# Patient Record
Sex: MALE | Race: WHITE | NOT HISPANIC OR LATINO | Employment: PART TIME | URBAN - METROPOLITAN AREA
[De-identification: names, ages, dates, MRNs, and addresses within clinical notes are randomized per-mention and may not be internally consistent; named-entity substitution may affect disease eponyms.]

---

## 2017-03-22 ENCOUNTER — OFFICE VISIT (OUTPATIENT)
Dept: MEDICAL GROUP | Facility: PHYSICIAN GROUP | Age: 57
End: 2017-03-22
Payer: COMMERCIAL

## 2017-03-22 VITALS
HEART RATE: 101 BPM | WEIGHT: 271 LBS | BODY MASS INDEX: 42.53 KG/M2 | TEMPERATURE: 97.5 F | HEIGHT: 67 IN | SYSTOLIC BLOOD PRESSURE: 130 MMHG | OXYGEN SATURATION: 97 % | DIASTOLIC BLOOD PRESSURE: 82 MMHG

## 2017-03-22 DIAGNOSIS — Z13.220 ENCOUNTER FOR SCREENING FOR LIPID DISORDER: ICD-10-CM

## 2017-03-22 DIAGNOSIS — Z12.11 SCREENING FOR COLON CANCER: ICD-10-CM

## 2017-03-22 DIAGNOSIS — M1A.0790 IDIOPATHIC CHRONIC GOUT OF ANKLE WITHOUT TOPHUS, UNSPECIFIED LATERALITY: ICD-10-CM

## 2017-03-22 DIAGNOSIS — Z13.21 ENCOUNTER FOR VITAMIN DEFICIENCY SCREENING: ICD-10-CM

## 2017-03-22 DIAGNOSIS — E66.01 MORBID OBESITY WITH BMI OF 40.0-44.9, ADULT (HCC): ICD-10-CM

## 2017-03-22 DIAGNOSIS — K31.84 GASTROPARESIS: ICD-10-CM

## 2017-03-22 DIAGNOSIS — I10 ESSENTIAL HYPERTENSION: ICD-10-CM

## 2017-03-22 PROBLEM — M1A.9XX0 CHRONIC GOUT: Status: ACTIVE | Noted: 2017-03-22

## 2017-03-22 PROBLEM — M10.9 GOUT: Status: ACTIVE | Noted: 2017-03-22

## 2017-03-22 PROCEDURE — 99204 OFFICE O/P NEW MOD 45 MIN: CPT | Performed by: FAMILY MEDICINE

## 2017-03-22 RX ORDER — LOSARTAN POTASSIUM 100 MG/1
100 TABLET ORAL DAILY
Qty: 30 TAB | Refills: 5 | Status: SHIPPED | OUTPATIENT
Start: 2017-03-22 | End: 2017-10-11 | Stop reason: SDUPTHER

## 2017-03-22 RX ORDER — LOSARTAN POTASSIUM 100 MG/1
100 TABLET ORAL DAILY
COMMUNITY
End: 2017-03-22 | Stop reason: SDUPTHER

## 2017-03-22 RX ORDER — HYDROCHLOROTHIAZIDE 25 MG/1
25 TABLET ORAL DAILY
Qty: 30 TAB | Refills: 5 | Status: SHIPPED | OUTPATIENT
Start: 2017-03-22 | End: 2018-02-23 | Stop reason: SDUPTHER

## 2017-03-22 RX ORDER — AMLODIPINE BESYLATE 2.5 MG/1
2.5 TABLET ORAL DAILY
Qty: 30 TAB | Refills: 5 | Status: SHIPPED | OUTPATIENT
Start: 2017-03-22 | End: 2017-10-11 | Stop reason: SDUPTHER

## 2017-03-22 RX ORDER — AMLODIPINE BESYLATE 2.5 MG/1
2.5 TABLET ORAL DAILY
COMMUNITY
End: 2017-03-22 | Stop reason: SDUPTHER

## 2017-03-22 RX ORDER — HYDROCHLOROTHIAZIDE 25 MG/1
25 TABLET ORAL DAILY
COMMUNITY
End: 2017-03-22 | Stop reason: SDUPTHER

## 2017-03-22 RX ORDER — ALLOPURINOL 300 MG/1
300 TABLET ORAL DAILY
Qty: 30 TAB | Refills: 5 | Status: SHIPPED | OUTPATIENT
Start: 2017-03-22 | End: 2017-10-11 | Stop reason: SDUPTHER

## 2017-03-22 RX ORDER — ALLOPURINOL 300 MG/1
300 TABLET ORAL DAILY
COMMUNITY
End: 2017-03-22 | Stop reason: SDUPTHER

## 2017-03-22 ASSESSMENT — PAIN SCALES - GENERAL: PAINLEVEL: NO PAIN

## 2017-03-22 ASSESSMENT — PATIENT HEALTH QUESTIONNAIRE - PHQ9: CLINICAL INTERPRETATION OF PHQ2 SCORE: 2

## 2017-03-22 NOTE — ASSESSMENT & PLAN NOTE
Patient had vagotomy surgery when he was 19 years old for a hiatal hernia. Ever since he has had some gastric issues which includes nausea, dizziness if he eats too much. Also associated with some abdominal pain and occasional diarrhea with certain types of foods. He has been seeing recently Dr. Rowland at [facility] and he had several studies including gastric emptying study and upper GI endoscopy and based on those studies he was diagnosed with gastroparesis. We will get records from [facility]. Since he changed his insurance he cannot see Dr. Rowland anymore. After we have gotten records he might need to be referred to a GI specialist within [facility].

## 2017-03-22 NOTE — ASSESSMENT & PLAN NOTE
This is a chronic condition and patient has been taking allopurinol 300 mg daily. He has not had any gout attacks for several years. He also has history of kidney stones twice almost 7-8 years back. Ever since being on allopurinol, he has not had any episode of kidney stones.

## 2017-03-22 NOTE — ASSESSMENT & PLAN NOTE
Has hypertension for several years. Has been taking hydrochlorothiazide 25 mg daily, losartan 100 mg daily, amlodipine 2.5 mg daily. Denies any headache or vision changes, leg edema, chest pain, shortness of breath. Checks his blood pressure occasionally at home and they usually within normal range.

## 2017-03-22 NOTE — PROGRESS NOTES
Venkat Mackenzie is a 56 y.o. male here for establishing care, discuss chronic medical conditions including hypertension and gastroparesis.  HPI: Patient is new to our clinic. His previous medical care was at [facility].    Gastroparesis  Patient had vagotomy surgery when he was 19 years old for a hiatal hernia. Ever since he has had some gastric issues which includes nausea, dizziness if he eats too much. Also associated with some abdominal pain and occasional diarrhea with certain types of foods. He has been seeing recently Dr. Rowland at [facility] and he had several studies including gastric emptying study and upper GI endoscopy and based on those studies he was diagnosed with gastroparesis. We will get records from [facility]. Since he changed his insurance he cannot see Dr. Rowland anymore. After we have gotten records he might need to be referred to a GI specialist within [facility].    Hypertension  Has hypertension for several years. Has been taking hydrochlorothiazide 25 mg daily, losartan 100 mg daily, amlodipine 2.5 mg daily. Denies any headache or vision changes, leg edema, chest pain, shortness of breath. Checks his blood pressure occasionally at home and they usually within normal range.    Chronic gout  This is a chronic condition and patient has been taking allopurinol 300 mg daily. He has not had any gout attacks for several years. He also has history of kidney stones twice almost 7-8 years back. Ever since being on allopurinol, he has not had any episode of kidney stones.    Morbid obesity with BMI of 40.0-44.9, adult (HCC)  Patient has generally is sedentary lifestyle. 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 He admits that his eating habits are not healthy and usually he will have 2 large meals a day. Also does not do any exercise. Recently with improvement in weather he is  thinking of getting a bike and start working out.      Current medicines (including changes today)  Current Outpatient Prescriptions   Medication Sig Dispense Refill   • amlodipine (NORVASC) 2.5 MG Tab Take 1 Tab by mouth every day. 30 Tab 5   • allopurinol (ZYLOPRIM) 300 MG Tab Take 1 Tab by mouth every day. 30 Tab 5   • hydrochlorothiazide (HYDRODIURIL) 25 MG Tab Take 1 Tab by mouth every day. 30 Tab 5   • losartan (COZAAR) 100 MG Tab Take 1 Tab by mouth every day. 30 Tab 5     No current facility-administered medications for this visit.     He  has a past medical history of Gastroparesis; Hypertension; and Kidney stones.  He  has past surgical history that includes vagotomy (at age 19); appendectomy; and cervical fusion posterior.  Social History   Substance Use Topics   • Smoking status: Never Smoker    • Smokeless tobacco: Former User     Types: Chew   • Alcohol Use: Yes      Comment: occasionally     Social History     Social History Narrative   • No narrative on file     Family History   Problem Relation Age of Onset   • Stroke Mother    • Heart Disease Mother    • Lung Disease Father      Family Status   Relation Status Death Age   • Mother Alive          ROS  Denies fever, chills, sweats, recent weight change  Skin: negative for rash, scaling, itching, pigmentation, hair or nail changes.  Eyes: negative for visual blurring, double vision, eye pain, floaters and discharge from eyes  Ears/Nose/Throat: negative for tinnitus, vertigo, bleeding gums, dental problem and hoarseness, frequent URI's, sinus trouble, persistent sore throat  Respiratory: negative for persistent cough, hemoptysis, dyspnea  Cardiovascular: negative for palpitations, irregular heart beat, chest pain, or peripheral edema.  Gastrointestinal: negative for poor appetite, dysphagia  Genitourinary: negative for nocturia, dysuria, frequency, hesitancy, incontinence  Musculoskeletal: negative for joint pain/swelling and muscle pain/ soreness, +  "left ankle cramps  Neurologic: negative for headaches, involuntary movements or tremor,muscle weakness  Psychiatric: negative for sleep disturbance, anxiety, depression     Objective:     Blood pressure 130/82, pulse 101, temperature 36.4 °C (97.5 °F), height 1.702 m (5' 7.01\"), weight 122.925 kg (271 lb), SpO2 97 %. Body mass index is 42.43 kg/(m^2).  Physical Exam:    GEN: Alert and oriented,well appearing, no acute distress  SKIN: warm, dry to touch, no rashes or lesions in visible areas  PSYCH: mood and affect normal, judgement normal  EYES: Conjunctiva clear, lids normal,pupils equal round and reactive  ENMT: Normal external nose and ears,EACs normal appearing, TM pearly gray with normal light reflex bilaterally,nasal mucosa and turbinates normal appearing without erythema or edema, lips without lesions,good dentition,oropharynx clear  Neck : Trachea midline, no masses or swelling, no thyromegaly  LYMPHATIC : No cervical or supraclavicular lymphadenopathy  RESPIRATORY : Unlabored respiratory effort, no distress noted, clear to auscultation bilaterally, no wheeze, rhonchi, crackles  CARDIOVASCULAR: RRR, S1 S2 normal, no murmurs , gallop , no carotid bruit, no edema of the extremities, pedal pulses B/L 2+  GI: Soft, Non tender, No rebound or guarding, no hepatosplenomegaly, no masses      Assessment and Plan:   The following treatment plan was discussed    1. Gastroparesis  We will get records from his surgeon at [facility].  After reviewing records, we will discuss the next best course of action which will most likely be referring him to a gastroenterologist within [facility].    2. Essential hypertension  Chronic condition but new to me. Well controlled. Continue current medications  - amlodipine (NORVASC) 2.5 MG Tab; Take 1 Tab by mouth every day.  Dispense: 30 Tab; Refill: 5  - hydrochlorothiazide (HYDRODIURIL) 25 MG Tab; Take 1 Tab by mouth every day.  Dispense: 30 Tab; Refill: 5  - " losartan (COZAAR) 100 MG Tab; Take 1 Tab by mouth every day.  Dispense: 30 Tab; Refill: 5  - CBC WITH DIFFERENTIAL; Future  - COMP METABOLIC PANEL; Future    3. Idiopathic chronic gout of ankle without tophus, unspecified laterality  Well-controlled. No gout attacks in recent years. We will check uric acid level. Continue current medication  - allopurinol (ZYLOPRIM) 300 MG Tab; Take 1 Tab by mouth every day.  Dispense: 30 Tab; Refill: 5  - URIC ACID; Future    4. Morbid obesity with BMI of 40.0-44.9, adult (CMS[facility])  - Patient identified as having weight management issue.  Appropriate orders and counseling given.  - REFERRAL TO [facility] IMPROVEMENT PROGRAMS (HIP) Services Requested:: Registered Dietitian for Medical Nutrition Therapy, Weight Management Program; Reason for Visit:: Overweight/Obesity      5. Encounter for vitamin deficiency screening  - VITAMIN D,25 HYDROXY; Future    6. Encounter for screening for lipid disorder  - LIPID PROFILE; Future    7. Screening for colon cancer  - REFERRAL TO GI FOR COLONOSCOPY      Records requested.  Followup: Return in about 3 months (around 6/22/2017) for follow up on chronic med conditions.         Please note that this dictation was created using voice recognition software. I have made every reasonable attempt to correct obvious errors, but I expect that there are errors of grammar and possibly content that I did not discover before finalizing the note.

## 2017-03-22 NOTE — ASSESSMENT & PLAN NOTE
Patient has generally is sedentary lifestyle. 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 He admits that his eating habits are not healthy and usually he will have 2 large meals a day. Also does not do any exercise. Recently with improvement in weather he is thinking of getting a bike and start working out.

## 2017-04-04 ENCOUNTER — HOSPITAL ENCOUNTER (OUTPATIENT)
Dept: LAB | Facility: MEDICAL CENTER | Age: 57
End: 2017-04-04
Attending: FAMILY MEDICINE
Payer: COMMERCIAL

## 2017-04-04 DIAGNOSIS — Z13.220 ENCOUNTER FOR SCREENING FOR LIPID DISORDER: ICD-10-CM

## 2017-04-04 DIAGNOSIS — Z13.21 ENCOUNTER FOR VITAMIN DEFICIENCY SCREENING: ICD-10-CM

## 2017-04-04 DIAGNOSIS — D89.2 HYPERGAMMAGLOBULINEMIA: ICD-10-CM

## 2017-04-04 DIAGNOSIS — R73.9 ELEVATED BLOOD SUGAR: ICD-10-CM

## 2017-04-04 DIAGNOSIS — M1A.0790 IDIOPATHIC CHRONIC GOUT OF ANKLE WITHOUT TOPHUS, UNSPECIFIED LATERALITY: ICD-10-CM

## 2017-04-04 DIAGNOSIS — I10 ESSENTIAL HYPERTENSION: ICD-10-CM

## 2017-04-04 DIAGNOSIS — E55.9 VITAMIN D DEFICIENCY: ICD-10-CM

## 2017-04-04 LAB
25(OH)D3 SERPL-MCNC: 9 NG/ML (ref 30–100)
ALBUMIN SERPL BCP-MCNC: 4.3 G/DL (ref 3.2–4.9)
ALBUMIN/GLOB SERPL: 1.2 G/DL
ALP SERPL-CCNC: 60 U/L (ref 30–99)
ALT SERPL-CCNC: 51 U/L (ref 2–50)
ANION GAP SERPL CALC-SCNC: 11 MMOL/L (ref 0–11.9)
AST SERPL-CCNC: 38 U/L (ref 12–45)
BASOPHILS # BLD AUTO: 1 % (ref 0–1.8)
BASOPHILS # BLD: 0.07 K/UL (ref 0–0.12)
BILIRUB SERPL-MCNC: 0.7 MG/DL (ref 0.1–1.5)
BUN SERPL-MCNC: 15 MG/DL (ref 8–22)
CALCIUM SERPL-MCNC: 9.7 MG/DL (ref 8.5–10.5)
CHLORIDE SERPL-SCNC: 102 MMOL/L (ref 96–112)
CHOLEST SERPL-MCNC: 122 MG/DL (ref 100–199)
CO2 SERPL-SCNC: 24 MMOL/L (ref 20–33)
CREAT SERPL-MCNC: 0.81 MG/DL (ref 0.5–1.4)
EOSINOPHIL # BLD AUTO: 0.32 K/UL (ref 0–0.51)
EOSINOPHIL NFR BLD: 4.4 % (ref 0–6.9)
ERYTHROCYTE [DISTWIDTH] IN BLOOD BY AUTOMATED COUNT: 44.6 FL (ref 35.9–50)
GFR SERPL CREATININE-BSD FRML MDRD: >60 ML/MIN/1.73 M 2
GLOBULIN SER CALC-MCNC: 3.6 G/DL (ref 1.9–3.5)
GLUCOSE SERPL-MCNC: 113 MG/DL (ref 65–99)
HCT VFR BLD AUTO: 50.7 % (ref 42–52)
HDLC SERPL-MCNC: 45 MG/DL
HGB BLD-MCNC: 16.9 G/DL (ref 14–18)
IMM GRANULOCYTES # BLD AUTO: 0.05 K/UL (ref 0–0.11)
IMM GRANULOCYTES NFR BLD AUTO: 0.7 % (ref 0–0.9)
LDLC SERPL CALC-MCNC: 59 MG/DL
LYMPHOCYTES # BLD AUTO: 2.29 K/UL (ref 1–4.8)
LYMPHOCYTES NFR BLD: 31.2 % (ref 22–41)
MCH RBC QN AUTO: 30 PG (ref 27–33)
MCHC RBC AUTO-ENTMCNC: 33.3 G/DL (ref 33.7–35.3)
MCV RBC AUTO: 89.9 FL (ref 81.4–97.8)
MONOCYTES # BLD AUTO: 0.68 K/UL (ref 0–0.85)
MONOCYTES NFR BLD AUTO: 9.3 % (ref 0–13.4)
NEUTROPHILS # BLD AUTO: 3.94 K/UL (ref 1.82–7.42)
NEUTROPHILS NFR BLD: 53.4 % (ref 44–72)
NRBC # BLD AUTO: 0 K/UL
NRBC BLD AUTO-RTO: 0 /100 WBC
PLATELET # BLD AUTO: 283 K/UL (ref 164–446)
PMV BLD AUTO: 10.7 FL (ref 9–12.9)
POTASSIUM SERPL-SCNC: 3.6 MMOL/L (ref 3.6–5.5)
PROT SERPL-MCNC: 7.9 G/DL (ref 6–8.2)
RBC # BLD AUTO: 5.64 M/UL (ref 4.7–6.1)
SODIUM SERPL-SCNC: 137 MMOL/L (ref 135–145)
TRIGL SERPL-MCNC: 88 MG/DL (ref 0–149)
URATE SERPL-MCNC: 5.9 MG/DL (ref 2.5–8.3)
WBC # BLD AUTO: 7.4 K/UL (ref 4.8–10.8)

## 2017-04-04 PROCEDURE — 82306 VITAMIN D 25 HYDROXY: CPT

## 2017-04-04 PROCEDURE — 84550 ASSAY OF BLOOD/URIC ACID: CPT

## 2017-04-04 PROCEDURE — 36415 COLL VENOUS BLD VENIPUNCTURE: CPT

## 2017-04-04 PROCEDURE — 80053 COMPREHEN METABOLIC PANEL: CPT

## 2017-04-04 PROCEDURE — 85025 COMPLETE CBC W/AUTO DIFF WBC: CPT

## 2017-04-04 PROCEDURE — 80061 LIPID PANEL: CPT

## 2017-04-18 PROBLEM — Z86.010 HISTORY OF ADENOMATOUS POLYP OF COLON: Status: ACTIVE | Noted: 2017-04-18

## 2017-04-18 PROBLEM — Z86.0101 HISTORY OF ADENOMATOUS POLYP OF COLON: Status: ACTIVE | Noted: 2017-04-18

## 2017-04-29 ENCOUNTER — HOSPITAL ENCOUNTER (OUTPATIENT)
Dept: LAB | Facility: MEDICAL CENTER | Age: 57
End: 2017-04-29
Attending: FAMILY MEDICINE
Payer: COMMERCIAL

## 2017-04-29 DIAGNOSIS — R73.9 ELEVATED BLOOD SUGAR: ICD-10-CM

## 2017-04-29 DIAGNOSIS — D89.2 HYPERGAMMAGLOBULINEMIA: ICD-10-CM

## 2017-04-29 LAB
EST. AVERAGE GLUCOSE BLD GHB EST-MCNC: 128 MG/DL
HBA1C MFR BLD: 6.1 % (ref 0–5.6)

## 2017-04-29 PROCEDURE — 36415 COLL VENOUS BLD VENIPUNCTURE: CPT

## 2017-04-29 PROCEDURE — 84165 PROTEIN E-PHORESIS SERUM: CPT

## 2017-04-29 PROCEDURE — 83036 HEMOGLOBIN GLYCOSYLATED A1C: CPT

## 2017-04-29 PROCEDURE — 84160 ASSAY OF PROTEIN ANY SOURCE: CPT

## 2017-05-03 LAB
ALBUMIN SERPL-MCNC: 4.14 G/DL (ref 3.75–5.01)
ALPHA1 GLOB SERPL ELPH-MCNC: 0.29 G/DL (ref 0.19–0.46)
ALPHA2 GLOB SERPL ELPH-MCNC: 0.85 G/DL (ref 0.48–1.05)
B-GLOBULIN SERPL ELPH-MCNC: 0.89 G/DL (ref 0.48–1.1)
GAMMA GLOB SERPL ELPH-MCNC: 1.03 G/DL (ref 0.62–1.51)
INTERPRETATION SERPL IFE-IMP: NORMAL
MONOCLON BAND OBS SERPL: NORMAL
PATHOLOGY STUDY: NORMAL
PROT SERPL-MCNC: 7.2 G/DL (ref 6–8.3)

## 2017-05-09 ENCOUNTER — OFFICE VISIT (OUTPATIENT)
Dept: MEDICAL GROUP | Facility: PHYSICIAN GROUP | Age: 57
End: 2017-05-09
Payer: COMMERCIAL

## 2017-05-09 VITALS
DIASTOLIC BLOOD PRESSURE: 76 MMHG | OXYGEN SATURATION: 94 % | HEART RATE: 79 BPM | BODY MASS INDEX: 41.59 KG/M2 | HEIGHT: 67 IN | SYSTOLIC BLOOD PRESSURE: 110 MMHG | WEIGHT: 265 LBS | TEMPERATURE: 97.3 F

## 2017-05-09 DIAGNOSIS — E88.810 METABOLIC SYNDROME: ICD-10-CM

## 2017-05-09 DIAGNOSIS — K21.9 GASTROESOPHAGEAL REFLUX DISEASE WITHOUT ESOPHAGITIS: ICD-10-CM

## 2017-05-09 DIAGNOSIS — E55.9 VITAMIN D DEFICIENCY: ICD-10-CM

## 2017-05-09 PROCEDURE — 99214 OFFICE O/P EST MOD 30 MIN: CPT | Performed by: FAMILY MEDICINE

## 2017-05-09 RX ORDER — LANSOPRAZOLE 30 MG/1
30 CAPSULE, DELAYED RELEASE ORAL DAILY
Qty: 30 CAP | Refills: 2 | Status: SHIPPED | OUTPATIENT
Start: 2017-05-09 | End: 2017-10-11 | Stop reason: SDUPTHER

## 2017-05-09 RX ORDER — METFORMIN HYDROCHLORIDE 500 MG/1
500 TABLET, EXTENDED RELEASE ORAL DAILY
Qty: 30 TAB | Refills: 5 | Status: SHIPPED | OUTPATIENT
Start: 2017-05-09 | End: 2017-11-16 | Stop reason: SDUPTHER

## 2017-05-09 ASSESSMENT — PAIN SCALES - GENERAL: PAINLEVEL: 3=SLIGHT PAIN

## 2017-05-09 NOTE — Clinical Note
[facility]  Jamison Diaz M.D.  [address]  [facility] [address]  Fax: [phone]   Authorization for Release/Disclosure of   Protected Health Information   Name: VENKAT WOO : 1960 SSN: [id number]   Address: [address] [place] [id number] Phone:    [phone] (home)    I authorize the entity listed below to release/disclose the PHI below to:   [facility]/Jamison Diaz M.D. and Jamison Diaz M.D.   Provider or Entity Name:  HENRY JONES   Address   City, State, [facility]   Phone:  [phone]    Fax:  [phone]   Reason for request: continuity of care   Information to be released:    [XXX  ] LAST COLONOSCOPY,  including any PATH REPORT and follow-up  [  ] LAST FIT/COLOGUARD RESULT [  ] LAST DEXA  [  ] LAST MAMMOGRAM  [  ] LAST PAP  [  ] LAST LABS [  ] RETINA EXAM REPORT  [  ] IMMUNIZATION RECORDS  [  ] Release all info      [  ] Check here and initial the line next to each item to release ALL health information INCLUDING  _____ Care and treatment for drug and / or alcohol abuse  _____ HIV testing, infection status, or AIDS  _____ Genetic Testing    DATES OF SERVICE OR TIME PERIOD TO BE DISCLOSED: _____________  I understand and acknowledge that:  * This Authorization may be revoked at any time by you in writing, except if your health information has already been used or disclosed.  * Your health information that will be used or disclosed as a result of you signing this authorization could be re-disclosed by the recipient. If this occurs, your re-disclosed health information may no longer be protected by State or Federal laws.  * You may refuse to sign this Authorization. Your refusal will not affect your ability to obtain treatment.  * This Authorization becomes effective upon signing and will  on (date) __________.      If no date is indicated, this Authorization will  one (1) year from the signature date.    Name: Venkat Woo    Signature:   Date:          5/9/2017       PLEASE FAX REQUESTED RECORDS BACK TO: [phone]

## 2017-05-09 NOTE — PROGRESS NOTES
Subjective:   Venkat Mackenzie is a 56 y.o. male here today to discuss blood test results including prediabetes, vitamin D deficiency and new complaint of chronic cough.     Patient is here today to discuss his abnormal blood test results including elevated hemoglobin A1c. He states that his trying to start working out. He tries to do a lot of work in the yard and walks regularly. He is thinking of starting to do biking.    He also complains of a chronic cough for several months. Cough is generally dry and worse after heavy meals and at nighttime when he lies down in bed. He does have occasional heartburn, especially after heavy meals. He does not have any shortness of breath, chest pain, fever, chills, nasal congestion. He has a history of vagotomy done several years back and was recently diagnosed with gastroparesis by Dr. Rowland. We do not have records of all the gastric evaluations including gastric emptying study, upper GI endoscopy that was done last year.  He has been taking over-the-counter Tums and Prilosec which seems to help initially but then the effect wears off.    He denies any dysphagia, recent weight loss.    Current medicines (including changes today)  Current Outpatient Prescriptions   Medication Sig Dispense Refill   • lansoprazole (PREVACID) 30 MG CAPSULE DELAYED RELEASE Take 1 Cap by mouth every day. 30 Cap 2   • metformin ER (GLUCOPHAGE XR) 500 MG TABLET SR 24 HR Take 1 Tab by mouth every day. Take with food 30 Tab 5   • Cholecalciferol (VITAMIN D3) 5000 UNITS Cap Take 1 Cap by mouth every day. 30 Cap 2   • amlodipine (NORVASC) 2.5 MG Tab Take 1 Tab by mouth every day. 30 Tab 5   • allopurinol (ZYLOPRIM) 300 MG Tab Take 1 Tab by mouth every day. 30 Tab 5   • hydrochlorothiazide (HYDRODIURIL) 25 MG Tab Take 1 Tab by mouth every day. 30 Tab 5   • losartan (COZAAR) 100 MG Tab Take 1 Tab by mouth every day. 30 Tab 5     No current facility-administered medications for this visit.     He  has a past  "medical history of Gastroparesis; Hypertension; and Kidney stones.    ROS   See history of present illness  No chest pain, no shortness of breath, no abdominal pain       Objective:     Blood pressure 110/76, pulse 79, temperature 36.3 °C (97.3 °F), height 1.702 m (5' 7.01\"), weight 120.203 kg (265 lb), SpO2 94 %. Body mass index is 41.5 kg/(m^2).     PHYSICAL EXAM     GEN: Alert and oriented,well appearing, no acute distress  SKIN: warm, dry to touch, no rashes or lesions in visible areas  PSYCH: mood and affect normal, judgement normal  LYMPHATIC : No cervical or supraclavicular lymphadenopathy  RESPIRATORY : Unlabored respiratory effort, no distress noted, clear to auscultation bilaterally, no wheeze, rhonchi, crackles  CARDIOVASCULAR: RRR, S1 S2 normal, no murmurs  GI: Soft, Non tender, No rebound or guarding, no hepatosplenomegaly, no masses      Assessment and Plan:   The following treatment plan was discussed    1. Metabolic syndrome  New diagnosis.HbA1C 6.1  Meets criteria for Metabolic syndrome : elevated BG, obesity, HTN.Encouraged patient to increase physical activity and make diet changes.  - metformin ER (GLUCOPHAGE XR) 500 MG TABLET SR 24 HR; Take 1 Tab by mouth every day. Take with food  Dispense: 30 Tab; Refill: 5    2. Vitamin D deficiency  New diagnosis.Will recheck Vitamin D level in 2 months  - Cholecalciferol (VITAMIN D3) 5000 UNITS Cap; Take 1 Cap by mouth every day.  Dispense: 30 Cap; Refill: 2  - VITAMIN D,25 HYDROXY; Future    3. Gastroesophageal reflux disease without esophagitis  New problem to me.Cough likely related to GERD.Will try a different PPI which may work better.Will obtain records from   - lansoprazole (PREVACID) 30 MG CAPSULE DELAYED RELEASE; Take 1 Cap by mouth every day.  Dispense: 30 Cap; Refill: 2      Followup: Return in about 6 months (around 11/9/2017) for follow up on chronic med conditions.         Please note that this dictation was created using voice " recognition software. I have made every reasonable attempt to correct obvious errors, but I expect that there are errors of grammar and possibly content that I did not discover before finalizing the note.

## 2017-05-09 NOTE — MR AVS SNAPSHOT
"        Venkat Mackenzie   2017 9:40 AM   Office Visit   MRN: [id number]    Department:  [facility]   Dept Phone:  [phone]    Description:  Male : 1960   Provider:  Jamison Diaz M.D.           Allergies as of 2017     Allergen Noted Reactions    Iodine 2017   Unspecified    dizziness      You were diagnosed with     Metabolic syndrome   [471874]       Vitamin D deficiency   [8600421]       Gastroesophageal reflux disease without esophagitis   [284810]         Vital Signs     Blood Pressure Pulse Temperature Height Weight Body Mass Index    110/76 mmHg 79 36.3 °C (97.3 °F) 1.702 m (5' 7.01\") 120.203 kg (265 lb) 41.50 kg/m2    Oxygen Saturation Smoking Status                94% Never Smoker           Basic Information     Date Of Birth Sex Race Ethnicity Preferred Language    1960 Male White Non- English      Problem List              ICD-10-CM Priority Class Noted - Resolved    Gastroparesis K31.84   Unknown - Present    Hypertension I10   Unknown - Present    Chronic gout M1A.[id number]   3/22/2017 - Present    Morbid obesity with BMI of 40.0-44.9, adult (HCC) E66.01, Z68.41   3/22/2017 - Present    Vitamin D deficiency E55.9   2017 - Present    History of adenomatous polyp of colon Z86.010   2017 - Present    Metabolic syndrome E88.81   2017 - Present    Gastroesophageal reflux disease without esophagitis K21.9   2017 - Present      Health Maintenance        Date Due Completion Dates    IMM DTaP/Tdap/Td Vaccine (1 - Tdap) 1977 ---    COLONOSCOPY 2022            Current Immunizations     Influenza TIV (IM) 10/8/2015      Below and/or attached are the medications your provider expects you to take. Review all of your home medications and newly ordered medications with your provider and/or pharmacist. Follow medication instructions as directed by your provider and/or pharmacist. Please keep your medication list with you and share with your " provider. Update the information when medications are discontinued, doses are changed, or new medications (including over-the-counter products) are added; and carry medication information at all times in the event of emergency situations     Allergies:  IODINE - Unspecified               Medications  Valid as of: May 09, 2017 - 10:03 AM    Generic Name Brand Name Tablet Size Instructions for use    Allopurinol (Tab) ZYLOPRIM 300 MG Take 1 Tab by mouth every day.        AmLODIPine Besylate (Tab) NORVASC 2.5 MG Take 1 Tab by mouth every day.        Cholecalciferol (Cap) vitamin D3 5000 UNITS Take 1 Cap by mouth every day.        HydroCHLOROthiazide (Tab) HYDRODIURIL 25 MG Take 1 Tab by mouth every day.        Lansoprazole (CAPSULE DELAYED RELEASE) PREVACID 30 MG Take 1 Cap by mouth every day.        Losartan Potassium (Tab) COZAAR 100 MG Take 1 Tab by mouth every day.        MetFORMIN HCl (TABLET SR 24 HR) GLUCOPHAGE  MG Take 1 Tab by mouth every day. Take with food        .                 Medicines prescribed today were sent to:     [facility]  [place], [place] - [address]    [facility] [facility] [place] [address]    Phone: [phone] Fax: [phone]    Open 24 Hours?: No      Medication refill instructions:       If your prescription bottle indicates you have medication refills left, it is not necessary to call your provider’s office. Please contact your pharmacy and they will refill your medication.    If your prescription bottle indicates you do not have any refills left, you may request refills at any time through one of the following ways: The online [facility] system (except Urgent Care), by calling your provider’s office, or by asking your pharmacy to contact your provider’s office with a refill request. Medication refills are processed only during regular business hours and may not be available until the next business day. Your provider may request additional information or to have a follow-up visit  with you prior to refilling your medication.   *Please Note: Medication refills are assigned a new Rx number when refilled electronically. Your pharmacy may indicate that no refills were authorized even though a new prescription for the same medication is available at the pharmacy. Please request the medicine by name with the pharmacy before contacting your provider for a refill.        Your To Do List     Future Labs/Procedures Complete By Expires    VITAMIN D,25 HYDROXY  As directed 7/10/2018         [employer] Access Code: Activation code not generated  Current [employer] Status: Active

## 2017-07-13 ENCOUNTER — OFFICE VISIT (OUTPATIENT)
Dept: MEDICAL GROUP | Facility: PHYSICIAN GROUP | Age: 57
End: 2017-07-13
Payer: COMMERCIAL

## 2017-07-13 VITALS
HEART RATE: 86 BPM | OXYGEN SATURATION: 94 % | WEIGHT: 256 LBS | TEMPERATURE: 98.1 F | DIASTOLIC BLOOD PRESSURE: 78 MMHG | HEIGHT: 67 IN | SYSTOLIC BLOOD PRESSURE: 108 MMHG | BODY MASS INDEX: 40.18 KG/M2

## 2017-07-13 DIAGNOSIS — R41.3 MEMORY DIFFICULTY: ICD-10-CM

## 2017-07-13 DIAGNOSIS — B07.9 VIRAL WARTS, UNSPECIFIED TYPE: ICD-10-CM

## 2017-07-13 DIAGNOSIS — M25.552 LEFT HIP PAIN: ICD-10-CM

## 2017-07-13 PROBLEM — M79.18 LEFT BUTTOCK PAIN: Status: RESOLVED | Noted: 2017-07-13 | Resolved: 2017-07-13

## 2017-07-13 PROBLEM — M79.18 LEFT BUTTOCK PAIN: Status: ACTIVE | Noted: 2017-07-13

## 2017-07-13 PROCEDURE — 99214 OFFICE O/P EST MOD 30 MIN: CPT | Mod: 25 | Performed by: FAMILY MEDICINE

## 2017-07-13 PROCEDURE — 17110 DESTRUCTION B9 LES UP TO 14: CPT | Performed by: FAMILY MEDICINE

## 2017-07-13 RX ORDER — IBUPROFEN 600 MG/1
600 TABLET ORAL 3 TIMES DAILY PRN
Qty: 60 TAB | Refills: 0 | Status: SHIPPED | OUTPATIENT
Start: 2017-07-13 | End: 2017-08-28 | Stop reason: SDUPTHER

## 2017-07-13 ASSESSMENT — PAIN SCALES - GENERAL: PAINLEVEL: 2=MINIMAL-SLIGHT

## 2017-07-13 NOTE — ASSESSMENT & PLAN NOTE
Patient reports that he has been having left hip pain, posterior aspect with radiation down the back of the thigh for several weeks. This is worse when he sits for a long time. 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 He describes it as a sharp pain and it is worse when he squats or stands up after prolonged sitting.  He denies any weakness, numbness, tingling. He is not taking any over-the-counter medications for pain. No known relieving factors to the pain.

## 2017-07-13 NOTE — MR AVS SNAPSHOT
"        Venkat Gurdeep   2017 10:20 AM   Office Visit   MRN: [id number]    Department:  [facility]   Dept Phone:  [phone]    Description:  Male : 1960   Provider:  Jamison Diaz M.D.           Reason for Visit     Warts on left calf      Allergies as of 2017     Allergen Noted Reactions    Iodine 2017   Unspecified    dizziness      You were diagnosed with     Viral warts, unspecified type   [2793136]       Left hip pain   [168939]       Memory difficulty   [807012]         Vital Signs     Blood Pressure Pulse Temperature Height Weight Body Mass Index    108/78 mmHg 86 36.7 °C (98.1 °F) 1.702 m (5' 7.01\") 116.121 kg (256 lb) 40.09 kg/m2    Oxygen Saturation Smoking Status                94% Never Smoker           Basic Information     Date Of Birth Sex Race Ethnicity Preferred Language    1960 Male White Non- English      Your appointments     Aug 17, 2017 10:00 AM   Established Patient with Jamison Diaz M.D.   [address] [address]   [phone]           You will be receiving a confirmation call a few days before your appointment from our automated call confirmation system.              Problem List              ICD-10-CM Priority Class Noted - Resolved    Gastroparesis K31.84   Unknown - Present    Hypertension I10   Unknown - Present    Chronic gout M1A.9XX0   3/22/2017 - Present    Morbid obesity with BMI of 40.0-44.9, adult (HCC) E66.01, Z68.41   3/22/2017 - Present    Vitamin D deficiency E55.9   2017 - Present    History of adenomatous polyp of colon Z86.010   2017 - Present    Metabolic syndrome E88.81   2017 - Present    Gastroesophageal reflux disease without esophagitis K21.9   2017 - Present    Cutaneous wart B07.9   2017 - Present    Left buttock pain M79.1   2017 - Present    Memory difficulty R41.3   2017 - Present      Health Maintenance        Date Due " Completion Dates    IMM DTaP/Tdap/Td Vaccine (1 - Tdap) 8/5/1977 ---    IMM INFLUENZA (1) 9/1/2017 10/8/2015    COLONOSCOPY 4/13/2022 4/13/2017            Current Immunizations     Influenza TIV (IM) 10/8/2015      Below and/or attached are the medications your provider expects you to take. Review all of your home medications and newly ordered medications with your provider and/or pharmacist. Follow medication instructions as directed by your provider and/or pharmacist. Please keep your medication list with you and share with your provider. Update the information when medications are discontinued, doses are changed, or new medications (including over-the-counter products) are added; and carry medication information at all times in the event of emergency situations     Allergies:  IODINE - Unspecified               Medications  Valid as of: July 13, 2017 - 10:59 AM    Generic Name Brand Name Tablet Size Instructions for use    Allopurinol (Tab) ZYLOPRIM 300 MG Take 1 Tab by mouth every day.        AmLODIPine Besylate (Tab) NORVASC 2.5 MG Take 1 Tab by mouth every day.        Cholecalciferol (Cap) vitamin D3 5000 UNITS Take 1 Cap by mouth every day.        HydroCHLOROthiazide (Tab) HYDRODIURIL 25 MG Take 1 Tab by mouth every day.        Ibuprofen (Tab) MOTRIN 600 MG Take 1 Tab by mouth 3 times a day as needed.        Lansoprazole (CAPSULE DELAYED RELEASE) PREVACID 30 MG Take 1 Cap by mouth every day.        Losartan Potassium (Tab) COZAAR 100 MG Take 1 Tab by mouth every day.        MetFORMIN HCl (TABLET SR 24 HR) GLUCOPHAGE  MG Take 1 Tab by mouth every day. Take with food        .                 Medicines prescribed today were sent to:     [facility] - [place], [place] - [address]    [address] [place] [address]    Phone: [phone] Fax: [phone]    Open 24 Hours?: No      Medication refill instructions:       If your prescription bottle indicates you have medication refills left, it is not  necessary to call your provider’s office. Please contact your pharmacy and they will refill your medication.    If your prescription bottle indicates you do not have any refills left, you may request refills at any time through one of the following ways: The online [employer]Juvo system (except Urgent Care), by calling your provider’s office, or by asking your pharmacy to contact your provider’s office with a refill request. Medication refills are processed only during regular business hours and may not be available until the next business day. Your provider may request additional information or to have a follow-up visit with you prior to refilling your medication.   *Please Note: Medication refills are assigned a new Rx number when refilled electronically. Your pharmacy may indicate that no refills were authorized even though a new prescription for the same medication is available at the pharmacy. Please request the medicine by name with the pharmacy before contacting your provider for a refill.        Your To Do List     Future Labs/Procedures Complete By Expires    MR-BRAIN-W/O  As directed 1/13/2018    TSH  As directed 7/13/2018    VITAMIN B12  As directed 7/14/2018    WESTERGREN SED RATE  As directed 7/14/2018      Referral     A referral request has been sent to our patient care coordination department. Please allow 3-5 business days for us to process this request and contact you either by phone or mail. If you do not hear from us by the 5th business day, please call us at [phone].           coJuvo Access Code: Activation code not generated  Current coJuvo Status: Active

## 2017-07-13 NOTE — PROGRESS NOTES
Subjective:   Venkat Mackenzie is a 56 y.o. male here today for  skin lesion, memory issues, left hip pain    Cutaneous wart  Patient is here with complaint of multiple skin lesions on his left calf, and left tibia. The skin lesion on the left calf is the largest and it has been growing in size recently. He did accidentally scrape it and it bled.  It is not painful or itchy. It does have thick wartlike texture. He has not been applying anything to the area. He has noticed the skin lesion for about 6 months. He also has 2 small areas of wartlike lesion on the tibia of the left leg.    Memory difficulty  He reports that he has been having some memory issues for about the last 6 months. This is mostly at work and sometimes at home where he forgets where he kept something or if someone showed him something at work, he would forget it immediately afterwards. He does not have any problems remembering names, places, driving or accounting. He has not had any major problems because of memory difficulty.    Left hip pain  Patient reports that he has been having left hip pain, posterior aspect with radiation down the back of the thigh for several weeks. This is worse when he sits for a long time. 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 He describes it as a sharp pain and it is worse when he squats or stands up after prolonged sitting.  He denies any weakness, numbness, tingling. He is not taking any over-the-counter medications for pain. No known relieving factors to the pain.         Current medicines (including changes today)  Current Outpatient Prescriptions   Medication Sig Dispense Refill   • ibuprofen (MOTRIN) 600 MG Tab Take 1 Tab by mouth 3 times a day as needed. 60 Tab 0   • lansoprazole (PREVACID) 30 MG CAPSULE DELAYED RELEASE Take 1 Cap by mouth every day. 30 Cap 2   • metformin ER (GLUCOPHAGE XR) 500 MG TABLET SR 24 HR Take 1 Tab by mouth every day. Take with food 30 Tab 5   •  "Cholecalciferol (VITAMIN D3) 5000 UNITS Cap Take 1 Cap by mouth every day. 30 Cap 2   • amlodipine (NORVASC) 2.5 MG Tab Take 1 Tab by mouth every day. 30 Tab 5   • allopurinol (ZYLOPRIM) 300 MG Tab Take 1 Tab by mouth every day. 30 Tab 5   • hydrochlorothiazide (HYDRODIURIL) 25 MG Tab Take 1 Tab by mouth every day. 30 Tab 5   • losartan (COZAAR) 100 MG Tab Take 1 Tab by mouth every day. 30 Tab 5     No current facility-administered medications for this visit.     He  has a past medical history of Gastroparesis; Hypertension; and Kidney stones.    ROS   No chest pain, no shortness of breath, no abdominal pain  No weakness, numbness  No cough, no fever, chills  See history of present illness     Objective:     Blood pressure 108/78, pulse 86, temperature 36.7 °C (98.1 °F), height 1.702 m (5' 7.01\"), weight 116.121 kg (256 lb), SpO2 94 %. Body mass index is 40.09 kg/(m^2).     PHYSICAL EXAM     GEN: Alert and oriented,well appearing, no acute distress  SKIN: warm, dry to touch, 1 cm size common wart on left calf, smaller warts X 2 on the left tibia  PSYCH: mood and affect normal, judgement normal  EYES: Conjunctiva clear, lids normal,pupils equal round and reactive  ENMT: Normal external nose and ears,EACs normal appearing, TM pearly gray with normal light reflex bilaterally,nasal mucosa and turbinates normal appearing without erythema or edema, lips without lesions,good dentition,oropharynx clear  Neck : Trachea midline, no masses or swelling, no thyromegaly  LYMPHATIC : No cervical or supraclavicular lymphadenopathy  RESPIRATORY : Unlabored respiratory effort, no distress noted, clear to auscultation bilaterally, no wheeze, rhonchi, crackles  CARDIOVASCULAR: RRR, S1 S2 normal, no murmurs , gallop , no carotid bruit, no edema of the extremities, pedal pulses B/L 2+  GI: Soft, Non tender, No rebound or guarding, no hepatosplenomegaly, no masses  MUSCULOSKELETAL : Normal gait and stance, ROM at hip full, mild tenderness " to palpation in the left gluteal region, muscle strength 5/5  NEURO: No overt focal neurologic deficits,sensation intact    CRYOTHERAPY:  Discussed risks and benefits of cryotherapy. Patient verbally agreed. 3 applications of cryotherapy were applied to the 3 warts on left leg as described above. Patient tolerated procedure well. Aftercare instructions given.      Assessment and Plan:   The following treatment plan was discussed    1. Viral warts, unspecified type  New problem  Cryotherapy done in clinic today.  Follow up in 4 weeks.May need repeat cryotherapy for the posterior leg wart    2. 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 Referral placed to physical therapy. Rx sent to pharmacy for ibuprofen.   Advised heat application locally and Aspercreme  - REFERRAL TO PHYSICAL THERAPY Reason for Therapy: Eval/Treat/Report  - ibuprofen (MOTRIN) 600 MG Tab; Take 1 Tab by mouth 3 times a day as needed.  Dispense: 60 Tab; Refill: 0    3. Memory difficulty  New problem.  Will order brain MRI and labs for further evaluation.  - TSH; Future  - VITAMIN B12; Future  - WESTERGREN SED RATE; Future  - MR-BRAIN-W/O; Future    Followup: Return in about 4 weeks (around 8/10/2017) for follow up on wart, hip pain, chronic issues.         Please note that this dictation was created using voice recognition software. I have made every reasonable attempt to correct obvious errors, but I expect that there are errors of grammar and possibly content that I did not discover before finalizing the note.

## 2017-07-13 NOTE — ASSESSMENT & PLAN NOTE
He reports that he has been having some memory issues for about the last 6 months. This is mostly at work and sometimes at home where he forgets where he kept something or if someone showed him something at work, he would forget it immediately afterwards. He does not have any problems remembering names, places, driving or accounting. He has not had any major problems because of memory difficulty.

## 2017-07-13 NOTE — ASSESSMENT & PLAN NOTE
Patient is here with complaint of multiple skin lesions on his left calf, and left tibia. The skin lesion on the left calf is the largest and it has been growing in size recently. He did accidentally scrape it and it bled.  It is not painful or itchy. It does have thick wartlike texture. He has not been applying anything to the area. He has noticed the skin lesion for about 6 months. He also has 2 small areas of wartlike lesion on the tibia of the left leg.

## 2017-08-16 ENCOUNTER — TELEPHONE (OUTPATIENT)
Dept: MEDICAL GROUP | Facility: PHYSICIAN GROUP | Age: 57
End: 2017-08-16

## 2017-08-16 NOTE — TELEPHONE ENCOUNTER
ESTABLISHED PATIENT PRE-VISIT PLANNING     Note: Patient will not be contacted if there is no indication to call.     1.  Reviewed notes from the last few office visits within the medical group: Yes    2.  If any orders were placed at last visit or intended to be done for this visit (i.e. 6 mos follow-up), do we have Results/Consult Notes?        •  Labs - Labs ordered, NOT completed. Patient advised to complete prior to next appointment.        •  Imaging - Imaging ordered, NOT completed. Patient advised to complete prior to next appointment. Pt cancelled appt       •  Referrals - Referral ordered, patient has NOT been seen.    3. Is this appointment scheduled as a Hospital Follow-Up? No    4.  Immunizations were updated in Epic using WebIZ?: Epic matches WebIZ       •  Web Iz Recommendations: TDAP    5.  Patient is due for the following Health Maintenance Topics:   Health Maintenance Due   Topic Date Due   • IMM DTaP/Tdap/Td Vaccine (1 - Tdap) 08/05/1977           6.  Patient was informed to arrive 15 min prior to their scheduled appointment and bring in their medication bottles.

## 2017-08-17 ENCOUNTER — HOSPITAL ENCOUNTER (OUTPATIENT)
Dept: LAB | Facility: MEDICAL CENTER | Age: 57
End: 2017-08-17
Attending: FAMILY MEDICINE
Payer: COMMERCIAL

## 2017-08-17 ENCOUNTER — HOSPITAL ENCOUNTER (OUTPATIENT)
Facility: MEDICAL CENTER | Age: 57
End: 2017-08-17
Attending: FAMILY MEDICINE
Payer: COMMERCIAL

## 2017-08-17 ENCOUNTER — OFFICE VISIT (OUTPATIENT)
Dept: MEDICAL GROUP | Facility: PHYSICIAN GROUP | Age: 57
End: 2017-08-17
Payer: COMMERCIAL

## 2017-08-17 VITALS
SYSTOLIC BLOOD PRESSURE: 108 MMHG | BODY MASS INDEX: 40.18 KG/M2 | WEIGHT: 256 LBS | HEIGHT: 67 IN | HEART RATE: 91 BPM | OXYGEN SATURATION: 94 % | DIASTOLIC BLOOD PRESSURE: 70 MMHG | TEMPERATURE: 97.7 F

## 2017-08-17 DIAGNOSIS — E55.9 VITAMIN D DEFICIENCY: ICD-10-CM

## 2017-08-17 DIAGNOSIS — R41.3 MEMORY DIFFICULTY: ICD-10-CM

## 2017-08-17 DIAGNOSIS — Z23 NEED FOR VACCINATION: ICD-10-CM

## 2017-08-17 DIAGNOSIS — D49.2 ATYPICAL SQUAMOPROLIFERATIVE SKIN LESION: ICD-10-CM

## 2017-08-17 LAB
25(OH)D3 SERPL-MCNC: 40 NG/ML (ref 30–100)
ERYTHROCYTE [SEDIMENTATION RATE] IN BLOOD BY WESTERGREN METHOD: 13 MM/HOUR (ref 0–20)
TSH SERPL DL<=0.005 MIU/L-ACNC: 2.48 UIU/ML (ref 0.3–3.7)
VIT B12 SERPL-MCNC: 355 PG/ML (ref 211–911)

## 2017-08-17 PROCEDURE — 90715 TDAP VACCINE 7 YRS/> IM: CPT | Performed by: FAMILY MEDICINE

## 2017-08-17 PROCEDURE — 82607 VITAMIN B-12: CPT

## 2017-08-17 PROCEDURE — 84443 ASSAY THYROID STIM HORMONE: CPT

## 2017-08-17 PROCEDURE — 85652 RBC SED RATE AUTOMATED: CPT

## 2017-08-17 PROCEDURE — 99213 OFFICE O/P EST LOW 20 MIN: CPT | Mod: 25 | Performed by: FAMILY MEDICINE

## 2017-08-17 PROCEDURE — 36415 COLL VENOUS BLD VENIPUNCTURE: CPT

## 2017-08-17 PROCEDURE — 82306 VITAMIN D 25 HYDROXY: CPT

## 2017-08-17 PROCEDURE — 11401 EXC TR-EXT B9+MARG 0.6-1 CM: CPT | Performed by: FAMILY MEDICINE

## 2017-08-17 PROCEDURE — 88305 TISSUE EXAM BY PATHOLOGIST: CPT

## 2017-08-17 PROCEDURE — 90471 IMMUNIZATION ADMIN: CPT | Performed by: FAMILY MEDICINE

## 2017-08-17 ASSESSMENT — PAIN SCALES - GENERAL: PAINLEVEL: NO PAIN

## 2017-08-17 NOTE — PROGRESS NOTES
"Subjective:   Venkat Mackenzie is a 57 y.o. male here today for follow up on skin lesion on left leg    HPI :     Patient had originally seen me 4 weeks back about a skin lesion on the right leg calf area.Appearance similar to a common wart.Had cryotherapy and the lesion reduced in size but patient states that after a week it grew back again.He then accidentally ripped it at a table when he had some bleeding and then stopped.    Current medicines (including changes today)  Current Outpatient Prescriptions   Medication Sig Dispense Refill   • ibuprofen (MOTRIN) 600 MG Tab Take 1 Tab by mouth 3 times a day as needed. 60 Tab 0   • lansoprazole (PREVACID) 30 MG CAPSULE DELAYED RELEASE Take 1 Cap by mouth every day. 30 Cap 2   • metformin ER (GLUCOPHAGE XR) 500 MG TABLET SR 24 HR Take 1 Tab by mouth every day. Take with food 30 Tab 5   • Cholecalciferol (VITAMIN D3) 5000 UNITS Cap Take 1 Cap by mouth every day. 30 Cap 2   • amlodipine (NORVASC) 2.5 MG Tab Take 1 Tab by mouth every day. 30 Tab 5   • allopurinol (ZYLOPRIM) 300 MG Tab Take 1 Tab by mouth every day. 30 Tab 5   • hydrochlorothiazide (HYDRODIURIL) 25 MG Tab Take 1 Tab by mouth every day. 30 Tab 5   • losartan (COZAAR) 100 MG Tab Take 1 Tab by mouth every day. 30 Tab 5     No current facility-administered medications for this visit.     He  has a past medical history of Gastroparesis; Hypertension; and Kidney stones.    ROS   No chest pain, no shortness of breath, no abdominal pain       Objective:     Blood pressure 108/70, pulse 91, temperature 36.5 °C (97.7 °F), height 1.702 m (5' 7\"), weight 116.121 kg (256 lb), SpO2 94 %. Body mass index is 40.09 kg/(m^2).     PHYSICAL EXAM     GEN: Alert and oriented,well appearing, no acute distress  SKIN: warm, dry to touch,approximately 6 mm raised circular papule with thickened, scaly surface with somewhat warty appearance at the top   PSYCH: mood and affect normal, judgement normal    Shave biopsy Procedure :  After " informed written consent was obtained, using Betadine for cleansing and 1% Lidocaine with epinephrine for anesthetic, with sterile technique a Derma blade was used to shave the lesion tangential to the skin.A shallow saucer shaped defect was left and Hemostasis was obtained by pressure.No sutures required. Antibiotic dressing is applied, and wound care instructions provided. Be alert for any signs of cutaneous infection. The specimen is labeled and sent to pathology for evaluation. The procedure was well tolerated without complications.        Assessment and Plan:   The following treatment plan was discussed    1. Atypical squamoproliferative skin lesion  Differential include squamous cell carcinoma vs atypical wart  Shave biopsy performed as described above   Will follow pathology results    2. Need for vaccination  - TDAP VACCINE =>8YO IM      Followup: Return in about 6 months (around 2/17/2018) for follow up on chronic med conditions, Long.         Please note that this dictation was created using voice recognition software. I have made every reasonable attempt to correct obvious errors, but I expect that there are errors of grammar and possibly content that I did not discover before finalizing the note.

## 2017-08-17 NOTE — MR AVS SNAPSHOT
"        Venkat Gurdeep   2017 10:00 AM   Office Visit   MRN: [id number]    Department:  [facility]   Dept Phone:  [phone]    Description:  Male : 1960   Provider:  Jamison Diaz M.D.           Reason for Visit     Follow-Up           Allergies as of 2017     Allergen Noted Reactions    Iodine 2017   Unspecified    dizziness      You were diagnosed with     Atypical squamoproliferative skin lesion   [3400121]       Need for vaccination   [135853]         Vital Signs     Blood Pressure Pulse Temperature Height Weight Body Mass Index    108/70 mmHg 91 36.5 °C (97.7 °F) 1.702 m (5' 7\") 116.121 kg (256 lb) 40.09 kg/m2    Oxygen Saturation Smoking Status                94% Never Smoker           Basic Information     Date Of Birth Sex Race Ethnicity Preferred Language    1960 Male White Non- English      Your appointments     2018  9:00 AM   Established Patient with Jamison Diaz M.D.   [address] [address] [phone]           You will be receiving a confirmation call a few days before your appointment from our automated call confirmation system.              Problem List              ICD-10-CM Priority Class Noted - Resolved    Gastroparesis K31.84   Unknown - Present    Hypertension I10   Unknown - Present    Chronic gout M1A.9XX0   3/22/2017 - Present    Morbid obesity with BMI of 40.0-44.9, adult (HCC) E66.01, Z68.41   3/22/2017 - Present    Vitamin D deficiency E55.9   2017 - Present    History of adenomatous polyp of colon Z86.010   2017 - Present    Metabolic syndrome E88.81   2017 - Present    Gastroesophageal reflux disease without esophagitis K21.9   2017 - Present    Cutaneous wart B07.9   2017 - Present    Memory difficulty R41.3   2017 - Present    Left hip pain M25.552   2017 - Present      Health Maintenance        Date Due Completion Dates    IMM " DTaP/Tdap/Td Vaccine (1 - Tdap) 8/5/1977 ---    IMM INFLUENZA (1) 9/1/2017 10/8/2015    COLONOSCOPY 4/13/2022 4/13/2017            Current Immunizations     Influenza TIV (IM) 10/8/2015    Tdap Vaccine  Incomplete      Below and/or attached are the medications your provider expects you to take. Review all of your home medications and newly ordered medications with your provider and/or pharmacist. Follow medication instructions as directed by your provider and/or pharmacist. Please keep your medication list with you and share with your provider. Update the information when medications are discontinued, doses are changed, or new medications (including over-the-counter products) are added; and carry medication information at all times in the event of emergency situations     Allergies:  IODINE - Unspecified               Medications  Valid as of: August 17, 2017 - 10:39 AM    Generic Name Brand Name Tablet Size Instructions for use    Allopurinol (Tab) ZYLOPRIM 300 MG Take 1 Tab by mouth every day.        AmLODIPine Besylate (Tab) NORVASC 2.5 MG Take 1 Tab by mouth every day.        Cholecalciferol (Cap) vitamin D3 5000 units Take 1 Cap by mouth every day.        HydroCHLOROthiazide (Tab) HYDRODIURIL 25 MG Take 1 Tab by mouth every day.        Ibuprofen (Tab) MOTRIN 600 MG Take 1 Tab by mouth 3 times a day as needed.        Lansoprazole (CAPSULE DELAYED RELEASE) PREVACID 30 MG Take 1 Cap by mouth every day.        Losartan Potassium (Tab) COZAAR 100 MG Take 1 Tab by mouth every day.        MetFORMIN HCl (TABLET SR 24 HR) GLUCOPHAGE  MG Take 1 Tab by mouth every day. Take with food        .                 Medicines prescribed today were sent to:     [facility] - [place], [place] - [address]    [address] [place] [address]    Phone: [phone] Fax: [phone]    Open 24 Hours?: No      Medication refill instructions:       If your prescription bottle indicates you have medication refills left, it is  not necessary to call your provider’s office. Please contact your pharmacy and they will refill your medication.    If your prescription bottle indicates you do not have any refills left, you may request refills at any time through one of the following ways: The online [employer] system (except Urgent Care), by calling your provider’s office, or by asking your pharmacy to contact your provider’s office with a refill request. Medication refills are processed only during regular business hours and may not be available until the next business day. Your provider may request additional information or to have a follow-up visit with you prior to refilling your medication.   *Please Note: Medication refills are assigned a new Rx number when refilled electronically. Your pharmacy may indicate that no refills were authorized even though a new prescription for the same medication is available at the pharmacy. Please request the medicine by name with the pharmacy before contacting your provider for a refill.           [employer] Access Code: Activation code not generated  Current [employer] Status: Active

## 2017-08-28 DIAGNOSIS — M25.552 LEFT HIP PAIN: ICD-10-CM

## 2017-08-28 RX ORDER — IBUPROFEN 600 MG/1
600 TABLET ORAL 3 TIMES DAILY PRN
Qty: 60 TAB | Refills: 0 | Status: SHIPPED | OUTPATIENT
Start: 2017-08-28 | End: 2017-10-11 | Stop reason: SDUPTHER

## 2017-10-11 DIAGNOSIS — K21.9 GASTROESOPHAGEAL REFLUX DISEASE WITHOUT ESOPHAGITIS: ICD-10-CM

## 2017-10-11 DIAGNOSIS — M1A.0790 IDIOPATHIC CHRONIC GOUT OF ANKLE WITHOUT TOPHUS, UNSPECIFIED LATERALITY: ICD-10-CM

## 2017-10-11 DIAGNOSIS — M25.552 LEFT HIP PAIN: ICD-10-CM

## 2017-10-11 DIAGNOSIS — I10 ESSENTIAL HYPERTENSION: ICD-10-CM

## 2017-10-11 RX ORDER — AMLODIPINE BESYLATE 2.5 MG/1
2.5 TABLET ORAL DAILY
Qty: 30 TAB | Refills: 0 | Status: SHIPPED | OUTPATIENT
Start: 2017-10-11 | End: 2017-11-18 | Stop reason: SDUPTHER

## 2017-10-11 RX ORDER — IBUPROFEN 600 MG/1
600 TABLET ORAL 3 TIMES DAILY PRN
Qty: 60 TAB | Refills: 0 | Status: SHIPPED | OUTPATIENT
Start: 2017-10-11 | End: 2017-11-18 | Stop reason: SDUPTHER

## 2017-10-11 RX ORDER — ALLOPURINOL 300 MG/1
300 TABLET ORAL DAILY
Qty: 30 TAB | Refills: 0 | Status: SHIPPED | OUTPATIENT
Start: 2017-10-11 | End: 2017-11-18 | Stop reason: SDUPTHER

## 2017-10-11 RX ORDER — LANSOPRAZOLE 30 MG/1
30 CAPSULE, DELAYED RELEASE ORAL DAILY
Qty: 30 CAP | Refills: 0 | Status: SHIPPED | OUTPATIENT
Start: 2017-10-11 | End: 2017-11-20 | Stop reason: SDUPTHER

## 2017-10-11 RX ORDER — LOSARTAN POTASSIUM 100 MG/1
100 TABLET ORAL DAILY
Qty: 30 TAB | Refills: 0 | Status: SHIPPED | OUTPATIENT
Start: 2017-10-11 | End: 2017-11-18 | Stop reason: SDUPTHER

## 2017-11-16 DIAGNOSIS — E88.810 METABOLIC SYNDROME: ICD-10-CM

## 2017-11-16 RX ORDER — METFORMIN HYDROCHLORIDE 500 MG/1
500 TABLET, EXTENDED RELEASE ORAL DAILY
Qty: 90 TAB | Refills: 3 | Status: SHIPPED | OUTPATIENT
Start: 2017-11-16 | End: 2018-11-25 | Stop reason: SDUPTHER

## 2017-11-18 DIAGNOSIS — I10 ESSENTIAL HYPERTENSION: ICD-10-CM

## 2017-11-18 DIAGNOSIS — M1A.0790 IDIOPATHIC CHRONIC GOUT OF ANKLE WITHOUT TOPHUS, UNSPECIFIED LATERALITY: ICD-10-CM

## 2017-11-18 DIAGNOSIS — M25.552 LEFT HIP PAIN: ICD-10-CM

## 2017-11-18 DIAGNOSIS — K21.9 GASTROESOPHAGEAL REFLUX DISEASE WITHOUT ESOPHAGITIS: ICD-10-CM

## 2017-11-18 RX ORDER — LANSOPRAZOLE 30 MG/1
30 CAPSULE, DELAYED RELEASE ORAL DAILY
Qty: 30 CAP | Refills: 0 | Status: CANCELLED | OUTPATIENT
Start: 2017-11-18

## 2017-11-20 DIAGNOSIS — K21.9 GASTROESOPHAGEAL REFLUX DISEASE WITHOUT ESOPHAGITIS: ICD-10-CM

## 2017-11-20 RX ORDER — AMLODIPINE BESYLATE 2.5 MG/1
2.5 TABLET ORAL DAILY
Qty: 90 TAB | Refills: 1 | Status: SHIPPED | OUTPATIENT
Start: 2017-11-20 | End: 2018-05-29 | Stop reason: SDUPTHER

## 2017-11-20 RX ORDER — LOSARTAN POTASSIUM 100 MG/1
100 TABLET ORAL DAILY
Qty: 90 TAB | Refills: 1 | Status: SHIPPED | OUTPATIENT
Start: 2017-11-20 | End: 2018-05-29 | Stop reason: SDUPTHER

## 2017-11-20 RX ORDER — ALLOPURINOL 300 MG/1
300 TABLET ORAL DAILY
Qty: 90 TAB | Refills: 1 | Status: SHIPPED | OUTPATIENT
Start: 2017-11-20 | End: 2018-05-29 | Stop reason: SDUPTHER

## 2017-11-20 RX ORDER — IBUPROFEN 600 MG/1
600 TABLET ORAL 3 TIMES DAILY PRN
Qty: 60 TAB | Refills: 0 | Status: SHIPPED | OUTPATIENT
Start: 2017-11-20 | End: 2018-01-22 | Stop reason: SDUPTHER

## 2017-11-20 RX ORDER — LANSOPRAZOLE 30 MG/1
30 CAPSULE, DELAYED RELEASE ORAL DAILY
Qty: 90 CAP | Refills: 1 | Status: SHIPPED | OUTPATIENT
Start: 2017-11-20 | End: 2018-06-17 | Stop reason: SDUPTHER

## 2018-01-18 ENCOUNTER — OFFICE VISIT (OUTPATIENT)
Dept: MEDICAL GROUP | Facility: PHYSICIAN GROUP | Age: 58
End: 2018-01-18
Payer: COMMERCIAL

## 2018-01-18 VITALS
TEMPERATURE: 97.5 F | HEIGHT: 67 IN | DIASTOLIC BLOOD PRESSURE: 72 MMHG | OXYGEN SATURATION: 93 % | SYSTOLIC BLOOD PRESSURE: 110 MMHG | WEIGHT: 268 LBS | HEART RATE: 98 BPM | BODY MASS INDEX: 42.06 KG/M2

## 2018-01-18 DIAGNOSIS — E88.810 METABOLIC SYNDROME: ICD-10-CM

## 2018-01-18 DIAGNOSIS — E55.9 VITAMIN D DEFICIENCY: ICD-10-CM

## 2018-01-18 DIAGNOSIS — M72.2 PLANTAR FASCIITIS OF RIGHT FOOT: ICD-10-CM

## 2018-01-18 DIAGNOSIS — I10 ESSENTIAL HYPERTENSION: ICD-10-CM

## 2018-01-18 PROCEDURE — 99214 OFFICE O/P EST MOD 30 MIN: CPT | Performed by: FAMILY MEDICINE

## 2018-01-18 ASSESSMENT — PAIN SCALES - GENERAL: PAINLEVEL: 7=MODERATE-SEVERE PAIN

## 2018-01-19 NOTE — PROGRESS NOTES
"Subjective:   Venkat Mackenzie is a 57 y.o. male here today for right heel pain    HPI :     Patient is here with complaint of right heel pain for 2 months.Pain is worst in the morning and when he walks, sometimes he has to limp sometimes.He has been taking ibuprofen which helps sometimes.No known injury.No recent change in activity.  Regarding his chronic problems he reports taking his medications regularly for metabolic syndrome and hypertension.Tolerating medication well.No side effects.    Current medicines (including changes today)  Current Outpatient Prescriptions   Medication Sig Dispense Refill   • amlodipine (NORVASC) 2.5 MG Tab Take 1 Tab by mouth every day. 90 Tab 1   • ibuprofen (MOTRIN) 600 MG Tab Take 1 Tab by mouth 3 times a day as needed. 60 Tab 0   • losartan (COZAAR) 100 MG Tab Take 1 Tab by mouth every day. 90 Tab 1   • allopurinol (ZYLOPRIM) 300 MG Tab Take 1 Tab by mouth every day. 90 Tab 1   • lansoprazole (PREVACID) 30 MG CAPSULE DELAYED RELEASE Take 1 Cap by mouth every day. 90 Cap 1   • metformin ER (GLUCOPHAGE XR) 500 MG TABLET SR 24 HR Take 1 Tab by mouth every day. Take with food 90 Tab 3   • Cholecalciferol (VITAMIN D3) 5000 UNITS Cap Take 1 Cap by mouth every day. 30 Cap 2   • hydrochlorothiazide (HYDRODIURIL) 25 MG Tab Take 1 Tab by mouth every day. 30 Tab 5     No current facility-administered medications for this visit.      He  has a past medical history of Gastroparesis; Hypertension; and Kidney stones.    ROS   No chest pain, no shortness of breath, no abdominal pain  No weakness, numbness, tingling  No fever, chills     Objective:     Blood pressure 110/72, pulse 98, temperature 36.4 °C (97.5 °F), height 1.702 m (5' 7\"), weight 121.6 kg (268 lb), SpO2 93 %. Body mass index is 41.97 kg/m².     PHYSICAL EXAM     GEN: Alert and oriented,well appearing, no acute distress  SKIN: warm, dry to touch, no rashes or lesions in visible areas  PSYCH: mood and affect normal, judgement " normal  EYES: Conjunctiva clear, lids normal,pupils equal round and reactive  ENMT: Normal external nose and ears,EACs normal appearing, TM pearly gray with normal light reflex bilaterally,nasal mucosa and turbinates normal appearing without erythema or edema, lips without lesions,good dentition,oropharynx clear  Neck : Trachea midline, no masses or swelling, no thyromegaly  LYMPHATIC : No cervical or supraclavicular lymphadenopathy  RESPIRATORY : Unlabored respiratory effort, no distress noted, clear to auscultation bilaterally, no wheeze, rhonchi, crackles  CARDIOVASCULAR: RRR, S1 S2 normal, no murmurs  MUSCULOSKELETAL : Normal gait and stance, ++ tenderness at the right calcaneus at the attachment of the plantar fascia,normal ROM at the ankle, muscle strength 5/5 both dorsiflexors and plantarflexors   NEURO: No overt focal neurologic deficits,sensation intact        Assessment and Plan:   The following treatment plan was discussed    1. Plantar fasciitis of right foot  New problem.  - Relative rest and avoid aggravating activities   - Hand outs given for stretching exercises including heel cord stretch and plantar fascia stretch  - Cryotherapy 3-4 times a day for 10 - 15 mins  - Prescription given for using heel gel pad for both feet and night splint for the affected foot   Follow up in 6 weeks if symptoms do not improve    2. Vitamin D deficiency  - VITAMIN D,25 HYDROXY; Future    3. Metabolic syndrome  This is a chronic medical condition.Stable.Continue current medications.  - CBC WITH DIFFERENTIAL; Future  - COMP METABOLIC PANEL; Future  - LIPID PROFILE; Future  - HEMOGLOBIN A1C; Future    4. Essential hypertension  This is a chronic medical condition.Controlled.Continue current medications.      Total 25 minutes face-to-face time spent with patient, with greater than 50% of the total time discussing patient's issues and symptoms as listed above in assessment and plan, as well as managing coordination of care  for future evaluation and treatment.    Followup: Return in about 6 months (around 7/18/2018) for follow up on chronic med conditions.         Please note that this dictation was created using voice recognition software. I have made every reasonable attempt to correct obvious errors, but I expect that there are errors of grammar and possibly content that I did not discover before finalizing the note.

## 2018-01-22 DIAGNOSIS — M25.552 LEFT HIP PAIN: ICD-10-CM

## 2018-01-22 RX ORDER — IBUPROFEN 600 MG/1
600 TABLET ORAL 3 TIMES DAILY PRN
Qty: 60 TAB | Refills: 0 | Status: SHIPPED | OUTPATIENT
Start: 2018-01-22 | End: 2018-02-21 | Stop reason: SDUPTHER

## 2018-02-20 ENCOUNTER — HOSPITAL ENCOUNTER (OUTPATIENT)
Dept: LAB | Facility: MEDICAL CENTER | Age: 58
End: 2018-02-20
Attending: FAMILY MEDICINE
Payer: COMMERCIAL

## 2018-02-20 DIAGNOSIS — E88.810 METABOLIC SYNDROME: ICD-10-CM

## 2018-02-20 DIAGNOSIS — E55.9 VITAMIN D DEFICIENCY: ICD-10-CM

## 2018-02-20 LAB
25(OH)D3 SERPL-MCNC: 29 NG/ML (ref 30–100)
ALBUMIN SERPL BCP-MCNC: 4.5 G/DL (ref 3.2–4.9)
ALBUMIN/GLOB SERPL: 1.5 G/DL
ALP SERPL-CCNC: 65 U/L (ref 30–99)
ALT SERPL-CCNC: 44 U/L (ref 2–50)
ANION GAP SERPL CALC-SCNC: 10 MMOL/L (ref 0–11.9)
AST SERPL-CCNC: 31 U/L (ref 12–45)
BASOPHILS # BLD AUTO: 0.9 % (ref 0–1.8)
BASOPHILS # BLD: 0.06 K/UL (ref 0–0.12)
BILIRUB SERPL-MCNC: 0.6 MG/DL (ref 0.1–1.5)
BUN SERPL-MCNC: 13 MG/DL (ref 8–22)
CALCIUM SERPL-MCNC: 9.3 MG/DL (ref 8.5–10.5)
CHLORIDE SERPL-SCNC: 100 MMOL/L (ref 96–112)
CHOLEST SERPL-MCNC: 139 MG/DL (ref 100–199)
CO2 SERPL-SCNC: 26 MMOL/L (ref 20–33)
CREAT SERPL-MCNC: 0.84 MG/DL (ref 0.5–1.4)
EOSINOPHIL # BLD AUTO: 0.18 K/UL (ref 0–0.51)
EOSINOPHIL NFR BLD: 2.6 % (ref 0–6.9)
ERYTHROCYTE [DISTWIDTH] IN BLOOD BY AUTOMATED COUNT: 43.9 FL (ref 35.9–50)
EST. AVERAGE GLUCOSE BLD GHB EST-MCNC: 128 MG/DL
GLOBULIN SER CALC-MCNC: 3 G/DL (ref 1.9–3.5)
GLUCOSE SERPL-MCNC: 88 MG/DL (ref 65–99)
HBA1C MFR BLD: 6.1 % (ref 0–5.6)
HCT VFR BLD AUTO: 48.7 % (ref 42–52)
HDLC SERPL-MCNC: 52 MG/DL
HGB BLD-MCNC: 16.7 G/DL (ref 14–18)
IMM GRANULOCYTES # BLD AUTO: 0.05 K/UL (ref 0–0.11)
IMM GRANULOCYTES NFR BLD AUTO: 0.7 % (ref 0–0.9)
LDLC SERPL CALC-MCNC: 67 MG/DL
LYMPHOCYTES # BLD AUTO: 2.2 K/UL (ref 1–4.8)
LYMPHOCYTES NFR BLD: 31.6 % (ref 22–41)
MCH RBC QN AUTO: 30.6 PG (ref 27–33)
MCHC RBC AUTO-ENTMCNC: 34.3 G/DL (ref 33.7–35.3)
MCV RBC AUTO: 89.2 FL (ref 81.4–97.8)
MONOCYTES # BLD AUTO: 0.62 K/UL (ref 0–0.85)
MONOCYTES NFR BLD AUTO: 8.9 % (ref 0–13.4)
NEUTROPHILS # BLD AUTO: 3.85 K/UL (ref 1.82–7.42)
NEUTROPHILS NFR BLD: 55.3 % (ref 44–72)
NRBC # BLD AUTO: 0 K/UL
NRBC BLD-RTO: 0 /100 WBC
PLATELET # BLD AUTO: 297 K/UL (ref 164–446)
PMV BLD AUTO: 10.4 FL (ref 9–12.9)
POTASSIUM SERPL-SCNC: 3.5 MMOL/L (ref 3.6–5.5)
PROT SERPL-MCNC: 7.5 G/DL (ref 6–8.2)
RBC # BLD AUTO: 5.46 M/UL (ref 4.7–6.1)
SODIUM SERPL-SCNC: 136 MMOL/L (ref 135–145)
TRIGL SERPL-MCNC: 98 MG/DL (ref 0–149)
WBC # BLD AUTO: 7 K/UL (ref 4.8–10.8)

## 2018-02-20 PROCEDURE — 85025 COMPLETE CBC W/AUTO DIFF WBC: CPT

## 2018-02-20 PROCEDURE — 82306 VITAMIN D 25 HYDROXY: CPT

## 2018-02-20 PROCEDURE — 83036 HEMOGLOBIN GLYCOSYLATED A1C: CPT

## 2018-02-20 PROCEDURE — 80053 COMPREHEN METABOLIC PANEL: CPT

## 2018-02-20 PROCEDURE — 36415 COLL VENOUS BLD VENIPUNCTURE: CPT

## 2018-02-20 PROCEDURE — 80061 LIPID PANEL: CPT

## 2018-02-21 DIAGNOSIS — M25.552 LEFT HIP PAIN: ICD-10-CM

## 2018-02-21 RX ORDER — IBUPROFEN 600 MG/1
600 TABLET ORAL 3 TIMES DAILY PRN
Qty: 60 TAB | Refills: 0 | Status: SHIPPED | OUTPATIENT
Start: 2018-02-21 | End: 2019-12-10

## 2018-02-23 DIAGNOSIS — I10 ESSENTIAL HYPERTENSION: ICD-10-CM

## 2018-02-25 RX ORDER — HYDROCHLOROTHIAZIDE 25 MG/1
25 TABLET ORAL DAILY
Qty: 90 TAB | Refills: 3 | Status: SHIPPED | OUTPATIENT
Start: 2018-02-25 | End: 2019-02-25 | Stop reason: SDUPTHER

## 2018-05-17 ENCOUNTER — OFFICE VISIT (OUTPATIENT)
Dept: URGENT CARE | Facility: PHYSICIAN GROUP | Age: 58
End: 2018-05-17
Payer: COMMERCIAL

## 2018-05-17 ENCOUNTER — HOSPITAL ENCOUNTER (OUTPATIENT)
Dept: RADIOLOGY | Facility: MEDICAL CENTER | Age: 58
End: 2018-05-17
Attending: NURSE PRACTITIONER
Payer: COMMERCIAL

## 2018-05-17 VITALS
SYSTOLIC BLOOD PRESSURE: 130 MMHG | OXYGEN SATURATION: 95 % | WEIGHT: 262 LBS | TEMPERATURE: 98.2 F | BODY MASS INDEX: 41.12 KG/M2 | HEART RATE: 77 BPM | HEIGHT: 67 IN | DIASTOLIC BLOOD PRESSURE: 70 MMHG

## 2018-05-17 DIAGNOSIS — R06.02 SOB (SHORTNESS OF BREATH): ICD-10-CM

## 2018-05-17 PROCEDURE — 71046 X-RAY EXAM CHEST 2 VIEWS: CPT

## 2018-05-17 PROCEDURE — 99213 OFFICE O/P EST LOW 20 MIN: CPT | Performed by: NURSE PRACTITIONER

## 2018-05-17 RX ORDER — AZITHROMYCIN 250 MG/1
TABLET, FILM COATED ORAL
Qty: 6 TAB | Refills: 0 | Status: SHIPPED | OUTPATIENT
Start: 2018-05-17 | End: 2018-06-13

## 2018-05-17 ASSESSMENT — ENCOUNTER SYMPTOMS
EYES NEGATIVE: 1
SENSORY CHANGE: 0
SPEECH CHANGE: 0
FOCAL WEAKNESS: 0
MUSCULOSKELETAL NEGATIVE: 1
HEADACHES: 0
DIZZINESS: 0
VOMITING: 0
NAUSEA: 1
HEARTBURN: 1
ABDOMINAL PAIN: 0
CARDIOVASCULAR NEGATIVE: 1
COUGH: 1
FEVER: 0

## 2018-05-17 NOTE — PROGRESS NOTES
"Subjective:      Venkat Mackenzie is a 57 y.o. male who presents with Shortness of Breath (lethargic, nausea x 2 days.)            HPI  Pt states that last night he was gasping for air that woke him  up occurred 3-4 time b/w 12am -5 am  Felt queasy but no emesis  No cp but left shoulder pain that resolved, pt thought he had slept on shoulder  Pt states now he just feels dazed, foggy and tired  + cough--. Chronic secondary to GERD an d gastroparesis    + PND states always    Never tested for sleep apnea  Not sure if snores  No recent wt gain  No St  No ear aches  Never had eyes tested  No recent cold    Hx of dm-->  AIC 6.1 2/18      Review of Systems   Constitutional: Positive for malaise/fatigue. Negative for fever.   HENT: Negative.         PND   Eyes: Negative.    Respiratory: Positive for cough.    Cardiovascular: Negative.    Gastrointestinal: Positive for heartburn and nausea. Negative for abdominal pain and vomiting.   Genitourinary: Negative.    Musculoskeletal: Negative.    Skin: Negative.    Neurological: Negative for dizziness, sensory change, speech change, focal weakness and headaches.   Endo/Heme/Allergies: Negative.           Objective:     /70   Pulse 77   Temp 36.8 °C (98.2 °F)   Ht 1.702 m (5' 7\")   SpO2 95%      Physical Exam   Constitutional: He is oriented to person, place, and time. He appears well-developed and well-nourished.   HENT:   Head: Normocephalic and atraumatic.   Right Ear: Hearing, tympanic membrane, external ear and ear canal normal.   Left Ear: Hearing, tympanic membrane, external ear and ear canal normal.   Nose: Mucosal edema present.   Mouth/Throat: Uvula is midline, oropharynx is clear and moist and mucous membranes are normal.   Clear rhinorrhea  No sinus tenderness    Eyes: Conjunctivae are normal.   Neck: Normal range of motion. Neck supple.   Cardiovascular: Normal rate, regular rhythm and normal heart sounds.    No chest tenderness  + le edema     "   Pulmonary/Chest: Effort normal and breath sounds normal.   Musculoskeletal: Normal range of motion.   Neurological: He is alert and oriented to person, place, and time.   Skin: Skin is warm and dry. Capillary refill takes less than 2 seconds.   Psychiatric: He has a normal mood and affect. His behavior is normal. Judgment and thought content normal.          Neck 19 inches     Assessment/Plan:     1. SOB (shortness of breath)  DX-CHEST-2 VIEWS    EKG - Clinic Performed     SOB related to sleep apnea vs cardiac etiology??    Dicussed xray  And ekg with pt.    Contingent antibiotics given if  Symptoms remain the same start tomorrow  f/u with pcp as pt may need cardio referral  For LE edema elevate legs and decrease salt intake    Strict ER precautions given for worsening in SOB, CP, worse LE edema.      xrays: FINDINGS:  The heart is normal in size.  Mild hyperinflation is present.  No pulmonary infiltrates or consolidations are noted.  No pleural effusions are appreciated.  A hiatal hernia is present.    EKG: prolonged SR,  Prolonged W[place] interval, PVC

## 2018-06-13 ENCOUNTER — HOSPITAL ENCOUNTER (OUTPATIENT)
Dept: LAB | Facility: MEDICAL CENTER | Age: 58
End: 2018-06-13
Attending: FAMILY MEDICINE
Payer: COMMERCIAL

## 2018-06-13 ENCOUNTER — OFFICE VISIT (OUTPATIENT)
Dept: MEDICAL GROUP | Facility: PHYSICIAN GROUP | Age: 58
End: 2018-06-13
Payer: COMMERCIAL

## 2018-06-13 VITALS
TEMPERATURE: 97 F | HEIGHT: 67 IN | OXYGEN SATURATION: 92 % | HEART RATE: 84 BPM | WEIGHT: 260 LBS | SYSTOLIC BLOOD PRESSURE: 118 MMHG | BODY MASS INDEX: 40.81 KG/M2 | DIASTOLIC BLOOD PRESSURE: 84 MMHG

## 2018-06-13 DIAGNOSIS — K44.9 HIATAL HERNIA: ICD-10-CM

## 2018-06-13 DIAGNOSIS — K31.84 GASTROPARESIS: ICD-10-CM

## 2018-06-13 DIAGNOSIS — G47.39 OTHER SLEEP APNEA: ICD-10-CM

## 2018-06-13 DIAGNOSIS — R06.00 DYSPNEA, UNSPECIFIED TYPE: ICD-10-CM

## 2018-06-13 DIAGNOSIS — E66.01 MORBID OBESITY WITH BMI OF 40.0-44.9, ADULT (HCC): ICD-10-CM

## 2018-06-13 LAB
ANION GAP SERPL CALC-SCNC: 11 MMOL/L (ref 0–11.9)
BUN SERPL-MCNC: 17 MG/DL (ref 8–22)
CALCIUM SERPL-MCNC: 9.5 MG/DL (ref 8.5–10.5)
CHLORIDE SERPL-SCNC: 100 MMOL/L (ref 96–112)
CO2 SERPL-SCNC: 29 MMOL/L (ref 20–33)
CREAT SERPL-MCNC: 1.02 MG/DL (ref 0.5–1.4)
GLUCOSE SERPL-MCNC: 94 MG/DL (ref 65–99)
POTASSIUM SERPL-SCNC: 3.7 MMOL/L (ref 3.6–5.5)
SODIUM SERPL-SCNC: 140 MMOL/L (ref 135–145)
TSH SERPL DL<=0.005 MIU/L-ACNC: 2.26 UIU/ML (ref 0.38–5.33)

## 2018-06-13 PROCEDURE — 36415 COLL VENOUS BLD VENIPUNCTURE: CPT

## 2018-06-13 PROCEDURE — 80048 BASIC METABOLIC PNL TOTAL CA: CPT

## 2018-06-13 PROCEDURE — 84443 ASSAY THYROID STIM HORMONE: CPT

## 2018-06-13 PROCEDURE — 99214 OFFICE O/P EST MOD 30 MIN: CPT | Performed by: FAMILY MEDICINE

## 2018-06-13 ASSESSMENT — PATIENT HEALTH QUESTIONNAIRE - PHQ9: CLINICAL INTERPRETATION OF PHQ2 SCORE: 0

## 2018-06-13 ASSESSMENT — PAIN SCALES - GENERAL: PAINLEVEL: NO PAIN

## 2018-06-13 NOTE — PROGRESS NOTES
Subjective:   Venkat Mackenzie is a 57 y.o. male here today for follow up on Urgent Care visit for shortness of breath    HPI :    Woke up one morning gasping for air with dizziness and feeling queasy.This was in mid May.Was seen at Urgent Care.Had a CXR and EKG.EKG showed prolonged KS interval and few ventricular premature complexes.Chest X ray was essentially normal.Shortness of breath has resolved.but occasionally has to take a deep breath.No smoking hx , no known hx of asthma or COPD.Also obese with BMI greater than 40.He is trying to lose weigh by means of diet and some exercise.  Also has   He has a chronic cough, worse at night time..Now after he eats, he feels like he has a lump in the throat after he eats.He was found to have a hiatal hernia on X ray.  No fever, chills, wheezing, ankle edema.Last labs in February was mainly significant for prediabetes/metabolic syndrome. for which he is on Metformin.He is a light snorer as well.    Current medicines (including changes today)  Current Outpatient Prescriptions   Medication Sig Dispense Refill   • losartan (COZAAR) 100 MG Tab TAKE ONE TABLET BY MOUTH ONE TIME DAILY 30 Tab 0   • amLODIPine (NORVASC) 2.5 MG Tab TAKE ONE TABLET BY MOUTH ONE TIME DAILY 30 Tab 0   • allopurinol (ZYLOPRIM) 300 MG Tab TAKE ONE TABLET BY MOUTH ONE TIME DAILY 30 Tab 0   • hydroCHLOROthiazide (HYDRODIURIL) 25 MG Tab Take 1 Tab by mouth every day. 90 Tab 3   • ibuprofen (MOTRIN) 600 MG Tab Take 1 Tab by mouth 3 times a day as needed. 60 Tab 0   • lansoprazole (PREVACID) 30 MG CAPSULE DELAYED RELEASE Take 1 Cap by mouth every day. 90 Cap 1   • metformin ER (GLUCOPHAGE XR) 500 MG TABLET SR 24 HR Take 1 Tab by mouth every day. Take with food 90 Tab 3   • Cholecalciferol (VITAMIN D3) 5000 UNITS Cap Take 1 Cap by mouth every day. 30 Cap 2     No current facility-administered medications for this visit.      He  has a past medical history of Gastroparesis; Hypertension; and Kidney stones.    ROS  "  No chest pain,  no abdominal pain  See HPI       Objective:     Blood pressure 118/84, pulse 84, temperature 36.1 °C (97 °F), height 1.702 m (5' 7\"), weight 117.9 kg (260 lb), SpO2 92 %. Body mass index is 40.72 kg/m².     PHYSICAL EXAM     GEN: Alert and oriented,well appearing, no acute distress  SKIN: warm, dry to touch, no rashes or lesions in visible areas  PSYCH: mood and affect normal, judgement normal  EYES: Conjunctiva clear, lids normal,pupils equal round and reactive  ENMT: Normal external nose and ears,EACs normal appearing, TM pearly gray with normal light reflex bilaterally,nasal mucosa and turbinates normal appearing without erythema or edema, lips without lesions,good dentition,oropharynx clear  Neck : Trachea midline, no masses or swelling, no thyromegaly  LYMPHATIC : No cervical or supraclavicular lymphadenopathy  RESPIRATORY : Unlabored respiratory effort, no distress noted, clear to auscultation bilaterally, no wheeze, rhonchi, crackles  CARDIOVASCULAR: RRR, S1 S2 normal, no murmurs , no edema of the extremities  MUSCULOSKELETAL : Normal gait and stance, no obvious abnormalities   NEURO: No overt focal neurologic deficits,sensation intact      Assessment and Plan:   The following treatment plan was discussed    1. Other sleep apnea  Highly likely to have underlying sleep apnea.Referral to have sleep study  - REFERRAL TO SLEEP STUDIES    2. Dyspnea, unspecified type  New problem.Reviewed CXR and EKG results.Ordered Cardiac stress test and Echocardiogram to R/O cardiac cause.PFT ordered to R/O COPD.Relevant labs ordered.Advised the importance of weight loss and body conditioning with increased physical activity and exercise.  - ECHOCARDIOGRAM-COMP W/ CONT; Future  - PULMONARY FUNCTION TESTS Test requested: Complete Pulmonary Function Test  - BASIC METABOLIC PANEL; Future  - TSH WITH REFLEX TO FT4; Future  - NM-CARDIAC STRESS TEST; Future    4. Hiatal hernia  Continue Prevacid.Advised small " meals.Referral to GI for further assessment        Followup: Return if symptoms worsen or fail to improve.         Please note that this dictation was created using voice recognition software. I have made every reasonable attempt to correct obvious errors, but I expect that there are errors of grammar and possibly content that I did not discover before finalizing the note.

## 2018-06-16 PROBLEM — K44.9 HIATAL HERNIA: Status: ACTIVE | Noted: 2018-06-16

## 2018-06-17 DIAGNOSIS — K21.9 GASTROESOPHAGEAL REFLUX DISEASE WITHOUT ESOPHAGITIS: ICD-10-CM

## 2018-06-18 RX ORDER — LANSOPRAZOLE 30 MG/1
CAPSULE, DELAYED RELEASE ORAL
Qty: 90 CAP | Refills: 2 | Status: SHIPPED | OUTPATIENT
Start: 2018-06-18 | End: 2019-07-03 | Stop reason: SDUPTHER

## 2018-07-01 DIAGNOSIS — I10 ESSENTIAL HYPERTENSION: ICD-10-CM

## 2018-07-01 DIAGNOSIS — M1A.0790 IDIOPATHIC CHRONIC GOUT OF ANKLE WITHOUT TOPHUS, UNSPECIFIED LATERALITY: ICD-10-CM

## 2018-07-02 RX ORDER — ALLOPURINOL 300 MG/1
TABLET ORAL
Qty: 90 TAB | Refills: 0 | Status: SHIPPED | OUTPATIENT
Start: 2018-07-02 | End: 2018-10-10 | Stop reason: SDUPTHER

## 2018-07-02 RX ORDER — AMLODIPINE BESYLATE 2.5 MG/1
TABLET ORAL
Qty: 90 TAB | Refills: 0 | Status: SHIPPED | OUTPATIENT
Start: 2018-07-02 | End: 2018-09-28 | Stop reason: SDUPTHER

## 2018-07-02 RX ORDER — LOSARTAN POTASSIUM 100 MG/1
TABLET ORAL
Qty: 90 TAB | Refills: 0 | Status: SHIPPED | OUTPATIENT
Start: 2018-07-02 | End: 2018-09-28 | Stop reason: SDUPTHER

## 2018-07-02 NOTE — TELEPHONE ENCOUNTER
Was the patient seen in the last year in this department? Yes LOV 06/13/18    Does patient have an active prescription for medications requested? No     Received Request Via: Pharmacy

## 2018-07-03 ENCOUNTER — APPOINTMENT (OUTPATIENT)
Dept: RADIOLOGY | Facility: MEDICAL CENTER | Age: 58
End: 2018-07-03
Attending: FAMILY MEDICINE
Payer: COMMERCIAL

## 2018-07-03 ENCOUNTER — APPOINTMENT (OUTPATIENT)
Dept: MEDICAL GROUP | Facility: PHYSICIAN GROUP | Age: 58
End: 2018-07-03
Payer: COMMERCIAL

## 2018-07-03 ENCOUNTER — APPOINTMENT (OUTPATIENT)
Dept: CARDIOLOGY | Facility: MEDICAL CENTER | Age: 58
End: 2018-07-03
Attending: FAMILY MEDICINE
Payer: COMMERCIAL

## 2018-07-10 ENCOUNTER — APPOINTMENT (OUTPATIENT)
Dept: PULMONOLOGY | Facility: MEDICAL CENTER | Age: 58
End: 2018-07-10
Payer: COMMERCIAL

## 2018-09-11 ENCOUNTER — OFFICE VISIT (OUTPATIENT)
Dept: MEDICAL GROUP | Facility: PHYSICIAN GROUP | Age: 58
End: 2018-09-11
Payer: COMMERCIAL

## 2018-09-11 VITALS
WEIGHT: 252 LBS | OXYGEN SATURATION: 96 % | HEART RATE: 69 BPM | HEIGHT: 67 IN | DIASTOLIC BLOOD PRESSURE: 74 MMHG | BODY MASS INDEX: 39.55 KG/M2 | TEMPERATURE: 98.1 F | SYSTOLIC BLOOD PRESSURE: 114 MMHG

## 2018-09-11 DIAGNOSIS — K31.84 GASTROPARESIS: ICD-10-CM

## 2018-09-11 DIAGNOSIS — G89.29 CHRONIC PAIN OF MULTIPLE JOINTS: ICD-10-CM

## 2018-09-11 DIAGNOSIS — M25.50 CHRONIC PAIN OF MULTIPLE JOINTS: ICD-10-CM

## 2018-09-11 DIAGNOSIS — G47.62 NOCTURNAL LEG CRAMPS: ICD-10-CM

## 2018-09-11 PROCEDURE — 99214 OFFICE O/P EST MOD 30 MIN: CPT | Performed by: FAMILY MEDICINE

## 2018-09-11 NOTE — ASSESSMENT & PLAN NOTE
This problem is new to me.  Patient reports multiple episodes of leg cramps that occur in the middle the night.  He states that they are in the lower part of the leg around the calf; extremely painful; wake him from sleep.  Patient has to get up and walk around and massage his legs for resolution.  He denies any numbness or tingling; does not take anything for this problem.

## 2018-09-11 NOTE — ASSESSMENT & PLAN NOTE
This problem is new to me.  Patient is reporting a history of gastroparesis which was diagnosed through the GI doctor.  He was initially put on Reglan but reports significant side effects.  He continues on Prevacid 30 mg daily and states that this has been somewhat beneficial.  Patient has multiple questions about any food changes that he needs to make in any other issues or concerns that he should be aware of regarding this diagnosis.

## 2018-09-11 NOTE — ASSESSMENT & PLAN NOTE
This problem is new to me.  Patient reports long-standing history of arthritis in multiple joints.  He is questioning what medication he can take to help with symptom management.  He states that most the day he can tolerate the pain but occasionally he has severe symptoms.  He states the pain is in multiple places including the spine along with multiple joints of the arms and legs.  He describes the pain as achy to sharp in nature; pain comes and goes; is usually worse at the end of the day.  Denies any specific swelling; denies any radiation of the pain when it occurs.

## 2018-09-11 NOTE — PROGRESS NOTES
Subjective:   Venkat Mackenzie is a 58 y.o. male here today for gastroparesis, leg cramps, joint pain    Gastroparesis  This problem is new to me.  Patient is reporting a history of gastroparesis which was diagnosed through the GI doctor.  He was initially put on Reglan but reports significant side effects.  He continues on Prevacid 30 mg daily and states that this has been somewhat beneficial.  Patient has multiple questions about any food changes that he needs to make in any other issues or concerns that he should be aware of regarding this diagnosis.    Nocturnal leg cramps  This problem is new to me.  Patient reports multiple episodes of leg cramps that occur in the middle the night.  He states that they are in the lower part of the leg around the calf; extremely painful; wake him from sleep.  Patient has to get up and walk around and massage his legs for resolution.  He denies any numbness or tingling; does not take anything for this problem.    Chronic pain of multiple joints  This problem is new to me.  Patient reports long-standing history of arthritis in multiple joints.  He is questioning what medication he can take to help with symptom management.  He states that most the day he can tolerate the pain but occasionally he has severe symptoms.  He states the pain is in multiple places including the spine along with multiple joints of the arms and legs.  He describes the pain as achy to sharp in nature; pain comes and goes; is usually worse at the end of the day.  Denies any specific swelling; denies any radiation of the pain when it occurs.     Patient understands that he will need to establish with a new provider    Current medicines (including changes today)  Current Outpatient Prescriptions   Medication Sig Dispense Refill   • allopurinol (ZYLOPRIM) 300 MG Tab TAKE ONE TABLET BY MOUTH ONE TIME DAILY 90 Tab 0   • amLODIPine (NORVASC) 2.5 MG Tab TAKE ONE TABLET BY MOUTH ONE TIME DAILY 90 Tab 0   • losartan  "(COZAAR) 100 MG Tab TAKE ONE TABLET BY MOUTH ONE TIME DAILY 90 Tab 0   • lansoprazole (PREVACID) 30 MG CAPSULE DELAYED RELEASE TAKE ONE CAPSULE BY MOUTH ONE TIME DAILY 90 Cap 2   • hydroCHLOROthiazide (HYDRODIURIL) 25 MG Tab Take 1 Tab by mouth every day. 90 Tab 3   • ibuprofen (MOTRIN) 600 MG Tab Take 1 Tab by mouth 3 times a day as needed. 60 Tab 0   • metformin ER (GLUCOPHAGE XR) 500 MG TABLET SR 24 HR Take 1 Tab by mouth every day. Take with food 90 Tab 3   • Cholecalciferol (VITAMIN D3) 5000 UNITS Cap Take 1 Cap by mouth every day. 30 Cap 2     No current facility-administered medications for this visit.      He  has a past medical history of Gastroparesis; Hypertension; and Kidney stones.    ROS   No chest pain, no shortness of breath, no abdominal pain  + Joint pain     Objective:     Blood pressure 114/74, pulse 69, temperature 36.7 °C (98.1 °F), height 1.702 m (5' 7\"), weight 114.3 kg (252 lb), SpO2 96 %. Body mass index is 39.47 kg/m².   Physical Exam:  Alert, oriented in no acute distress.  Eye contact is good, speech goal directed, affect calm  HEENT: conjunctiva non-injected, sclera non-icteric.  Pinna normal. TM pearly gray.   Oral mucous membranes pink and moist with no lesions.  Neck No adenopathy or masses in the neck or supraclavicular regions.  Lungs: clear to auscultation bilaterally with good excursion.  CV: regular rate and rhythm.  Abdomen: soft, nontender, No CVAT  Ext: no edema, color normal, vascularity normal, temperature normal        Assessment and Plan:   The following treatment plan was discussed   1. Gastroparesis      Stable.  Encouraged patient to choose small meals, separate consumption of liquids and solids, no consumption of carbonated beverages, continue Prevacid   2. Nocturnal leg cramps      Uncontrolled; strongly encouraged patient to get good hydration; trial of over-the-counter magnesium; monitor   3. Chronic pain of multiple joints      Stable.  Recommend patient trial " over-the-counter Tylenol for arthritis for symptom management; monitor       Followup: Return if symptoms worsen or fail to improve.

## 2018-10-05 ENCOUNTER — NON-PROVIDER VISIT (OUTPATIENT)
Dept: OCCUPATIONAL MEDICINE | Facility: CLINIC | Age: 58
End: 2018-10-05

## 2018-10-05 PROCEDURE — 8907 PR URINE COLLECT ONLY: Performed by: PREVENTIVE MEDICINE

## 2018-10-10 ENCOUNTER — OFFICE VISIT (OUTPATIENT)
Dept: OCCUPATIONAL MEDICINE | Facility: CLINIC | Age: 58
End: 2018-10-10

## 2018-10-10 VITALS
HEIGHT: 68 IN | DIASTOLIC BLOOD PRESSURE: 74 MMHG | OXYGEN SATURATION: 94 % | BODY MASS INDEX: 37.89 KG/M2 | TEMPERATURE: 97.3 F | WEIGHT: 250 LBS | HEART RATE: 91 BPM | SYSTOLIC BLOOD PRESSURE: 122 MMHG

## 2018-10-10 DIAGNOSIS — Z02.4 ENCOUNTER FOR COMMERCIAL DRIVER MEDICAL EXAMINATION (CDME): ICD-10-CM

## 2018-10-10 PROCEDURE — 7100 PR DOT PHYSICAL: Performed by: PREVENTIVE MEDICINE

## 2018-10-16 ENCOUNTER — OFFICE VISIT (OUTPATIENT)
Dept: MEDICAL GROUP | Facility: PHYSICIAN GROUP | Age: 58
End: 2018-10-16

## 2018-10-16 VITALS
OXYGEN SATURATION: 97 % | TEMPERATURE: 97.7 F | WEIGHT: 243 LBS | DIASTOLIC BLOOD PRESSURE: 68 MMHG | BODY MASS INDEX: 36.83 KG/M2 | HEIGHT: 68 IN | SYSTOLIC BLOOD PRESSURE: 100 MMHG | HEART RATE: 79 BPM

## 2018-10-16 DIAGNOSIS — I10 ESSENTIAL HYPERTENSION: ICD-10-CM

## 2018-10-16 DIAGNOSIS — Z23 FLU VACCINE NEED: ICD-10-CM

## 2018-10-16 DIAGNOSIS — K31.84 GASTROPARESIS: Primary | ICD-10-CM

## 2018-10-16 DIAGNOSIS — E66.09 CLASS 2 OBESITY DUE TO EXCESS CALORIES WITHOUT SERIOUS COMORBIDITY WITH BODY MASS INDEX (BMI) OF 37.0 TO 37.9 IN ADULT: ICD-10-CM

## 2018-10-16 DIAGNOSIS — G47.39 OTHER SLEEP APNEA: ICD-10-CM

## 2018-10-16 DIAGNOSIS — K21.9 GASTROESOPHAGEAL REFLUX DISEASE WITHOUT ESOPHAGITIS: ICD-10-CM

## 2018-10-16 PROBLEM — E66.812 CLASS 2 OBESITY DUE TO EXCESS CALORIES WITHOUT SERIOUS COMORBIDITY IN ADULT: Status: ACTIVE | Noted: 2018-10-16

## 2018-10-16 PROBLEM — E66.01 MORBID OBESITY WITH BMI OF 40.0-44.9, ADULT (HCC): Status: RESOLVED | Noted: 2017-03-22 | Resolved: 2018-10-16

## 2018-10-16 PROCEDURE — 90686 IIV4 VACC NO PRSV 0.5 ML IM: CPT | Performed by: FAMILY MEDICINE

## 2018-10-16 PROCEDURE — 90471 IMMUNIZATION ADMIN: CPT | Performed by: FAMILY MEDICINE

## 2018-10-16 PROCEDURE — 99214 OFFICE O/P EST MOD 30 MIN: CPT | Mod: 25 | Performed by: FAMILY MEDICINE

## 2018-10-16 RX ORDER — IBUPROFEN 200 MG
200 TABLET ORAL EVERY 6 HOURS PRN
COMMUNITY
End: 2019-12-10

## 2018-10-16 NOTE — ASSESSMENT & PLAN NOTE
Chronic stable medical condition.  Currently well controlled on amlodipine, losartan, and hydrochlorothiazide.

## 2018-10-16 NOTE — ASSESSMENT & PLAN NOTE
Chronic stable medical condition.  History of Nissen fundoplication and vagotomy in his youth to treat intractable gastroesophageal reflux which is currently well controlled.  Patient has now begun working with a nutritionist and eating small meals to help with his gastroparesis.

## 2018-10-16 NOTE — ASSESSMENT & PLAN NOTE
Chronic stable medical condition.  Patient has a history of snoring at night and states that he gets up at least once per night and then will go back to sleep.  He will fall asleep quickly once he sits down to watch TV but denies needing naps during the day or falling asleep while driving.  Declines any further workup at this point but is open to evaluation if he can get more hours per sleep at night.  Currently going to bed late and limiting his sleep to 5 hours

## 2018-10-16 NOTE — PROGRESS NOTES
CC:  The primary encounter diagnosis was Flu vaccine need. Diagnoses of Gastroparesis, Essential hypertension, Gastroesophageal reflux disease without esophagitis, Class 2 obesity due to excess calories without serious comorbidity with body mass index (BMI) of 37.0 to 37.9 in adult, and Other sleep apnea were also pertinent to this visit.    HISTORY OF THE PRESENT ILLNESS: Patient is a 58 y.o. male. This pleasant patient is here today to establish new primary care provider.    Health Maintenance: Completed      Gastroparesis  Chronic stable medical condition.  History of Nissen fundoplication and vagotomy in his youth to treat intractable gastroesophageal reflux which is currently well controlled.  Patient has now begun working with a nutritionist and eating small meals to help with his gastroparesis.    Hypertension  Chronic stable medical condition.  Currently well controlled on amlodipine, losartan, and hydrochlorothiazide.    Gastroesophageal reflux disease without esophagitis  Chronic stable medical condition.  Currently well controlled with Prevacid 30 mg daily, history of Nissen fundoplication and vagotomy in the past.    Class 2 obesity due to excess calories without serious comorbidity in adult  Chronic stable medical condition.  That is currently improving and patient has lost 10 pounds approximately in the last month with dietary changes and lifestyle modifications.  Has a new job that requires him to be on his feet and walking around significantly more than his previous one is a [employer] .    Other sleep apnea  Chronic stable medical condition.  Patient has a history of snoring at night and states that he gets up at least once per night and then will go back to sleep.  He will fall asleep quickly once he sits down to watch TV but denies needing naps during the day or falling asleep while driving.  Declines any further workup at this point but is open to evaluation if he can get  more hours per sleep at night.  Currently going to bed late and limiting his sleep to 5 hours    Allergies: Iodine    Current Outpatient Prescriptions Ordered in [facility]   Medication Sig Dispense Refill   • ibuprofen (MOTRIN) 200 MG Tab Take 200 mg by mouth every 6 hours as needed.     • allopurinol (ZYLOPRIM) 300 MG Tab Take one tablet by mouth one time daily 90 Tab 2   • amLODIPine (NORVASC) 2.5 MG Tab Take one tablet by mouth one time daily 90 Tab 0   • losartan (COZAAR) 100 MG Tab Take one tablet by mouth one time daily 90 Tab 0   • lansoprazole (PREVACID) 30 MG CAPSULE DELAYED RELEASE TAKE ONE CAPSULE BY MOUTH ONE TIME DAILY 90 Cap 2   • hydroCHLOROthiazide (HYDRODIURIL) 25 MG Tab Take 1 Tab by mouth every day. 90 Tab 3   • metformin ER (GLUCOPHAGE XR) 500 MG TABLET SR 24 HR Take 1 Tab by mouth every day. Take with food 90 Tab 3   • Cholecalciferol (VITAMIN D3) 5000 UNITS Cap Take 1 Cap by mouth every day. 30 Cap 2   • ibuprofen (MOTRIN) 600 MG Tab Take 1 Tab by mouth 3 times a day as needed. 60 Tab 0     No current Epic-ordered facility-administered medications on file.        Past Medical History:   Diagnosis Date   • Gastroparesis     2016   • Hypertension     7 yrs   • Kidney stones     6-7 yrs ago       Past Surgical History:   Procedure Laterality Date   • APPENDECTOMY     • CERVICAL FUSION POSTERIOR      7-8 yrs back   • VAGOTOMY  at age 19    due to hiatal hernia       Social History   Substance Use Topics   • Smoking status: Never Smoker   • Smokeless tobacco: Former User     Types: Chew   • Alcohol use Yes      Comment: occasionally       Social History     Social History Narrative   • No narrative on file       Family History   Problem Relation Age of Onset   • Stroke Mother    • Heart Disease Mother    • Lung Disease Father        ROS:   Denies chest pain and shortness of breath        - NOTE: All other systems reviewed and are negative, except as in HPI.      Exam: Blood pressure 100/68, pulse 79,  "temperature 36.5 °C (97.7 °F), temperature source Temporal, height 1.727 m (5' 8\"), weight 110.2 kg (243 lb), SpO2 97 %. Body mass index is 36.95 kg/m².    GENERAL: No acute distress  HENT: Atraumatic, normocephalic, external auditory canals clear bilaterally, TMs translucent and pearly gray, nares clear without discharge, posterior pharynx clear without erythema or exudates.  EYES: Extraocular movements intact, pupils equal and reactive to light.  NECK: Supple, FROM  CHEST: No deformities, Equal chest expansion, no murmurs, gallops, or rubs.  RESP: Unlabored, no stridor or audible wheeze.  No wheezes, crackles, nor rhonchi  ABD: Soft, Nontender, Non-Distended.  Normal bowel sounds.  No hepatosplenomegaly  Extremities: No Clubbing, Cyanosis, or Edema.  Skin: Warm/dry, without rases  Neuro: A/O x 4, CN 2-12 Grossly intact, Motor/sensory grosly intact  Psych: Normal behavior, normal affect    Lab review:  labs are reviewed, up to date and normal    Medical Records Reviewed:  Previous medical records from Dr. Ward Andrews and Dr. Saniya Montelongo reviewed.      Assessment/Plan:  1. Flu vaccine need  - Influenza Vaccine Quad Injection >3Y (PF)    2. Gastroparesis  Chronic stable medical condition.  Please continue your dietary changes and follow-up with gastroenterology as indicated.    3. Essential hypertension  Controlled chronic medical condition.  No changes in medical or pharmacologic management at this point.  I will continue to follow and make changes as indicated/necessary.    4. Gastroesophageal reflux disease without esophagitis  Controlled chronic medical condition.  Please continue PPI therapy.    5. Class 2 obesity due to excess calories without serious comorbidity with body mass index (BMI) of 37.0 to 37.9 in adult  Chronic stable medical condition.  Counseled on diet, exercise, and weight loss.  Follow-up in 3 months.    6. Other sleep apnea  Chronic and stable medical condition.  Patient declines sleep " study testing at this point.  Will reevaluate in 3 months.      Please note that this dictation was created using voice recognition software. I have made every reasonable attempt to correct obvious errors, but I expect that there are errors of grammar and possibly content that I did not discover before finalizing the note.

## 2018-10-16 NOTE — ASSESSMENT & PLAN NOTE
Chronic stable medical condition.  Currently well controlled with Prevacid 30 mg daily, history of Nissen fundoplication and vagotomy in the past.

## 2018-10-16 NOTE — ASSESSMENT & PLAN NOTE
Chronic stable medical condition.  That is currently improving and patient has lost 10 pounds approximately in the last month with dietary changes and lifestyle modifications.  Has a new job that requires him to be on his feet and walking around significantly more than his previous one is a [employer] .

## 2018-11-24 DIAGNOSIS — E88.810 METABOLIC SYNDROME: ICD-10-CM

## 2018-11-25 RX ORDER — METFORMIN HYDROCHLORIDE 500 MG/1
TABLET, EXTENDED RELEASE ORAL
Qty: 90 TAB | Refills: 3 | Status: SHIPPED | OUTPATIENT
Start: 2018-11-25 | End: 2019-11-20 | Stop reason: SDUPTHER

## 2018-12-20 ENCOUNTER — TELEPHONE (OUTPATIENT)
Dept: SCHEDULING | Facility: IMAGING CENTER | Age: 58
End: 2018-12-20

## 2019-04-03 ENCOUNTER — OFFICE VISIT (OUTPATIENT)
Dept: MEDICAL GROUP | Facility: MEDICAL CENTER | Age: 59
End: 2019-04-03
Payer: COMMERCIAL

## 2019-04-03 VITALS
RESPIRATION RATE: 16 BRPM | DIASTOLIC BLOOD PRESSURE: 76 MMHG | HEART RATE: 76 BPM | WEIGHT: 249 LBS | SYSTOLIC BLOOD PRESSURE: 114 MMHG | HEIGHT: 68 IN | BODY MASS INDEX: 37.74 KG/M2 | OXYGEN SATURATION: 98 % | TEMPERATURE: 98.7 F

## 2019-04-03 DIAGNOSIS — K21.9 GASTROESOPHAGEAL REFLUX DISEASE WITHOUT ESOPHAGITIS: ICD-10-CM

## 2019-04-03 DIAGNOSIS — Z00.00 HEALTHCARE MAINTENANCE: ICD-10-CM

## 2019-04-03 DIAGNOSIS — M10.9 GOUT, ARTHRITIS: ICD-10-CM

## 2019-04-03 DIAGNOSIS — E55.9 VITAMIN D DEFICIENCY: ICD-10-CM

## 2019-04-03 DIAGNOSIS — R73.03 PREDIABETES: ICD-10-CM

## 2019-04-03 DIAGNOSIS — G47.39 OTHER SLEEP APNEA: ICD-10-CM

## 2019-04-03 DIAGNOSIS — E66.09 CLASS 2 OBESITY DUE TO EXCESS CALORIES WITHOUT SERIOUS COMORBIDITY WITH BODY MASS INDEX (BMI) OF 37.0 TO 37.9 IN ADULT: ICD-10-CM

## 2019-04-03 DIAGNOSIS — K31.84 GASTROPARESIS: ICD-10-CM

## 2019-04-03 DIAGNOSIS — I10 ESSENTIAL HYPERTENSION: ICD-10-CM

## 2019-04-03 PROBLEM — E66.812 CLASS 2 OBESITY DUE TO EXCESS CALORIES WITHOUT SERIOUS COMORBIDITY WITH BODY MASS INDEX (BMI) OF 37.0 TO 37.9 IN ADULT: Status: ACTIVE | Noted: 2019-04-03

## 2019-04-03 PROCEDURE — 99204 OFFICE O/P NEW MOD 45 MIN: CPT | Performed by: FAMILY MEDICINE

## 2019-04-03 RX ORDER — MULTIVITAMIN WITH IRON
TABLET ORAL DAILY
COMMUNITY
End: 2023-07-31

## 2019-04-03 ASSESSMENT — PATIENT HEALTH QUESTIONNAIRE - PHQ9: CLINICAL INTERPRETATION OF PHQ2 SCORE: 0

## 2019-04-03 NOTE — PROGRESS NOTES
CC: New patient: Prediabetes, hypertension, gout arthritis, acid reflux, gastroparesis, vitamin D deficiency, sleep apnea, obesity    HPI:  Venkat presents today to establish new PCP.    Patient has been active and independent with all ADLs.  The following chronic medical issues:    Hyperglycemia  His last last A1c was done in February 2018, was 6.1.  Patient has been asymptomatic, denies polyuria and polydipsia.  No history of diabetes.  However patient has been on metformin 500 mg daily.  No side effects.    Essential hypertension  Has been adequately controlled on current medication. Denies headache, chest pain, and SOB.  Patient has been on hydrochlorothiazide 25 mg daily, amlodipine 2.5 mg daily, losartan 100 mg daily.  No side effects    Gout, arthritis  Patient has been asymptomatic.  His last acute episode was more than a year ago.  Is currently on allopurinol 300 mg daily.  Has been taking it for many years.    Gastroesophageal reflux disease without esophagitis/ Gastroparesis  Patient has history of severe acid reflux due to hiatal hernia, underwent vagotomy more than 20 years ago, so he developed gastroparesis as a result of that.  Currently stable, has been doing fine on lansoprazole 30 mg daily.    Class 2 obesity due to excess calories without serious comorbidity with body mass index (BMI) of 37.0 to 37.9 in adult  BMI is 37.Patient is counseled about the medical rationale for weight loss in obese individuals is that obesity is associated with a significant increase in mortality, and many health risks including type 2 diabetes mellitus, hypertension, dyslipidemia, and coronary heart disease. The benefits of weight loss include a reduction in the rate of progression from impaired glucose tolerance to diabetes, blood pressure in hypertensive patients, and lipid levels in higher risk patients. Other noncardiac benefits of weight loss include reductions in urinary incontinence, sleep apnea, and  depression, and improvements in quality of life, physical functioning, and mobility.Recommend lifestyle modification: exercise 30 minutes per day 5 days per week. Recommend also portion control.    Snores while asleep  Patient has been having snoring, morning headache and tiredness, daytime is naps.  Probably sleep apnea.  Will send patient for sleep studies.    Colonoscopy is up-to-date, has had colonoscopy since 2017, was found to have adenomatous polyps, they recommended to repeat colonoscopy in 5 years.            Patient Active Problem List    Diagnosis Date Noted   • Class 2 obesity due to excess calories without serious comorbidity with body mass index (BMI) of 37.0 to 37.9 in adult 04/03/2019   • Class 2 obesity due to excess calories without serious comorbidity in adult 10/16/2018   • Nocturnal leg cramps 09/11/2018   • Chronic pain of multiple joints 09/11/2018   • Hiatal hernia 06/16/2018   • Other sleep apnea 06/13/2018   • Plantar fasciitis of right foot 01/18/2018   • Cutaneous wart 07/13/2017   • Memory difficulty 07/13/2017   • Left hip pain 07/13/2017   • Metabolic syndrome 05/09/2017   • Gastroesophageal reflux disease without esophagitis 05/09/2017   • History of adenomatous polyp of colon 04/18/2017   • Vitamin D deficiency 04/04/2017   • Chronic gout 03/22/2017   • Gastroparesis    • Hypertension        Current Outpatient Prescriptions   Medication Sig Dispense Refill   • Omega-3 Fatty Acids (OMEGA-3 2100 PO) Take  by mouth.     • Magnesium 250 MG Tab Take  by mouth.     • metFORMIN (GLUCOPHAGE) 500 MG Tab Take 500 mg by mouth 2 times a day, with meals.     • hydroCHLOROthiazide (HYDRODIURIL) 25 MG Tab Take one tablet by mouth one time daily 90 Tab 3   • amLODIPine (NORVASC) 2.5 MG Tab TAKE ONE TABLET BY MOUTH ONE TIME DAILY  90 Tab 3   • losartan (COZAAR) 100 MG Tab TAKE ONE TABLET BY MOUTH ONE TIME DAILY  90 Tab 3   • ibuprofen (MOTRIN) 200 MG Tab Take 200 mg by mouth every 6 hours as needed.   "   • allopurinol (ZYLOPRIM) 300 MG Tab Take one tablet by mouth one time daily 90 Tab 2   • lansoprazole (PREVACID) 30 MG CAPSULE DELAYED RELEASE TAKE ONE CAPSULE BY MOUTH ONE TIME DAILY 90 Cap 2   • ibuprofen (MOTRIN) 600 MG Tab Take 1 Tab by mouth 3 times a day as needed. 60 Tab 0   • Cholecalciferol (VITAMIN D3) 5000 UNITS Cap Take 1 Cap by mouth every day. 30 Cap 2     No current facility-administered medications for this visit.          Allergies as of 04/03/2019 - Reviewed 04/03/2019   Allergen Reaction Noted   • Iodine Unspecified 03/22/2017        Social History     Social History   • Marital status:      Spouse name: N/A   • Number of children: N/A   • Years of education: N/A     Occupational History   • Not on file.     Social History Main Topics   • Smoking status: Never Smoker   • Smokeless tobacco: Former User     Types: Chew   • Alcohol use Yes      Comment: occasionally   • Drug use: No   • Sexual activity: Yes     Partners: Female     Other Topics Concern   • Not on file     Social History Narrative   • No narrative on file       Family History   Problem Relation Age of Onset   • Stroke Mother    • Heart Disease Mother    • Lung Disease Father        Past Surgical History:   Procedure Laterality Date   • APPENDECTOMY     • CERVICAL FUSION POSTERIOR      7-8 yrs back   • VAGOTOMY  at age 19    due to hiatal hernia       ROS:  Denies any Headache, Blurred Vision, Confusion Chest pain,  Shortness of breath,  Abdominal pain, Changes of bowel or bladder, Lower ext edema, Fevers, Nights sweats, Weight Changes, Focal weakness or numbness.  All other systems are negative.    /76 (BP Location: Right arm, Patient Position: Sitting)   Pulse 76   Temp 37.1 °C (98.7 °F) (Temporal)   Resp 16   Ht 1.727 m (5' 8\")   Wt 112.9 kg (249 lb)   SpO2 98%   BMI 37.86 kg/m²     Physical Exam:  Gen:         Alert and oriented, No apparent distress.  HEENT:   Perrla, TM clear,  Oralpharynx no erythema or " exudates.  Neck:       No Jugular venous distension, Lymphadenopathy, Thyromegaly, Bruits.  Lungs:     Clear to auscultation bilaterally  CV:          Regular rate and rhythm. No murmurs, rubs or gallops.  Abd:         Soft non tender, non distended. Normal active bowel sounds. No                                        Hepatosplenomegaly, No pulsatile masses.  Ext:          No clubbing, cyanosis, edema.      Assessment and Plan.   58 y.o. male     1. Prediabetes  Last A1c was done in February 2018, was 6.1.  Patient is counseled about lifestyle modification.  Continue on metformin 500 mg daily.    - Comp Metabolic Panel; Future  - Lipid Profile; Future  - HEMOGLOBIN A1C; Future    2. Essential hypertension  Adequately controlled.  Continue on hydrochlorothiazide 25 mg daily, amlodipine 2.5 mg daily, losartan 100 mg daily.    - CBC WITH DIFFERENTIAL; Future  - Comp Metabolic Panel; Future  - Lipid Profile; Future  - TSH; Future    3. Gout, arthritis  Stable.  Last acute episode was more than a year ago.  Patient has been on allopurinol 300 mg daily.  Recommend to decrease her dose to 100 mg daily.    4. Gastroesophageal reflux disease without esophagitis  Patient has history of severe acid reflux due to hiatal hernia, underwent vagotomy more than 20 years ago, so he developed gastroparesis as a result of that.  Currently stable, has been doing fine on lansoprazole 30 mg daily.    5. Vitamin D deficiency  Patient has been on vitamin D, no history of fractures.  Last checked in February 2018 was 29.  Continue vitamin D.  Will recheck vitamin D level.    - VITAMIN D,25 HYDROXY; Future    6. Class 2 obesity due to excess calories without serious comorbidity with body mass index (BMI) of 37.0 to 37.9 in adult  BMI is 37.  Patient is counseled about lifestyle modification.    7. Gastroparesis  Patient has history of severe acid reflux due to hiatal hernia, underwent vagotomy more than 20 years ago, so he developed  gastroparesis as a result of that.  Currently stable, has been doing fine on lansoprazole 30 mg daily.    8. Healthcare maintenance  Colonoscopy is up-to-date, has had colonoscopy since 2017, was found to have adenomatous polyps, they recommended to repeat colonoscopy in 5 years.    9. Other sleep apnea  Patient has been having snoring, morning headache and tiredness, daytime is naps.  Probably sleep apnea.  Will send patient for sleep studies.    - REFERRAL TO SLEEP STUDIES

## 2019-04-17 ENCOUNTER — HOSPITAL ENCOUNTER (OUTPATIENT)
Dept: LAB | Facility: MEDICAL CENTER | Age: 59
End: 2019-04-17
Attending: FAMILY MEDICINE
Payer: COMMERCIAL

## 2019-04-17 DIAGNOSIS — R73.03 PREDIABETES: ICD-10-CM

## 2019-04-17 DIAGNOSIS — E55.9 VITAMIN D DEFICIENCY: ICD-10-CM

## 2019-04-17 DIAGNOSIS — I10 ESSENTIAL HYPERTENSION: ICD-10-CM

## 2019-04-17 LAB
25(OH)D3 SERPL-MCNC: 34 NG/ML (ref 30–100)
ALBUMIN SERPL BCP-MCNC: 4.5 G/DL (ref 3.2–4.9)
ALBUMIN/GLOB SERPL: 1.9 G/DL
ALP SERPL-CCNC: 57 U/L (ref 30–99)
ALT SERPL-CCNC: 26 U/L (ref 2–50)
ANION GAP SERPL CALC-SCNC: 9 MMOL/L (ref 0–11.9)
AST SERPL-CCNC: 18 U/L (ref 12–45)
BASOPHILS # BLD AUTO: 0.8 % (ref 0–1.8)
BASOPHILS # BLD: 0.06 K/UL (ref 0–0.12)
BILIRUB SERPL-MCNC: 0.6 MG/DL (ref 0.1–1.5)
BUN SERPL-MCNC: 15 MG/DL (ref 8–22)
CALCIUM SERPL-MCNC: 9.4 MG/DL (ref 8.5–10.5)
CHLORIDE SERPL-SCNC: 98 MMOL/L (ref 96–112)
CHOLEST SERPL-MCNC: 130 MG/DL (ref 100–199)
CO2 SERPL-SCNC: 26 MMOL/L (ref 20–33)
CREAT SERPL-MCNC: 0.73 MG/DL (ref 0.5–1.4)
EOSINOPHIL # BLD AUTO: 0.13 K/UL (ref 0–0.51)
EOSINOPHIL NFR BLD: 1.7 % (ref 0–6.9)
ERYTHROCYTE [DISTWIDTH] IN BLOOD BY AUTOMATED COUNT: 44.2 FL (ref 35.9–50)
EST. AVERAGE GLUCOSE BLD GHB EST-MCNC: 128 MG/DL
FASTING STATUS PATIENT QL REPORTED: NORMAL
GLOBULIN SER CALC-MCNC: 2.4 G/DL (ref 1.9–3.5)
GLUCOSE SERPL-MCNC: 93 MG/DL (ref 65–99)
HBA1C MFR BLD: 6.1 % (ref 0–5.6)
HCT VFR BLD AUTO: 48.3 % (ref 42–52)
HDLC SERPL-MCNC: 55 MG/DL
HGB BLD-MCNC: 16.5 G/DL (ref 14–18)
IMM GRANULOCYTES # BLD AUTO: 0.04 K/UL (ref 0–0.11)
IMM GRANULOCYTES NFR BLD AUTO: 0.5 % (ref 0–0.9)
LDLC SERPL CALC-MCNC: 62 MG/DL
LYMPHOCYTES # BLD AUTO: 2.43 K/UL (ref 1–4.8)
LYMPHOCYTES NFR BLD: 30.9 % (ref 22–41)
MCH RBC QN AUTO: 31 PG (ref 27–33)
MCHC RBC AUTO-ENTMCNC: 34.2 G/DL (ref 33.7–35.3)
MCV RBC AUTO: 90.6 FL (ref 81.4–97.8)
MONOCYTES # BLD AUTO: 0.54 K/UL (ref 0–0.85)
MONOCYTES NFR BLD AUTO: 6.9 % (ref 0–13.4)
NEUTROPHILS # BLD AUTO: 4.67 K/UL (ref 1.82–7.42)
NEUTROPHILS NFR BLD: 59.2 % (ref 44–72)
NRBC # BLD AUTO: 0 K/UL
NRBC BLD-RTO: 0 /100 WBC
PLATELET # BLD AUTO: 286 K/UL (ref 164–446)
PMV BLD AUTO: 10.7 FL (ref 9–12.9)
POTASSIUM SERPL-SCNC: 3.6 MMOL/L (ref 3.6–5.5)
PROT SERPL-MCNC: 6.9 G/DL (ref 6–8.2)
RBC # BLD AUTO: 5.33 M/UL (ref 4.7–6.1)
SODIUM SERPL-SCNC: 133 MMOL/L (ref 135–145)
TRIGL SERPL-MCNC: 65 MG/DL (ref 0–149)
TSH SERPL DL<=0.005 MIU/L-ACNC: 1.99 UIU/ML (ref 0.38–5.33)
WBC # BLD AUTO: 7.9 K/UL (ref 4.8–10.8)

## 2019-04-17 PROCEDURE — 83036 HEMOGLOBIN GLYCOSYLATED A1C: CPT

## 2019-04-17 PROCEDURE — 82306 VITAMIN D 25 HYDROXY: CPT

## 2019-04-17 PROCEDURE — 80053 COMPREHEN METABOLIC PANEL: CPT

## 2019-04-17 PROCEDURE — 80061 LIPID PANEL: CPT

## 2019-04-17 PROCEDURE — 36415 COLL VENOUS BLD VENIPUNCTURE: CPT

## 2019-04-17 PROCEDURE — 84443 ASSAY THYROID STIM HORMONE: CPT

## 2019-04-17 PROCEDURE — 85025 COMPLETE CBC W/AUTO DIFF WBC: CPT

## 2019-07-03 ENCOUNTER — OFFICE VISIT (OUTPATIENT)
Dept: MEDICAL GROUP | Facility: MEDICAL CENTER | Age: 59
End: 2019-07-03
Payer: COMMERCIAL

## 2019-07-03 VITALS
OXYGEN SATURATION: 94 % | TEMPERATURE: 97.6 F | RESPIRATION RATE: 16 BRPM | WEIGHT: 252.65 LBS | HEART RATE: 78 BPM | HEIGHT: 68 IN | BODY MASS INDEX: 38.29 KG/M2 | SYSTOLIC BLOOD PRESSURE: 120 MMHG | DIASTOLIC BLOOD PRESSURE: 80 MMHG

## 2019-07-03 DIAGNOSIS — E55.9 VITAMIN D DEFICIENCY: ICD-10-CM

## 2019-07-03 DIAGNOSIS — K31.84 GASTROPARESIS: ICD-10-CM

## 2019-07-03 DIAGNOSIS — R53.83 TIREDNESS: ICD-10-CM

## 2019-07-03 DIAGNOSIS — R73.02 IGT (IMPAIRED GLUCOSE TOLERANCE): ICD-10-CM

## 2019-07-03 DIAGNOSIS — K21.9 GASTROESOPHAGEAL REFLUX DISEASE WITHOUT ESOPHAGITIS: ICD-10-CM

## 2019-07-03 DIAGNOSIS — I10 ESSENTIAL HYPERTENSION: ICD-10-CM

## 2019-07-03 DIAGNOSIS — E66.9 OBESITY (BMI 30-39.9): ICD-10-CM

## 2019-07-03 PROCEDURE — 99214 OFFICE O/P EST MOD 30 MIN: CPT | Performed by: FAMILY MEDICINE

## 2019-07-03 RX ORDER — LANSOPRAZOLE 30 MG/1
CAPSULE, DELAYED RELEASE ORAL
Qty: 90 CAP | Refills: 2 | Status: SHIPPED | OUTPATIENT
Start: 2019-07-03 | End: 2020-04-13 | Stop reason: SDUPTHER

## 2019-07-03 NOTE — PROGRESS NOTES
CC:     HPI:   Venkat presents today discuss the following medical issues:    Essential hypertension  Has been adequately controlled on current medication. Denies headache, chest pain, and SOB.  Patient has been on hydrochlorothiazide 25 mg daily, amlodipine 2.5 mg daily, losartan 100 mg daily.  No side effects     Gastroesophageal reflux disease without esophagitis/ Gastroparesis  Patient has history of severe acid reflux due to hiatal hernia, underwent vagotomy more than 20 years ago, so he developed gastroparesis as a result of that.Patient has been having a lot of acid reflux and sometimes cold and cough and burning throat.  He used to take  lansoprazole 30 mg daily and was helping, however it has not been refilled for him for a while.    Impaired glucose tolerance  His last A1c was 6.1.  Patient has been asymptomatic, denies polyuria and polydipsia.  No history of diabetes.  However patient has been on metformin 500 mg daily.  No side effects.     Obesity (BMI 30-39.9)  BMI is 38.Patient is counseled about the medical rationale for weight loss in obese individuals is that obesity is associated with a significant increase in mortality, and many health risks including type 2 diabetes mellitus, hypertension, dyslipidemia, and coronary heart disease. The benefits of weight loss include a reduction in the rate of progression from impaired glucose tolerance to diabetes, blood pressure in hypertensive patients, and lipid levels in higher risk patients. Other noncardiac benefits of weight loss include reductions in urinary incontinence, sleep apnea, and depression, and improvements in quality of life, physical functioning, and mobility.Recommend lifestyle modification: exercise 30 minutes per day 5 days per week. Recommend also portion control.    Vitamin D deficiency  Patient has been feeling tired all the time, sometimes he gets bone pain.  His last vitamin D was low normal(34).  Has been taking vitamin D  over-the-counter at 1000 units daily.Patient advised to keep his vitamin D level above 50.      Patient Active Problem List    Diagnosis Date Noted   • Class 2 obesity due to excess calories without serious comorbidity with body mass index (BMI) of 37.0 to 37.9 in adult 04/03/2019   • Class 2 obesity due to excess calories without serious comorbidity in adult 10/16/2018   • Nocturnal leg cramps 09/11/2018   • Chronic pain of multiple joints 09/11/2018   • Hiatal hernia 06/16/2018   • Other sleep apnea 06/13/2018   • Plantar fasciitis of right foot 01/18/2018   • Cutaneous wart 07/13/2017   • Memory difficulty 07/13/2017   • Left hip pain 07/13/2017   • Metabolic syndrome 05/09/2017   • Gastroesophageal reflux disease without esophagitis 05/09/2017   • History of adenomatous polyp of colon 04/18/2017   • Vitamin D deficiency 04/04/2017   • Chronic gout 03/22/2017   • Gastroparesis    • Hypertension        Current Outpatient Prescriptions   Medication Sig Dispense Refill   • Omega-3 Fatty Acids (OMEGA-3 2100 PO) Take  by mouth.     • Magnesium 250 MG Tab Take  by mouth.     • metFORMIN (GLUCOPHAGE) 500 MG Tab Take 500 mg by mouth 2 times a day, with meals.     • hydroCHLOROthiazide (HYDRODIURIL) 25 MG Tab Take one tablet by mouth one time daily 90 Tab 3   • amLODIPine (NORVASC) 2.5 MG Tab TAKE ONE TABLET BY MOUTH ONE TIME DAILY  90 Tab 3   • losartan (COZAAR) 100 MG Tab TAKE ONE TABLET BY MOUTH ONE TIME DAILY  90 Tab 3   • ibuprofen (MOTRIN) 200 MG Tab Take 200 mg by mouth every 6 hours as needed.     • allopurinol (ZYLOPRIM) 300 MG Tab Take one tablet by mouth one time daily 90 Tab 2   • lansoprazole (PREVACID) 30 MG CAPSULE DELAYED RELEASE TAKE ONE CAPSULE BY MOUTH ONE TIME DAILY 90 Cap 2   • ibuprofen (MOTRIN) 600 MG Tab Take 1 Tab by mouth 3 times a day as needed. 60 Tab 0   • Cholecalciferol (VITAMIN D3) 5000 UNITS Cap Take 1 Cap by mouth every day. 30 Cap 2     No current facility-administered medications for  "this visit.          Allergies as of 07/03/2019 - Reviewed 07/03/2019   Allergen Reaction Noted   • Iodine Unspecified 03/22/2017        ROS: Denies any chest pain, Shortness of breath, Changes bowel or bladder, Lower extremity edema.    Physical Exam:  /80 (BP Location: Right arm, Patient Position: Sitting, BP Cuff Size: Adult)   Pulse 78   Temp 36.4 °C (97.6 °F) (Temporal)   Resp 16   Ht 1.727 m (5' 8\")   Wt 114.6 kg (252 lb 10.4 oz)   SpO2 94%   BMI 38.41 kg/m²   Gen.: Obese, no apparent distress,pleasant and cooperative with the examination  Skin:  Warm and dry with good turgor. No rashes or suspicious lesions in visible areas  HEENT:Sinuses nontender with palpation, TMs clear, nares patent with pink mucosa and clear rhinorrhea,no septal deviation ,polyps or lesions. lips without lesions, oropharynx clear.  Neck: Trachea midline,no masses or adenopathy. No JVD.  Cor: Regular rate and rhythm without murmur, gallop or rub.  Lungs: Respirations unlabored.Clear to auscultation with equal breath sounds bilaterally. No wheezes, rhonchi.  Extremities: No cyanosis, clubbing or edema.        Assessment and Plan.   58 y.o. male     1. Essential hypertension  Adequately controlled.  Continue on hydrochlorothiazide 25 mg daily, amlodipine 2.5 mg daily, losartan 100 mg daily.      2. Gastroparesis/ Gastroesophageal reflux disease without esophagitis  Patient has history of severe acid reflux due to hiatal hernia, underwent vagotomy more than 20 years ago, so he developed gastroparesis as a result of that.  Patient has been having a lot of acid reflux and sometimes cold and cough and burning throat.  He used to take  lansoprazole 30 mg daily and was helping, however it has not been refilled for him for a while.  Will refill lansoprazole 30 mg daily..    - lansoprazole (PREVACID) 30 MG CAPSULE DELAYED RELEASE; TAKE ONE CAPSULE BY MOUTH ONE TIME DAILY  Dispense: 90 Cap; Refill: 2    3. IGT (impaired glucose " tolerance)  Last A1c was 6.1.  No history of diabetes  Continue on metformin 500 mg daily    4. Obesity (BMI 30-39.9)  BMI is 38.  Patient is counseled about lifestyle medication.    6. Vitamin D deficiency  Patient has been feeling tired all the time, last vitamin D was low normal(34).  Has been taking vitamin D over-the-counter at 1000 units daily.  Patient advised to keep his vitamin D level above 50.    - Cholecalciferol 49313 UNIT Cap; Take 1 Cap by mouth every 7 days.  Dispense: 12 Cap; Refill: 0    7.  Tiredness  Possibly:  Vitamin D deficiency, patient advised to take high dose of vitamin D as mentioned above.  Patient has possibly sleep apnea, she is scheduled for sleep study.

## 2019-07-10 ENCOUNTER — OFFICE VISIT (OUTPATIENT)
Dept: URGENT CARE | Facility: PHYSICIAN GROUP | Age: 59
End: 2019-07-10
Payer: COMMERCIAL

## 2019-07-10 ENCOUNTER — APPOINTMENT (OUTPATIENT)
Dept: SLEEP MEDICINE | Facility: MEDICAL CENTER | Age: 59
End: 2019-07-10
Payer: COMMERCIAL

## 2019-07-10 VITALS
OXYGEN SATURATION: 94 % | TEMPERATURE: 97.7 F | SYSTOLIC BLOOD PRESSURE: 122 MMHG | HEIGHT: 66 IN | RESPIRATION RATE: 16 BRPM | WEIGHT: 249 LBS | HEART RATE: 71 BPM | DIASTOLIC BLOOD PRESSURE: 78 MMHG | BODY MASS INDEX: 40.02 KG/M2

## 2019-07-10 DIAGNOSIS — M25.571 ACUTE RIGHT ANKLE PAIN: ICD-10-CM

## 2019-07-10 DIAGNOSIS — M70.80 REPETITIVE MOTION INJURY: ICD-10-CM
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
## 2019-07-10 PROCEDURE — 99214 OFFICE O/P EST MOD 30 MIN: CPT | Performed by: NURSE PRACTITIONER

## 2019-07-10 RX ORDER — METHYLPREDNISOLONE 4 MG/1
TABLET ORAL
Qty: 1 KIT | Refills: 0 | Status: SHIPPED | OUTPATIENT
Start: 2019-07-10 | End: 2019-12-10

## 2019-07-10 ASSESSMENT — ENCOUNTER SYMPTOMS
FEVER: 0
MYALGIAS: 1
BRUISES/BLEEDS EASILY: 0
WEAKNESS: 0
SENSORY CHANGE: 0
CHILLS: 0
FALLS: 0
TINGLING: 0

## 2019-07-10 NOTE — PROGRESS NOTES
Subjective:      Venkat Mackenzie is a 58 y.o. male who presents with Ankle Pain (Painful ankle and foot, toes keep cramping up throughout the day x 7 days)            HPI  C/o acute right ankle/foot pain without injury x 1 week.  has been in a new job x 9 months.  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 Denies LE calf pain, redness with no swelling. C/o ankle region pain with weight bearing with intermittent swelling. NSAID intermittent use, no ice or elevation.     PMH:  has a past medical history of Gastroparesis; Hypertension; and Kidney stones.  MEDS:   Current Outpatient Prescriptions:   •  methylPREDNISolone (MEDROL DOSEPAK) 4 MG Tablet Therapy Pack, Use as directed, Disp: 1 Kit, Rfl: 0  •  lansoprazole (PREVACID) 30 MG CAPSULE DELAYED RELEASE, TAKE ONE CAPSULE BY MOUTH ONE TIME DAILY, Disp: 90 Cap, Rfl: 2  •  Cholecalciferol 23822 UNIT Cap, Take 1 Cap by mouth every 7 days., Disp: 12 Cap, Rfl: 0  •  Omega-3 Fatty Acids (OMEGA-3 2100 PO), Take  by mouth., Disp: , Rfl:   •  Magnesium 250 MG Tab, Take  by mouth., Disp: , Rfl:   •  metFORMIN (GLUCOPHAGE) 500 MG Tab, Take 500 mg by mouth 2 times a day, with meals., Disp: , Rfl:   •  hydroCHLOROthiazide (HYDRODIURIL) 25 MG Tab, Take one tablet by mouth one time daily, Disp: 90 Tab, Rfl: 3  •  amLODIPine (NORVASC) 2.5 MG Tab, TAKE ONE TABLET BY MOUTH ONE TIME DAILY , Disp: 90 Tab, Rfl: 3  •  losartan (COZAAR) 100 MG Tab, TAKE ONE TABLET BY MOUTH ONE TIME DAILY , Disp: 90 Tab, Rfl: 3  •  allopurinol (ZYLOPRIM) 300 MG Tab, Take one tablet by mouth one time daily, Disp: 90 Tab, Rfl: 2  •  ibuprofen (MOTRIN) 200 MG Tab, Take 200 mg by mouth every 6 hours as needed., Disp: , Rfl:   •  ibuprofen (MOTRIN) 600 MG Tab, Take 1 Tab by mouth 3 times a day as needed., Disp: 60 Tab, Rfl: 0  •  Cholecalciferol (VITAMIN D3) 5000 UNITS Cap, Take 1 Cap by mouth every day. (Patient not taking: Reported on 7/10/2019), Disp: 30 Cap, Rfl: 2  ALLERGIES:   Allergies  "  Allergen Reactions   • Iodine Unspecified     dizziness     SURGHX:   Past Surgical History:   Procedure Laterality Date   • APPENDECTOMY     • CERVICAL FUSION POSTERIOR      7-8 yrs back   • VAGOTOMY  at age 19    due to hiatal hernia     SOCHX:  reports that he has never smoked. He has quit using smokeless tobacco. His smokeless tobacco use included Chew. He reports that he drinks alcohol. He reports that he does not use drugs.  FH: Family history was reviewed, no pertinent findings to report    Review of Systems   Constitutional: Negative for chills, fever and malaise/fatigue.   Cardiovascular: Negative for leg swelling.   Musculoskeletal: Positive for joint pain and myalgias. Negative for falls.   Skin: Negative for itching and rash.   Neurological: Negative for tingling, sensory change and weakness.   Endo/Heme/Allergies: Does not bruise/bleed easily.   All other systems reviewed and are negative.         Objective:     /78 (BP Location: Left arm, Patient Position: Sitting, BP Cuff Size: Adult)   Pulse 71   Temp 36.5 °C (97.7 °F) (Temporal)   Resp 16   Ht 1.676 m (5' 6\")   Wt 112.9 kg (249 lb)   SpO2 94%   BMI 40.19 kg/m²      Physical Exam   Constitutional: He is oriented to person, place, and time. Vital signs are normal. He appears well-developed and well-nourished. He is active and cooperative.  Non-toxic appearance. He does not have a sickly appearance. He does not appear ill. No distress.   HENT:   Head: Normocephalic.   Eyes: Pupils are equal, round, and reactive to light. EOM are normal.   Cardiovascular: Normal rate.    Pulmonary/Chest: Effort normal. No accessory muscle usage. No respiratory distress.   Musculoskeletal: He exhibits tenderness. He exhibits no edema or deformity.        Right ankle: He exhibits swelling. He exhibits normal range of motion, no ecchymosis, no deformity, no laceration and normal pulse. Tenderness. AITFL tenderness found. Achilles tendon normal.        " Feet:    Neurological: He is alert and oriented to person, place, and time.   Skin: Skin is warm and dry. No rash noted. He is not diaphoretic. No erythema.   Vitals reviewed.            MA applied ace wrap  Assessment/Plan:     1. Repetitive motion injury    - REFERRAL TO SPORTS MEDICINE  - methylPREDNISolone (MEDROL DOSEPAK) 4 MG Tablet Therapy Pack; Use as directed  Dispense: 1 Kit; Refill: 0    2. Acute right ankle pain    - REFERRAL TO SPORTS MEDICINE  - methylPREDNISolone (MEDROL DOSEPAK) 4 MG Tablet Therapy Pack; Use as directed  Dispense: 1 Kit; Refill: 0    May use NSAID prn for pain/swelling  May use cool compresses for swelling prn  May utilize RICE method prn  Avoid excessive weight bearing to avoid further injury  May apply topical analgesics prn  Perform proper body mechanics with lifting, twisting, bending and walking  Monitor for deformity, numbness/tingling in toes, decreased ROM with weight bearing- need re-evaluation  F/u Sports Med

## 2019-07-24 ENCOUNTER — OFFICE VISIT (OUTPATIENT)
Dept: MEDICAL GROUP | Facility: CLINIC | Age: 59
End: 2019-07-24
Payer: COMMERCIAL

## 2019-07-24 VITALS
SYSTOLIC BLOOD PRESSURE: 110 MMHG | HEART RATE: 78 BPM | RESPIRATION RATE: 14 BRPM | TEMPERATURE: 98.4 F | BODY MASS INDEX: 40.02 KG/M2 | OXYGEN SATURATION: 95 % | HEIGHT: 66 IN | WEIGHT: 249 LBS | DIASTOLIC BLOOD PRESSURE: 80 MMHG

## 2019-07-24 DIAGNOSIS — S93.421A SPRAIN OF DELTOID LIGAMENT OF RIGHT ANKLE, INITIAL ENCOUNTER: ICD-10-CM

## 2019-07-24 DIAGNOSIS — M21.42 PES PLANUS OF BOTH FEET: ICD-10-CM

## 2019-07-24 DIAGNOSIS — M21.41 PES PLANUS OF BOTH FEET: ICD-10-CM

## 2019-07-24 PROCEDURE — 99203 OFFICE O/P NEW LOW 30 MIN: CPT | Performed by: FAMILY MEDICINE

## 2019-07-24 ASSESSMENT — ENCOUNTER SYMPTOMS
FOCAL WEAKNESS: 0
VOMITING: 0
FEVER: 0
SHORTNESS OF BREATH: 0
TINGLING: 1

## 2019-07-24 NOTE — PROGRESS NOTES
Subjective:     Venkat Mackenzie is a 58 y.o. male who presents for Ankle Pain (C/O right ankle pain, no trigger or trauma. Pain started one day, tried NSAIDS. 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 Went to  Tx: ace wrap and IBU, pain is constant. x 2 weeks )    HPI  Pt presents for evaluation of right ankle pain   Pt with right ankle pain for the past 2 weeks    Pain started without fall or injury   Pain is in the anterior and medial ankle   Pain is worse when up on his feet and better with rest  Pain stays localized in the ankle and does not radiate to feet  Has been working a new job the past 9 months and on his feet much more often   Wears a low cut shoe which is leather and feels comfortable   Also wears  boots at times  Does not wear any sort of insoles  Has occasional numbness/tingling in the toes, but none in the ankle       Review of Systems   Constitutional: Negative for fever.   Respiratory: Negative for shortness of breath.    Cardiovascular: Negative for chest pain.   Gastrointestinal: Negative for vomiting.   Skin: Negative for rash.   Neurological: Positive for tingling. Negative for focal weakness.       PMH:  has a past medical history of Gastroparesis; Hypertension; and Kidney stones.  MEDS:   Current Outpatient Prescriptions:   •  methylPREDNISolone (MEDROL DOSEPAK) 4 MG Tablet Therapy Pack, Use as directed, Disp: 1 Kit, Rfl: 0  •  lansoprazole (PREVACID) 30 MG CAPSULE DELAYED RELEASE, TAKE ONE CAPSULE BY MOUTH ONE TIME DAILY, Disp: 90 Cap, Rfl: 2  •  Cholecalciferol 27285 UNIT Cap, Take 1 Cap by mouth every 7 days., Disp: 12 Cap, Rfl: 0  •  Omega-3 Fatty Acids (OMEGA-3 2100 PO), Take  by mouth., Disp: , Rfl:   •  Magnesium 250 MG Tab, Take  by mouth., Disp: , Rfl:   •  metFORMIN (GLUCOPHAGE) 500 MG Tab, Take 500 mg by mouth 2 times a day, with meals., Disp: , Rfl:   •  hydroCHLOROthiazide (HYDRODIURIL) 25 MG Tab, Take one tablet by mouth one time daily, Disp: 90 Tab, Rfl: 3  •  amLODIPine  "(NORVASC) 2.5 MG Tab, TAKE ONE TABLET BY MOUTH ONE TIME DAILY , Disp: 90 Tab, Rfl: 3  •  losartan (COZAAR) 100 MG Tab, TAKE ONE TABLET BY MOUTH ONE TIME DAILY , Disp: 90 Tab, Rfl: 3  •  ibuprofen (MOTRIN) 200 MG Tab, Take 200 mg by mouth every 6 hours as needed., Disp: , Rfl:   •  allopurinol (ZYLOPRIM) 300 MG Tab, Take one tablet by mouth one time daily, Disp: 90 Tab, Rfl: 2  •  ibuprofen (MOTRIN) 600 MG Tab, Take 1 Tab by mouth 3 times a day as needed., Disp: 60 Tab, Rfl: 0  •  Cholecalciferol (VITAMIN D3) 5000 UNITS Cap, Take 1 Cap by mouth every day. (Patient not taking: Reported on 7/10/2019), Disp: 30 Cap, Rfl: 2  ALLERGIES:   Allergies   Allergen Reactions   • Iodine Unspecified     dizziness     SURGHX:   Past Surgical History:   Procedure Laterality Date   • APPENDECTOMY     • CERVICAL FUSION POSTERIOR      7-8 yrs back   • VAGOTOMY  at age 19    due to hiatal hernia     SOCHX:  reports that he has never smoked. He has quit using smokeless tobacco. His smokeless tobacco use included Chew. He reports that he drinks alcohol. He reports that he does not use drugs.  FH: Family history was reviewed, not contributing to ankle pain      Objective:   /80 (BP Location: Left arm, Patient Position: Sitting, BP Cuff Size: Adult)   Pulse 78   Temp 36.9 °C (98.4 °F) (Temporal)   Resp 14   Ht 1.676 m (5' 6\")   Wt 112.9 kg (249 lb)   SpO2 95%   BMI 40.19 kg/m²     Physical Exam   Constitutional: He is oriented to person, place, and time. He appears well-developed and well-nourished. No distress.   HENT:   Head: Normocephalic and atraumatic.   Musculoskeletal:   Right ankle/foot:  Appearance - No bruising, erythema, or deformity appreciated  Palpation - +TTP throughout deltoid ligament  ROM - FROM throughout  Strength - 5/5 throughout  Special testing - Neg squeeze test, neg drawer test  Neurovascular - 2+ dorsalis pedis and posterior tibial.   Gait - antalgic, pes planus when standing still, has valgus foot " with pronation during gait   Neurological: He is alert and oriented to person, place, and time.   Skin: Skin is warm and dry. No rash noted. He is not diaphoretic.   Psychiatric: He has a normal mood and affect. His behavior is normal. Judgment and thought content normal.       Assessment/Plan:   Assessment    1. Pes planus of both feet  2. Sprain of deltoid ligament of right ankle, initial encounter  Patient is a 58-year-old male with right foot/ankle pain.  Appears to have pes planus which causes a valgus foot and pronation during gait.  This is putting great strain on his deltoid ligament.  Advised that part of his problem can be corrected with low arch support inserts.  Will also refer to physical therapy for core foot strengthening and gait retraining.  - REFERRAL TO PHYSICAL THERAPY Reason for Therapy: Eval/Treat/Report

## 2019-08-07 DIAGNOSIS — M1A.0790 IDIOPATHIC CHRONIC GOUT OF ANKLE WITHOUT TOPHUS, UNSPECIFIED LATERALITY: ICD-10-CM

## 2019-08-07 RX ORDER — ALLOPURINOL 300 MG/1
TABLET ORAL
Qty: 90 TAB | Refills: 2 | Status: SHIPPED | OUTPATIENT
Start: 2019-08-07 | End: 2020-05-11

## 2019-11-07 ENCOUNTER — OFFICE VISIT (OUTPATIENT)
Dept: OCCUPATIONAL MEDICINE | Facility: CLINIC | Age: 59
End: 2019-11-07
Payer: COMMERCIAL

## 2019-11-07 VITALS
DIASTOLIC BLOOD PRESSURE: 82 MMHG | TEMPERATURE: 96.8 F | SYSTOLIC BLOOD PRESSURE: 112 MMHG | RESPIRATION RATE: 18 BRPM | OXYGEN SATURATION: 94 % | HEIGHT: 68 IN | WEIGHT: 251 LBS | HEART RATE: 82 BPM | BODY MASS INDEX: 38.04 KG/M2

## 2019-11-07 DIAGNOSIS — Z02.4 ENCOUNTER FOR COMMERCIAL DRIVER MEDICAL EXAMINATION (CDME): ICD-10-CM

## 2019-11-07 PROCEDURE — 7100 PR DOT PHYSICAL: Performed by: PREVENTIVE MEDICINE

## 2019-11-20 DIAGNOSIS — E88.810 METABOLIC SYNDROME: ICD-10-CM

## 2019-11-20 RX ORDER — METFORMIN HYDROCHLORIDE 500 MG/1
TABLET, EXTENDED RELEASE ORAL
Qty: 90 TAB | Refills: 0 | Status: SHIPPED | OUTPATIENT
Start: 2019-11-20 | End: 2020-03-18

## 2019-11-25 ENCOUNTER — OFFICE VISIT (OUTPATIENT)
Dept: MEDICAL GROUP | Facility: MEDICAL CENTER | Age: 59
End: 2019-11-25
Payer: COMMERCIAL

## 2019-11-25 VITALS
HEIGHT: 68 IN | WEIGHT: 252.21 LBS | SYSTOLIC BLOOD PRESSURE: 140 MMHG | BODY MASS INDEX: 38.22 KG/M2 | TEMPERATURE: 98.3 F | DIASTOLIC BLOOD PRESSURE: 90 MMHG | OXYGEN SATURATION: 96 % | HEART RATE: 69 BPM | RESPIRATION RATE: 16 BRPM

## 2019-11-25 DIAGNOSIS — E55.9 VITAMIN D DEFICIENCY: ICD-10-CM

## 2019-11-25 DIAGNOSIS — E66.9 OBESITY (BMI 30-39.9): ICD-10-CM

## 2019-11-25 DIAGNOSIS — I10 ESSENTIAL HYPERTENSION: ICD-10-CM

## 2019-11-25 DIAGNOSIS — R73.02 IGT (IMPAIRED GLUCOSE TOLERANCE): ICD-10-CM

## 2019-11-25 DIAGNOSIS — R53.83 TIREDNESS: ICD-10-CM

## 2019-11-25 PROCEDURE — 99214 OFFICE O/P EST MOD 30 MIN: CPT | Performed by: FAMILY MEDICINE

## 2019-11-25 NOTE — PROGRESS NOTES
CC: Vitamin D deficiency/tiredness, hypertension, impaired glucose tolerance    HPI:   Venkat presents today to discuss the following medical issues:    Tiredness/vitamin D deficiency  Patient has been feeling tired most of the time.  Has history of vitamin D deficiency was restarted on vitamin D high-dose for 12 weeks, he noticed some improvement, however he has been still tired and weak.  Denies any nausea, vomiting, weight loss, loss of appetite.    Essential hypertension  Has been adequately controlled on current medication. Denies headache, chest pain, and SOB.  Patient has been on hydrochlorothiazide 25 mg daily, losartan 100 mg daily, and amlodipine 2.5 mg daily.  No side effects    IGT (impaired glucose tolerance)  His last A1c was 6.1.  Denies polyuria and polydipsia.  No history of diabetes.  However patient has been on metformin extended release 500 mg daily.    Obesity (BMI 30-39.9)  BMI is 38.3.Patient is counseled about the medical rationale for weight loss in obese individuals is that obesity is associated with a significant increase in mortality, and many health risks including type 2 diabetes mellitus, hypertension, dyslipidemia, and coronary heart disease. The benefits of weight loss include a reduction in the rate of progression from impaired glucose tolerance to diabetes, blood pressure in hypertensive patients, and lipid levels in higher risk patients. Other noncardiac benefits of weight loss include reductions in urinary incontinence, sleep apnea, and depression, and improvements in quality of life, physical functioning, and mobility.Recommend lifestyle modification: exercise 30 minutes per day 5 days per week. Recommend also portion control.      Patient Active Problem List    Diagnosis Date Noted   • Class 2 obesity due to excess calories without serious comorbidity with body mass index (BMI) of 37.0 to 37.9 in adult 04/03/2019   • Class 2 obesity due to excess calories without serious  comorbidity in adult 10/16/2018   • Nocturnal leg cramps 09/11/2018   • Chronic pain of multiple joints 09/11/2018   • Hiatal hernia 06/16/2018   • Other sleep apnea 06/13/2018   • Plantar fasciitis of right foot 01/18/2018   • Cutaneous wart 07/13/2017   • Memory difficulty 07/13/2017   • Left hip pain 07/13/2017   • Metabolic syndrome 05/09/2017   • Gastroesophageal reflux disease without esophagitis 05/09/2017   • History of adenomatous polyp of colon 04/18/2017   • Vitamin D deficiency 04/04/2017   • Chronic gout 03/22/2017   • Gastroparesis    • Hypertension        Current Outpatient Medications   Medication Sig Dispense Refill   • metFORMIN ER (GLUCOPHAGE XR) 500 MG TABLET SR 24 HR Take one tablet by mouth one time daily with food 90 Tab 0   • allopurinol (ZYLOPRIM) 300 MG Tab Take one tablet by mouth one time daily 90 Tab 2   • methylPREDNISolone (MEDROL DOSEPAK) 4 MG Tablet Therapy Pack Use as directed (Patient not taking: Reported on 11/7/2019) 1 Kit 0   • lansoprazole (PREVACID) 30 MG CAPSULE DELAYED RELEASE TAKE ONE CAPSULE BY MOUTH ONE TIME DAILY 90 Cap 2   • Cholecalciferol 51598 UNIT Cap Take 1 Cap by mouth every 7 days. (Patient not taking: Reported on 11/7/2019) 12 Cap 0   • Omega-3 Fatty Acids (OMEGA-3 2100 PO) Take  by mouth.     • Magnesium 250 MG Tab Take  by mouth.     • metFORMIN (GLUCOPHAGE) 500 MG Tab Take 500 mg by mouth 2 times a day, with meals.     • hydroCHLOROthiazide (HYDRODIURIL) 25 MG Tab Take one tablet by mouth one time daily 90 Tab 3   • amLODIPine (NORVASC) 2.5 MG Tab TAKE ONE TABLET BY MOUTH ONE TIME DAILY  90 Tab 3   • losartan (COZAAR) 100 MG Tab TAKE ONE TABLET BY MOUTH ONE TIME DAILY  90 Tab 3   • ibuprofen (MOTRIN) 200 MG Tab Take 200 mg by mouth every 6 hours as needed.     • ibuprofen (MOTRIN) 600 MG Tab Take 1 Tab by mouth 3 times a day as needed. (Patient not taking: Reported on 11/7/2019) 60 Tab 0   • Cholecalciferol (VITAMIN D3) 5000 UNITS Cap Take 1 Cap by mouth  "every day. (Patient not taking: Reported on 7/10/2019) 30 Cap 2     No current facility-administered medications for this visit.          Allergies as of 11/25/2019 - Reviewed 11/25/2019   Allergen Reaction Noted   • Iodine Unspecified 03/22/2017        ROS: Denies any chest pain, Shortness of breath, Changes bowel or bladder, Lower extremity edema.    Physical Exam:  /90 (BP Location: Right arm, Patient Position: Sitting, BP Cuff Size: Adult)   Pulse 69   Temp 36.8 °C (98.3 °F) (Temporal)   Resp 16   Ht 1.727 m (5' 8\")   Wt 114.4 kg (252 lb 3.3 oz)   SpO2 96%   BMI 38.35 kg/m²   Gen.: Obese, no apparent distress,pleasant and cooperative with the examination  Skin:  Warm and dry with good turgor. No rashes or suspicious lesions in visible areas  HEENT:Sinuses nontender with palpation, TMs clear, nares patent with pink mucosa and clear rhinorrhea,no septal deviation ,polyps or lesions. lips without lesions, oropharynx clear.  Neck: Trachea midline,no masses or adenopathy. No JVD.  Cor: Regular rate and rhythm without murmur, gallop or rub.  Lungs: Respirations unlabored.Clear to auscultation with equal breath sounds bilaterally. No wheezes, rhonchi.  Extremities: No cyanosis, clubbing or edema.        Assessment and Plan.   59 y.o. male     1. Tiredness  Rule out vitamin D deficiency.  Patient has history of low vitamin D, was put on high-dose for 12 weeks.  Needs to recheck his vitamin D level.    We will check thyroid function, and testosterone level as well.    - TSH; Future  - TESTOSTERONE, FREE AND TOTAL; Future  - VITAMIN D,25 HYDROXY; Future    2. Vitamin D deficiency  We will recheck vitamin D level.    - VITAMIN D,25 HYDROXY; Future    3. Essential hypertension  Controlled.  Continue on hydrochlorothiazide 25 mg daily, losartan 100 mg daily, and amlodipine 2.5 mg daily.    4. IGT (impaired glucose tolerance)  Last A1c was 6.1.  Continue metformin extended release 500 mg daily.    Will check his " A1c.  - HEMOGLOBIN A1C; Future    5. Obesity (BMI 30-39.9)  BMI is 38.3.  Patient is counseled on lifestyle modification.  - Patient identified as having weight management issue.  Appropriate orders and counseling given.

## 2019-12-02 ENCOUNTER — HOSPITAL ENCOUNTER (OUTPATIENT)
Dept: LAB | Facility: MEDICAL CENTER | Age: 59
End: 2019-12-02
Attending: FAMILY MEDICINE
Payer: COMMERCIAL

## 2019-12-02 DIAGNOSIS — R73.02 IGT (IMPAIRED GLUCOSE TOLERANCE): ICD-10-CM

## 2019-12-02 DIAGNOSIS — R53.83 TIREDNESS: ICD-10-CM

## 2019-12-02 DIAGNOSIS — E55.9 VITAMIN D DEFICIENCY: ICD-10-CM

## 2019-12-02 LAB
25(OH)D3 SERPL-MCNC: 46 NG/ML (ref 30–100)
EST. AVERAGE GLUCOSE BLD GHB EST-MCNC: 128 MG/DL
HBA1C MFR BLD: 6.1 % (ref 0–5.6)
TSH SERPL DL<=0.005 MIU/L-ACNC: 3.09 UIU/ML (ref 0.38–5.33)

## 2019-12-02 PROCEDURE — 84402 ASSAY OF FREE TESTOSTERONE: CPT

## 2019-12-02 PROCEDURE — 84443 ASSAY THYROID STIM HORMONE: CPT

## 2019-12-02 PROCEDURE — 82306 VITAMIN D 25 HYDROXY: CPT

## 2019-12-02 PROCEDURE — 83036 HEMOGLOBIN GLYCOSYLATED A1C: CPT

## 2019-12-02 PROCEDURE — 84403 ASSAY OF TOTAL TESTOSTERONE: CPT

## 2019-12-02 PROCEDURE — 84270 ASSAY OF SEX HORMONE GLOBUL: CPT

## 2019-12-02 PROCEDURE — 36415 COLL VENOUS BLD VENIPUNCTURE: CPT

## 2019-12-03 LAB
SHBG SERPL-SCNC: 43 NMOL/L (ref 11–80)
TESTOST FREE MFR SERPL: 1.6 % (ref 1.6–2.9)
TESTOST FREE SERPL-MCNC: 69 PG/ML (ref 47–244)
TESTOST SERPL-MCNC: 434 NG/DL (ref 300–720)

## 2019-12-10 ENCOUNTER — OFFICE VISIT (OUTPATIENT)
Dept: URGENT CARE | Facility: PHYSICIAN GROUP | Age: 59
End: 2019-12-10
Payer: COMMERCIAL

## 2019-12-10 VITALS
RESPIRATION RATE: 16 BRPM | SYSTOLIC BLOOD PRESSURE: 130 MMHG | HEIGHT: 67 IN | BODY MASS INDEX: 37.67 KG/M2 | WEIGHT: 240 LBS | OXYGEN SATURATION: 93 % | HEART RATE: 68 BPM | TEMPERATURE: 98 F | DIASTOLIC BLOOD PRESSURE: 82 MMHG

## 2019-12-10 DIAGNOSIS — R05.9 COUGH: ICD-10-CM

## 2019-12-10 DIAGNOSIS — J22 ACUTE RESPIRATORY INFECTION: ICD-10-CM

## 2019-12-10 PROCEDURE — 99214 OFFICE O/P EST MOD 30 MIN: CPT | Performed by: NURSE PRACTITIONER

## 2019-12-10 RX ORDER — BENZONATATE 100 MG/1
CAPSULE ORAL
Qty: 40 CAP | Refills: 0 | Status: SHIPPED | OUTPATIENT
Start: 2019-12-10 | End: 2020-05-01

## 2019-12-10 RX ORDER — CODEINE PHOSPHATE AND GUAIFENESIN 10; 100 MG/5ML; MG/5ML
5 SOLUTION ORAL EVERY 6 HOURS PRN
Qty: 120 ML | Refills: 0 | Status: SHIPPED | OUTPATIENT
Start: 2019-12-10 | End: 2019-12-17

## 2019-12-10 ASSESSMENT — ENCOUNTER SYMPTOMS
WHEEZING: 0
WEIGHT LOSS: 0
RHINORRHEA: 0
SWEATS: 0
HEADACHES: 0
FEVER: 0
CHILLS: 0
COUGH: 1
SHORTNESS OF BREATH: 0
SORE THROAT: 1
HEMOPTYSIS: 0
MYALGIAS: 0
HEARTBURN: 0

## 2019-12-10 NOTE — LETTER
December 10, 2019       Patient: Venkat Mackenzie   YOB: 1960   Date of Visit: 12/10/2019         To Whom It May Concern:    It is my medical opinion that Venkat Mackenzie return to full duty work, no restrictions on 12/12/19..              Sincerely,          MATTHEW Horvath  Electronically Signed

## 2019-12-10 NOTE — PROGRESS NOTES
"Subjective:      Venkat Mackenzie is a 59 y.o. male who presents with Cough (cough x1 wk)    Reviewed past medical, surgical and family history. Reviewed prescription and OTC medications with patient in electronic health record today      Allergies   Allergen Reactions   • Iodine Unspecified     dizziness             Cough   This is a new problem. The current episode started 1 to 4 weeks ago. The problem has been gradually worsening. The problem occurs constantly. The cough is productive of sputum (intermittently ). Associated symptoms include nasal congestion and a sore throat. Pertinent negatives include no chest pain, chills, ear congestion, ear pain, fever, headaches, heartburn, hemoptysis, myalgias, postnasal drip, rash, rhinorrhea, shortness of breath, sweats, weight loss or wheezing. Nothing aggravates the symptoms. He has tried OTC cough suppressant and cool air for the symptoms. The treatment provided mild relief. His past medical history is significant for bronchitis. There is no history of asthma, environmental allergies or pneumonia.       Review of Systems   Constitutional: Negative for chills, fever and weight loss.   HENT: Positive for sore throat. Negative for ear pain, postnasal drip and rhinorrhea.    Respiratory: Positive for cough. Negative for hemoptysis, shortness of breath and wheezing.    Cardiovascular: Negative for chest pain.   Gastrointestinal: Negative for heartburn.   Musculoskeletal: Negative for myalgias.   Skin: Negative for rash.   Neurological: Negative for headaches.   Endo/Heme/Allergies: Negative for environmental allergies.          Objective:     /82 (BP Location: Right arm, Patient Position: Sitting, BP Cuff Size: Adult)   Pulse 68   Temp 36.7 °C (98 °F) (Temporal)   Resp 16   Ht 1.702 m (5' 7\")   Wt 108.9 kg (240 lb)   SpO2 93%   BMI 37.59 kg/m²      Physical Exam  Vitals signs and nursing note reviewed.   Constitutional:       General: He is not in acute " distress.     Appearance: Normal appearance. He is well-developed. He is not toxic-appearing.   HENT:      Head: Normocephalic and atraumatic.      Right Ear: External ear normal.      Left Ear: External ear normal.      Nose: Nose normal.   Eyes:      General:         Right eye: No discharge.         Left eye: No discharge.      Conjunctiva/sclera: Conjunctivae normal.      Pupils: Pupils are equal, round, and reactive to light.   Neck:      Musculoskeletal: Neck supple.   Cardiovascular:      Rate and Rhythm: Normal rate and regular rhythm.   Pulmonary:      Effort: Pulmonary effort is normal.      Breath sounds: Normal breath sounds.   Lymphadenopathy:      Cervical: No cervical adenopathy.      Upper Body:      Right upper body: No supraclavicular adenopathy.      Left upper body: No supraclavicular adenopathy.   Skin:     General: Skin is warm and dry.   Neurological:      Mental Status: He is alert and oriented to person, place, and time.                 Assessment/Plan:     1. Acute respiratory infection     2. Cough  benzonatate (TESSALON) 100 MG Cap    guaifenesin-codeine (ROBITUSSIN AC) Solution oral solution       Educated in proper administration of medication(s) ordered today including safety, possible SE, risks, benefits, rationale and alternatives to therapy.     Keep well hydrated    Return to clinic or PCP  5-7  days if current symptoms are not resolving in a satisfactory manner or sooner if new or worsening symptoms occur. Differential diagnosis, natural history, supportive care, and indications for immediate follow-up discussed at length.   Patient was advised of signs and symptoms which would warrant further evaluation and /or emergent evaluation in ER.  Verbalized agreement with this treatment plan and seemed to understand without barriers. Questions were encouraged and answered to patients satisfaction.       Advised not to drink ETOH, drive car or operate machinery while taking narcotic RX .  Verbalizes understanding of safety instructions. Narc Check verified. [place]  Aware web site evaluation: I have obtained and reviewed patient utilization report from [facility] pharmacy database prior to writing prescription for controlled substance II, III or IV per [place] bill . Opioid Risk Tool screen completed.  I believe the benefits of controlled substance therapy outweigh the risks. The reasons for prescribing controlled substances include non-narcotic tussive alternatives have been inadequate for cough control. Accordingly, I have discussed the risk and benefits, treatment plan, and alternative therapies with the patient.

## 2019-12-19 DIAGNOSIS — I10 ESSENTIAL HYPERTENSION: ICD-10-CM

## 2019-12-19 RX ORDER — AMLODIPINE BESYLATE 2.5 MG/1
TABLET ORAL
Qty: 90 TAB | Refills: 0 | Status: SHIPPED | OUTPATIENT
Start: 2019-12-19 | End: 2020-04-22 | Stop reason: SDUPTHER

## 2019-12-19 RX ORDER — LOSARTAN POTASSIUM 100 MG/1
TABLET ORAL
Qty: 90 TAB | Refills: 0 | Status: SHIPPED | OUTPATIENT
Start: 2019-12-19 | End: 2020-04-20

## 2020-02-21 DIAGNOSIS — I10 ESSENTIAL HYPERTENSION: ICD-10-CM

## 2020-02-22 NOTE — TELEPHONE ENCOUNTER
Received request via: Pharmacy    Was the patient seen in the last year in this department? Yes lov 11/25/19    Does the patient have an active prescription (recently filled or refills available) for medication(s) requested? No

## 2020-02-24 RX ORDER — HYDROCHLOROTHIAZIDE 25 MG/1
TABLET ORAL
Qty: 90 TAB | Refills: 0 | Status: SHIPPED | OUTPATIENT
Start: 2020-02-24 | End: 2020-06-17

## 2020-03-18 DIAGNOSIS — E88.810 METABOLIC SYNDROME: ICD-10-CM

## 2020-03-18 NOTE — TELEPHONE ENCOUNTER
Received request via: Pharmacy    Was the patient seen in the last year in this department? Yes lov 7/3/19    Does the patient have an active prescription (recently filled or refills available) for medication(s) requested? No

## 2020-03-19 RX ORDER — METFORMIN HYDROCHLORIDE 500 MG/1
TABLET, EXTENDED RELEASE ORAL
Qty: 90 TAB | Refills: 0 | Status: SHIPPED | OUTPATIENT
Start: 2020-03-19 | End: 2020-06-22 | Stop reason: SDUPTHER

## 2020-04-13 DIAGNOSIS — K21.9 GASTROESOPHAGEAL REFLUX DISEASE WITHOUT ESOPHAGITIS: ICD-10-CM

## 2020-04-13 DIAGNOSIS — K31.84 GASTROPARESIS: ICD-10-CM

## 2020-04-13 RX ORDER — LANSOPRAZOLE 30 MG/1
CAPSULE, DELAYED RELEASE ORAL
Qty: 90 CAP | Refills: 2 | Status: SHIPPED | OUTPATIENT
Start: 2020-04-13 | End: 2021-01-07

## 2020-04-18 DIAGNOSIS — I10 ESSENTIAL HYPERTENSION: ICD-10-CM

## 2020-04-20 RX ORDER — LOSARTAN POTASSIUM 100 MG/1
TABLET ORAL
Qty: 90 TAB | Refills: 0 | Status: SHIPPED | OUTPATIENT
Start: 2020-04-20 | End: 2020-07-20

## 2020-04-20 NOTE — TELEPHONE ENCOUNTER
Received request via: Pharmacy    Was the patient seen in the last year in this department? Yeslov 11/25/19    Does the patient have an active prescription (recently filled or refills available) for medication(s) requested? No

## 2020-04-22 DIAGNOSIS — I10 ESSENTIAL HYPERTENSION: ICD-10-CM

## 2020-04-22 RX ORDER — AMLODIPINE BESYLATE 2.5 MG/1
TABLET ORAL
Qty: 90 TAB | Refills: 3 | Status: SHIPPED | OUTPATIENT
Start: 2020-04-22 | End: 2021-04-15

## 2020-04-22 NOTE — TELEPHONE ENCOUNTER
----- Message from Antoinette Guardado sent at 4/22/2020  3:48 PM PDT -----  Regarding: refill rx  Contact: [phone]  Hello,    This patient called and asked for a refill on his Amlodipine Besylate 2.5 mg. Please send to [facility] pharmacy on [facility].    Thank you,    Antoinette

## 2020-05-01 ENCOUNTER — OFFICE VISIT (OUTPATIENT)
Dept: MEDICAL GROUP | Facility: PHYSICIAN GROUP | Age: 60
End: 2020-05-01
Payer: COMMERCIAL

## 2020-05-01 VITALS
WEIGHT: 257 LBS | TEMPERATURE: 97.9 F | OXYGEN SATURATION: 94 % | SYSTOLIC BLOOD PRESSURE: 128 MMHG | RESPIRATION RATE: 16 BRPM | BODY MASS INDEX: 38.95 KG/M2 | HEART RATE: 69 BPM | HEIGHT: 68 IN | DIASTOLIC BLOOD PRESSURE: 88 MMHG

## 2020-05-01 DIAGNOSIS — G47.62 NOCTURNAL LEG CRAMPS: ICD-10-CM

## 2020-05-01 DIAGNOSIS — G89.29 CHRONIC PAIN OF MULTIPLE JOINTS: ICD-10-CM

## 2020-05-01 DIAGNOSIS — Z76.89 ENCOUNTER TO ESTABLISH CARE: ICD-10-CM

## 2020-05-01 DIAGNOSIS — K31.84 GASTROPARESIS: ICD-10-CM

## 2020-05-01 DIAGNOSIS — K21.9 GASTROESOPHAGEAL REFLUX DISEASE WITHOUT ESOPHAGITIS: ICD-10-CM

## 2020-05-01 DIAGNOSIS — I10 ESSENTIAL HYPERTENSION: ICD-10-CM

## 2020-05-01 DIAGNOSIS — D22.9 MULTIPLE ATYPICAL SKIN MOLES: ICD-10-CM

## 2020-05-01 DIAGNOSIS — Z13.228 SCREENING FOR ENDOCRINE, METABOLIC AND IMMUNITY DISORDER: ICD-10-CM

## 2020-05-01 DIAGNOSIS — Z13.0 SCREENING FOR ENDOCRINE, METABOLIC AND IMMUNITY DISORDER: ICD-10-CM

## 2020-05-01 DIAGNOSIS — M1A.0790 IDIOPATHIC CHRONIC GOUT OF ANKLE WITHOUT TOPHUS, UNSPECIFIED LATERALITY: ICD-10-CM

## 2020-05-01 DIAGNOSIS — M25.50 CHRONIC PAIN OF MULTIPLE JOINTS: ICD-10-CM

## 2020-05-01 DIAGNOSIS — E88.810 METABOLIC SYNDROME: ICD-10-CM

## 2020-05-01 DIAGNOSIS — Z13.29 SCREENING FOR ENDOCRINE, METABOLIC AND IMMUNITY DISORDER: ICD-10-CM

## 2020-05-01 DIAGNOSIS — E55.9 VITAMIN D DEFICIENCY: ICD-10-CM

## 2020-05-01 PROCEDURE — 99214 OFFICE O/P EST MOD 30 MIN: CPT | Performed by: NURSE PRACTITIONER

## 2020-05-01 ASSESSMENT — PATIENT HEALTH QUESTIONNAIRE - PHQ9: CLINICAL INTERPRETATION OF PHQ2 SCORE: 0

## 2020-05-01 ASSESSMENT — FIBROSIS 4 INDEX: FIB4 SCORE: 0.75

## 2020-05-01 NOTE — ASSESSMENT & PLAN NOTE
Chronic problem new problem to examiner.  Patient reports that he has several moles located on his chest, right cheek, and left upper back.  His most recent mole is on his mid chest.  The area is red in color and blisters and the blister resolves on its own.  He does use Neosporin to help.  He has not had a full body skin check.  He states the moles are unchanging in size.

## 2020-05-01 NOTE — ASSESSMENT & PLAN NOTE
Chronic medical problem. New to examiner. Patient is currently taking probiotics daily, lansoprazole in AM and evening probiotic.  He does try and eat small meals throughout the day.  He has history of Nissen fundoplication and vagotomy.

## 2020-05-01 NOTE — ASSESSMENT & PLAN NOTE
Chronic medical problem. New to examiner. Patient is currently taking magnesium 250 mg daily.  He reports the medication is helping with his leg cramps.

## 2020-05-01 NOTE — ASSESSMENT & PLAN NOTE
Chronic medical problem. New to examiner. Patient is currently taking metformin  mg daily.  His last A1c was in December 2019 which was 6.1%.  He is tolerating the medication.

## 2020-05-01 NOTE — ASSESSMENT & PLAN NOTE
Chronic medical problem. New to examiner. Patient is not currently taking any medication.  Last lab results:  Results for DIAZ WOO (MRN [id number]) as of 5/1/2020 16:56   Ref. Range 12/2/2019 06:51   25-Hydroxy   Vitamin D 25 Latest Ref Range: 30 - 100 ng/mL 46

## 2020-05-01 NOTE — PROGRESS NOTES
Subjective:     CC:  Diagnoses of Gastroparesis, Gastroesophageal reflux disease without esophagitis, Nocturnal leg cramps, Multiple atypical skin moles, Essential hypertension, Vitamin D deficiency, Metabolic syndrome, Screening for endocrine, metabolic and immunity disorder, Screening for lipid disorders, Chronic pain of multiple joints, and Idiopathic chronic gout of ankle without tophus, unspecified laterality were pertinent to this visit.    HISTORY OF THE PRESENT ILLNESS: Patient is a 59 y.o. male. This pleasant patient is here today to establish care and discuss the following. His prior PCP was Dr. Flood.    Gastroparesis  Chronic medical problem. New to examiner. Patient is currently taking probiotics daily, lansoprazole in AM and evening probiotic.  He does try and eat small meals throughout the day.  He has history of Nissen fundoplication and vagotomy.    Gastroesophageal reflux disease without esophagitis  Chronic medical problem. New to examiner. Patient is currently taking lansoprazole 30 mg daily. He has history of vagotomy and nissen fundoplication    Nocturnal leg cramps  Chronic medical problem. New to examiner. Patient is currently taking magnesium 250 mg daily.  He reports the medication is helping with his leg cramps.    Vitamin D deficiency  Chronic medical problem. New to examiner. Patient is not currently taking any medication.  Last lab results:  Results for DIAZ WOO (MRN [id number]) as of 5/1/2020 16:56   Ref. Range 12/2/2019 06:51   25-Hydroxy   Vitamin D 25 Latest Ref Range: 30 - 100 ng/mL 46       Multiple atypical skin moles  Chronic problem new problem to examiner.  Patient reports that he has several moles on his chest, right cheek, and left upper back.  His most recent mole is on his mid chest.  The area does blister and resolves on its own.  He does use Neosporin to help.  He has not had a full body skin check.  He states the moles are unchanging in size.    Metabolic  syndrome  Chronic medical problem. New to examiner. Patient is currently taking metformin  mg daily.  His last A1c was in December 2019 which was 6.1%.  He is tolerating the medication.    Hypertension  Chronic medical problem. New to examiner. Patient is currently taking losartan 100 mg daily, hydrochlorothiazide 25 mg daily, and amlodipine 2.5 mg daily.  He does not check his blood pressure at home.  BP today 128/88.  Denies any chest pain, shortness of breath, palpitations, headaches, or dizziness.  He does not need medication refill today.    Chronic pain of multiple joints  Chronic medical problem. New to examiner. Patient reports longstanding history of joint pain.  He worked as a  for 28 years and is now retired.  He does do lots of physical work around the house.  He does have pain in his knees and neck.  He has had neck fusion in the past.  In 7/2019 he was diagnosed with flatfeet, he has been using inserts that have been helping the pain.  He takes Tylenol as needed.  BMI today 39.08.    Chronic gout  Chronic medical problem. New to examiner. Patient is currently taking allopurinol 300 mg daily.  He states the medication is controlling his symptoms.  No complaints today.      Allergies: Iodine    Current Outpatient Medications Ordered in Epic   Medication Sig Dispense Refill   • amLODIPine (NORVASC) 2.5 MG Tab TAKE ONE TABLET BY MOUTH ONE TIME DAILY 90 Tab 3   • losartan (COZAAR) 100 MG Tab TAKE ONE TABLET BY MOUTH ONE TIME DAILY  90 Tab 0   • lansoprazole (PREVACID) 30 MG CAPSULE DELAYED RELEASE TAKE ONE CAPSULE BY MOUTH ONE TIME DAILY 90 Cap 2   • metFORMIN ER (GLUCOPHAGE XR) 500 MG TABLET SR 24 HR TAKE ONE TABLET BY MOUTH ONE TIME DAILY WITH FOOD  90 Tab 0   • hydroCHLOROthiazide (HYDRODIURIL) 25 MG Tab TAKE ONE TABLET BY MOUTH ONE TIME DAILY  90 Tab 0   • allopurinol (ZYLOPRIM) 300 MG Tab Take one tablet by mouth one time daily 90 Tab 2   • Magnesium 250 MG Tab Take  by mouth.       No  "current [facility]-ordered facility-administered medications on file.        Past Medical History:   Diagnosis Date   • Gastroparesis     2016   • Hypertension     7 yrs   • Kidney stones     6-7 yrs ago       Past Surgical History:   Procedure Laterality Date   • APPENDECTOMY     • CERVICAL FUSION POSTERIOR      7-8 yrs back   • VAGOTOMY  at age 19    due to hiatal hernia       Social History     Tobacco Use   • Smoking status: Never Smoker   • Smokeless tobacco: Former User     Types: Chew   Substance Use Topics   • Alcohol use: Yes     Alcohol/week: 0.6 oz     Types: 1 Cans of beer per week     Comment: occasionally   • Drug use: No       Social History     Social History Narrative   • Not on file       Family History   Problem Relation Age of Onset   • Stroke Mother    • Heart Disease Mother    • Diabetes Mother    • Lung Disease Mother         COPD   • Diabetes Father    • Heart Disease Father    • Stroke Father    • No Known Problems Son    • No Known Problems Son        Health Maintenance: due for hepatitis c screen    ROS:   Gen: no fevers/chills, no changes in weight  Eyes: no changes in vision  ENT: no sore throat, no ear pain  Pulm: no sob, no cough  CV: no chest pain, no palpitations, no leg edema  GI: no nausea/vomiting, no diarrhea  : no dysuria  MSk: no myalgias  Skin: + moles  Neuro: no headaches, no dizziness  Heme/Lymph: no easy bruising      Objective:     Vital signs reviewed  Exam: /88 (BP Location: Left arm, Patient Position: Sitting, BP Cuff Size: Large adult)   Pulse 69   Temp 36.6 °C (97.9 °F) (Temporal)   Resp 16   Ht 1.727 m (5' 8\")   Wt 116.6 kg (257 lb)   SpO2 94%  Body mass index is 39.08 kg/m².    General: Normal appearing. No distress.  HENT: Normocephalic. Ears normal shape and contour, canals are clear bilaterally, tympanic membranes are benign.   Eyes: Eyes conjunctiva clear lids without ptosis, pupils equal and reactive to light accommodation, lids normal.  No scleral " icterus  Neck: Supple without JVD. Thyroid is not enlarged.  Pulmonary: Clear to ausculation.  Normal effort. No rales, ronchi, or wheezing.  Cardiovascular: Regular rate and rhythm without murmur. Radial pulses are intact and equal bilaterally.  Abdomen: Soft, nontender, nondistended.  Obese.  Neurologic: Grossly nonfocal  Lymph: No cervical or supraclavicular lymph nodes are palpable  Skin: Warm and dry.  No obvious lesions. Mid chest mole is asymmetrical, irregular border, coloring is darker red on top and lighter red on bottom, diameter approx. 10 mm, and has not changed size but does blister at times. Right cheek mole symmetric with regular borders, uniform color, unchanged in size or color. Right upper back mole symmetric irregular borders, uniform color, and unchanged in size or color.  Musculoskeletal: Normal gait. No extremity cyanosis, clubbing, or edema.  Psych: Normal mood and affect. Alert and oriented x3. Judgment and insight is normal.    Assessment & Plan:   59 y.o. male with the following -    1. Encounter to establish care  New problem to examiner.  Care established.  Due for labs, last lab work 4/17/2019.    2. Multiple atypical skin moles  New problem to examiner. Referral placed. Monitor.   - REFERRAL TO DERMATOLOGY    3. Essential hypertension  New problem to examiner.  Continue losartan, hydrochlorothiazide, and amlodipine.  BP at goal today.  Monitor and follow.  - CBC WITH DIFFERENTIAL; Future  - Comp Metabolic Panel; Future  - LIPID PANEL    4. Metabolic syndrome  New problem to examiner.  Continue metformin.  Due for repeat labs.  Orders placed.  Monitor and follow-up via [place].  - LIPID PANEL  - HEMOGLOBIN A1C; Future    5. Gastroparesis  New problem to examiner.  Continue lansoprazole and probiotic.  Continue small meals.  Monitor and follow.    6. Idiopathic chronic gout of ankle without tophus, unspecified laterality  New problem to examiner.  Continue allopurinol.  Monitor and  follow.    7. Gastroesophageal reflux disease without esophagitis  New problem to examiner.  Continue lansoprazole.  Continue small frequent meals.  Monitor and follow.    8. Chronic pain of multiple joints  New problem to examiner.  Continue Tylenol as needed.  Discussed diet, exercise, weight loss, and lifestyle changes.  Recommended weight loss to help with joint pain.  Monitor and follow.    9. Nocturnal leg cramps  New problems to examiner.  Continue magnesium.  Continue diet and exercise.  Monitor and follow.    10. Vitamin D deficiency  New problem to examiner.  Due for labs.  Orders placed.  Monitor and follow-up via [facility].  - VITAMIN D,25 HYDROXY; Future    11. Screening for endocrine, metabolic and immunity disorder  New problem to examiner.  Screening indicated.  Orders placed.  Monitor and follow-up via [facility].  - TSH; Future    Return in about 6 months (around 11/1/2020) for HTN, mole, joint pain.    Please note that this dictation was created using voice recognition software. I have made every reasonable attempt to correct obvious errors, but I expect that there are errors of grammar and possibly content that I did not discover before finalizing the note.

## 2020-05-01 NOTE — ASSESSMENT & PLAN NOTE
Chronic medical problem. New to examiner. Patient is currently taking losartan 100 mg daily, hydrochlorothiazide 25 mg daily, and amlodipine 2.5 mg daily.  He does not check his blood pressure at home.  BP today 128/88.  Denies any chest pain, shortness of breath, palpitations, headaches, or dizziness.  He does not need medication refill today.

## 2020-05-01 NOTE — ASSESSMENT & PLAN NOTE
Chronic medical problem. New to examiner. Patient is currently taking lansoprazole 30 mg daily. He has history of vagotomy and nissen fundoplication

## 2020-05-02 NOTE — ASSESSMENT & PLAN NOTE
Chronic medical problem. New to examiner. Patient reports longstanding history of joint pain.  He worked as a  for 28 years and is now retired.  He does do lots of physical work around the house.  He does have pain in his knees and neck.  He has had neck fusion in the past.  In 7/2019 he was diagnosed with flatfeet, he has been using inserts that have been helping the pain.  He takes Tylenol as needed.  BMI today 39.08.

## 2020-05-02 NOTE — ASSESSMENT & PLAN NOTE
Chronic medical problem. New to examiner. Patient is currently taking allopurinol 300 mg daily.  He states the medication is controlling his symptoms.  No complaints today.

## 2020-05-06 ENCOUNTER — HOSPITAL ENCOUNTER (OUTPATIENT)
Dept: LAB | Facility: MEDICAL CENTER | Age: 60
End: 2020-05-06
Attending: NURSE PRACTITIONER
Payer: COMMERCIAL

## 2020-05-06 DIAGNOSIS — E55.9 VITAMIN D DEFICIENCY: ICD-10-CM

## 2020-05-06 DIAGNOSIS — I10 ESSENTIAL HYPERTENSION: ICD-10-CM

## 2020-05-06 DIAGNOSIS — Z13.0 SCREENING FOR ENDOCRINE, METABOLIC AND IMMUNITY DISORDER: ICD-10-CM

## 2020-05-06 DIAGNOSIS — Z13.228 SCREENING FOR ENDOCRINE, METABOLIC AND IMMUNITY DISORDER: ICD-10-CM

## 2020-05-06 DIAGNOSIS — E88.810 METABOLIC SYNDROME: ICD-10-CM

## 2020-05-06 DIAGNOSIS — Z13.29 SCREENING FOR ENDOCRINE, METABOLIC AND IMMUNITY DISORDER: ICD-10-CM

## 2020-05-06 LAB
25(OH)D3 SERPL-MCNC: 41 NG/ML (ref 30–100)
ALBUMIN SERPL BCP-MCNC: 4.5 G/DL (ref 3.2–4.9)
ALBUMIN/GLOB SERPL: 1.6 G/DL
ALP SERPL-CCNC: 79 U/L (ref 30–99)
ALT SERPL-CCNC: 34 U/L (ref 2–50)
ANION GAP SERPL CALC-SCNC: 16 MMOL/L (ref 7–16)
AST SERPL-CCNC: 21 U/L (ref 12–45)
BASOPHILS # BLD AUTO: 0.8 % (ref 0–1.8)
BASOPHILS # BLD: 0.05 K/UL (ref 0–0.12)
BILIRUB SERPL-MCNC: 0.6 MG/DL (ref 0.1–1.5)
BUN SERPL-MCNC: 19 MG/DL (ref 8–22)
CALCIUM SERPL-MCNC: 9.6 MG/DL (ref 8.5–10.5)
CHLORIDE SERPL-SCNC: 97 MMOL/L (ref 96–112)
CHOLEST SERPL-MCNC: 141 MG/DL (ref 100–199)
CO2 SERPL-SCNC: 22 MMOL/L (ref 20–33)
CREAT SERPL-MCNC: 0.81 MG/DL (ref 0.5–1.4)
EOSINOPHIL # BLD AUTO: 0.22 K/UL (ref 0–0.51)
EOSINOPHIL NFR BLD: 3.4 % (ref 0–6.9)
ERYTHROCYTE [DISTWIDTH] IN BLOOD BY AUTOMATED COUNT: 45.5 FL (ref 35.9–50)
EST. AVERAGE GLUCOSE BLD GHB EST-MCNC: 120 MG/DL
GLOBULIN SER CALC-MCNC: 2.9 G/DL (ref 1.9–3.5)
GLUCOSE SERPL-MCNC: 106 MG/DL (ref 65–99)
HBA1C MFR BLD: 5.8 % (ref 0–5.6)
HCT VFR BLD AUTO: 48.7 % (ref 42–52)
HDLC SERPL-MCNC: 52 MG/DL
HGB BLD-MCNC: 16.7 G/DL (ref 14–18)
IMM GRANULOCYTES # BLD AUTO: 0.04 K/UL (ref 0–0.11)
IMM GRANULOCYTES NFR BLD AUTO: 0.6 % (ref 0–0.9)
LDLC SERPL CALC-MCNC: 70 MG/DL
LYMPHOCYTES # BLD AUTO: 1.95 K/UL (ref 1–4.8)
LYMPHOCYTES NFR BLD: 30.3 % (ref 22–41)
MCH RBC QN AUTO: 31.2 PG (ref 27–33)
MCHC RBC AUTO-ENTMCNC: 34.3 G/DL (ref 33.7–35.3)
MCV RBC AUTO: 91 FL (ref 81.4–97.8)
MONOCYTES # BLD AUTO: 0.59 K/UL (ref 0–0.85)
MONOCYTES NFR BLD AUTO: 9.2 % (ref 0–13.4)
NEUTROPHILS # BLD AUTO: 3.58 K/UL (ref 1.82–7.42)
NEUTROPHILS NFR BLD: 55.7 % (ref 44–72)
NRBC # BLD AUTO: 0 K/UL
NRBC BLD-RTO: 0 /100 WBC
PLATELET # BLD AUTO: 263 K/UL (ref 164–446)
PMV BLD AUTO: 10.2 FL (ref 9–12.9)
POTASSIUM SERPL-SCNC: 3.5 MMOL/L (ref 3.6–5.5)
PROT SERPL-MCNC: 7.4 G/DL (ref 6–8.2)
RBC # BLD AUTO: 5.35 M/UL (ref 4.7–6.1)
SODIUM SERPL-SCNC: 135 MMOL/L (ref 135–145)
TRIGL SERPL-MCNC: 93 MG/DL (ref 0–149)
TSH SERPL DL<=0.005 MIU/L-ACNC: 4.19 UIU/ML (ref 0.38–5.33)
WBC # BLD AUTO: 6.4 K/UL (ref 4.8–10.8)

## 2020-05-06 PROCEDURE — 83036 HEMOGLOBIN GLYCOSYLATED A1C: CPT

## 2020-05-06 PROCEDURE — 80053 COMPREHEN METABOLIC PANEL: CPT

## 2020-05-06 PROCEDURE — 36415 COLL VENOUS BLD VENIPUNCTURE: CPT

## 2020-05-06 PROCEDURE — 84443 ASSAY THYROID STIM HORMONE: CPT

## 2020-05-06 PROCEDURE — 80061 LIPID PANEL: CPT

## 2020-05-06 PROCEDURE — 85025 COMPLETE CBC W/AUTO DIFF WBC: CPT

## 2020-05-06 PROCEDURE — 82306 VITAMIN D 25 HYDROXY: CPT

## 2020-05-10 DIAGNOSIS — M1A.0790 IDIOPATHIC CHRONIC GOUT OF ANKLE WITHOUT TOPHUS, UNSPECIFIED LATERALITY: ICD-10-CM

## 2020-05-11 RX ORDER — ALLOPURINOL 300 MG/1
TABLET ORAL
Qty: 90 TAB | Refills: 3 | Status: SHIPPED | OUTPATIENT
Start: 2020-05-11 | End: 2021-05-12 | Stop reason: SDUPTHER

## 2020-05-11 NOTE — TELEPHONE ENCOUNTER
Requested Prescriptions     Signed Prescriptions Disp Refills   • allopurinol (ZYLOPRIM) 300 MG Tab 90 Tab 3     Sig: TAKE ONE TABLET BY MOUTH ONE TIME DAILY     Authorizing Provider: LEIGH SOUZA A.P.R.N.

## 2020-05-29 ENCOUNTER — HOSPITAL ENCOUNTER (OUTPATIENT)
Facility: MEDICAL CENTER | Age: 60
End: 2020-05-29
Payer: COMMERCIAL

## 2020-06-02 LAB
SARS-COV-2 RNA SPEC QL NAA+PROBE: NOT DETECTED
SPECIMEN SOURCE: NORMAL

## 2020-06-17 DIAGNOSIS — I10 ESSENTIAL HYPERTENSION: ICD-10-CM

## 2020-06-17 RX ORDER — HYDROCHLOROTHIAZIDE 25 MG/1
TABLET ORAL
Qty: 90 TAB | Refills: 3 | Status: SHIPPED | OUTPATIENT
Start: 2020-06-17 | End: 2021-06-14

## 2020-06-18 NOTE — TELEPHONE ENCOUNTER
Requested Prescriptions     Signed Prescriptions Disp Refills   • hydroCHLOROthiazide (HYDRODIURIL) 25 MG Tab 90 Tab 3     Sig: TAKE ONE TABLET BY MOUTH ONE TIME DAILY     Authorizing Provider: LEIGH SOUZA A.P.R.N.

## 2020-06-22 DIAGNOSIS — E88.810 METABOLIC SYNDROME: ICD-10-CM

## 2020-06-22 RX ORDER — METFORMIN HYDROCHLORIDE 500 MG/1
500 TABLET, EXTENDED RELEASE ORAL
Qty: 90 TAB | Refills: 1 | Status: SHIPPED | OUTPATIENT
Start: 2020-06-22 | End: 2020-09-20

## 2020-06-22 NOTE — TELEPHONE ENCOUNTER
Requested Prescriptions     Signed Prescriptions Disp Refills   • metFORMIN ER (GLUCOPHAGE XR) 500 MG TABLET SR 24 HR 90 Tab 1     Sig: Take 1 Tab by mouth every day for 90 days.     Authorizing Provider: LEIGH SOUZA A.P.R.N.

## 2020-07-18 DIAGNOSIS — I10 ESSENTIAL HYPERTENSION: ICD-10-CM

## 2020-07-20 RX ORDER — LOSARTAN POTASSIUM 100 MG/1
TABLET ORAL
Qty: 90 TAB | Refills: 3 | Status: SHIPPED | OUTPATIENT
Start: 2020-07-20 | End: 2021-07-19

## 2020-07-20 NOTE — TELEPHONE ENCOUNTER
Requested Prescriptions     Signed Prescriptions Disp Refills   • losartan (COZAAR) 100 MG Tab 90 Tab 3     Sig: TAKE ONE TABLET BY MOUTH ONE TIME DAILY     Authorizing Provider: LEIGH SOUZA A.P.R.N.

## 2020-07-22 ENCOUNTER — APPOINTMENT (RX ONLY)
Dept: URBAN - METROPOLITAN AREA CLINIC 22 | Facility: CLINIC | Age: 60
Setting detail: DERMATOLOGY
End: 2020-07-22

## 2020-07-22 DIAGNOSIS — D18.0 HEMANGIOMA: ICD-10-CM

## 2020-07-22 DIAGNOSIS — L82.1 OTHER SEBORRHEIC KERATOSIS: ICD-10-CM

## 2020-07-22 DIAGNOSIS — L91.8 OTHER HYPERTROPHIC DISORDERS OF THE SKIN: ICD-10-CM

## 2020-07-22 DIAGNOSIS — L81.4 OTHER MELANIN HYPERPIGMENTATION: ICD-10-CM

## 2020-07-22 DIAGNOSIS — L57.0 ACTINIC KERATOSIS: ICD-10-CM

## 2020-07-22 DIAGNOSIS — Z71.89 OTHER SPECIFIED COUNSELING: ICD-10-CM

## 2020-07-22 DIAGNOSIS — D22 MELANOCYTIC NEVI: ICD-10-CM

## 2020-07-22 PROBLEM — D22.61 MELANOCYTIC NEVI OF RIGHT UPPER LIMB, INCLUDING SHOULDER: Status: ACTIVE | Noted: 2020-07-22

## 2020-07-22 PROBLEM — D18.01 HEMANGIOMA OF SKIN AND SUBCUTANEOUS TISSUE: Status: ACTIVE | Noted: 2020-07-22

## 2020-07-22 PROBLEM — D48.5 NEOPLASM OF UNCERTAIN BEHAVIOR OF SKIN: Status: ACTIVE | Noted: 2020-07-22

## 2020-07-22 PROBLEM — D22.62 MELANOCYTIC NEVI OF LEFT UPPER LIMB, INCLUDING SHOULDER: Status: ACTIVE | Noted: 2020-07-22

## 2020-07-22 PROBLEM — D22.5 MELANOCYTIC NEVI OF TRUNK: Status: ACTIVE | Noted: 2020-07-22

## 2020-07-22 PROCEDURE — ? BIOPSY BY SHAVE METHOD

## 2020-07-22 PROCEDURE — 17003 DESTRUCT PREMALG LES 2-14: CPT

## 2020-07-22 PROCEDURE — 11102 TANGNTL BX SKIN SINGLE LES: CPT

## 2020-07-22 PROCEDURE — 17000 DESTRUCT PREMALG LESION: CPT | Mod: 59

## 2020-07-22 PROCEDURE — ? LIQUID NITROGEN

## 2020-07-22 PROCEDURE — ? COUNSELING

## 2020-07-22 PROCEDURE — 99203 OFFICE O/P NEW LOW 30 MIN: CPT | Mod: 25

## 2020-07-22 ASSESSMENT — LOCATION SIMPLE DESCRIPTION DERM
LOCATION SIMPLE: ABDOMEN
LOCATION SIMPLE: LEFT FOREHEAD
LOCATION SIMPLE: RIGHT LOWER BACK
LOCATION SIMPLE: LEFT TEMPLE
LOCATION SIMPLE: RIGHT FOREARM
LOCATION SIMPLE: LEFT FOREARM
LOCATION SIMPLE: RIGHT FOREHEAD
LOCATION SIMPLE: LEFT ANTERIOR NECK
LOCATION SIMPLE: LEFT LOWER BACK
LOCATION SIMPLE: RIGHT CHEEK
LOCATION SIMPLE: RIGHT UPPER BACK

## 2020-07-22 ASSESSMENT — LOCATION DETAILED DESCRIPTION DERM
LOCATION DETAILED: LEFT INFERIOR LATERAL FOREHEAD
LOCATION DETAILED: LEFT RIB CAGE
LOCATION DETAILED: RIGHT LATERAL FOREHEAD
LOCATION DETAILED: RIGHT MEDIAL UPPER BACK
LOCATION DETAILED: LEFT LATERAL FOREHEAD
LOCATION DETAILED: RIGHT SUPERIOR UPPER BACK
LOCATION DETAILED: RIGHT RIB CAGE
LOCATION DETAILED: LEFT INFERIOR MEDIAL MIDBACK
LOCATION DETAILED: RIGHT INFERIOR CENTRAL MALAR CHEEK
LOCATION DETAILED: LEFT PROXIMAL DORSAL FOREARM
LOCATION DETAILED: LEFT MID TEMPLE
LOCATION DETAILED: LEFT DISTAL DORSAL FOREARM
LOCATION DETAILED: RIGHT PROXIMAL DORSAL FOREARM
LOCATION DETAILED: RIGHT SUPERIOR MEDIAL MALAR CHEEK
LOCATION DETAILED: LEFT INFERIOR LATERAL NECK
LOCATION DETAILED: EPIGASTRIC SKIN
LOCATION DETAILED: RIGHT SUPERIOR MEDIAL MIDBACK
LOCATION DETAILED: RIGHT INFERIOR LATERAL FOREHEAD
LOCATION DETAILED: RIGHT CENTRAL MALAR CHEEK

## 2020-07-22 ASSESSMENT — LOCATION ZONE DERM
LOCATION ZONE: TRUNK
LOCATION ZONE: NECK
LOCATION ZONE: ARM
LOCATION ZONE: FACE

## 2020-07-22 NOTE — PROCEDURE: BIOPSY BY SHAVE METHOD
Detail Level: Detailed
Depth Of Biopsy: dermis
Was A Bandage Applied: Yes
Size Of Lesion In Cm: 2
X Size Of Lesion In Cm: 0
Biopsy Type: H and E
Biopsy Method: Personna blade
Anesthesia Type: 1% lidocaine with 1:100,000 epinephrine and a 1:3 solution of 8.4% sodium bicarbonate
Anesthesia Volume In Cc: 1
Hemostasis: Drysol
Wound Care: Vaseline
Dressing: bandage
Destruction After The Procedure: No
Type Of Destruction Used: Curettage
Curettage Text: The wound bed was treated with curettage after the biopsy was performed.
Cryotherapy Text: The wound bed was treated with cryotherapy after the biopsy was performed.
Electrodesiccation Text: The wound bed was treated with electrodesiccation after the biopsy was performed.
Electrodesiccation And Curettage Text: The wound bed was treated with electrodesiccation and curettage after the biopsy was performed.
Silver Nitrate Text: The wound bed was treated with silver nitrate after the biopsy was performed.
Lab: [id number]
Lab Facility: 
Consent: Written consent was obtained and risks were reviewed including but not limited to scarring, infection, bleeding, scabbing, incomplete removal, nerve damage and allergy to anesthesia.
Post-Care Instructions: I reviewed with the patient in detail post-care instructions. Patient is to keep the biopsy site dry overnight, and then apply bacitracin twice daily until healed. Patient may apply hydrogen peroxide soaks to remove any crusting.
Notification Instructions: Patient will be notified of biopsy results. However, patient instructed to call the office if not contacted within 2 weeks.
Billing Type: Third-Party Bill
Information: Selecting Yes will display possible errors in your note based on the variables you have selected. This validation is only offered as a suggestion for you. PLEASE NOTE THAT THE VALIDATION TEXT WILL BE REMOVED WHEN YOU FINALIZE YOUR NOTE. IF YOU WANT TO FAX A PRELIMINARY NOTE YOU WILL NEED TO TOGGLE THIS TO 'NO' IF YOU DO NOT WANT IT IN YOUR FAXED NOTE.

## 2020-09-01 ENCOUNTER — APPOINTMENT (RX ONLY)
Dept: URBAN - METROPOLITAN AREA CLINIC 22 | Facility: CLINIC | Age: 60
Setting detail: DERMATOLOGY
End: 2020-09-01

## 2020-09-01 DIAGNOSIS — Z71.89 OTHER SPECIFIED COUNSELING: ICD-10-CM

## 2020-09-01 DIAGNOSIS — L57.0 ACTINIC KERATOSIS: ICD-10-CM

## 2020-09-01 PROBLEM — D04.5 CARCINOMA IN SITU OF SKIN OF TRUNK: Status: ACTIVE | Noted: 2020-09-01

## 2020-09-01 PROCEDURE — 17000 DESTRUCT PREMALG LESION: CPT | Mod: 59

## 2020-09-01 PROCEDURE — 17003 DESTRUCT PREMALG LES 2-14: CPT

## 2020-09-01 PROCEDURE — ? LIQUID NITROGEN

## 2020-09-01 PROCEDURE — 17263 DSTRJ MAL LES T/A/L 2.1-3.0: CPT

## 2020-09-01 PROCEDURE — ? CURETTAGE AND DESTRUCTION

## 2020-09-01 PROCEDURE — ? COUNSELING

## 2020-09-01 ASSESSMENT — LOCATION DETAILED DESCRIPTION DERM
LOCATION DETAILED: MID POSTERIOR NECK
LOCATION DETAILED: LEFT PROXIMAL DORSAL FOREARM
LOCATION DETAILED: LEFT INFERIOR LATERAL NECK
LOCATION DETAILED: RIGHT SUPERIOR FOREHEAD
LOCATION DETAILED: RIGHT DISTAL DORSAL FOREARM

## 2020-09-01 ASSESSMENT — LOCATION ZONE DERM
LOCATION ZONE: ARM
LOCATION ZONE: NECK
LOCATION ZONE: FACE

## 2020-09-01 ASSESSMENT — LOCATION SIMPLE DESCRIPTION DERM
LOCATION SIMPLE: RIGHT FOREARM
LOCATION SIMPLE: LEFT FOREARM
LOCATION SIMPLE: RIGHT FOREHEAD
LOCATION SIMPLE: LEFT ANTERIOR NECK
LOCATION SIMPLE: POSTERIOR NECK

## 2020-09-01 NOTE — PROCEDURE: LIQUID NITROGEN
Total Number Of Aks Treated: 8
Duration Of Freeze Thaw-Cycle (Seconds): 0
Render In Bullet Format When Appropriate: No
Post-Care Instructions: I reviewed with the patient in detail post-care instructions. Patient is to wear sunprotection, and avoid picking at any of the treated lesions. Pt may apply Vaseline to crusted or scabbing areas.
Detail Level: Zone
Consent: The patient's consent was obtained including but not limited to risks of crusting, scabbing, blistering, scarring, darker or lighter pigmentary change, recurrence, incomplete removal and infection.
Number Of Freeze-Thaw Cycles: 2 freeze-thaw cycles

## 2020-09-01 NOTE — PROCEDURE: CURETTAGE AND DESTRUCTION
Detail Level: Detailed
Size Of Lesion In Cm: 2.1
Size Of Lesion After Curettage: 2.5
Add Intralesional Injection: No
Total Volume (Ccs): 1
Anesthesia Type: 1% lidocaine with epinephrine
Anesthesia Volume In Cc: 6
Cautery Type: electrodesiccation
Number Of Curettages: 2
What Was Performed First?: Curettage
Additional Information: (Optional): The wound was cleaned, and a pressure dressing was applied.  The patient received detailed post-op instructions.
Consent was obtained from the patient. The risks, benefits and alternatives to therapy were discussed in detail. Specifically, the risks of infection, scarring, bleeding, prolonged wound healing, nerve injury, incomplete removal, allergy to anesthesia and recurrence were addressed. Alternatives to ED&C, such as: surgical removal and XRT were also discussed.  Prior to the procedure, the treatment site was clearly identified and confirmed by the patient. All components of Universal Protocol/PAUSE Rule completed.
Post-Care Instructions: I reviewed with the patient in detail post-care instructions. Patient is to keep the area dry for 48 hours, and not to engage in any swimming until the area is healed. Should the patient develop any fevers, chills, bleeding, severe pain patient will contact the office immediately.
Bill As A Line Item Or As Units: Line Item

## 2020-11-11 ENCOUNTER — TELEPHONE (OUTPATIENT)
Dept: MEDICAL GROUP | Facility: PHYSICIAN GROUP | Age: 60
End: 2020-11-11

## 2020-11-11 ENCOUNTER — OFFICE VISIT (OUTPATIENT)
Dept: MEDICAL GROUP | Facility: PHYSICIAN GROUP | Age: 60
End: 2020-11-11
Payer: COMMERCIAL

## 2020-11-11 VITALS
HEIGHT: 68 IN | OXYGEN SATURATION: 93 % | RESPIRATION RATE: 16 BRPM | BODY MASS INDEX: 38.19 KG/M2 | SYSTOLIC BLOOD PRESSURE: 128 MMHG | TEMPERATURE: 96.9 F | DIASTOLIC BLOOD PRESSURE: 86 MMHG | WEIGHT: 252 LBS | HEART RATE: 91 BPM

## 2020-11-11 DIAGNOSIS — E55.9 VITAMIN D DEFICIENCY: ICD-10-CM

## 2020-11-11 DIAGNOSIS — G89.29 CHRONIC PAIN OF MULTIPLE JOINTS: ICD-10-CM

## 2020-11-11 DIAGNOSIS — G47.62 NOCTURNAL LEG CRAMPS: ICD-10-CM

## 2020-11-11 DIAGNOSIS — K21.9 GASTROESOPHAGEAL REFLUX DISEASE WITHOUT ESOPHAGITIS: ICD-10-CM

## 2020-11-11 DIAGNOSIS — M25.50 CHRONIC PAIN OF MULTIPLE JOINTS: ICD-10-CM

## 2020-11-11 DIAGNOSIS — I10 ESSENTIAL HYPERTENSION: ICD-10-CM

## 2020-11-11 DIAGNOSIS — E88.810 METABOLIC SYNDROME: ICD-10-CM

## 2020-11-11 DIAGNOSIS — Z11.59 NEED FOR HEPATITIS C SCREENING TEST: ICD-10-CM

## 2020-11-11 PROCEDURE — 99214 OFFICE O/P EST MOD 30 MIN: CPT | Performed by: NURSE PRACTITIONER

## 2020-11-11 RX ORDER — METFORMIN HYDROCHLORIDE 500 MG/1
500 TABLET, EXTENDED RELEASE ORAL DAILY
COMMUNITY
End: 2020-12-17

## 2020-11-11 ASSESSMENT — FIBROSIS 4 INDEX: FIB4 SCORE: 0.85

## 2020-11-11 NOTE — PROGRESS NOTES
Subjective:     CC: GERD and joint pain    HPI:   Venkat presents today with the following:     Gastroesophageal reflux disease without esophagitis  Chronic medical problem. He is taking lansoprazole 30 mg daily. He has tickle in his throat that makes him cough that started 1 month ago. The GERD has stayed the same. He has changed his diet and stopped sodas. He is drinking hot tea and carbonated water. He does not eat spicy foods. He is eating 2 meals a day, lunch and dinner. He will have breakfast sometimes but not consistently. He does not lay down 30 minutes after meals. He has had upper endoscopy in the past that he says was unremarkable.    Chronic pain of multiple joints  Chronic medical problem. He is taking acetaminophen PRN. His neck and shoulder pain are the worse. The cold weather worsens the pain. He would like to know if there are other medications he can try.     Nocturnal leg cramps  Chronic medical problem. He is taking magnesium daily. This helps with his cramps. No new issues today.       Past Medical History:   Diagnosis Date   • Gastroparesis     2016   • Hypertension     7 yrs   • Kidney stones     6-7 yrs ago       Social History     Tobacco Use   • Smoking status: Never Smoker   • Smokeless tobacco: Former User     Types: Chew   Substance Use Topics   • Alcohol use: Not Currently     Alcohol/week: 0.6 oz     Types: 1 Cans of beer per week     Comment: occasionally   • Drug use: No       Current Outpatient Medications Ordered in Epic   Medication Sig Dispense Refill   • metFORMIN ER (GLUCOPHAGE XR) 500 MG TABLET SR 24 HR Take 500 mg by mouth every day.     • Probiotic Product (PROBIOTIC-10 PO) Take  by mouth.     • Diclofenac Sodium 1 % Gel Place 1 Application on the skin 4 times a day as needed (shoulder and neck pain). 100 g 2   • losartan (COZAAR) 100 MG Tab TAKE ONE TABLET BY MOUTH ONE TIME DAILY  90 Tab 3   • hydroCHLOROthiazide (HYDRODIURIL) 25 MG Tab TAKE ONE TABLET BY MOUTH ONE TIME  "DAILY  90 Tab 3   • allopurinol (ZYLOPRIM) 300 MG Tab TAKE ONE TABLET BY MOUTH ONE TIME DAILY  90 Tab 3   • amLODIPine (NORVASC) 2.5 MG Tab TAKE ONE TABLET BY MOUTH ONE TIME DAILY 90 Tab 3   • lansoprazole (PREVACID) 30 MG CAPSULE DELAYED RELEASE TAKE ONE CAPSULE BY MOUTH ONE TIME DAILY 90 Cap 2   • Magnesium 250 MG Tab Take  by mouth.       No current Epic-ordered facility-administered medications on file.        Allergies:  Iodine    Health Maintenance: due for hepatitis C screen    ROS:  Gen: no fevers/chills, no changes in weight  Eyes: no changes in vision  ENT: no sore throat  Pulm: no sob, + intermittent cough  CV: no chest pain, no palpitations  GI: no nausea/vomiting, no diarrhea, + heartburn  MSk: no falls,  + nocturnal leg cramps, + chronic joint pain  Skin: no rash  Neuro: no headaches, no dizziness    Objective:     Vital   Exam:  /86 (BP Location: Right arm, Patient Position: Sitting, BP Cuff Size: Large adult)   Pulse 91   Temp 36.1 °C (96.9 °F) (Temporal)   Resp 16   Ht 1.727 m (5' 8\")   Wt 114.3 kg (252 lb)   SpO2 93%   BMI 38.32 kg/m²  Body mass index is 38.32 kg/m².    Gen: Alert and oriented, No apparent distress.  Neck: Neck is supple without lymphadenopathy.  Lungs: Normal effort, CTA bilaterally, no wheezes, rhonchi, or rales  CV: Regular rate and rhythm. No murmurs, rubs, or gallops.  Ext: No clubbing, cyanosis, edema.    Assessment & Plan:     60 y.o. male with the following -     1. Gastroesophageal reflux disease without esophagitis  Chronic stable medical problem. Continue lansoprazole. Discussed eating 6 small meals throughout the day. Discussed avoiding spicy food, carbonated beverages and to not lay down 30 minutes after meals. Discussed exercise and weight loss. We reviewed his weight today. Monitor and follow.     2. Chronic pain of multiple joints  Chronic unstable medical problem. Discussed starting diclofenac gel as needed to area. Discussed he may continue tylenol as " needed. Discussed lifestyle modifications. Monitor and follow.   - Diclofenac Sodium 1 % Gel; Place 1 Application on the skin 4 times a day as needed (shoulder and neck pain).  Dispense: 100 g; Refill: 2    3. Essential hypertension  Chronic stable medical problem. Continue amlodipine, hctz, and losartan. He would like labs order to complete prior to his next appointment in May 2021, orders placed as below. Monitor and follow.   - CBC WITH DIFFERENTIAL; Future  - Comp Metabolic Panel; Future    4. Metabolic syndrome  Chronic stable medical problem.  Continue Metformin.  He would like labs ordered to complete prior to his next appointment in May 2021, orders placed.  Monitor and follow.  - Comp Metabolic Panel; Future  - HEMOGLOBIN A1C; Future  - Lipid Profile; Future    5. Nocturnal leg cramps  Chronic stable medical problem.  Continue magnesium.  He has no acute issues today.  Monitor and follow.    6. Vitamin D deficiency  Chronic stable medical problem.  Continue vitamin D.  He would like labs ordered to complete prior to his next appointment in May 2021, orders placed.  Monitor and follow.  - VITAMIN D,25 HYDROXY; Future    7. Need for hepatitis C screening test  New problem to examiner.  Screening indicated.  Patient in agreement.  Orders placed.  Monitor and follow.  - HCV Scrn ( [id number] [facility]xLife); Future      Return in about 6 months (around 2021).    Please note that this dictation was created using voice recognition software. I have made every reasonable attempt to correct obvious errors, but I expect that there are errors of grammar and possibly content that I did not discover before finalizing the note.

## 2020-11-11 NOTE — ASSESSMENT & PLAN NOTE
Chronic medical problem. He is taking magnesium daily. This helps with his cramps. No new issues today.

## 2020-11-11 NOTE — ASSESSMENT & PLAN NOTE
Chronic medical problem. He is taking acetaminophen PRN. His neck and shoulder pain are the worse. The cold weather worsens the pain. He would like to know if there are other medications he can try.

## 2020-11-11 NOTE — ASSESSMENT & PLAN NOTE
Chronic medical problem. He is taking lansoprazole 30 mg daily. He has tickle in his throat that makes him cough that started 1 month ago. The GERD has stayed the same. He has changed his diet and stopped sodas. He is drinking hot tea and carbonated water. He does not eat spicy foods. He is eating 2 meals a day, lunch and dinner. He will have breakfast sometimes but not consistently. He does not lay down 30 minutes after meals. He has had upper endoscopy in the past that he says was unremarkable.

## 2020-11-11 NOTE — TELEPHONE ENCOUNTER
VOICEMAIL  1. Caller Name: [facility]                      Call Back Number: [phone]    2. [facility] diclofenac [facility] gel. Pharmacy has some questions on the directions of this rx. They would like to know how many grams does the pt apply at a time and how much Is the max he could apply.    3. Patient approves office to leave a detailed voicemail/MyChart message: yes

## 2020-12-17 DIAGNOSIS — E88.810 METABOLIC SYNDROME: ICD-10-CM

## 2020-12-17 RX ORDER — METFORMIN HYDROCHLORIDE 500 MG/1
TABLET, EXTENDED RELEASE ORAL
Qty: 90 TAB | Refills: 3 | Status: SHIPPED | OUTPATIENT
Start: 2020-12-17 | End: 2021-12-21

## 2020-12-17 NOTE — TELEPHONE ENCOUNTER
Requested Prescriptions     Signed Prescriptions Disp Refills   • metFORMIN ER (GLUCOPHAGE XR) 500 MG TABLET SR 24 HR 90 Tab 3     Sig: Take 1 Tablet by mouth every day for 90 days.     Authorizing Provider: LEIGH SOUZA A.P.R.N.

## 2021-01-07 DIAGNOSIS — K21.9 GASTROESOPHAGEAL REFLUX DISEASE WITHOUT ESOPHAGITIS: ICD-10-CM

## 2021-01-07 DIAGNOSIS — K31.84 GASTROPARESIS: ICD-10-CM

## 2021-01-07 RX ORDER — LANSOPRAZOLE 30 MG/1
CAPSULE, DELAYED RELEASE ORAL
Qty: 90 CAP | Refills: 2 | Status: SHIPPED | OUTPATIENT
Start: 2021-01-07 | End: 2021-10-11

## 2021-01-07 NOTE — TELEPHONE ENCOUNTER
Requested Prescriptions     Signed Prescriptions Disp Refills   • lansoprazole (PREVACID) 30 MG CAPSULE DELAYED RELEASE 90 Cap 2     Sig: TAKE ONE CAPSULE BY MOUTH ONE TIME DAILY     Authorizing Provider: LEIGH SOUZA A.P.R.N.

## 2021-01-12 ENCOUNTER — APPOINTMENT (RX ONLY)
Dept: URBAN - METROPOLITAN AREA CLINIC 22 | Facility: CLINIC | Age: 61
Setting detail: DERMATOLOGY
End: 2021-01-12

## 2021-01-12 DIAGNOSIS — L57.0 ACTINIC KERATOSIS: ICD-10-CM

## 2021-01-12 DIAGNOSIS — L82.1 OTHER SEBORRHEIC KERATOSIS: ICD-10-CM

## 2021-01-12 DIAGNOSIS — Z86.007 PERSONAL HISTORY OF IN-SITU NEOPLASM OF SKIN: ICD-10-CM

## 2021-01-12 DIAGNOSIS — Z71.89 OTHER SPECIFIED COUNSELING: ICD-10-CM

## 2021-01-12 DIAGNOSIS — L81.4 OTHER MELANIN HYPERPIGMENTATION: ICD-10-CM

## 2021-01-12 DIAGNOSIS — L91.0 HYPERTROPHIC SCAR: ICD-10-CM

## 2021-01-12 PROBLEM — Z85.828 PERSONAL HISTORY OF OTHER MALIGNANT NEOPLASM OF SKIN: Status: ACTIVE | Noted: 2021-01-12

## 2021-01-12 PROCEDURE — ? PHOTO-DOCUMENTATION

## 2021-01-12 PROCEDURE — 11900 INJECT SKIN LESIONS </W 7: CPT | Mod: 59

## 2021-01-12 PROCEDURE — ? SUNSCREEN RECOMMENDATIONS

## 2021-01-12 PROCEDURE — 17000 DESTRUCT PREMALG LESION: CPT

## 2021-01-12 PROCEDURE — ? COUNSELING

## 2021-01-12 PROCEDURE — ? INTRALESIONAL KENALOG

## 2021-01-12 PROCEDURE — 99213 OFFICE O/P EST LOW 20 MIN: CPT | Mod: 25

## 2021-01-12 PROCEDURE — 17003 DESTRUCT PREMALG LES 2-14: CPT

## 2021-01-12 PROCEDURE — ? LIQUID NITROGEN

## 2021-01-12 ASSESSMENT — LOCATION DETAILED DESCRIPTION DERM
LOCATION DETAILED: NASAL SUPRATIP
LOCATION DETAILED: LEFT MID TEMPLE
LOCATION DETAILED: LEFT POSTERIOR SHOULDER
LOCATION DETAILED: RIGHT LATERAL MALAR CHEEK
LOCATION DETAILED: STERNUM
LOCATION DETAILED: RIGHT INFERIOR LATERAL FOREHEAD
LOCATION DETAILED: RIGHT POSTERIOR SHOULDER
LOCATION DETAILED: LEFT MID-UPPER BACK
LOCATION DETAILED: RIGHT SUPERIOR LATERAL UPPER BACK
LOCATION DETAILED: LEFT INFERIOR TEMPLE

## 2021-01-12 ASSESSMENT — LOCATION SIMPLE DESCRIPTION DERM
LOCATION SIMPLE: LEFT TEMPLE
LOCATION SIMPLE: NOSE
LOCATION SIMPLE: LEFT UPPER BACK
LOCATION SIMPLE: RIGHT UPPER BACK
LOCATION SIMPLE: RIGHT CHEEK
LOCATION SIMPLE: LEFT SHOULDER
LOCATION SIMPLE: RIGHT FOREHEAD
LOCATION SIMPLE: RIGHT SHOULDER
LOCATION SIMPLE: CHEST

## 2021-01-12 ASSESSMENT — LOCATION ZONE DERM
LOCATION ZONE: NOSE
LOCATION ZONE: FACE
LOCATION ZONE: TRUNK
LOCATION ZONE: ARM

## 2021-01-12 NOTE — HPI: WOUND
Is The Wound New Or Recurrent?: new
How Severe Is It?: mild
How Is Your Wound Healing?: healed
Is This A New Presentation, Or A Follow-Up?: Wound
Date Of Surgery: September 2020
Name Of Surgery: ED&C

## 2021-01-12 NOTE — PROCEDURE: LIQUID NITROGEN
Duration Of Freeze Thaw-Cycle (Seconds): 3
Consent: The patient's consent was obtained including but not limited to risks of crusting, scabbing, blistering, scarring, darker or lighter pigmentary change, recurrence, incomplete removal and infection.
Number Of Freeze-Thaw Cycles: 2 freeze-thaw cycles
Detail Level: Detailed
Render Note In Bullet Format When Appropriate: No
Post-Care Instructions: I reviewed with the patient in detail post-care instructions. Patient is to wear sunprotection, and avoid picking at any of the treated lesions. Pt may apply Vaseline to crusted or scabbing areas.

## 2021-01-12 NOTE — PROCEDURE: INTRALESIONAL KENALOG
Administered By (Optional): Geronimo Celis PA-C
Lot # For Kenalog (Optional): [id number]
Expiration Date For Kenalog (Optional): Jan. 2022
Medical Necessity Clause: This procedure was medically necessary because the lesions that were treated were:
Concentration Of Kenalog Solution Injected (Mg/Ml): 20.0
Detail Level: Detailed
Total Volume (Ccs): 0.4
Kenalog Preparation: Kenalog
Consent: The risks of atrophy were reviewed with the patient.
Size Of Lesion (Optional): 1.5
Ndc# For Kenalog Only: 2032-3437-96
Include Z78.9 (Other Specified Conditions Influencing Health Status) As An Associated Diagnosis?: No
X Size Of Lesion In Cm (Optional): 0

## 2021-03-15 DIAGNOSIS — Z23 NEED FOR VACCINATION: ICD-10-CM

## 2021-03-19 ENCOUNTER — IMMUNIZATION (OUTPATIENT)
Dept: FAMILY PLANNING/WOMEN'S HEALTH CLINIC | Facility: IMMUNIZATION CENTER | Age: 61
End: 2021-03-19
Attending: INTERNAL MEDICINE
Payer: COMMERCIAL

## 2021-03-19 DIAGNOSIS — Z23 NEED FOR VACCINATION: ICD-10-CM

## 2021-03-19 DIAGNOSIS — Z23 ENCOUNTER FOR VACCINATION: Primary | ICD-10-CM

## 2021-03-19 PROCEDURE — 91301 MODERNA SARS-COV-2 VACCINE: CPT

## 2021-03-19 PROCEDURE — 0011A MODERNA SARS-COV-2 VACCINE: CPT

## 2021-04-15 DIAGNOSIS — I10 ESSENTIAL HYPERTENSION: ICD-10-CM

## 2021-04-15 RX ORDER — AMLODIPINE BESYLATE 2.5 MG/1
TABLET ORAL
Qty: 90 TABLET | Refills: 1 | Status: SHIPPED | OUTPATIENT
Start: 2021-04-15 | End: 2021-11-08

## 2021-04-15 NOTE — TELEPHONE ENCOUNTER
Requested Prescriptions     Signed Prescriptions Disp Refills   • amLODIPine (NORVASC) 2.5 MG Tab 90 tablet 1     Sig: TAKE ONE TABLET BY MOUTH ONE TIME DAILY     Authorizing Provider: LEIGH SOUZA A.P.R.N.

## 2021-04-17 ENCOUNTER — IMMUNIZATION (OUTPATIENT)
Dept: FAMILY PLANNING/WOMEN'S HEALTH CLINIC | Facility: IMMUNIZATION CENTER | Age: 61
End: 2021-04-17
Attending: INTERNAL MEDICINE
Payer: COMMERCIAL

## 2021-04-17 DIAGNOSIS — Z23 ENCOUNTER FOR VACCINATION: Primary | ICD-10-CM

## 2021-04-17 PROCEDURE — 91301 MODERNA SARS-COV-2 VACCINE: CPT

## 2021-04-17 PROCEDURE — 0012A MODERNA SARS-COV-2 VACCINE: CPT

## 2021-05-10 ENCOUNTER — HOSPITAL ENCOUNTER (OUTPATIENT)
Dept: LAB | Facility: MEDICAL CENTER | Age: 61
End: 2021-05-10
Attending: NURSE PRACTITIONER
Payer: COMMERCIAL

## 2021-05-10 DIAGNOSIS — I10 ESSENTIAL HYPERTENSION: ICD-10-CM

## 2021-05-10 DIAGNOSIS — Z11.59 NEED FOR HEPATITIS C SCREENING TEST: ICD-10-CM

## 2021-05-10 DIAGNOSIS — E88.810 METABOLIC SYNDROME: ICD-10-CM

## 2021-05-10 DIAGNOSIS — E55.9 VITAMIN D DEFICIENCY: ICD-10-CM

## 2021-05-10 LAB
ALBUMIN SERPL BCP-MCNC: 4.2 G/DL (ref 3.2–4.9)
ALBUMIN/GLOB SERPL: 1.5 G/DL
ALP SERPL-CCNC: 73 U/L (ref 30–99)
ALT SERPL-CCNC: 25 U/L (ref 2–50)
ANION GAP SERPL CALC-SCNC: 12 MMOL/L (ref 7–16)
AST SERPL-CCNC: 20 U/L (ref 12–45)
BASOPHILS # BLD AUTO: 0.9 % (ref 0–1.8)
BASOPHILS # BLD: 0.05 K/UL (ref 0–0.12)
BILIRUB SERPL-MCNC: 0.6 MG/DL (ref 0.1–1.5)
BUN SERPL-MCNC: 14 MG/DL (ref 8–22)
CALCIUM SERPL-MCNC: 9.1 MG/DL (ref 8.5–10.5)
CHLORIDE SERPL-SCNC: 96 MMOL/L (ref 96–112)
CHOLEST SERPL-MCNC: 125 MG/DL (ref 100–199)
CO2 SERPL-SCNC: 27 MMOL/L (ref 20–33)
CREAT SERPL-MCNC: 0.78 MG/DL (ref 0.5–1.4)
EOSINOPHIL # BLD AUTO: 0.2 K/UL (ref 0–0.51)
EOSINOPHIL NFR BLD: 3.5 % (ref 0–6.9)
ERYTHROCYTE [DISTWIDTH] IN BLOOD BY AUTOMATED COUNT: 44.2 FL (ref 35.9–50)
EST. AVERAGE GLUCOSE BLD GHB EST-MCNC: 123 MG/DL
FASTING STATUS PATIENT QL REPORTED: NORMAL
GLOBULIN SER CALC-MCNC: 2.8 G/DL (ref 1.9–3.5)
GLUCOSE SERPL-MCNC: 88 MG/DL (ref 65–99)
HBA1C MFR BLD: 5.9 % (ref 4–5.6)
HCT VFR BLD AUTO: 48.1 % (ref 42–52)
HCV AB SER QL: NORMAL
HDLC SERPL-MCNC: 51 MG/DL
HGB BLD-MCNC: 16 G/DL (ref 14–18)
IMM GRANULOCYTES # BLD AUTO: 0.03 K/UL (ref 0–0.11)
IMM GRANULOCYTES NFR BLD AUTO: 0.5 % (ref 0–0.9)
LDLC SERPL CALC-MCNC: 59 MG/DL
LYMPHOCYTES # BLD AUTO: 1.66 K/UL (ref 1–4.8)
LYMPHOCYTES NFR BLD: 29.4 % (ref 22–41)
MCH RBC QN AUTO: 30.5 PG (ref 27–33)
MCHC RBC AUTO-ENTMCNC: 33.3 G/DL (ref 33.7–35.3)
MCV RBC AUTO: 91.6 FL (ref 81.4–97.8)
MONOCYTES # BLD AUTO: 0.52 K/UL (ref 0–0.85)
MONOCYTES NFR BLD AUTO: 9.2 % (ref 0–13.4)
NEUTROPHILS # BLD AUTO: 3.19 K/UL (ref 1.82–7.42)
NEUTROPHILS NFR BLD: 56.5 % (ref 44–72)
NRBC # BLD AUTO: 0 K/UL
NRBC BLD-RTO: 0 /100 WBC
PLATELET # BLD AUTO: 271 K/UL (ref 164–446)
PMV BLD AUTO: 11 FL (ref 9–12.9)
POTASSIUM SERPL-SCNC: 3.4 MMOL/L (ref 3.6–5.5)
PROT SERPL-MCNC: 7 G/DL (ref 6–8.2)
RBC # BLD AUTO: 5.25 M/UL (ref 4.7–6.1)
SODIUM SERPL-SCNC: 135 MMOL/L (ref 135–145)
TRIGL SERPL-MCNC: 75 MG/DL (ref 0–149)
WBC # BLD AUTO: 5.7 K/UL (ref 4.8–10.8)

## 2021-05-10 PROCEDURE — 80053 COMPREHEN METABOLIC PANEL: CPT

## 2021-05-10 PROCEDURE — 85025 COMPLETE CBC W/AUTO DIFF WBC: CPT

## 2021-05-10 PROCEDURE — 80061 LIPID PANEL: CPT

## 2021-05-10 PROCEDURE — 82306 VITAMIN D 25 HYDROXY: CPT

## 2021-05-10 PROCEDURE — 83036 HEMOGLOBIN GLYCOSYLATED A1C: CPT

## 2021-05-10 PROCEDURE — 36415 COLL VENOUS BLD VENIPUNCTURE: CPT

## 2021-05-10 PROCEDURE — G0472 HEP C SCREEN HIGH RISK/OTHER: HCPCS

## 2021-05-11 ENCOUNTER — APPOINTMENT (RX ONLY)
Dept: URBAN - METROPOLITAN AREA CLINIC 22 | Facility: CLINIC | Age: 61
Setting detail: DERMATOLOGY
End: 2021-05-11

## 2021-05-11 DIAGNOSIS — Z71.89 OTHER SPECIFIED COUNSELING: ICD-10-CM

## 2021-05-11 DIAGNOSIS — L91.0 HYPERTROPHIC SCAR: ICD-10-CM | Status: IMPROVED

## 2021-05-11 DIAGNOSIS — D22 MELANOCYTIC NEVI: ICD-10-CM

## 2021-05-11 DIAGNOSIS — L81.4 OTHER MELANIN HYPERPIGMENTATION: ICD-10-CM

## 2021-05-11 DIAGNOSIS — L81.8 OTHER SPECIFIED DISORDERS OF PIGMENTATION: ICD-10-CM

## 2021-05-11 DIAGNOSIS — L91.8 OTHER HYPERTROPHIC DISORDERS OF THE SKIN: ICD-10-CM

## 2021-05-11 DIAGNOSIS — L82.1 OTHER SEBORRHEIC KERATOSIS: ICD-10-CM

## 2021-05-11 DIAGNOSIS — Z86.007 PERSONAL HISTORY OF IN-SITU NEOPLASM OF SKIN: ICD-10-CM

## 2021-05-11 DIAGNOSIS — L57.0 ACTINIC KERATOSIS: ICD-10-CM

## 2021-05-11 DIAGNOSIS — D18.0 HEMANGIOMA: ICD-10-CM

## 2021-05-11 DIAGNOSIS — L259 CONTACT DERMATITIS AND OTHER ECZEMA, UNSPECIFIED CAUSE: ICD-10-CM | Status: INADEQUATELY CONTROLLED

## 2021-05-11 PROBLEM — L30.8 OTHER SPECIFIED DERMATITIS: Status: ACTIVE | Noted: 2021-05-11

## 2021-05-11 PROBLEM — D18.01 HEMANGIOMA OF SKIN AND SUBCUTANEOUS TISSUE: Status: ACTIVE | Noted: 2021-05-11

## 2021-05-11 PROBLEM — Z85.828 PERSONAL HISTORY OF OTHER MALIGNANT NEOPLASM OF SKIN: Status: ACTIVE | Noted: 2021-05-11

## 2021-05-11 PROBLEM — D22.5 MELANOCYTIC NEVI OF TRUNK: Status: ACTIVE | Noted: 2021-05-11

## 2021-05-11 PROCEDURE — ? PHOTO-DOCUMENTATION

## 2021-05-11 PROCEDURE — ? COUNSELING

## 2021-05-11 PROCEDURE — ? LIQUID NITROGEN

## 2021-05-11 PROCEDURE — ? TREATMENT REGIMEN

## 2021-05-11 PROCEDURE — 17004 DESTROY PREMAL LESIONS 15/>: CPT

## 2021-05-11 PROCEDURE — 99214 OFFICE O/P EST MOD 30 MIN: CPT | Mod: 25

## 2021-05-11 PROCEDURE — 11900 INJECT SKIN LESIONS </W 7: CPT | Mod: 59

## 2021-05-11 PROCEDURE — ? PRESCRIPTION

## 2021-05-11 PROCEDURE — ? INTRALESIONAL KENALOG

## 2021-05-11 PROCEDURE — ? ADDITIONAL NOTES

## 2021-05-11 PROCEDURE — ? SUNSCREEN RECOMMENDATIONS

## 2021-05-11 RX ORDER — TRIAMCINOLONE ACETONIDE 1 MG/G
1 CREAM TOPICAL BID PRN
Qty: 1 | Refills: 1 | Status: ERX | COMMUNITY
Start: 2021-05-11

## 2021-05-11 RX ADMIN — TRIAMCINOLONE ACETONIDE 1: 1 CREAM TOPICAL at 00:00

## 2021-05-11 ASSESSMENT — LOCATION SIMPLE DESCRIPTION DERM
LOCATION SIMPLE: RIGHT AXILLARY VAULT
LOCATION SIMPLE: LEFT UPPER BACK
LOCATION SIMPLE: CHEST
LOCATION SIMPLE: LEFT FOREARM
LOCATION SIMPLE: RIGHT PRETIBIAL REGION
LOCATION SIMPLE: INFERIOR FOREHEAD
LOCATION SIMPLE: RIGHT FOREARM
LOCATION SIMPLE: ABDOMEN
LOCATION SIMPLE: LEFT PRETIBIAL REGION
LOCATION SIMPLE: RIGHT LOWER BACK

## 2021-05-11 ASSESSMENT — LOCATION ZONE DERM
LOCATION ZONE: AXILLAE
LOCATION ZONE: TRUNK
LOCATION ZONE: FACE
LOCATION ZONE: ARM
LOCATION ZONE: LEG

## 2021-05-11 ASSESSMENT — LOCATION DETAILED DESCRIPTION DERM
LOCATION DETAILED: RIGHT DISTAL PRETIBIAL REGION
LOCATION DETAILED: RIGHT DISTAL DORSAL FOREARM
LOCATION DETAILED: LEFT PROXIMAL DORSAL FOREARM
LOCATION DETAILED: LEFT DISTAL PRETIBIAL REGION
LOCATION DETAILED: RIGHT PROXIMAL DORSAL FOREARM
LOCATION DETAILED: INFERIOR MID FOREHEAD
LOCATION DETAILED: EPIGASTRIC SKIN
LOCATION DETAILED: RIGHT AXILLARY VAULT
LOCATION DETAILED: RIGHT SUPERIOR MEDIAL MIDBACK
LOCATION DETAILED: LEFT DISTAL DORSAL FOREARM
LOCATION DETAILED: LEFT SUPERIOR MEDIAL UPPER BACK
LOCATION DETAILED: STERNUM

## 2021-05-11 NOTE — PROCEDURE: LIQUID NITROGEN
Duration Of Freeze Thaw-Cycle (Seconds): 0
Render Post-Care Instructions In Note?: no
Total Number Of Aks Treated: 17
Consent: The patient's consent was obtained including but not limited to risks of crusting, scabbing, blistering, scarring, darker or lighter pigmentary change, recurrence, incomplete removal and infection.
Detail Level: Zone
Post-Care Instructions: I reviewed with the patient in detail post-care instructions. Patient is to wear sunprotection, and avoid picking at any of the treated lesions. Pt may apply Vaseline to crusted or scabbing areas.
Number Of Freeze-Thaw Cycles: 2 freeze-thaw cycles

## 2021-05-11 NOTE — PROCEDURE: ADDITIONAL NOTES
Detail Level: Detailed
Additional Notes: I discussed with patient that the skin tags are benign, and they are not bothering him they can be left alone. He agreed to leave untreated.
Render Risk Assessment In Note?: no

## 2021-05-11 NOTE — PROCEDURE: INTRALESIONAL KENALOG
Administered By (Optional): Geronimo Celis PA-C
Lot # For Kenalog (Optional): [id number]
Expiration Date For Kenalog (Optional): Jan. 2022
Medical Necessity Clause: This procedure was medically necessary because the lesions that were treated were:
Concentration Of Kenalog Solution Injected (Mg/Ml): 20.0
Detail Level: Detailed
Total Volume (Ccs): 0.5
Kenalog Preparation: Kenalog
Consent: The risks of atrophy were reviewed with the patient.
Size Of Lesion (Optional): 1.5
Ndc# For Kenalog Only: 8413-9880-08
Include Z78.9 (Other Specified Conditions Influencing Health Status) As An Associated Diagnosis?: No
X Size Of Lesion In Cm (Optional): 0

## 2021-05-11 NOTE — HPI: SKIN LESION (SKIN TAGS)
How Severe Are They?: mild
Is This A New Presentation, Or A Follow-Up?: Skin Lesions
Additional History: Patient had concern about the skin tags in axillary region. They were documented by primary doctor, so he would like to ask if he needs to be concerned about the skin tags.

## 2021-05-12 ENCOUNTER — OFFICE VISIT (OUTPATIENT)
Dept: MEDICAL GROUP | Facility: PHYSICIAN GROUP | Age: 61
End: 2021-05-12
Payer: COMMERCIAL

## 2021-05-12 VITALS
DIASTOLIC BLOOD PRESSURE: 62 MMHG | SYSTOLIC BLOOD PRESSURE: 110 MMHG | HEIGHT: 67 IN | TEMPERATURE: 97.9 F | WEIGHT: 249 LBS | OXYGEN SATURATION: 93 % | RESPIRATION RATE: 18 BRPM | HEART RATE: 85 BPM | BODY MASS INDEX: 39.08 KG/M2

## 2021-05-12 DIAGNOSIS — K21.9 GASTROESOPHAGEAL REFLUX DISEASE WITHOUT ESOPHAGITIS: ICD-10-CM

## 2021-05-12 DIAGNOSIS — E55.9 VITAMIN D DEFICIENCY: ICD-10-CM

## 2021-05-12 DIAGNOSIS — I10 ESSENTIAL HYPERTENSION: ICD-10-CM

## 2021-05-12 DIAGNOSIS — M1A.0790 IDIOPATHIC CHRONIC GOUT OF ANKLE WITHOUT TOPHUS, UNSPECIFIED LATERALITY: ICD-10-CM

## 2021-05-12 DIAGNOSIS — E66.9 OBESITY (BMI 30-39.9): ICD-10-CM

## 2021-05-12 DIAGNOSIS — E88.810 METABOLIC SYNDROME: ICD-10-CM

## 2021-05-12 DIAGNOSIS — I49.9 IRREGULAR HEARTBEAT: ICD-10-CM

## 2021-05-12 PROBLEM — E66.812 CLASS 2 OBESITY DUE TO EXCESS CALORIES WITHOUT SERIOUS COMORBIDITY IN ADULT: Status: RESOLVED | Noted: 2018-10-16 | Resolved: 2021-05-12

## 2021-05-12 PROBLEM — B07.9 CUTANEOUS WART: Status: RESOLVED | Noted: 2017-07-13 | Resolved: 2021-05-12

## 2021-05-12 PROBLEM — E66.812 CLASS 2 OBESITY DUE TO EXCESS CALORIES WITHOUT SERIOUS COMORBIDITY WITH BODY MASS INDEX (BMI) OF 37.0 TO 37.9 IN ADULT: Status: RESOLVED | Noted: 2019-04-03 | Resolved: 2021-05-12

## 2021-05-12 PROBLEM — M25.552 LEFT HIP PAIN: Status: RESOLVED | Noted: 2017-07-13 | Resolved: 2021-05-12

## 2021-05-12 PROBLEM — E66.09 CLASS 2 OBESITY DUE TO EXCESS CALORIES WITHOUT SERIOUS COMORBIDITY WITH BODY MASS INDEX (BMI) OF 37.0 TO 37.9 IN ADULT: Status: RESOLVED | Noted: 2019-04-03 | Resolved: 2021-05-12

## 2021-05-12 PROBLEM — E66.09 CLASS 2 OBESITY DUE TO EXCESS CALORIES WITHOUT SERIOUS COMORBIDITY IN ADULT: Status: RESOLVED | Noted: 2018-10-16 | Resolved: 2021-05-12

## 2021-05-12 LAB — 25(OH)D3 SERPL-MCNC: 30 NG/ML (ref 30–80)

## 2021-05-12 PROCEDURE — 93000 ELECTROCARDIOGRAM COMPLETE: CPT | Performed by: NURSE PRACTITIONER

## 2021-05-12 PROCEDURE — 99214 OFFICE O/P EST MOD 30 MIN: CPT | Performed by: NURSE PRACTITIONER

## 2021-05-12 RX ORDER — ALLOPURINOL 300 MG/1
300 TABLET ORAL DAILY
Qty: 90 TABLET | Refills: 3 | Status: SHIPPED | OUTPATIENT
Start: 2021-05-12 | End: 2023-01-09 | Stop reason: SDUPTHER

## 2021-05-12 ASSESSMENT — FIBROSIS 4 INDEX: FIB4 SCORE: 0.92

## 2021-05-12 ASSESSMENT — PATIENT HEALTH QUESTIONNAIRE - PHQ9: CLINICAL INTERPRETATION OF PHQ2 SCORE: 0

## 2021-05-12 NOTE — ASSESSMENT & PLAN NOTE
Chronic medical problem.  His BMI today is 39.59.  He walks half a mile daily.  He is on blood pressure medication.  He is on Metformin.

## 2021-05-12 NOTE — PROGRESS NOTES
Subjective:     CC: Follow-up lab results, gastroesophageal reflux, and irregular heartbeat     HPI:   Diaz presents today with the following:    Irregular heartbeat  Acute medical problem. The problem started 4 months ago. The problem is intermittent. When he palpates his pulse he does notice his heartbeat stop and then restart. He has fatigue. Denies chest pain, shortness of breath, dizziness, blurry vision or headaches.     Gastroesophageal reflux disease without esophagitis  Chronic medical problem. He is taking lansoprazole 30 mg daily. He notices a tickle in his throat and cough, this is intermittent. He has had endoscopy in the past and was diagnosed with gastroparesis.  Denies chest pain, difficulty breathing, wheezing, bloating, night sweats, bloody stool, hematuria, tarry stools.     Metabolic syndrome  Chronic medical problem.  He is taking metformin 500 mg daily. He is walking 1/2 mile daily.  He is tolerating his medication.  He had recent labs that he like to review today.  Last lab results:  Results for DIAZ WOO (MRN [id number]) as of 5/12/2021 13:17   Ref. Range 5/10/2021 12:35   Glycohemoglobin Latest Ref Range: 4.0 - 5.6 % 5.9 (H)   Estim. Avg Glu Latest Units: mg/dL 123       Vitamin D deficiency  Chronic medical problem.  He had recent labs that he would like to review today.  Last lab results:  Results for DIAZ WOO (MRN [id number]) as of 5/12/2021 13:32   Ref. Range 5/10/2021 12:35   25-Hydroxy   Vitamin D 25 Latest Ref Range: 30 - 80 ng/mL 30       Essential hypertension  Chronic medical problem.  His blood pressure is at goal today.  He is taking amlodipine 2.5 mg daily, losartan 100 mg daily and hydrochlorothiazide 25 mg daily.  He denies any chest pain, shortness of breath, dizziness, blurry vision, or headaches.    Chronic gout  Chronic medical problem.  He is taking allopurinol 3 mg daily.  Denies any recent gout flares.    Obesity (BMI 30-39.9)  Chronic medical problem.  His BMI  today is 39.59.  He walks half a mile daily.  He is on blood pressure medication.  He is on Metformin.      Past Medical History:   Diagnosis Date   • Gastroparesis     2016   • Hypertension     7 yrs   • Kidney stones     6-7 yrs ago       Social History     Tobacco Use   • Smoking status: Never Smoker   • Smokeless tobacco: Former User     Types: Chew   Vaping Use   • Vaping Use: Never used   Substance Use Topics   • Alcohol use: Not Currently     Alcohol/week: 0.6 oz     Types: 1 Cans of beer per week     Comment: occasionally   • Drug use: No       Current Outpatient Medications Ordered in Epic   Medication Sig Dispense Refill   • allopurinol (ZYLOPRIM) 300 MG Tab Take 1 tablet by mouth every day. 90 tablet 3   • amLODIPine (NORVASC) 2.5 MG Tab TAKE ONE TABLET BY MOUTH ONE TIME DAILY 90 tablet 1   • lansoprazole (PREVACID) 30 MG CAPSULE DELAYED RELEASE TAKE ONE CAPSULE BY MOUTH ONE TIME DAILY  90 Cap 2   • metFORMIN ER (GLUCOPHAGE XR) 500 MG TABLET SR 24 HR Take 1 Tablet by mouth every day for 90 days. 90 Tab 3   • Probiotic Product (PROBIOTIC-10 PO) Take  by mouth.     • Diclofenac Sodium 1 % Gel Place 1 Application on the skin 4 times a day as needed (shoulder and neck pain). 100 g 2   • losartan (COZAAR) 100 MG Tab TAKE ONE TABLET BY MOUTH ONE TIME DAILY  90 Tab 3   • hydroCHLOROthiazide (HYDRODIURIL) 25 MG Tab TAKE ONE TABLET BY MOUTH ONE TIME DAILY  90 Tab 3   • Magnesium 250 MG Tab Take  by mouth.       No current Epic-ordered facility-administered medications on file.       Allergies:  Iodine    Health Maintenance: Completed    ROS:  Gen: no fevers/chills, no changes in weight, no night sweats  Eyes: no changes in vision  ENT: no sore throat  Pulm: no shortness of breath, + intermittent cough  CV: no chest pain, + irregular heartbeat  GI: no nausea/vomiting, no heartburn, no indigestion, no bloody stools, no tarry stools  : no dysuria, no hematuria  MSk: no myalgias  Skin: no rash  Neuro: no headaches,  "no dizziness    Objective:     Vital signs reviewed  Exam:  /62 (BP Location: Left arm, Patient Position: Sitting, BP Cuff Size: Adult)   Pulse 85   Temp 36.6 °C (97.9 °F) (Temporal)   Resp 18   Ht 1.689 m (5' 6.5\")   Wt 113 kg (249 lb)   SpO2 93%   BMI 39.59 kg/m²  Body mass index is 39.59 kg/m².    Gen: Alert and oriented, No apparent distress.  Neck: Neck is supple without lymphadenopathy.  Lungs: Normal effort, CTA bilaterally, no wheezes, rhonchi, or rales  CV: Regular rate and irregular rhythm. No murmurs, rubs, or gallops.  Pulse rate regular with irregular rhythm, corresponding to heart rhythm.  Ext: No clubbing, cyanosis, edema.    EKG Interpretation-HR is 80 there are no previous tracings available for comparison, normal sinus rhythm, PAC's noted, 1st degree AV block. No ST elevation.       Assessment & Plan:     60 y.o. male with the following -     1. Irregular heartbeat  Acute uncomplicated problem.  EKG in office completed today.  No ST elevation.  EKG did show PACs with first-degree AV block.  His recent labs were discussed and reviewed today.  Will refer to cardiology, consider Holter monitor.  Red flags discussed with strict ER precautions.  - EKG    2. Metabolic syndrome  Chronic stable problem.  He will continue with his Metformin.  We discussed and reviewed his recent labs.  He will continue with his diet and lifestyle modifications.    3. Essential hypertension  Chronic stable problem.  His blood pressure is at goal today.  He will continue with his amlodipine, hydrochlorothiazide, and losartan.  We discussed reviewed recent labs today.    4. Gastroesophageal reflux disease without esophagitis  Chronic exacerbated problem.  Continue lansoprazole.  Given his new symptoms of cough and throat complaints will refer him to GI for possible upper endoscopy.  We discussed continuing to eat small frequent meals throughout the day.  Sitting up after meals.  Red flags discussed.  - REFERRAL TO " GASTROENTEROLOGY    5. Idiopathic chronic gout of ankle without tophus, unspecified laterality  Chronic stable problem.  He will continue his allopurinol.  No acute complaints today.    6. Vitamin D deficiency  Chronic stable problem.  Discussed and reviewed recent lab results.  Questions answered.    7. Obesity (BMI 30-39.9)  Chronic stable problem.  Continue diet and lifestyle modifications.  - Patient identified as having weight management issue.  Appropriate orders and counseling given.      Return if symptoms worsen or fail to improve.    Please note that this dictation was created using voice recognition software. I have made every reasonable attempt to correct obvious errors, but I expect that there are errors of grammar and possibly content that I did not discover before finalizing the note.

## 2021-05-12 NOTE — ASSESSMENT & PLAN NOTE
Chronic medical problem.  He is taking metformin 500 mg daily. He is walking 1/2 mile daily.  He is tolerating his medication.  He had recent labs that he like to review today.  Last lab results:  Results for DIAZ WOO (MRN [id number]) as of 5/12/2021 13:17   Ref. Range 5/10/2021 12:35   Glycohemoglobin Latest Ref Range: 4.0 - 5.6 % 5.9 (H)   Estim. Avg Glu Latest Units: mg/dL 123

## 2021-05-12 NOTE — ASSESSMENT & PLAN NOTE
Chronic medical problem.  He had recent labs that he would like to review today.  Last lab results:  Results for DIAZ WOO (MRN [id number]) as of 5/12/2021 13:32   Ref. Range 5/10/2021 12:35   25-Hydroxy   Vitamin D 25 Latest Ref Range: 30 - 80 ng/mL 30

## 2021-05-12 NOTE — ASSESSMENT & PLAN NOTE
Acute medical problem. The problem started 4 months ago. The problem is intermittent. When he palpates his pulse he does notice his heartbeat stop and then restart. He has fatigue and drinks sugar free energy drink. Denies chest pain, shortness of breath, dizziness, blurry vision or headaches.

## 2021-05-12 NOTE — ASSESSMENT & PLAN NOTE
Chronic medical problem.  His blood pressure is at goal today.  He is taking amlodipine 2.5 mg daily, losartan 100 mg daily and hydrochlorothiazide 25 mg daily.  He denies any chest pain, shortness of breath, dizziness, blurry vision, or headaches.

## 2021-05-12 NOTE — ASSESSMENT & PLAN NOTE
Chronic medical problem. He is taking lansoprazole 30 mg daily. He notices a tickle in his throat and cough, this is intermittent. He has had endoscopy in the past and was diagnosed with gastroparesis.  Denies chest pain, difficulty breathing, wheezing, bloating, night sweats, bloody stool, hematuria, tarry stools.

## 2021-05-19 ENCOUNTER — OFFICE VISIT (OUTPATIENT)
Dept: MEDICAL GROUP | Facility: PHYSICIAN GROUP | Age: 61
End: 2021-05-19
Payer: COMMERCIAL

## 2021-05-19 VITALS
DIASTOLIC BLOOD PRESSURE: 70 MMHG | HEIGHT: 67 IN | RESPIRATION RATE: 16 BRPM | OXYGEN SATURATION: 95 % | TEMPERATURE: 98.2 F | BODY MASS INDEX: 38.61 KG/M2 | WEIGHT: 246 LBS | HEART RATE: 87 BPM | SYSTOLIC BLOOD PRESSURE: 116 MMHG

## 2021-05-19 DIAGNOSIS — I10 ESSENTIAL HYPERTENSION: ICD-10-CM

## 2021-05-19 DIAGNOSIS — F32.0 CURRENT MILD EPISODE OF MAJOR DEPRESSIVE DISORDER WITHOUT PRIOR EPISODE (HCC): ICD-10-CM

## 2021-05-19 DIAGNOSIS — F43.10 PTSD (POST-TRAUMATIC STRESS DISORDER): ICD-10-CM

## 2021-05-19 DIAGNOSIS — F41.1 GAD (GENERALIZED ANXIETY DISORDER): ICD-10-CM

## 2021-05-19 PROCEDURE — 99214 OFFICE O/P EST MOD 30 MIN: CPT | Performed by: NURSE PRACTITIONER

## 2021-05-19 ASSESSMENT — ANXIETY QUESTIONNAIRES
6. BECOMING EASILY ANNOYED OR IRRITABLE: MORE THAN HALF THE DAYS
7. FEELING AFRAID AS IF SOMETHING AWFUL MIGHT HAPPEN: MORE THAN HALF THE DAYS
1. FEELING NERVOUS, ANXIOUS, OR ON EDGE: MORE THAN HALF THE DAYS
GAD7 TOTAL SCORE: 12
5. BEING SO RESTLESS THAT IT IS HARD TO SIT STILL: MORE THAN HALF THE DAYS
3. WORRYING TOO MUCH ABOUT DIFFERENT THINGS: MORE THAN HALF THE DAYS
2. NOT BEING ABLE TO STOP OR CONTROL WORRYING: MORE THAN HALF THE DAYS
4. TROUBLE RELAXING: NOT AT ALL

## 2021-05-19 ASSESSMENT — FIBROSIS 4 INDEX: FIB4 SCORE: 0.92

## 2021-05-19 ASSESSMENT — PATIENT HEALTH QUESTIONNAIRE - PHQ9
SUM OF ALL RESPONSES TO PHQ QUESTIONS 1-9: 8
5. POOR APPETITE OR OVEREATING: 0 - NOT AT ALL
CLINICAL INTERPRETATION OF PHQ2 SCORE: 4

## 2021-05-19 NOTE — PROGRESS NOTES
Subjective:     CC: Anxiety    HPI:   Venkat presents today with the following:    DION (generalized anxiety disorder)  Acute medical problem. He has had ongoing anxiety and PTSD for years. His dion score today is 12. 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 Other triggers include emotional stress. He was with his father when his father went into cardiac arrest.  He denies any self-harm or SI today.    Current mild episode of major depressive disorder without prior episode (HCC)  Acute medical problem.  His PHQ score today is 8. He has not seen therapy or psychiatry.  He is a retired .  He denies any self-harm or SI today.    PTSD (post-traumatic stress disorder)  Acute medical problem.  Patient reports that he has been doing with anxiety and PTSD for several years.  He is a retired .  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  He also had to perform CPR in his father who went into cardiac arrest.  He has not been to therapy or seen psychiatry.  He denies any self-harm or SI today.    Essential hypertension  Chronic medical problem.  His blood pressure is at goal today.  He is taking amlodipine 2.5 mg daily, hydrochlorothiazide 25 mg daily and losartan 100 mg daily.  He denies any chest pain, shortness of breath, dizziness, or headaches.        Past Medical History:   Diagnosis Date   • Gastroparesis     2016   • Hypertension     7 yrs   • Kidney stones     6-7 yrs ago       Social History     Tobacco Use   • Smoking status: Never Smoker   • Smokeless tobacco: Former User     Types: Chew   Vaping Use   • Vaping Use: Never used   Substance Use Topics   • Alcohol use: Not Currently     Alcohol/week: 0.6 oz     Types: 1 Cans of beer per week     Comment: occasionally   • Drug use: No       Current Outpatient Medications Ordered in Epic   Medication Sig Dispense Refill   • allopurinol (ZYLOPRIM) 300 MG  "Tab Take 1 tablet by mouth every day. 90 tablet 3   • amLODIPine (NORVASC) 2.5 MG Tab TAKE ONE TABLET BY MOUTH ONE TIME DAILY 90 tablet 1   • lansoprazole (PREVACID) 30 MG CAPSULE DELAYED RELEASE TAKE ONE CAPSULE BY MOUTH ONE TIME DAILY  90 Cap 2   • metFORMIN ER (GLUCOPHAGE XR) 500 MG TABLET SR 24 HR Take 1 Tablet by mouth every day for 90 days. 90 Tab 3   • Probiotic Product (PROBIOTIC-10 PO) Take  by mouth.     • Diclofenac Sodium 1 % Gel Place 1 Application on the skin 4 times a day as needed (shoulder and neck pain). 100 g 2   • losartan (COZAAR) 100 MG Tab TAKE ONE TABLET BY MOUTH ONE TIME DAILY  90 Tab 3   • hydroCHLOROthiazide (HYDRODIURIL) 25 MG Tab TAKE ONE TABLET BY MOUTH ONE TIME DAILY  90 Tab 3   • Magnesium 250 MG Tab Take  by mouth.       No current Epic-ordered facility-administered medications on file.       Allergies:  Iodine    Health Maintenance: Completed    ROS:  Per HPI      Objective:     Vital signs reviewed  Exam:  /70 (BP Location: Left arm, Patient Position: Sitting, BP Cuff Size: Adult)   Pulse 87   Temp 36.8 °C (98.2 °F) (Temporal)   Resp 16   Ht 1.689 m (5' 6.5\")   Wt 112 kg (246 lb)   SpO2 95%   BMI 39.11 kg/m²  Body mass index is 39.11 kg/m².    Gen: Alert and oriented, No apparent distress.  Neck: Neck is supple without lymphadenopathy.  Lungs: Normal effort, CTA bilaterally, no wheezes, rhonchi, or rales  CV: Regular rate and rhythm. No murmurs, rubs, or gallops.  Ext: No clubbing, cyanosis, edema.      Assessment & Plan:     60 y.o. male with the following -     1. DOIN (generalized anxiety disorder)  Acute uncomplicated problem.  Discussed and reviewed his dion score today.  We did discuss starting medication, such as SSRI, versus referral to behavioral health and psychiatry.  After discussion he would like to start with referral first.  He denies any self-harm or SI today.  Red flags discussed.  - REFERRAL TO BEHAVIORAL HEALTH  - REFERRAL TO PSYCHIATRY    2. PTSD " (post-traumatic stress disorder)  Acute complicated problem.  He is a retired .  He does have triggers.  He is interested referral to psychiatry and behavioral health.  We discussed starting SSRI, but he would like to proceed with referral.  He denies any self-harm or SI today.  - REFERRAL TO BEHAVIORAL HEALTH  - REFERRAL TO PSYCHIATRY    3. Current mild episode of major depressive disorder without prior episode (HCC)  Acute uncomplicated problem.  Discussed and reviewed his PHQ score today.  He denies any self-harm or SI today.  He like to start with referral to behavioral health and psychiatry.  Red flags discussed.  - REFERRAL TO BEHAVIORAL HEALTH  - REFERRAL TO PSYCHIATRY    4. Essential hypertension  Chronic stable problem.  His blood pressure is at goal today.  He will continue with his amlodipine, hydrochlorothiazide, and losartan.  He reports he has upcoming appointment scheduled for cardiology on 6/9/1221.  Red flags discussed.      Return in about 2 months (around 7/19/2021) for anxiety .    Please note that this dictation was created using voice recognition software. I have made every reasonable attempt to correct obvious errors, but I expect that there are errors of grammar and possibly content that I did not discover before finalizing the note.

## 2021-05-19 NOTE — ASSESSMENT & PLAN NOTE
Chronic medical problem.  His blood pressure is at goal today.  He is taking amlodipine 2.5 mg daily, hydrochlorothiazide 25 mg daily and losartan 100 mg daily.  He denies any chest pain, shortness of breath, dizziness, or headaches.

## 2021-05-19 NOTE — ASSESSMENT & PLAN NOTE
Acute medical problem.  Patient reports that he has been doing with anxiety and PTSD for several years.  He is a retired .  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  He also had to perform CPR in his father who went into cardiac arrest.  He has not been to therapy or seen psychiatry.  He denies any self-harm or SI today.

## 2021-05-19 NOTE — ASSESSMENT & PLAN NOTE
Acute medical problem.  His PHQ score today is 8. He has not seen therapy or psychiatry.  He is a retired .  He denies any self-harm or SI today.

## 2021-05-19 NOTE — ASSESSMENT & PLAN NOTE
Acute medical problem. He has had ongoing anxiety and PTSD for years. His mary anne score today is 12. 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 Other triggers include emotional stress. He was with his father when his father went into cardiac arrest.  He denies any self-harm or SI today.

## 2021-06-08 ENCOUNTER — TELEPHONE (OUTPATIENT)
Dept: CARDIOLOGY | Facility: MEDICAL CENTER | Age: 61
End: 2021-06-08

## 2021-06-08 ASSESSMENT — ENCOUNTER SYMPTOMS
NEAR-SYNCOPE: 0
DIZZINESS: 0
DIARRHEA: 0
PND: 0
PALPITATIONS: 0
ORTHOPNEA: 0
VOMITING: 0
NIGHT SWEATS: 0
WEAKNESS: 0
ABDOMINAL PAIN: 0
SHORTNESS OF BREATH: 0
DYSPNEA ON EXERTION: 0
IRREGULAR HEARTBEAT: 0
WHEEZING: 0
FOCAL WEAKNESS: 0
SYNCOPE: 0
FEVER: 0
COUGH: 0
NAUSEA: 0

## 2021-06-08 NOTE — TELEPHONE ENCOUNTER
"Chart Prep  S/W Pt in regards to NP appt with Dr. Broussard. Pt has not seen a previous cardiologist, labs are most recent in Epic from 5/10/21 and  no previous cardiac testing has been done outside of [facility]. Pt is coming in due to irregular heartbeat, feeling fatigued and feeling \"not right lately\". Pt is aware of time, date and location of appt.  "

## 2021-06-09 ENCOUNTER — OFFICE VISIT (OUTPATIENT)
Dept: CARDIOLOGY | Facility: MEDICAL CENTER | Age: 61
End: 2021-06-09
Attending: NURSE PRACTITIONER
Payer: COMMERCIAL

## 2021-06-09 VITALS
HEART RATE: 83 BPM | SYSTOLIC BLOOD PRESSURE: 122 MMHG | RESPIRATION RATE: 16 BRPM | OXYGEN SATURATION: 94 % | BODY MASS INDEX: 39.24 KG/M2 | DIASTOLIC BLOOD PRESSURE: 82 MMHG | HEIGHT: 67 IN | WEIGHT: 250 LBS

## 2021-06-09 DIAGNOSIS — I49.9 IRREGULAR HEART BEAT: ICD-10-CM

## 2021-06-09 DIAGNOSIS — I49.3 PVC (PREMATURE VENTRICULAR CONTRACTION): ICD-10-CM

## 2021-06-09 DIAGNOSIS — R06.09 DYSPNEA ON EXERTION: ICD-10-CM

## 2021-06-09 PROCEDURE — 99244 OFF/OP CNSLTJ NEW/EST MOD 40: CPT | Performed by: STUDENT IN AN ORGANIZED HEALTH CARE EDUCATION/TRAINING PROGRAM

## 2021-06-09 ASSESSMENT — FIBROSIS 4 INDEX: FIB4 SCORE: 0.92

## 2021-06-09 NOTE — PROGRESS NOTES
Cardiology Initial Consultation Note    Date of note:    6/9/2021    Primary Care Provider: VIKKI Major  Referring Provider: Becky Lynn A*     Patient Name: Venkat Mackenzie     YOB: 1960  MRN:              [id number]    Chief Complaint: Irregular heartbeat    History of Present Illness: Mr. Venkat Mackenzie is a 60 y.o. male whose current medical problems include hypertension, DM, PTSD, GERD, and sleep apnea who is here for cardiac consultation for regular heartbeat.    Patient was seen by his primary care physician on 5/20/2021 for anxiety. In the visit, the patient reported irregular heartbeat, and as such the patient was referred to cardiology clinic.     The patient reports more fatigued lately, and felt his heart rate to be irregular.  He denies any heart racing. No syncope or pre-syncopal episodes.  The patient denies any chest pain on exertion.  He does report shortness of breath on exertion.  No orthopnea/PND/leg swelling.      Cardiovascular Risk Factors:  1. Smoking status: Never smoker  2. Type II Diabetes Mellitus: On medication  Lab Results   Component Value Date/Time    HBA1C 5.9 (H) 05/10/2021 12:35 PM    HBA1C 5.8 (H) 05/06/2020 08:10 AM     3. Hypertension: On medication  4. Dyslipidemia: None  Cholesterol,Tot   Date Value Ref Range Status   05/10/2021 125 100 - 199 mg/dL Final     LDL   Date Value Ref Range Status   05/10/2021 59 <100 mg/dL Final     HDL   Date Value Ref Range Status   05/10/2021 51 >=40 mg/dL Final     Triglycerides   Date Value Ref Range Status   05/10/2021 75 0 - 149 mg/dL Final     5. Family history of early Coronary Artery Disease in a first degree relative (Male less than 55 years of age; Female less than 65 years of age): None  6.  Obesity and/or Metabolic Syndrome: BMI 39.75  7. Sedentary lifestyle: Walks daily    Review of Systems   Constitutional: Negative for fever, malaise/fatigue and night sweats.   Cardiovascular: Negative for  "chest pain, dyspnea on exertion, irregular heartbeat, leg swelling, near-syncope, orthopnea, palpitations, paroxysmal nocturnal dyspnea and syncope.   Respiratory: Negative for cough, shortness of breath and wheezing.    Gastrointestinal: Negative for abdominal pain, diarrhea, nausea and vomiting.   Neurological: Negative for dizziness, focal weakness and weakness.       All other systems reviewed and are negative.       Current Outpatient Medications   Medication Sig Dispense Refill   • allopurinol (ZYLOPRIM) 300 MG Tab Take 1 tablet by mouth every day. 90 tablet 3   • amLODIPine (NORVASC) 2.5 MG Tab TAKE ONE TABLET BY MOUTH ONE TIME DAILY 90 tablet 1   • lansoprazole (PREVACID) 30 MG CAPSULE DELAYED RELEASE TAKE ONE CAPSULE BY MOUTH ONE TIME DAILY  90 Cap 2   • metFORMIN ER (GLUCOPHAGE XR) 500 MG TABLET SR 24 HR Take 1 Tablet by mouth every day for 90 days. 90 Tab 3   • Probiotic Product (PROBIOTIC-10 PO) Take  by mouth every day.     • Diclofenac Sodium 1 % Gel Place 1 Application on the skin 4 times a day as needed (shoulder and neck pain). 100 g 2   • losartan (COZAAR) 100 MG Tab TAKE ONE TABLET BY MOUTH ONE TIME DAILY  90 Tab 3   • hydroCHLOROthiazide (HYDRODIURIL) 25 MG Tab TAKE ONE TABLET BY MOUTH ONE TIME DAILY  90 Tab 3   • Magnesium 250 MG Tab Take  by mouth every day.       No current facility-administered medications for this visit.         Allergies   Allergen Reactions   • Iodine Unspecified     dizziness       Physical Exam:  Ambulatory Vitals  /82 (BP Location: Left arm, Patient Position: Sitting, BP Cuff Size: Adult)   Pulse 83   Resp 16   Ht 1.689 m (5' 6.5\")   Wt 113 kg (250 lb)   SpO2 94%    Oxygen Therapy:  Pulse Oximetry: 94 %  BP Readings from Last 4 Encounters:   06/09/21 122/82   05/19/21 116/70   05/12/21 110/62   11/11/20 128/86       Weight/BMI: Body mass index is 39.75 kg/m².  Wt Readings from Last 4 Encounters:   06/09/21 113 kg (250 lb)   05/19/21 112 kg (246 lb)   05/12/21 " 113 kg (249 lb)   11/11/20 114 kg (252 lb)       General: Well appearing and in no apparent distress  Eyes: nl conjunctiva, no icteric sclera  ENT: wearing a mask, normal external appearance of ears  Neck: no visible JVP,  no carotid bruits  Lungs: normal respiratory effort, CTAB  Heart: RRR, no murmurs, no rubs or gallops,  no edema bilateral lower extremities. No LV/RV heave on cardiac palpatation. + bilateral radial pulses.  + bilateral dp pulses.   Abdomen: soft, non tender, non distended, no masses, normal bowel sounds.  No HSM.  Extremities/MSK: no clubbing, no cyanosis  Neurological: No focal sensory deficits  Psychiatric: Appropriate affect, A/O x 3, intact judgement and insight  Skin: Warm extremities      Lab Data Review:  Lab Results   Component Value Date/Time    CHOLSTRLTOT 125 05/10/2021 12:35 PM    LDL 59 05/10/2021 12:35 PM    HDL 51 05/10/2021 12:35 PM    TRIGLYCERIDE 75 05/10/2021 12:35 PM       Lab Results   Component Value Date/Time    SODIUM 135 05/10/2021 12:35 PM    POTASSIUM 3.4 (L) 05/10/2021 12:35 PM    CHLORIDE 96 05/10/2021 12:35 PM    CO2 27 05/10/2021 12:35 PM    GLUCOSE 88 05/10/2021 12:35 PM    BUN 14 05/10/2021 12:35 PM    CREATININE 0.78 05/10/2021 12:35 PM     Lab Results   Component Value Date/Time    ALKPHOSPHAT 73 05/10/2021 12:35 PM    ASTSGOT 20 05/10/2021 12:35 PM    ALTSGPT 25 05/10/2021 12:35 PM    TBILIRUBIN 0.6 05/10/2021 12:35 PM      Lab Results   Component Value Date/Time    WBC 5.7 05/10/2021 12:35 PM     Lab Results   Component Value Date/Time    HBA1C 5.9 (H) 05/10/2021 12:35 PM    HBA1C 5.8 (H) 05/06/2020 08:10 AM       Cardiac Imaging and Procedures Review:    EKG dated 5/12/2021: My personal interpretation is sinus rhythm, low voltage precordial leads, PVCs    No prior echocardiogram      Assessment & Plan     1. Dyspnea on exertion  NM-CARDIAC STRESS TEST    EC-ECHOCARDIOGRAM COMPLETE W/O CONT    Cardiac Event Monitor   2. PVC (premature ventricular contraction)   NM-CARDIAC STRESS TEST    EC-ECHOCARDIOGRAM COMPLETE W/O CONT    Cardiac Event Monitor   3. Irregular heart beat  EC-ECHOCARDIOGRAM COMPLETE W/O CONT    Cardiac Event Monitor         Shared Medical Decision Making:    Irregular heartbeat  PVC  Dyspnea on exertion  -Obtain echocardiogram to assess LVEF and any structural abnormalities.  -Obtain nuclear stress test to assess ischemia.  -Obtain event monitor to assess for PVC burden and presence of any arrhythmia.   -Discussed starting a beta blocker but as the patient is not bothered by symptoms, will hold off until completing of testing above.     All of the patient's excellent questions were answered to the best of my knowledge and to his satisfaction.  It was a pleasure seeing Mr. Venkat Mackenzie in my clinic today. Return in about 8 weeks (around 8/4/2021). Patient is aware to call the cardiology clinic with any questions or concerns.      Luzmaria Broussard MD  [facility]  [place] [facility], [address]  [address] [address]  Phone: [phone]  Fax: [phone]

## 2021-06-14 DIAGNOSIS — I10 ESSENTIAL HYPERTENSION: ICD-10-CM

## 2021-06-14 RX ORDER — HYDROCHLOROTHIAZIDE 25 MG/1
TABLET ORAL
Qty: 90 TABLET | Refills: 3 | Status: SHIPPED | OUTPATIENT
Start: 2021-06-14 | End: 2022-07-24

## 2021-06-14 NOTE — TELEPHONE ENCOUNTER
Requested Prescriptions     Signed Prescriptions Disp Refills   • hydroCHLOROthiazide (HYDRODIURIL) 25 MG Tab 90 tablet 3     Sig: TAKE ONE TABLET BY MOUTH ONE TIME DAILY     Authorizing Provider: LEIGH SOUZA A.P.R.N.

## 2021-06-23 ENCOUNTER — PATIENT MESSAGE (OUTPATIENT)
Dept: MEDICAL GROUP | Facility: PHYSICIAN GROUP | Age: 61
End: 2021-06-23

## 2021-06-23 DIAGNOSIS — R05.9 COUGH IN ADULT: ICD-10-CM

## 2021-06-23 DIAGNOSIS — R07.0 THROAT DISCOMFORT: ICD-10-CM

## 2021-07-18 DIAGNOSIS — I10 ESSENTIAL HYPERTENSION: ICD-10-CM

## 2021-07-19 RX ORDER — LOSARTAN POTASSIUM 100 MG/1
TABLET ORAL
Qty: 90 TABLET | Refills: 0 | Status: SHIPPED | OUTPATIENT
Start: 2021-07-19 | End: 2021-11-08

## 2021-07-19 NOTE — TELEPHONE ENCOUNTER
Requested Prescriptions     Signed Prescriptions Disp Refills   • losartan (COZAAR) 100 MG Tab 90 tablet 0     Sig: TAKE ONE TABLET BY MOUTH ONE TIME DAILY     Authorizing Provider: LEIGH SOUZA A.P.R.N.

## 2021-08-11 ENCOUNTER — OFFICE VISIT (OUTPATIENT)
Dept: MEDICAL GROUP | Facility: PHYSICIAN GROUP | Age: 61
End: 2021-08-11
Payer: COMMERCIAL

## 2021-08-11 VITALS
OXYGEN SATURATION: 94 % | TEMPERATURE: 97.1 F | WEIGHT: 256 LBS | HEIGHT: 67 IN | RESPIRATION RATE: 18 BRPM | DIASTOLIC BLOOD PRESSURE: 74 MMHG | HEART RATE: 69 BPM | BODY MASS INDEX: 40.18 KG/M2 | SYSTOLIC BLOOD PRESSURE: 132 MMHG

## 2021-08-11 DIAGNOSIS — F41.1 GAD (GENERALIZED ANXIETY DISORDER): ICD-10-CM

## 2021-08-11 DIAGNOSIS — I10 ESSENTIAL HYPERTENSION: ICD-10-CM

## 2021-08-11 DIAGNOSIS — I49.9 IRREGULAR HEARTBEAT: ICD-10-CM

## 2021-08-11 PROCEDURE — 99214 OFFICE O/P EST MOD 30 MIN: CPT | Performed by: NURSE PRACTITIONER

## 2021-08-11 ASSESSMENT — ANXIETY QUESTIONNAIRES
4. TROUBLE RELAXING: NOT AT ALL
5. BEING SO RESTLESS THAT IT IS HARD TO SIT STILL: NOT AT ALL
7. FEELING AFRAID AS IF SOMETHING AWFUL MIGHT HAPPEN: NOT AT ALL
6. BECOMING EASILY ANNOYED OR IRRITABLE: NOT AT ALL
3. WORRYING TOO MUCH ABOUT DIFFERENT THINGS: NOT AT ALL
2. NOT BEING ABLE TO STOP OR CONTROL WORRYING: NOT AT ALL
GAD7 TOTAL SCORE: 0
1. FEELING NERVOUS, ANXIOUS, OR ON EDGE: NOT AT ALL

## 2021-08-11 ASSESSMENT — FIBROSIS 4 INDEX: FIB4 SCORE: 0.93

## 2021-08-11 NOTE — ASSESSMENT & PLAN NOTE
Chronic medical problem. He has seen cardiology. He has not completed the echocardiogram, nuclear medicine stress test, or heart monitor.  He states that he has not had recurrent irregular heartbeats.  Denies any chest pain, shortness of breath, dizziness, blurry vision, or headaches.

## 2021-08-11 NOTE — PROGRESS NOTES
Subjective:     CC: anxiety follow-up    HPI:   Venkat presents today with the following:     DION (generalized anxiety disorder)  Chronic medical problem. He reports that his anxiety flares periodically. He denies any self harm or SI today. He does relaxation techniques that help. He has not made appointments with behavorial health or psychiatry. His dion score today is 0.    Irregular heartbeat  Chronic medical problem. He has seen cardiology. He has not completed the echocardiogram, nuclear medicine stress test, or heart monitor.  He states that he has not had recurrent irregular heartbeats.  Denies any chest pain, shortness of breath, dizziness, blurry vision, or headaches.    Essential hypertension  Chronic medical problem. His blood pressure is at goal today. He is taking amlodipine 2.5 mg daily, hydrochlorothiazide 25 mg daily and losartan 100 mg daily.      Past Medical History:   Diagnosis Date   • Gastroparesis     2016   • Hypertension     7 yrs   • Kidney stones     6-7 yrs ago       Social History     Tobacco Use   • Smoking status: Never Smoker   • Smokeless tobacco: Former User     Types: Chew   Vaping Use   • Vaping Use: Never used   Substance Use Topics   • Alcohol use: Yes     Alcohol/week: 1.2 oz     Types: 2 Cans of beer per week     Comment: occasionally   • Drug use: No       Current Outpatient Medications Ordered in Epic   Medication Sig Dispense Refill   • losartan (COZAAR) 100 MG Tab TAKE ONE TABLET BY MOUTH ONE TIME DAILY  90 tablet 0   • hydroCHLOROthiazide (HYDRODIURIL) 25 MG Tab TAKE ONE TABLET BY MOUTH ONE TIME DAILY  90 tablet 3   • allopurinol (ZYLOPRIM) 300 MG Tab Take 1 tablet by mouth every day. 90 tablet 3   • amLODIPine (NORVASC) 2.5 MG Tab TAKE ONE TABLET BY MOUTH ONE TIME DAILY 90 tablet 1   • lansoprazole (PREVACID) 30 MG CAPSULE DELAYED RELEASE TAKE ONE CAPSULE BY MOUTH ONE TIME DAILY  90 Cap 2   • metFORMIN ER (GLUCOPHAGE XR) 500 MG TABLET SR 24 HR Take 1 Tablet by mouth  "every day for 90 days. 90 Tab 3   • Probiotic Product (PROBIOTIC-10 PO) Take  by mouth every day.     • Diclofenac Sodium 1 % Gel Place 1 Application on the skin 4 times a day as needed (shoulder and neck pain). 100 g 2   • Magnesium 250 MG Tab Take  by mouth every day.       No current Epic-ordered facility-administered medications on file.       Allergies:  Iodine    Health Maintenance: Completed    ROS:  Gen: no fevers/chills, denies any self harm or SI today  Eyes: no changes in vision  Pulm: no shortness of breath  CV: no chest pain, no palpitations  Skin: no rash  Neuro: no headaches, no dizziness    Objective:     Vital signs reviewed  Exam:  /74 (BP Location: Right arm, Patient Position: Sitting, BP Cuff Size: Adult)   Pulse 69   Temp 36.2 °C (97.1 °F) (Temporal)   Resp 18   Ht 1.689 m (5' 6.5\")   Wt 116 kg (256 lb)   SpO2 94%   BMI 40.70 kg/m²  Body mass index is 40.7 kg/m².    Gen: Alert and oriented, No apparent distress.  Neck: Neck is supple without lymphadenopathy.  Lungs: Normal effort, CTA bilaterally, no wheezes, rhonchi, or rales  CV: Regular rate and rhythm. No murmurs, rubs, or gallops. Right radial pulse 2+.  Ext: No clubbing, cyanosis, edema.      Assessment & Plan:     61 y.o. male with the following -     1. DION (generalized anxiety disorder)  Chronic stable problem.  Discussed and reviewed his dion score today.  We discussed that he can continue with relaxation techniques.  We discussed that if relaxation techniques are not helping he can call and schedule appointment behavioral health, psychiatry, or with me.  He denies any self-harm or SI today.  Red flags discussed.    2. Irregular heartbeat  Chronic stable problem.  Regular heart rhythm on exam today.  Discussed red flags.  Continue to monitor.    3. Essential hypertension  Chronic stable problem.  Blood pressure is at goal.  He will continue with his amlodipine, hydrochlorothiazide, and losartan.  Red flags " discussed.      Return in about 6 months (around 2/11/2022) for HTN, anxiety .    Please note that this dictation was created using voice recognition software. I have made every reasonable attempt to correct obvious errors, but I expect that there are errors of grammar and possibly content that I did not discover before finalizing the note.

## 2021-08-11 NOTE — ASSESSMENT & PLAN NOTE
Chronic medical problem. His blood pressure is at goal today. He is taking amlodipine 2.5 mg daily, hydrochlorothiazide 25 mg daily and losartan 100 mg daily.

## 2021-08-11 NOTE — ASSESSMENT & PLAN NOTE
Chronic medical problem. He reports that his anxiety flares periodically. He denies any self harm or SI today. He does relaxation techniques that help. He has not made appointments with behavorial health or psychiatry. His mary anne score today is 0.

## 2021-10-10 DIAGNOSIS — K21.9 GASTROESOPHAGEAL REFLUX DISEASE WITHOUT ESOPHAGITIS: ICD-10-CM

## 2021-10-11 ENCOUNTER — OFFICE VISIT (OUTPATIENT)
Dept: MEDICAL GROUP | Facility: PHYSICIAN GROUP | Age: 61
End: 2021-10-11
Payer: COMMERCIAL

## 2021-10-11 ENCOUNTER — HOSPITAL ENCOUNTER (OUTPATIENT)
Facility: MEDICAL CENTER | Age: 61
End: 2021-10-11
Attending: NURSE PRACTITIONER
Payer: COMMERCIAL

## 2021-10-11 ENCOUNTER — PATIENT MESSAGE (OUTPATIENT)
Dept: MEDICAL GROUP | Facility: PHYSICIAN GROUP | Age: 61
End: 2021-10-11

## 2021-10-11 VITALS
HEART RATE: 95 BPM | SYSTOLIC BLOOD PRESSURE: 120 MMHG | WEIGHT: 252 LBS | TEMPERATURE: 98.1 F | BODY MASS INDEX: 38.19 KG/M2 | OXYGEN SATURATION: 97 % | HEIGHT: 68 IN | DIASTOLIC BLOOD PRESSURE: 78 MMHG

## 2021-10-11 DIAGNOSIS — R25.2 BILATERAL LEG CRAMPS: ICD-10-CM

## 2021-10-11 LAB
EXTERNAL QUALITY CONTROL: NORMAL
SARS-COV+SARS-COV-2 AG RESP QL IA.RAPID: NEGATIVE

## 2021-10-11 PROCEDURE — 87426 SARSCOV CORONAVIRUS AG IA: CPT | Performed by: NURSE PRACTITIONER

## 2021-10-11 PROCEDURE — U0003 INFECTIOUS AGENT DETECTION BY NUCLEIC ACID (DNA OR RNA); SEVERE ACUTE RESPIRATORY SYNDROME CORONAVIRUS 2 (SARS-COV-2) (CORONAVIRUS DISEASE [COVID-19]), AMPLIFIED PROBE TECHNIQUE, MAKING USE OF HIGH THROUGHPUT TECHNOLOGIES AS DESCRIBED BY CMS-2020-01-R: HCPCS

## 2021-10-11 PROCEDURE — U0005 INFEC AGEN DETEC AMPLI PROBE: HCPCS

## 2021-10-11 PROCEDURE — 99213 OFFICE O/P EST LOW 20 MIN: CPT | Performed by: NURSE PRACTITIONER

## 2021-10-11 RX ORDER — LANSOPRAZOLE 30 MG/1
CAPSULE, DELAYED RELEASE ORAL
Qty: 90 CAPSULE | Refills: 3 | Status: SHIPPED | OUTPATIENT
Start: 2021-10-11 | End: 2022-11-21

## 2021-10-11 ASSESSMENT — FIBROSIS 4 INDEX: FIB4 SCORE: 0.93

## 2021-10-11 NOTE — ASSESSMENT & PLAN NOTE
"Acute medical problem.  Patient reports he called out sick from work due to bilateral leg cramps 1 day ago. His work sent him home today and is requesting a COVID test before he can return to work. He reports that hisl eg cramps have been more severe this episode and he reported to our nurse that he \"passed out\" from the pain yesterday. He took a shower that helped resolve the cramps. He is taking over-the-counter magnesium 250 mg daily and potassium chloride 99 mg daily. He has been drinking bubbly water. Coffee and tea exacerbate. Denies fever, chills, nasal congestion, chest pain, shortness of breath, loss of taste/smell or sick contacts.   "

## 2021-10-11 NOTE — ASSESSMENT & PLAN NOTE
"Chronic medical problem. Patient called out sick from work due to leg cramps. Leg cramps have been more severe and he reported to our nurse that he \"passed out\" from the pain yesterday. He took a shower that helped resolve the cramps. His employer is requesting a Covid test to return to work.  "

## 2021-10-11 NOTE — PATIENT COMMUNICATION
"Phone Number Called: [phone] (home)     I called the patient in response to his message.  The patient said that he had leg cramps yesterday and today, which caused him to call out from work.  The patient reported a history of muscle cramps, for which he takes OTC magnesium and other OTC substances.  On repeat questioning, the patient said that the leg cramps he had yesterday and today were the same as he has had previously, only more severe.  The patient reported that he \"passed out\" from the pain yesterday.    He reported taking a shower yesterday with resolution of the cramps.  The patient stated that he gets leg cramps after drinking coffee or tea, which he  doesn't normally have, however, he did have yesterday.   His employer has required a COVID test for return to work.  I explained that [facility] does not do COVID testing for asymptomatic patients, however, he is seeking care and testing to R/O COVID with myositis as a presenting symptom.  "

## 2021-10-11 NOTE — TELEPHONE ENCOUNTER
From: Venkat Mackenzie  To: Nurse Practitioner Becky Lynn  Sent: 10/11/2021 6:22 AM PDT  Subject: My work is asking me to get a Covid test done     Because I have called out of work the last two days 10-11, My signs are not Covid symptoms I think, my first day off I had  leg cramps, and the second day I am acing probably from the Leg cramps, are these Covid systems. So if I need a test done, were do I go to get one. If I need one just to make work happy where do I go to get one.

## 2021-10-11 NOTE — PROGRESS NOTES
"Subjective:     CC: leg cramps and COVID testing    HPI:   Venkat presents today with the following:     Bilateral leg cramps  Acute medical problem.  Patient reports he called out sick from work due to bilateral leg cramps 1 day ago. His work sent him home today and is requesting a COVID test before he can return to work. He reports that hisl eg cramps have been more severe this episode and he reported to our nurse that he \"passed out\" from the pain yesterday. He took a shower that helped resolve the cramps. He is taking over-the-counter magnesium 250 mg daily and potassium chloride 99 mg daily. He has been drinking bubbly water. Coffee and tea exacerbate. Denies fever, chills, nasal congestion, chest pain, shortness of breath, loss of taste/smell or sick contacts.       Past Medical History:   Diagnosis Date   • Gastroparesis     2016   • Hypertension     7 yrs   • Kidney stones     6-7 yrs ago       Social History     Tobacco Use   • Smoking status: Never Smoker   • Smokeless tobacco: Former User     Types: Chew   Vaping Use   • Vaping Use: Never used   Substance Use Topics   • Alcohol use: Yes     Alcohol/week: 1.2 oz     Types: 2 Cans of beer per week     Comment: occasionally   • Drug use: No       Current Outpatient Medications Ordered in Epic   Medication Sig Dispense Refill   • lansoprazole (PREVACID) 30 MG CAPSULE DELAYED RELEASE TAKE ONE CAPSULE BY MOUTH ONE TIME DAILY 90 Capsule 3   • Potassium 99 MG Tab Take 1 Tablet by mouth every day.     • losartan (COZAAR) 100 MG Tab TAKE ONE TABLET BY MOUTH ONE TIME DAILY  90 tablet 0   • hydroCHLOROthiazide (HYDRODIURIL) 25 MG Tab TAKE ONE TABLET BY MOUTH ONE TIME DAILY  90 tablet 3   • allopurinol (ZYLOPRIM) 300 MG Tab Take 1 tablet by mouth every day. 90 tablet 3   • amLODIPine (NORVASC) 2.5 MG Tab TAKE ONE TABLET BY MOUTH ONE TIME DAILY 90 tablet 1   • metFORMIN ER (GLUCOPHAGE XR) 500 MG TABLET SR 24 HR Take 1 Tablet by mouth every day for 90 days. 90 Tab 3 " "  • Probiotic Product (PROBIOTIC-10 PO) Take  by mouth every day.     • Diclofenac Sodium 1 % Gel Place 1 Application on the skin 4 times a day as needed (shoulder and neck pain). 100 g 2   • Magnesium 250 MG Tab Take  by mouth every day.       No current [facility]-ordered facility-administered medications on file.       Allergies:  Iodine    Health Maintenance: reviewed     ROS:  Per HPI    Objective:     Vital signs reviewed   Exam:  /78 (BP Location: Right arm, Patient Position: Sitting, BP Cuff Size: Adult)   Pulse 95   Temp 36.7 °C (98.1 °F) (Temporal)   Ht 1.727 m (5' 8\")   Wt 114 kg (252 lb)   SpO2 97%   BMI 38.32 kg/m²  Body mass index is 38.32 kg/m².      General: Normal appearing. No distress.  HENT: Normocephalic. Ears normal shape and contour, canals are clear bilaterally, tympanic membranes are benign oropharynx is without erythema, edema or exudates, cobblestoning present.   Eyes: Eyes conjunctiva clear lids without ptosis, lids normal.  Pulmonary: Clear to ausculation.  Normal effort. No rales, ronchi, or wheezing.  Cardiovascular: Regular rate and rhythm without murmur.   Lymph: No cervical or supraclavicular lymph nodes are palpable  Skin: Warm and dry.  No obvious lesions.  Psych: Normal mood and affect. Alert and oriented x3. Judgment and insight is normal.      Assessment & Plan:     61 y.o. male with the following -     1. Bilateral leg cramps  Acute uncomplicated problem. Rapid COVID test negative today. Discussed with patient that we will send COVID PCR and [facility] Isolation precautions discussed. For his cramps will check electrolytes and CK. Discussed continuing his magnesium, potassium, and staying hydrated.   - POCT SARS-COV Antigen MARQUES (Symptomatic Only)  - Basic Metabolic Panel; Future  - MAGNESIUM; Future  - PHOSPHORUS; Future  - CREATINE KINASE; Future  - SARS-CoV-2 PCR (24 hour In-House): Collect NP swab in VTM; Future        Return for pending " labs.    Please note that this dictation was created using voice recognition software. I have made every reasonable attempt to correct obvious errors, but I expect that there are errors of grammar and possibly content that I did not discover before finalizing the note.

## 2021-10-11 NOTE — TELEPHONE ENCOUNTER
Requested Prescriptions     Signed Prescriptions Disp Refills   • lansoprazole (PREVACID) 30 MG CAPSULE DELAYED RELEASE 90 Capsule 3     Sig: TAKE ONE CAPSULE BY MOUTH ONE TIME DAILY     Authorizing Provider: LEIGH SOUZA A.P.R.N.

## 2021-10-12 DIAGNOSIS — R25.2 BILATERAL LEG CRAMPS: ICD-10-CM

## 2021-10-12 LAB
COVID ORDER STATUS COVID19: NORMAL
SARS-COV-2 RNA RESP QL NAA+PROBE: NOTDETECTED
SPECIMEN SOURCE: NORMAL

## 2021-10-13 ENCOUNTER — HOSPITAL ENCOUNTER (OUTPATIENT)
Dept: LAB | Facility: MEDICAL CENTER | Age: 61
End: 2021-10-13
Attending: NURSE PRACTITIONER
Payer: COMMERCIAL

## 2021-10-13 DIAGNOSIS — R25.2 BILATERAL LEG CRAMPS: ICD-10-CM

## 2021-10-13 LAB
ANION GAP SERPL CALC-SCNC: 16 MMOL/L (ref 7–16)
BUN SERPL-MCNC: 18 MG/DL (ref 8–22)
CALCIUM SERPL-MCNC: 9.5 MG/DL (ref 8.5–10.5)
CHLORIDE SERPL-SCNC: 99 MMOL/L (ref 96–112)
CK SERPL-CCNC: 119 U/L (ref 0–154)
CO2 SERPL-SCNC: 24 MMOL/L (ref 20–33)
CREAT SERPL-MCNC: 0.82 MG/DL (ref 0.5–1.4)
GLUCOSE SERPL-MCNC: 105 MG/DL (ref 65–99)
MAGNESIUM SERPL-MCNC: 1.9 MG/DL (ref 1.5–2.5)
PHOSPHATE SERPL-MCNC: 3.1 MG/DL (ref 2.5–4.5)
POTASSIUM SERPL-SCNC: 3.7 MMOL/L (ref 3.6–5.5)
SODIUM SERPL-SCNC: 139 MMOL/L (ref 135–145)

## 2021-10-13 PROCEDURE — 83735 ASSAY OF MAGNESIUM: CPT

## 2021-10-13 PROCEDURE — 82550 ASSAY OF CK (CPK): CPT

## 2021-10-13 PROCEDURE — 84100 ASSAY OF PHOSPHORUS: CPT

## 2021-10-13 PROCEDURE — 36415 COLL VENOUS BLD VENIPUNCTURE: CPT

## 2021-10-13 PROCEDURE — 80048 BASIC METABOLIC PNL TOTAL CA: CPT

## 2021-10-13 NOTE — LETTER
[facility]  [address] [facility] [address]     October 13, 2021    Patient: Venkat Mackenzie   YOB: 1960   Date of Visit: 10/13/2021       To Whom It May Concern:    Venkat Mackenzie was seen and treated in our department on 10/11/2021. His COVID test is negative.       Sincerely,     ECHO Major.

## 2021-11-06 DIAGNOSIS — I10 ESSENTIAL HYPERTENSION: ICD-10-CM

## 2021-11-08 RX ORDER — LOSARTAN POTASSIUM 100 MG/1
TABLET ORAL
Qty: 90 TABLET | Refills: 3 | Status: SHIPPED | OUTPATIENT
Start: 2021-11-08 | End: 2023-01-09 | Stop reason: SDUPTHER

## 2021-11-08 RX ORDER — AMLODIPINE BESYLATE 2.5 MG/1
TABLET ORAL
Qty: 90 TABLET | Refills: 3 | Status: SHIPPED | OUTPATIENT
Start: 2021-11-08 | End: 2023-01-09 | Stop reason: SDUPTHER

## 2021-11-08 NOTE — TELEPHONE ENCOUNTER
Requested Prescriptions     Signed Prescriptions Disp Refills   • losartan (COZAAR) 100 MG Tab 90 Tablet 3     Sig: TAKE ONE TABLET BY MOUTH ONE TIME DAILY     Authorizing Provider: LEIGH SOUZA   • amLODIPine (NORVASC) 2.5 MG Tab 90 Tablet 3     Sig: TAKE ONE TABLET BY MOUTH ONE TIME DAILY     Authorizing Provider: LEIGH SOUZA A.P.R.N.

## 2021-11-09 ENCOUNTER — APPOINTMENT (RX ONLY)
Dept: URBAN - METROPOLITAN AREA CLINIC 22 | Facility: CLINIC | Age: 61
Setting detail: DERMATOLOGY
End: 2021-11-09

## 2021-11-09 DIAGNOSIS — L81.8 OTHER SPECIFIED DISORDERS OF PIGMENTATION: ICD-10-CM

## 2021-11-09 DIAGNOSIS — D18.0 HEMANGIOMA: ICD-10-CM

## 2021-11-09 DIAGNOSIS — L91.0 HYPERTROPHIC SCAR: ICD-10-CM

## 2021-11-09 DIAGNOSIS — L81.4 OTHER MELANIN HYPERPIGMENTATION: ICD-10-CM

## 2021-11-09 DIAGNOSIS — D22 MELANOCYTIC NEVI: ICD-10-CM

## 2021-11-09 DIAGNOSIS — Z71.89 OTHER SPECIFIED COUNSELING: ICD-10-CM

## 2021-11-09 DIAGNOSIS — L82.1 OTHER SEBORRHEIC KERATOSIS: ICD-10-CM

## 2021-11-09 DIAGNOSIS — L57.0 ACTINIC KERATOSIS: ICD-10-CM

## 2021-11-09 DIAGNOSIS — L259 CONTACT DERMATITIS AND OTHER ECZEMA, UNSPECIFIED CAUSE: ICD-10-CM | Status: IMPROVED

## 2021-11-09 DIAGNOSIS — Z86.007 PERSONAL HISTORY OF IN-SITU NEOPLASM OF SKIN: ICD-10-CM

## 2021-11-09 PROBLEM — D22.5 MELANOCYTIC NEVI OF TRUNK: Status: ACTIVE | Noted: 2021-11-09

## 2021-11-09 PROBLEM — L30.8 OTHER SPECIFIED DERMATITIS: Status: ACTIVE | Noted: 2021-11-09

## 2021-11-09 PROBLEM — Z85.828 PERSONAL HISTORY OF OTHER MALIGNANT NEOPLASM OF SKIN: Status: ACTIVE | Noted: 2021-11-09

## 2021-11-09 PROBLEM — D18.01 HEMANGIOMA OF SKIN AND SUBCUTANEOUS TISSUE: Status: ACTIVE | Noted: 2021-11-09

## 2021-11-09 PROCEDURE — ? LIQUID NITROGEN

## 2021-11-09 PROCEDURE — 99213 OFFICE O/P EST LOW 20 MIN: CPT | Mod: 25

## 2021-11-09 PROCEDURE — ? SUNSCREEN RECOMMENDATIONS

## 2021-11-09 PROCEDURE — 17004 DESTROY PREMAL LESIONS 15/>: CPT

## 2021-11-09 PROCEDURE — ? COUNSELING

## 2021-11-09 PROCEDURE — ? TREATMENT REGIMEN

## 2021-11-09 ASSESSMENT — LOCATION DETAILED DESCRIPTION DERM
LOCATION DETAILED: LEFT PROXIMAL DORSAL FOREARM
LOCATION DETAILED: LEFT SUPERIOR MEDIAL UPPER BACK
LOCATION DETAILED: RIGHT PROXIMAL DORSAL FOREARM
LOCATION DETAILED: RIGHT DISTAL DORSAL FOREARM
LOCATION DETAILED: STERNUM
LOCATION DETAILED: EPIGASTRIC SKIN
LOCATION DETAILED: LEFT DISTAL PRETIBIAL REGION
LOCATION DETAILED: LEFT ULNAR DORSAL HAND
LOCATION DETAILED: LEFT DISTAL DORSAL FOREARM
LOCATION DETAILED: RIGHT SUPERIOR MEDIAL MIDBACK
LOCATION DETAILED: RIGHT DISTAL PRETIBIAL REGION

## 2021-11-09 ASSESSMENT — LOCATION ZONE DERM
LOCATION ZONE: ARM
LOCATION ZONE: HAND
LOCATION ZONE: TRUNK
LOCATION ZONE: LEG

## 2021-11-09 ASSESSMENT — LOCATION SIMPLE DESCRIPTION DERM
LOCATION SIMPLE: ABDOMEN
LOCATION SIMPLE: RIGHT FOREARM
LOCATION SIMPLE: LEFT UPPER BACK
LOCATION SIMPLE: CHEST
LOCATION SIMPLE: LEFT FOREARM
LOCATION SIMPLE: RIGHT PRETIBIAL REGION
LOCATION SIMPLE: RIGHT LOWER BACK
LOCATION SIMPLE: LEFT PRETIBIAL REGION
LOCATION SIMPLE: LEFT HAND

## 2021-11-09 NOTE — PROCEDURE: LIQUID NITROGEN
Render In Bullet Format When Appropriate: No
Total Number Of Aks Treated: 19
Detail Level: Zone
Duration Of Freeze Thaw-Cycle (Seconds): 0
Number Of Freeze-Thaw Cycles: 2 freeze-thaw cycles
Consent: The patient's consent was obtained including but not limited to risks of crusting, scabbing, blistering, scarring, darker or lighter pigmentary change, recurrence, incomplete removal and infection.
Post-Care Instructions: I reviewed with the patient in detail post-care instructions. Patient is to wear sunprotection, and avoid picking at any of the treated lesions. Pt may apply Vaseline to crusted or scabbing areas.

## 2021-11-17 DIAGNOSIS — G89.29 CHRONIC PAIN OF MULTIPLE JOINTS: ICD-10-CM

## 2021-11-17 DIAGNOSIS — M25.50 CHRONIC PAIN OF MULTIPLE JOINTS: ICD-10-CM

## 2021-11-17 NOTE — TELEPHONE ENCOUNTER
Requested Prescriptions     Signed Prescriptions Disp Refills   • diclofenac sodium (VOLTAREN) 1 % Gel 100 g 3     Sig: apply 4 grams to shoulder(s) and neck 4 times a day for pain.     Authorizing Provider: LEIGH SOUZA A.P.R.N.

## 2021-12-21 DIAGNOSIS — E88.810 METABOLIC SYNDROME: ICD-10-CM

## 2021-12-21 RX ORDER — METFORMIN HYDROCHLORIDE 500 MG/1
TABLET, EXTENDED RELEASE ORAL
Qty: 90 TABLET | Refills: 2 | Status: SHIPPED | OUTPATIENT
Start: 2021-12-21 | End: 2023-01-09 | Stop reason: SDUPTHER

## 2021-12-21 NOTE — TELEPHONE ENCOUNTER
Requested Prescriptions     Signed Prescriptions Disp Refills   • metFORMIN ER (GLUCOPHAGE XR) 500 MG TABLET SR 24 HR 90 Tablet 2     Sig: TAKE ONE TABLET BY MOUTH ONE TIME DAILY     Authorizing Provider: LEIGH SOUZA A.P.R.N.

## 2022-05-02 ENCOUNTER — OFFICE VISIT (OUTPATIENT)
Dept: MEDICAL GROUP | Facility: PHYSICIAN GROUP | Age: 62
End: 2022-05-02
Payer: COMMERCIAL

## 2022-05-02 VITALS
HEIGHT: 68 IN | TEMPERATURE: 98.2 F | SYSTOLIC BLOOD PRESSURE: 130 MMHG | OXYGEN SATURATION: 94 % | DIASTOLIC BLOOD PRESSURE: 86 MMHG | RESPIRATION RATE: 18 BRPM | HEART RATE: 75 BPM | WEIGHT: 254 LBS | BODY MASS INDEX: 38.49 KG/M2

## 2022-05-02 DIAGNOSIS — M54.41 ACUTE RIGHT-SIDED LOW BACK PAIN WITH BILATERAL SCIATICA: ICD-10-CM

## 2022-05-02 DIAGNOSIS — M54.42 ACUTE RIGHT-SIDED LOW BACK PAIN WITH BILATERAL SCIATICA: ICD-10-CM

## 2022-05-02 PROBLEM — M54.50 ACUTE LOW BACK PAIN: Status: ACTIVE | Noted: 2022-05-02

## 2022-05-02 PROCEDURE — 99213 OFFICE O/P EST LOW 20 MIN: CPT | Performed by: NURSE PRACTITIONER

## 2022-05-02 RX ORDER — CYCLOBENZAPRINE HCL 5 MG
5-10 TABLET ORAL 3 TIMES DAILY PRN
Qty: 20 TABLET | Refills: 0 | Status: SHIPPED | OUTPATIENT
Start: 2022-05-02 | End: 2022-05-04

## 2022-05-02 RX ORDER — PREDNISONE 20 MG/1
40 TABLET ORAL DAILY
Qty: 10 TABLET | Refills: 0 | Status: SHIPPED | OUTPATIENT
Start: 2022-05-02 | End: 2022-05-07

## 2022-05-02 ASSESSMENT — FIBROSIS 4 INDEX: FIB4 SCORE: 0.93

## 2022-05-02 ASSESSMENT — PATIENT HEALTH QUESTIONNAIRE - PHQ9: CLINICAL INTERPRETATION OF PHQ2 SCORE: 0

## 2022-05-02 NOTE — ASSESSMENT & PLAN NOTE
The back pain started 3 days ago. He was cleaning his garage. He bent over to  a heavy object and heard a pop and had immediate pain. Associated symptoms include leg aches and bilateral shooting pain down bilateral legs. Interventions tired include Voltaren. Aggravating factors include movements, getting out of chair and walking. Alleviating factors include lumbar support back brace and Aleve. Denies falls, trauma, urinary incontinence, bowel incontinence, saddle anesthesia and bruising.

## 2022-05-02 NOTE — PROGRESS NOTES
Subjective:     CC: back pain     HPI:   Venkat presents today with the following:     Acute right-sided low back pain with bilateral sciatica  The back pain started 3 days ago. He was cleaning his garage. He bent over to  a heavy object and heard a pop and had immediate pain. Associated symptoms include leg aches and bilateral shooting pain down bilateral legs. Interventions tired include Voltaren. Aggravating factors include movements, getting out of chair and walking. Alleviating factors include lumbar support back brace and Aleve. Denies falls, trauma, urinary incontinence, bowel incontinence, saddle anesthesia and bruising.         Past Medical History:   Diagnosis Date   • Gastroparesis     2016   • Hypertension     7 yrs   • Kidney stones     6-7 yrs ago       Social History     Tobacco Use   • Smoking status: Never Smoker   • Smokeless tobacco: Former User     Types: Chew   Vaping Use   • Vaping Use: Never used   Substance Use Topics   • Alcohol use: Yes     Alcohol/week: 1.2 oz     Types: 2 Cans of beer per week     Comment: occasionally   • Drug use: No       Current Outpatient Medications Ordered in Epic   Medication Sig Dispense Refill   • cyclobenzaprine (FLEXERIL) 5 mg tablet Take 1-2 Tablets by mouth 3 times a day as needed for Muscle Spasms for up to 5 days. 20 Tablet 0   • predniSONE (DELTASONE) 20 MG Tab Take 2 Tablets by mouth every day for 5 days. 10 Tablet 0   • metFORMIN ER (GLUCOPHAGE XR) 500 MG TABLET SR 24 HR TAKE ONE TABLET BY MOUTH ONE TIME DAILY 90 Tablet 2   • diclofenac sodium (VOLTAREN) 1 % Gel apply 4 grams to shoulder(s) and neck 4 times a day for pain. 100 g 3   • losartan (COZAAR) 100 MG Tab TAKE ONE TABLET BY MOUTH ONE TIME DAILY 90 Tablet 3   • amLODIPine (NORVASC) 2.5 MG Tab TAKE ONE TABLET BY MOUTH ONE TIME DAILY 90 Tablet 3   • lansoprazole (PREVACID) 30 MG CAPSULE DELAYED RELEASE TAKE ONE CAPSULE BY MOUTH ONE TIME DAILY 90 Capsule 3   • Potassium 99 MG Tab Take 1  "Tablet by mouth every day.     • hydroCHLOROthiazide (HYDRODIURIL) 25 MG Tab TAKE ONE TABLET BY MOUTH ONE TIME DAILY  90 tablet 3   • allopurinol (ZYLOPRIM) 300 MG Tab Take 1 tablet by mouth every day. 90 tablet 3   • Probiotic Product (PROBIOTIC-10 PO) Take  by mouth every day.     • Magnesium 250 MG Tab Take  by mouth every day.       No current [facility]-ordered facility-administered medications on file.       Allergies:  Iodine    Health Maintenance: Due for 5 year recall for colon cancer screening.      Objective:     Vital signs reviewed   Exam:  /86 (BP Location: Right arm, Patient Position: Sitting, BP Cuff Size: Adult)   Pulse 75   Temp 36.8 °C (98.2 °F) (Temporal)   Resp 18   Ht 1.727 m (5' 8\")   Wt 115 kg (254 lb)   SpO2 94%   BMI 38.62 kg/m²  Body mass index is 38.62 kg/m².    Gen: Alert and oriented, No apparent distress.  Neck: Neck is supple, full ROM.  Lungs: Normal effort, no audible wheezes  CV: Skin pink, warm and dry.  Ext: No clubbing, cyanosis, edema.  No bruising visible to back or lower lumbar area.  Reproducible pain to right lateral lumbar area.  No pain on the spinous processes.      Assessment & Plan:     61 y.o. male with the following -     1. Acute right-sided low back pain with bilateral sciatica  Acute uncomplicated problem.  Believes more muscular nature as the pain is reproducible.  Discussed starting cyclobenzaprine 5 mg 3 times daily as needed.  We discussed he should take his first dose while at home and operate any machinery or vehicles or make decisions until he knows how medication affects him.  He verbalized understanding.  We discussed that the 5 mg helps but does not completely muscle spasm he may take up to 2 tablets which is 10 mg.  Discussed starting short burst of steroids to help with inflammation.  Discussed back stretching exercises.  Continue with symptomatic care, rest, ice, heat and stretching.  Follow-up if symptoms not improved.  He declines physical " therapy today.  - cyclobenzaprine (FLEXERIL) 5 mg tablet; Take 1-2 Tablets by mouth 3 times a day as needed for Muscle Spasms for up to 5 days.  Dispense: 20 Tablet; Refill: 0  - predniSONE (DELTASONE) 20 MG Tab; Take 2 Tablets by mouth every day for 5 days.  Dispense: 10 Tablet; Refill: 0        Return if symptoms worsen or fail to improve.    Please note that this dictation was created using voice recognition software. I have made every reasonable attempt to correct obvious errors, but I expect that there are errors of grammar and possibly content that I did not discover before finalizing the note.

## 2022-05-02 NOTE — LETTER
May 2, 2022    To Whom It May Concern:         This is confirmation that Venkat Mackenzie attended his scheduled appointment with VIKKI Major on 5/02/22. Please excuse him from work today 5/2/2022. He may return to work Thursday May 5, 2022.          If you have any questions please do not hesitate to call me at the phone number listed below.    Sincerely,          ECHO Major.  [phone]

## 2022-05-04 ENCOUNTER — OFFICE VISIT (OUTPATIENT)
Dept: MEDICAL GROUP | Facility: PHYSICIAN GROUP | Age: 62
End: 2022-05-04
Payer: COMMERCIAL

## 2022-05-04 VITALS
SYSTOLIC BLOOD PRESSURE: 134 MMHG | BODY MASS INDEX: 38.49 KG/M2 | DIASTOLIC BLOOD PRESSURE: 92 MMHG | OXYGEN SATURATION: 95 % | WEIGHT: 254 LBS | HEIGHT: 68 IN | HEART RATE: 80 BPM | TEMPERATURE: 97.7 F

## 2022-05-04 DIAGNOSIS — M54.41 ACUTE RIGHT-SIDED LOW BACK PAIN WITH BILATERAL SCIATICA: ICD-10-CM

## 2022-05-04 DIAGNOSIS — Z12.12 SCREENING FOR COLORECTAL CANCER: ICD-10-CM

## 2022-05-04 DIAGNOSIS — Z12.11 SCREENING FOR COLORECTAL CANCER: ICD-10-CM

## 2022-05-04 DIAGNOSIS — M54.42 ACUTE RIGHT-SIDED LOW BACK PAIN WITH BILATERAL SCIATICA: ICD-10-CM

## 2022-05-04 PROCEDURE — 99214 OFFICE O/P EST MOD 30 MIN: CPT | Performed by: NURSE PRACTITIONER

## 2022-05-04 RX ORDER — LIDOCAINE 4 G/G
PATCH TOPICAL
Qty: 15 PATCH | Refills: 0 | Status: SHIPPED | OUTPATIENT
Start: 2022-05-04 | End: 2023-01-09

## 2022-05-04 RX ORDER — CYCLOBENZAPRINE HCL 10 MG
10 TABLET ORAL 3 TIMES DAILY PRN
Qty: 15 TABLET | Refills: 0 | Status: SHIPPED | OUTPATIENT
Start: 2022-05-04 | End: 2022-08-09

## 2022-05-04 ASSESSMENT — FIBROSIS 4 INDEX: FIB4 SCORE: 0.93

## 2022-05-04 NOTE — ASSESSMENT & PLAN NOTE
"He tried 2 dose of the Flexeril 10 mg that did help with movement and some pain. He continues with his prednisone for the last 2 days. He states that this morning he bent down to put on his socks and the muscle tensed up and \"almost put him on the floor\". He states that his work wants him to return to work today. He is wearing back brace and using cane with ambulation. No new falls.   "

## 2022-05-04 NOTE — LETTER
May 4, 2022    To Whom It May Concern:         This is confirmation that Venkat Mackenzie attended his scheduled appointment with VIKKI Major on 5/04/22. He has re-injured his previous injury. Please excuse him from work 5/4/2022 and return to work 5/9/2022 for his scheduled shift.       If you have any questions please do not hesitate to call me at the phone number listed below.    Sincerely,          ECHO Major.  [phone]

## 2022-05-04 NOTE — PROGRESS NOTES
"Subjective:     CC: back pain follow-up    HPI:   Venkat presents today with the following:     Acute right-sided low back pain with bilateral sciatica  He tried 2 dose of the Flexeril 10 mg that did help with movement and some pain. He continues with his prednisone for the last 2 days. He states that this morning he bent down to put on his socks and the muscle tensed up and \"almost put him on the floor\". He states that his work wants him to return to work today. He is wearing back brace and using cane with ambulation. No new falls.       Past Medical History:   Diagnosis Date   • Gastroparesis     2016   • Hypertension     7 yrs   • Kidney stones     6-7 yrs ago       Social History     Tobacco Use   • Smoking status: Never Smoker   • Smokeless tobacco: Former User     Types: Chew   Vaping Use   • Vaping Use: Never used   Substance Use Topics   • Alcohol use: Yes     Alcohol/week: 1.2 oz     Types: 2 Cans of beer per week     Comment: occasionally   • Drug use: No       Current Outpatient Medications Ordered in Epic   Medication Sig Dispense Refill   • cyclobenzaprine (FLEXERIL) 10 mg Tab Take 1 Tablet by mouth 3 times a day as needed for Muscle Spasms for up to 5 days. 15 Tablet 0   • Lidocaine 4 % Patch Apply patch to painful area. Patch may remain in place for up to 12 hours in any 24-hour period. No more than 1 patch should be used in a 24-hour period. 15 Patch 0   • predniSONE (DELTASONE) 20 MG Tab Take 2 Tablets by mouth every day for 5 days. 10 Tablet 0   • metFORMIN ER (GLUCOPHAGE XR) 500 MG TABLET SR 24 HR TAKE ONE TABLET BY MOUTH ONE TIME DAILY 90 Tablet 2   • diclofenac sodium (VOLTAREN) 1 % Gel apply 4 grams to shoulder(s) and neck 4 times a day for pain. 100 g 3   • losartan (COZAAR) 100 MG Tab TAKE ONE TABLET BY MOUTH ONE TIME DAILY 90 Tablet 3   • amLODIPine (NORVASC) 2.5 MG Tab TAKE ONE TABLET BY MOUTH ONE TIME DAILY 90 Tablet 3   • lansoprazole (PREVACID) 30 MG CAPSULE DELAYED RELEASE TAKE ONE " How Severe Is It?: moderate "CAPSULE BY MOUTH ONE TIME DAILY 90 Capsule 3   • Potassium 99 MG Tab Take 1 Tablet by mouth every day.     • hydroCHLOROthiazide (HYDRODIURIL) 25 MG Tab TAKE ONE TABLET BY MOUTH ONE TIME DAILY  90 tablet 3   • allopurinol (ZYLOPRIM) 300 MG Tab Take 1 tablet by mouth every day. 90 tablet 3   • Probiotic Product (PROBIOTIC-10 PO) Take  by mouth every day.     • Magnesium 250 MG Tab Take  by mouth every day.       No current [facility]-ordered facility-administered medications on file.       Allergies:  Iodine    Health Maintenance: Due for colon cancer screening.     Objective:     Vital signs reviewed   Exam:  /92 (BP Location: Right arm, Patient Position: Sitting, BP Cuff Size: Adult)   Pulse 80   Temp 36.5 °C (97.7 °F) (Temporal)   Ht 1.727 m (5' 8\")   Wt 115 kg (254 lb)   SpO2 95%   BMI 38.62 kg/m²  Body mass index is 38.62 kg/m².    Gen:    Alert and oriented, No apparent distress.  Neck:   Neck is supple, full ROM.  Lungs: Normal effort, no audible wheezes  CV:      Skin pink, warm and dry.  Ext:      No clubbing, cyanosis, edema.  No bruising visible to back or lower lumbar area.  Reproducible pain to right and left lateral lumbar area.  No pain on the spinous processes.    Assessment & Plan:     61 y.o. male with the following -     1. Acute right-sided low back pain with bilateral sciatica  Chronic exacerbated problem. I saw this patient 2 days ago. Has exacerbated his injury. Continue with Prednisone. He tolerated Flexeril 10 mg dosing, he can take for additional 5 days with re-injury. Start Lidocaine patch. Referral to physical therapy. Continue conservative management.   - Referral to Physical Therapy  - cyclobenzaprine (FLEXERIL) 10 mg Tab; Take 1 Tablet by mouth 3 times a day as needed for Muscle Spasms for up to 5 days.  Dispense: 15 Tablet; Refill: 0  - Lidocaine 4 % Patch; Apply patch to painful area. Patch may remain in place for up to 12 hours in any 24-hour period. No more than 1 patch should " Is This A New Presentation, Or A Follow-Up?: Follow Up Alopecia Areata be used in a 24-hour period.  Dispense: 15 Patch; Refill: 0    2. Screening for colorectal cancer  Chronic stable problem. Due for 5 year recall. He is in agreement.   - Referral to GI for Colonoscopy        Return if symptoms worsen or fail to improve.    Please note that this dictation was created using voice recognition software. I have made every reasonable attempt to correct obvious errors, but I expect that there are errors of grammar and possibly content that I did not discover before finalizing the note.

## 2022-05-13 DIAGNOSIS — G89.29 CHRONIC PAIN OF MULTIPLE JOINTS: ICD-10-CM

## 2022-05-13 DIAGNOSIS — M25.50 CHRONIC PAIN OF MULTIPLE JOINTS: ICD-10-CM

## 2022-05-20 ENCOUNTER — OFFICE VISIT (OUTPATIENT)
Dept: URGENT CARE | Facility: PHYSICIAN GROUP | Age: 62
End: 2022-05-20
Payer: COMMERCIAL

## 2022-05-20 VITALS
TEMPERATURE: 97.2 F | RESPIRATION RATE: 16 BRPM | DIASTOLIC BLOOD PRESSURE: 64 MMHG | OXYGEN SATURATION: 94 % | HEART RATE: 114 BPM | WEIGHT: 230 LBS | BODY MASS INDEX: 34.86 KG/M2 | HEIGHT: 68 IN | SYSTOLIC BLOOD PRESSURE: 128 MMHG

## 2022-05-20 DIAGNOSIS — R25.2 LEG CRAMPS: ICD-10-CM

## 2022-05-20 DIAGNOSIS — R55 SYNCOPE, UNSPECIFIED SYNCOPE TYPE: ICD-10-CM

## 2022-05-20 PROCEDURE — 99214 OFFICE O/P EST MOD 30 MIN: CPT | Performed by: NURSE PRACTITIONER

## 2022-05-20 PROCEDURE — 93000 ELECTROCARDIOGRAM COMPLETE: CPT | Performed by: NURSE PRACTITIONER

## 2022-05-20 ASSESSMENT — FIBROSIS 4 INDEX: FIB4 SCORE: 0.93

## 2022-05-20 ASSESSMENT — ENCOUNTER SYMPTOMS
SHORTNESS OF BREATH: 0
WEAKNESS: 0
LEG PAIN: 1
DIZZINESS: 1
SYNCOPE: 1
SPEECH CHANGE: 0

## 2022-05-20 NOTE — PROGRESS NOTES
Subjective:     Venkat Mackenzie is a 61 y.o. male who presents for Leg Problem (3x months, intermittent muscle spasms, end up passing out, dizziness )      Patient reports leg cramps, over a year.  States he was laying in bed and got up to go to work when the leg spasms started. Stating he tried to walk it out, and passed out in the living room on the floor. Woke up to the phone ringing; it was work, stating he was confused at the time as to what happened. Estimates he lost consciousness for 20-30 minutes. He felt dizziness and blacked out. Left back, bilateral upper leg and calf pain. Has achiness right now. Has been using emu oil and diclofenac. Hot baths also help.    Leg Pain  This is a chronic problem. Pertinent negatives include no chest pain, fever, headaches or weakness.   Syncope  This is a new problem. Associated symptoms include back pain, confusion and dizziness. Pertinent negatives include no bladder incontinence, bowel incontinence, chest pain, fever, headaches, slurred speech or weakness.       Past Medical History:   Diagnosis Date   • Gastroparesis     2016   • Hypertension     7 yrs   • Kidney stones     6-7 yrs ago       Past Surgical History:   Procedure Laterality Date   • APPENDECTOMY     • CERVICAL FUSION POSTERIOR      7-8 yrs back   • VAGOTOMY  at age 19    due to hiatal hernia       Social History     Socioeconomic History   • Marital status:      Spouse name: Not on file   • Number of children: Not on file   • Years of education: Not on file   • Highest education level: Not on file   Occupational History   • Occupation: surveillance     Employer: [employer]   Tobacco Use   • Smoking status: Never Smoker   • Smokeless tobacco: Former User     Types: Chew     Quit date: 5/1/1980   Vaping Use   • Vaping Use: Never used   Substance and Sexual Activity   • Alcohol use: Yes     Alcohol/week: 1.2 oz     Types: 2 Cans of beer per week     Comment: occasionally   • Drug use: No   • Sexual  "activity: Yes     Partners: Female   Other Topics Concern   • Not on file   Social History Narrative   • Not on file     Social Determinants of Health     Financial Resource Strain: Not on file   Food Insecurity: Not on file   Transportation Needs: Not on file   Physical Activity: Not on file   Stress: Not on file   Social Connections: Not on file   Intimate Partner Violence: Not on file   Housing Stability: Not on file        Family History   Problem Relation Age of Onset   • Stroke Mother    • Heart Disease Mother    • Diabetes Mother    • Lung Disease Mother         COPD   • Diabetes Father    • Heart Disease Father    • Stroke Father    • No Known Problems Son    • No Known Problems Son         Allergies   Allergen Reactions   • Iodine Unspecified     dizziness       Review of Systems   Constitutional: Negative for fever.   Respiratory: Negative for shortness of breath.    Cardiovascular: Positive for syncope. Negative for chest pain.   Gastrointestinal: Negative for bowel incontinence.   Genitourinary: Negative for bladder incontinence.   Musculoskeletal: Positive for back pain.   Neurological: Positive for dizziness and loss of consciousness. Negative for speech change, weakness and headaches.   Psychiatric/Behavioral: Positive for confusion.   All other systems reviewed and are negative.       Objective:   /64 (BP Location: Left arm, Patient Position: Sitting, BP Cuff Size: Adult)   Pulse (!) 114   Temp 36.2 °C (97.2 °F) (Temporal)   Resp 16   Ht 1.727 m (5' 8\")   Wt 104 kg (230 lb)   SpO2 94%   BMI 34.97 kg/m²     Physical Exam  Vitals reviewed.   Constitutional:       General: He is not in acute distress.     Appearance: He is not toxic-appearing.   Eyes:      Extraocular Movements: Extraocular movements intact.      Conjunctiva/sclera: Conjunctivae normal.      Pupils: Pupils are equal, round, and reactive to light.   Cardiovascular:      Rate and Rhythm: Normal rate.      Comments: Trace " peripheral edema bilaterally.   Pulmonary:      Effort: Pulmonary effort is normal. No respiratory distress.      Breath sounds: Normal breath sounds.   Musculoskeletal:      Cervical back: Normal range of motion.   Skin:     General: Skin is warm and dry.      Coloration: Skin is not pale.   Neurological:      General: No focal deficit present.      Mental Status: He is alert and oriented to person, place, and time.      GCS: GCS eye subscore is 4. GCS verbal subscore is 5. GCS motor subscore is 6.      Cranial Nerves: No facial asymmetry.      Motor: Motor function is intact. No weakness or tremor.      Gait: Gait is intact.      Comments: Equal extremity strength x 4, 5/5. Clear speech.    Psychiatric:         Mood and Affect: Mood normal.         Behavior: Behavior normal.         Thought Content: Thought content normal.         Judgment: Judgment normal.         Assessment/Plan:   1. Syncope, unspecified syncope type  - EKG - Clinic Performed    2. Leg cramps    Syncopal episode. No ST elevation. Patient reports syncope was due to pain, no neuro deficits. Can not fully attribute dizziness and syncope to pain or possible vasovagal episode. Differentlal to include electrolyte imbalance, cardiac, or neuro correlation. Referred to the ED for further evaluation. Discussed risks and benefits. Patient is stable. No neuro deficit. Pt opted to go POV, discussed calling wife for transport.     Differential diagnosis, natural history, supportive care, and indications for immediate follow-up discussed.

## 2022-05-21 DIAGNOSIS — M54.42 ACUTE RIGHT-SIDED LOW BACK PAIN WITH BILATERAL SCIATICA: ICD-10-CM

## 2022-05-21 DIAGNOSIS — M54.41 ACUTE RIGHT-SIDED LOW BACK PAIN WITH BILATERAL SCIATICA: ICD-10-CM

## 2022-05-23 RX ORDER — CYCLOBENZAPRINE HCL 10 MG
10 TABLET ORAL 3 TIMES DAILY PRN
Qty: 15 TABLET | Refills: 0 | OUTPATIENT
Start: 2022-05-23 | End: 2022-05-28

## 2022-05-23 NOTE — TELEPHONE ENCOUNTER
Requested Prescriptions     Refused Prescriptions Disp Refills   • cyclobenzaprine (FLEXERIL) 10 mg Tab [Pharmacy Med Name: Cyclobenzaprine Hydrochloride 10 Mg Tab Teva] 15 Tablet 0     Sig: Take 1 Tablet by mouth 3 times a day as needed for Muscle Spasms for up to 5 days.     Refused By: BECKY SOUZA     Reason for Refusal: Refill not appropriate.     Becky Souza A.P.R.N.

## 2022-05-24 ASSESSMENT — ENCOUNTER SYMPTOMS
FEVER: 0
CONFUSION: 1
LOSS OF CONSCIOUSNESS: 1
SLURRED SPEECH: 0
BACK PAIN: 1
BOWEL INCONTINENCE: 0
HEADACHES: 0

## 2022-05-30 DIAGNOSIS — M25.50 CHRONIC PAIN OF MULTIPLE JOINTS: ICD-10-CM

## 2022-05-30 DIAGNOSIS — G89.29 CHRONIC PAIN OF MULTIPLE JOINTS: ICD-10-CM

## 2022-05-31 NOTE — TELEPHONE ENCOUNTER
Requested Prescriptions     Signed Prescriptions Disp Refills   • diclofenac sodium (VOLTAREN) 1 % Gel 100 g 3     Sig: apply 4 grams topically to shoulder(S) and neck four times a day for pain     Authorizing Provider: LEIGH SOUZA A.P.R.N.

## 2022-07-11 ENCOUNTER — HOSPITAL ENCOUNTER (OUTPATIENT)
Facility: MEDICAL CENTER | Age: 62
End: 2022-07-11
Attending: PHYSICIAN ASSISTANT
Payer: COMMERCIAL

## 2022-07-11 ENCOUNTER — OFFICE VISIT (OUTPATIENT)
Dept: URGENT CARE | Facility: PHYSICIAN GROUP | Age: 62
End: 2022-07-11

## 2022-07-11 VITALS
DIASTOLIC BLOOD PRESSURE: 92 MMHG | BODY MASS INDEX: 34.86 KG/M2 | SYSTOLIC BLOOD PRESSURE: 120 MMHG | OXYGEN SATURATION: 96 % | HEART RATE: 96 BPM | RESPIRATION RATE: 16 BRPM | HEIGHT: 68 IN | TEMPERATURE: 98.4 F | WEIGHT: 230 LBS

## 2022-07-11 DIAGNOSIS — I49.9 IRREGULAR HEART BEAT: Primary | ICD-10-CM

## 2022-07-11 DIAGNOSIS — F41.1 GAD (GENERALIZED ANXIETY DISORDER): ICD-10-CM

## 2022-07-11 DIAGNOSIS — I49.1 PREMATURE ATRIAL COMPLEXES: ICD-10-CM

## 2022-07-11 DIAGNOSIS — J02.9 SORE THROAT: ICD-10-CM

## 2022-07-11 DIAGNOSIS — R09.89 CHEST CONGESTION: ICD-10-CM

## 2022-07-11 PROCEDURE — U0005 INFEC AGEN DETEC AMPLI PROBE: HCPCS

## 2022-07-11 PROCEDURE — U0003 INFECTIOUS AGENT DETECTION BY NUCLEIC ACID (DNA OR RNA); SEVERE ACUTE RESPIRATORY SYNDROME CORONAVIRUS 2 (SARS-COV-2) (CORONAVIRUS DISEASE [COVID-19]), AMPLIFIED PROBE TECHNIQUE, MAKING USE OF HIGH THROUGHPUT TECHNOLOGIES AS DESCRIBED BY CMS-2020-01-R: HCPCS

## 2022-07-11 PROCEDURE — 99214 OFFICE O/P EST MOD 30 MIN: CPT | Performed by: PHYSICIAN ASSISTANT

## 2022-07-11 PROCEDURE — 93000 ELECTROCARDIOGRAM COMPLETE: CPT | Performed by: PHYSICIAN ASSISTANT

## 2022-07-11 PROCEDURE — 0240U HCHG SARS-COV-2 COVID-19 NFCT DS RESP RNA 3 TRGT MIC: CPT

## 2022-07-11 RX ORDER — HYDROXYZINE HYDROCHLORIDE 25 MG/1
25 TABLET, FILM COATED ORAL 3 TIMES DAILY PRN
Qty: 30 TABLET | Refills: 0 | Status: SHIPPED | OUTPATIENT
Start: 2022-07-11 | End: 2022-07-21

## 2022-07-11 ASSESSMENT — FIBROSIS 4 INDEX: FIB4 SCORE: 0.93

## 2022-07-11 NOTE — PROGRESS NOTES
"Subjective:   Venkat Mackenzie is a 61 y.o. male who presents for Congestion (Chest congestion, cough x2 days )     URI sx since Saturday associated with cough, chills, and sweating, myalgias  Chest feels like it is wheezing but denies SOB  Diarrhea  Some nasal congestion, had one episode of epistaxis when blowing his nose last night  Chest feels congested.  No h/o respiratory/lung issues; does not smoke  Has been having anxiety related to new job and coughing episodes  Feels like he is worrying more than usual  Has h/o chronic anxiety; has not seen behavioral health,       Medications:  allopurinol Tabs  amLODIPine Tabs  diclofenac sodium Gel  hydroCHLOROthiazide Tabs  lansoprazole Cpdr  Lidocaine Ptch  losartan Tabs  Magnesium Tabs  metFORMIN ER Tb24  Potassium Tabs  PROBIOTIC-10 PO    Allergies:             Iodine    Surgical History:         Past Surgical History:   Procedure Laterality Date   • APPENDECTOMY     • CERVICAL FUSION POSTERIOR      7-8 yrs back   • VAGOTOMY  at age 19    due to hiatal hernia       Past Social Hx:  Venkat Mackenzie  reports that he has never smoked. He quit smokeless tobacco use about 42 years ago.  His smokeless tobacco use included chew. He reports current alcohol use of about 1.2 oz of alcohol per week. He reports that he does not use drugs.     Past Family Hx:   Venkat Mackenzie family history includes Diabetes in his father and mother; Heart Disease in his father and mother; Lung Disease in his mother; No Known Problems in his son and son; Stroke in his father and mother.       Problem list, medications, and allergies reviewed by myself today in Epic.     Objective:     BP (!) 120/92   Pulse 96   Temp 36.9 °C (98.4 °F) (Temporal)   Resp 16   Ht 1.727 m (5' 8\")   Wt 104 kg (230 lb)   SpO2 96%   BMI 34.97 kg/m²     Physical Exam  Twelve-lead EKG with premature atrial complexes, prolonged TN interval, rate 81, normal axis.  No ST or T wave changes.  No Q waves.  Assessment/Plan: "     Diagnosis and Associated Orders:     1. DION (generalized anxiety disorder)  - hydrOXYzine HCl (ATARAX) 25 MG Tab; Take 1 Tablet by mouth 3 times a day as needed for Anxiety for up to 10 days.  Dispense: 30 Tablet; Refill: 0  - Referral to Behavioral Health    2. Sore throat  - CoV-2 and Flu A/B by PCR (24 hour In-House): Collect NP swab in VTM; Future    3. Chest congestion  - CoV-2 and Flu A/B by PCR (24 hour In-House): Collect NP swab in VTM; Future  - EKG - Clinic Performed    4. Irregular heart beat  - EKG - Clinic Performed  - REFERRAL TO CARDIOLOGY    5. Premature atrial complexes  - REFERRAL TO CARDIOLOGY        Comments/MDM:    Patient presents with URI symptoms concerning for COVID.  We will collect COVID and influenza PCR testing given short duration of symptoms.  EKG performed given finding of irregular heart rate on auscultation.  There are atrial premature complexes.  I did review his chart.  He was seen for syncopal event a couple of months ago.  I will place referral to cardiology for further evaluation and treatment.  He does have a history of hypertension.  His lungs are clear to auscultation bilaterally.  He is not hypoxic.  He is not tachypneic.  Recommend continued conservative treatment for symptoms.  In regards to his anxiety had been prescribed hydroxyzine.  Will place referral to behavioral health for further work-up, evaluation, treatment.  Strict ED precautions discussed.    The patient's presenting symptoms and exam findings most likely are due to a viral etiology.     Symptomatic and supportive care:   Plenty of oral hydration and rest   Over the counter cough suppressant as directed.  Tylenol or ibuprofen for pain and fever as directed.   Warm salt water gargles for sore throat, soft foods, cool liquids.   Saline nasal spray, Flonase, and/or otc sudafed (if no history of hypertension) as a decongestant.   Infection control measures at home. Stay away from people, Hand washing,  covering sneeze/cough, disinfect surfaces.   Remain home from work, school, and other populated environments while ill.  Overall, the patient is well-appearing. They are not hypoxic, afebrile, and a normal pulmonary exam.    Test for COVID-19 and influenza performed if applicable. Result will be reviewed by myself. We will call/message back for positive results only and appropriate further instructions. Instructed to sign up for [employer] if they have not already. Result will be automatically released to [employer] application for patient review.       I personally reviewed prior external notes and test results pertinent to today's visit.  Red flags discussed as well as indications to present to the Emergency Department.  Supportive care, natural history, differential diagnoses, and indications for immediate follow-up discussed.  Patient expresses understanding and agrees to plan.  Patient denies any other questions or concerns.    Follow-up with the primary care physician for recheck, reevaluation, and consideration of further management.      Please note that this dictation was created using voice recognition software. I have made a reasonable attempt to correct obvious errors, but I expect that there are errors of grammar and possibly content that I did not discover before finalizing the note.    This note was electronically signed by Magali Collier PA-C

## 2022-07-12 DIAGNOSIS — J02.9 SORE THROAT: ICD-10-CM

## 2022-07-12 DIAGNOSIS — R09.89 CHEST CONGESTION: ICD-10-CM

## 2022-07-12 LAB
COVID ORDER STATUS COVID19: NORMAL
SARS-COV-2 RNA RESP QL NAA+PROBE: NOTDETECTED

## 2022-07-13 LAB
FLUAV RNA SPEC QL NAA+PROBE: NEGATIVE
FLUBV RNA SPEC QL NAA+PROBE: NEGATIVE
SARS-COV-2 RNA RESP QL NAA+PROBE: NOTDETECTED
SPECIMEN SOURCE: NORMAL

## 2022-07-22 DIAGNOSIS — I10 ESSENTIAL HYPERTENSION: ICD-10-CM

## 2022-07-22 NOTE — TELEPHONE ENCOUNTER
Received request via: Patient    Was the patient seen in the last year in this department? Yes    Does the patient have an active prescription (recently filled or refills available) for medication(s) requested? No

## 2022-07-24 RX ORDER — HYDROCHLOROTHIAZIDE 25 MG/1
TABLET ORAL
Qty: 90 TABLET | Refills: 0 | Status: SHIPPED | OUTPATIENT
Start: 2022-07-24 | End: 2022-10-24

## 2022-07-24 NOTE — TELEPHONE ENCOUNTER
Requested Prescriptions     Pending Prescriptions Disp Refills   • hydroCHLOROthiazide (HYDRODIURIL) 25 MG Tab [Pharmacy Med Name: Hydrochlorothiazide 25 Mg Tab Teva] 90 Tablet 0     Sig: TAKE ONE TABLET BY MOUTH ONE TIME DAILY       ECHO Alegre.

## 2022-08-08 DIAGNOSIS — M54.41 ACUTE RIGHT-SIDED LOW BACK PAIN WITH BILATERAL SCIATICA: ICD-10-CM

## 2022-08-08 DIAGNOSIS — M54.42 ACUTE RIGHT-SIDED LOW BACK PAIN WITH BILATERAL SCIATICA: ICD-10-CM

## 2022-08-09 RX ORDER — CYCLOBENZAPRINE HCL 10 MG
10 TABLET ORAL 3 TIMES DAILY PRN
Qty: 15 TABLET | Refills: 0 | Status: SHIPPED | OUTPATIENT
Start: 2022-08-09 | End: 2022-08-14

## 2022-08-09 NOTE — TELEPHONE ENCOUNTER
Requested Prescriptions     Signed Prescriptions Disp Refills   • cyclobenzaprine (FLEXERIL) 10 mg Tab 15 Tablet 0     Sig: Take 1 Tablet by mouth 3 times a day as needed for Muscle Spasms for up to 5 days.     Authorizing Provider: LEIGH SOUZA A.P.R.N.

## 2022-11-19 DIAGNOSIS — K21.9 GASTROESOPHAGEAL REFLUX DISEASE WITHOUT ESOPHAGITIS: ICD-10-CM

## 2022-11-21 RX ORDER — LANSOPRAZOLE 30 MG/1
CAPSULE, DELAYED RELEASE ORAL
Qty: 90 CAPSULE | Refills: 3 | Status: SHIPPED | OUTPATIENT
Start: 2022-11-21 | End: 2023-07-31

## 2022-11-21 NOTE — TELEPHONE ENCOUNTER
Requested Prescriptions     Signed Prescriptions Disp Refills    lansoprazole (PREVACID) 30 MG CAPSULE DELAYED RELEASE 90 Capsule 3     Sig: TAKE ONE CAPSULE BY MOUTH ONE TIME DAILY     Authorizing Provider: LEIGH SOUZA A.P.R.N.

## 2022-11-29 DIAGNOSIS — M54.42 ACUTE RIGHT-SIDED LOW BACK PAIN WITH BILATERAL SCIATICA: ICD-10-CM

## 2022-11-29 DIAGNOSIS — I10 ESSENTIAL HYPERTENSION: ICD-10-CM

## 2022-11-29 DIAGNOSIS — M54.41 ACUTE RIGHT-SIDED LOW BACK PAIN WITH BILATERAL SCIATICA: ICD-10-CM

## 2022-11-29 RX ORDER — CYCLOBENZAPRINE HCL 10 MG
10 TABLET ORAL 3 TIMES DAILY PRN
Qty: 30 TABLET | Refills: 0 | OUTPATIENT
Start: 2022-11-29

## 2022-11-29 RX ORDER — CYCLOBENZAPRINE HCL 10 MG
10 TABLET ORAL 3 TIMES DAILY PRN
Status: ON HOLD | COMMUNITY
End: 2023-08-01

## 2022-11-29 RX ORDER — HYDROCHLOROTHIAZIDE 25 MG/1
25 TABLET ORAL DAILY
Qty: 90 TABLET | Refills: 0 | Status: SHIPPED
Start: 2022-11-29 | End: 2023-06-27

## 2022-11-29 NOTE — TELEPHONE ENCOUNTER
Received request via: Pharmacy    Was the patient seen in the last year in this department? Yes    Does the patient have an active prescription (recently filled or refills available) for medication(s) requested? No    Does the patient have [facility] and need 100 day supply (blood pressure, diabetes and cholesterol meds only)? Patient does not have SCP    I left a message for this patient on 11/29/22 that he is needing appointment and updated labs per last note on refill of his HCTZ.

## 2022-11-29 NOTE — TELEPHONE ENCOUNTER
Requested Prescriptions     Signed Prescriptions Disp Refills    hydroCHLOROthiazide (HYDRODIURIL) 25 MG Tab 90 Tablet 0     Sig: Take 1 Tablet by mouth every day. Due for appointment with PCP. Last refill.     Authorizing Provider: LEIGH SOUZA     Refused Prescriptions Disp Refills    cyclobenzaprine (FLEXERIL) 10 mg Tab 30 Tablet 0     Sig: Take 1 Tablet by mouth 3 times a day as needed for Muscle Spasms.     Refused By: LEIGH SOUZA     Reason for Refusal: Patient needs appointment.     VIKKI Major

## 2022-12-05 ENCOUNTER — TELEPHONE (OUTPATIENT)
Dept: MEDICAL GROUP | Facility: PHYSICIAN GROUP | Age: 62
End: 2022-12-05

## 2022-12-05 NOTE — TELEPHONE ENCOUNTER
Pt was notified that his refill request for Cyclobenzaprine was refused. Pt is due for appt and was notified. Pt stated that he just started working for [employer] and is waiting for his insurance to kick in, hopefully in the next few week.

## 2023-01-09 ENCOUNTER — OFFICE VISIT (OUTPATIENT)
Dept: MEDICAL GROUP | Facility: PHYSICIAN GROUP | Age: 63
End: 2023-01-09
Payer: COMMERCIAL

## 2023-01-09 VITALS
DIASTOLIC BLOOD PRESSURE: 68 MMHG | OXYGEN SATURATION: 95 % | SYSTOLIC BLOOD PRESSURE: 126 MMHG | TEMPERATURE: 98 F | WEIGHT: 217 LBS | HEART RATE: 61 BPM | HEIGHT: 68 IN | BODY MASS INDEX: 32.89 KG/M2

## 2023-01-09 DIAGNOSIS — I10 ESSENTIAL HYPERTENSION: ICD-10-CM

## 2023-01-09 DIAGNOSIS — M79.10 MYALGIA: ICD-10-CM

## 2023-01-09 DIAGNOSIS — E55.9 VITAMIN D DEFICIENCY: ICD-10-CM

## 2023-01-09 DIAGNOSIS — R53.83 OTHER FATIGUE: ICD-10-CM

## 2023-01-09 DIAGNOSIS — E88.810 METABOLIC SYNDROME: ICD-10-CM

## 2023-01-09 DIAGNOSIS — R73.03 PREDIABETES: ICD-10-CM

## 2023-01-09 DIAGNOSIS — F41.1 GAD (GENERALIZED ANXIETY DISORDER): ICD-10-CM

## 2023-01-09 DIAGNOSIS — M1A.0790 IDIOPATHIC CHRONIC GOUT OF ANKLE WITHOUT TOPHUS, UNSPECIFIED LATERALITY: ICD-10-CM

## 2023-01-09 PROCEDURE — 99214 OFFICE O/P EST MOD 30 MIN: CPT | Performed by: NURSE PRACTITIONER

## 2023-01-09 RX ORDER — ALLOPURINOL 300 MG/1
300 TABLET ORAL DAILY
Qty: 90 TABLET | Refills: 3 | Status: SHIPPED | OUTPATIENT
Start: 2023-01-09

## 2023-01-09 RX ORDER — METFORMIN HYDROCHLORIDE 500 MG/1
500 TABLET, EXTENDED RELEASE ORAL DAILY
Qty: 90 TABLET | Refills: 3 | Status: SHIPPED | OUTPATIENT
Start: 2023-01-09

## 2023-01-09 RX ORDER — TRIAMCINOLONE ACETONIDE 1 MG/G
CREAM TOPICAL
COMMUNITY
Start: 2022-12-07 | End: 2023-07-28

## 2023-01-09 RX ORDER — LOSARTAN POTASSIUM 100 MG/1
100 TABLET ORAL DAILY
Qty: 90 TABLET | Refills: 3 | Status: ON HOLD
Start: 2023-01-09 | End: 2023-08-18

## 2023-01-09 RX ORDER — HYDROXYZINE HYDROCHLORIDE 25 MG/1
25 TABLET, FILM COATED ORAL 3 TIMES DAILY PRN
Qty: 30 TABLET | Refills: 2 | Status: SHIPPED
Start: 2023-01-09 | End: 2023-02-07

## 2023-01-09 RX ORDER — AMLODIPINE BESYLATE 2.5 MG/1
2.5 TABLET ORAL DAILY
Qty: 90 TABLET | Refills: 3 | Status: SHIPPED
Start: 2023-01-09 | End: 2023-07-31

## 2023-01-09 ASSESSMENT — PATIENT HEALTH QUESTIONNAIRE - PHQ9
SUM OF ALL RESPONSES TO PHQ QUESTIONS 1-9: 0
SUM OF ALL RESPONSES TO PHQ9 QUESTIONS 1 AND 2: 0
7. TROUBLE CONCENTRATING ON THINGS, SUCH AS READING THE NEWSPAPER OR WATCHING TELEVISION: NOT AT ALL
4. FEELING TIRED OR HAVING LITTLE ENERGY: NOT AT ALL
1. LITTLE INTEREST OR PLEASURE IN DOING THINGS: NOT AT ALL
9. THOUGHTS THAT YOU WOULD BE BETTER OFF DEAD, OR OF HURTING YOURSELF: NOT AT ALL
5. POOR APPETITE OR OVEREATING: NOT AT ALL
6. FEELING BAD ABOUT YOURSELF - OR THAT YOU ARE A FAILURE OR HAVE LET YOURSELF OR YOUR FAMILY DOWN: NOT AL ALL
2. FEELING DOWN, DEPRESSED, IRRITABLE, OR HOPELESS: NOT AT ALL
8. MOVING OR SPEAKING SO SLOWLY THAT OTHER PEOPLE COULD HAVE NOTICED. OR THE OPPOSITE, BEING SO FIGETY OR RESTLESS THAT YOU HAVE BEEN MOVING AROUND A LOT MORE THAN USUAL: NOT AT ALL
3. TROUBLE FALLING OR STAYING ASLEEP OR SLEEPING TOO MUCH: NOT AT ALL

## 2023-01-09 ASSESSMENT — ANXIETY QUESTIONNAIRES
2. NOT BEING ABLE TO STOP OR CONTROL WORRYING: NEARLY EVERY DAY
4. TROUBLE RELAXING: NEARLY EVERY DAY
1. FEELING NERVOUS, ANXIOUS, OR ON EDGE: NEARLY EVERY DAY
6. BECOMING EASILY ANNOYED OR IRRITABLE: NEARLY EVERY DAY
3. WORRYING TOO MUCH ABOUT DIFFERENT THINGS: NEARLY EVERY DAY
7. FEELING AFRAID AS IF SOMETHING AWFUL MIGHT HAPPEN: NEARLY EVERY DAY
5. BEING SO RESTLESS THAT IT IS HARD TO SIT STILL: NEARLY EVERY DAY
GAD7 TOTAL SCORE: 21

## 2023-01-09 ASSESSMENT — FIBROSIS 4 INDEX: FIB4 SCORE: 0.94

## 2023-01-09 NOTE — ASSESSMENT & PLAN NOTE
He does need a refill on losartan and amlodipine. He was skipping doses as he was running out of medication. Blood pressure today is 126/68. He has started a new job at [employer] that is very physical.  He was previously taking losartan 100 mg daily, hydrochlorothiazide 25 mg daily and amlodipine 2.5 mg daily.

## 2023-01-09 NOTE — PROGRESS NOTES
Subjective:     CC: Anxiety and medication refill    HPI:   Venkat presents today with the following:    DION (generalized anxiety disorder)  His DION score today is 21. He was let go from his last job. He has new job and is worried about losing the new job. He is also experiencing leg pain, numbness and tingling. He has fatigue as well. He has been able to lose 28-30 pounds in total over 5 months since the new job.  Denies any self-harm or SI today.  Reports history of PTSD.            5/19/2021 8/11/2021 1/9/2023   DION 7   DION-7 Total Score 12 0 21       Multiple values from one day are sorted in reverse-chronological order       Interpretation of DION 7 Total Score   Score Severity:  0-4 No Anxiety   5-9 Mild Anxiety  10-14 Moderate Anxiety  15-21 Severe Anxiety      Essential hypertension  He does need a refill on losartan and amlodipine. He was skipping doses as he was running out of medication. Blood pressure today is 126/68. He has started a new job at [employer] that is very physical.  He was previously taking losartan 100 mg daily, hydrochlorothiazide 25 mg daily and amlodipine 2.5 mg daily.    Chronic gout  He needs a medication refill on his allopurinol today.  He has been rationing his medication.  His prescription is for 300 mg daily.  No recent flares.      Past Medical History:   Diagnosis Date    Gastroparesis     2016    Hypertension     7 yrs    Kidney stones     6-7 yrs ago       Social History     Tobacco Use    Smoking status: Never    Smokeless tobacco: Former     Types: Chew     Quit date: 5/1/1980   Vaping Use    Vaping Use: Never used   Substance Use Topics    Alcohol use: Yes     Alcohol/week: 1.2 oz     Types: 2 Cans of beer per week     Comment: occasionally    Drug use: No       Current Outpatient Medications Ordered in Epic   Medication Sig Dispense Refill    triamcinolone acetonide (KENALOG) 0.1 % Cream Apply to dry itchy rash, twice daily up to 2 weeks as needed. Do not apply to face,  "axilla, or genitals.      sertraline (ZOLOFT) 50 MG Tab Take 1 Tablet by mouth every day. 30 Tablet 2    hydrOXYzine HCl (ATARAX) 25 MG Tab Take 1 Tablet by mouth 3 times a day as needed for Anxiety. 30 Tablet 2    metFORMIN ER (GLUCOPHAGE XR) 500 MG TABLET SR 24 HR Take 1 Tablet by mouth every day. 90 Tablet 3    losartan (COZAAR) 100 MG Tab Take 1 Tablet by mouth every day. 90 Tablet 3    amLODIPine (NORVASC) 2.5 MG Tab Take 1 Tablet by mouth every day. 90 Tablet 3    allopurinol (ZYLOPRIM) 300 MG Tab Take 1 Tablet by mouth every day. 90 Tablet 3    cyclobenzaprine (FLEXERIL) 10 mg Tab Take 10 mg by mouth 3 times a day as needed.      hydroCHLOROthiazide (HYDRODIURIL) 25 MG Tab Take 1 Tablet by mouth every day. Due for appointment with PCP. Last refill. 90 Tablet 0    lansoprazole (PREVACID) 30 MG CAPSULE DELAYED RELEASE TAKE ONE CAPSULE BY MOUTH ONE TIME DAILY 90 Capsule 3    diclofenac sodium (VOLTAREN) 1 % Gel apply 4 grams topically to shoulder(S) and neck four times a day for pain 100 g 3    Potassium 99 MG Tab Take 1 Tablet by mouth every day.      Probiotic Product (PROBIOTIC-10 PO) Take  by mouth every day.      Magnesium 250 MG Tab Take  by mouth every day.       No current [facility]-ordered facility-administered medications on file.       Allergies:  Iodine    Health Maintenance: Reviewed.      Objective:     Vital signs reviewed  Exam:  /68 (BP Location: Right arm, Patient Position: Sitting, BP Cuff Size: Adult)   Pulse 61   Temp 36.7 °C (98 °F) (Temporal)   Ht 1.727 m (5' 8\")   Wt 98.4 kg (217 lb)   SpO2 95%   BMI 32.99 kg/m²  Body mass index is 32.99 kg/m².    Gen: Alert and oriented, No apparent distress.  Lungs: Normal effort, CTA bilaterally, no wheezes, rhonchi, or rales  CV: Regular rate and rhythm. No murmurs, rubs, or gallops.  Ext: No clubbing, cyanosis, edema.        Assessment & Plan:     62 y.o. male with the following -     1. DION (generalized anxiety disorder)  Chronic exacerbated " problem.  Discussed and reviewed his DION score today.  He is interested in starting medication.  We will start sertraline 50 mg daily.  We discussed this medication can take up to 4-6 weeks before he feels a full effect of the medication.  He verbalized understanding.  We also discussed using hydroxyzine 25 mg as needed for anxiety.  Declines referral to psychiatry or psychology today.  He will return in 1 month for follow-up.  Red flags discussed.  - sertraline (ZOLOFT) 50 MG Tab; Take 1 Tablet by mouth every day.  Dispense: 30 Tablet; Refill: 2  - hydrOXYzine HCl (ATARAX) 25 MG Tab; Take 1 Tablet by mouth 3 times a day as needed for Anxiety.  Dispense: 30 Tablet; Refill: 2    2. Essential hypertension  Chronic exacerbated problem.  Blood pressure is at goal today.  Medications refilled today.  Continue with losartan 100 mg daily, amlodipine 2.5 mg daily and hydrochlorothiazide 25 mg daily.  Checking updated labs.  - CBC WITH DIFFERENTIAL; Future  - Comp Metabolic Panel; Future  - Lipid Profile; Future  - losartan (COZAAR) 100 MG Tab; Take 1 Tablet by mouth every day.  Dispense: 90 Tablet; Refill: 3  - amLODIPine (NORVASC) 2.5 MG Tab; Take 1 Tablet by mouth every day.  Dispense: 90 Tablet; Refill: 3    3. Other fatigue  Chronic exacerbated problem.  Checking vitamin D, B12 and thyroid function.  - TSH WITH REFLEX TO FT4; Future  - VITAMIN D,25 HYDROXY (DEFICIENCY); Future  - VITAMIN B12; Future    4. Idiopathic chronic gout of ankle without tophus, unspecified laterality  Chronic stable problem.  Checking updated uric acid level.  Continue with allopurinol 300 mg daily.  - URIC ACID; Future  - allopurinol (ZYLOPRIM) 300 MG Tab; Take 1 Tablet by mouth every day.  Dispense: 90 Tablet; Refill: 3    5. Vitamin D deficiency  Chronic stable problem.  Given his fatigue we will check vitamin D level.  - VITAMIN D,25 HYDROXY (DEFICIENCY); Future    6. Prediabetes  Chronic stable problem.  Due for updated A1c.  Continues  with metformin  mg daily.  - HEMOGLOBIN A1C; Future    7. Myalgia  Chronic exacerbated problem.  He is returning in 1 day to discuss further.  Ordering labs so he can complete.  - CREATINE KINASE; Future  - Sed Rate; Future  - CRP QUANTITIVE (NON-CARDIAC); Future  - ANTI-NUCLEAR ANTIBODY SERUM; Future    8. Metabolic syndrome  Chronic stable problem.  Continue with metformin  mg daily.  - metFORMIN ER (GLUCOPHAGE XR) 500 MG TABLET SR 24 HR; Take 1 Tablet by mouth every day.  Dispense: 90 Tablet; Refill: 3        Return in about 4 weeks (around 2/6/2023) for medication management.    Please note that this dictation was created using voice recognition software. I have made every reasonable attempt to correct obvious errors, but I expect that there are errors of grammar and possibly content that I did not discover before finalizing the note.

## 2023-01-09 NOTE — ASSESSMENT & PLAN NOTE
His DION score today is 21. He was let go from his last job. He has new job and is worried about losing the new job. He is also experiencing leg pain, numbness and tingling. He has fatigue as well. He has been able to lose 28-30 pounds in total over 5 months since the new job.  Denies any self-harm or SI today.  Reports history of PTSD.            5/19/2021 8/11/2021 1/9/2023   DION 7   DION-7 Total Score 12 0 21       Multiple values from one day are sorted in reverse-chronological order       Interpretation of DION 7 Total Score   Score Severity:  0-4 No Anxiety   5-9 Mild Anxiety  10-14 Moderate Anxiety  15-21 Severe Anxiety

## 2023-01-09 NOTE — ASSESSMENT & PLAN NOTE
He needs a medication refill on his allopurinol today.  He has been rationing his medication.  His prescription is for 300 mg daily.  No recent flares.

## 2023-01-10 ENCOUNTER — OFFICE VISIT (OUTPATIENT)
Dept: MEDICAL GROUP | Facility: PHYSICIAN GROUP | Age: 63
End: 2023-01-10
Payer: COMMERCIAL

## 2023-01-10 ENCOUNTER — HOSPITAL ENCOUNTER (OUTPATIENT)
Dept: LAB | Facility: MEDICAL CENTER | Age: 63
End: 2023-01-10
Attending: NURSE PRACTITIONER
Payer: COMMERCIAL

## 2023-01-10 VITALS
SYSTOLIC BLOOD PRESSURE: 132 MMHG | BODY MASS INDEX: 33.04 KG/M2 | HEIGHT: 68 IN | TEMPERATURE: 97.7 F | WEIGHT: 218 LBS | OXYGEN SATURATION: 96 % | HEART RATE: 65 BPM | DIASTOLIC BLOOD PRESSURE: 64 MMHG

## 2023-01-10 DIAGNOSIS — E55.9 VITAMIN D DEFICIENCY: ICD-10-CM

## 2023-01-10 DIAGNOSIS — R20.2 NUMBNESS AND TINGLING OF BOTH LOWER EXTREMITIES: ICD-10-CM

## 2023-01-10 DIAGNOSIS — R09.89 DECREASED DORSALIS PEDIS PULSE: ICD-10-CM

## 2023-01-10 DIAGNOSIS — M1A.0790 IDIOPATHIC CHRONIC GOUT OF ANKLE WITHOUT TOPHUS, UNSPECIFIED LATERALITY: ICD-10-CM

## 2023-01-10 DIAGNOSIS — I10 ESSENTIAL HYPERTENSION: ICD-10-CM

## 2023-01-10 DIAGNOSIS — R20.0 NUMBNESS AND TINGLING OF BOTH LOWER EXTREMITIES: ICD-10-CM

## 2023-01-10 DIAGNOSIS — R53.83 OTHER FATIGUE: ICD-10-CM

## 2023-01-10 DIAGNOSIS — M79.10 MYALGIA: ICD-10-CM

## 2023-01-10 DIAGNOSIS — M72.2 PLANTAR FASCIITIS OF LEFT FOOT: ICD-10-CM

## 2023-01-10 DIAGNOSIS — R73.03 PREDIABETES: ICD-10-CM

## 2023-01-10 LAB
25(OH)D3 SERPL-MCNC: 28 NG/ML (ref 30–100)
ALBUMIN SERPL BCP-MCNC: 4.4 G/DL (ref 3.2–4.9)
ALBUMIN/GLOB SERPL: 1.7 G/DL
ALP SERPL-CCNC: 80 U/L (ref 30–99)
ALT SERPL-CCNC: 13 U/L (ref 2–50)
ANION GAP SERPL CALC-SCNC: 12 MMOL/L (ref 7–16)
AST SERPL-CCNC: 15 U/L (ref 12–45)
BASOPHILS # BLD AUTO: 0.9 % (ref 0–1.8)
BASOPHILS # BLD: 0.05 K/UL (ref 0–0.12)
BILIRUB SERPL-MCNC: 0.6 MG/DL (ref 0.1–1.5)
BUN SERPL-MCNC: 23 MG/DL (ref 8–22)
CALCIUM ALBUM COR SERPL-MCNC: 8.7 MG/DL (ref 8.5–10.5)
CALCIUM SERPL-MCNC: 9 MG/DL (ref 8.5–10.5)
CHLORIDE SERPL-SCNC: 106 MMOL/L (ref 96–112)
CHOLEST SERPL-MCNC: 135 MG/DL (ref 100–199)
CK SERPL-CCNC: 82 U/L (ref 0–154)
CO2 SERPL-SCNC: 26 MMOL/L (ref 20–33)
CREAT SERPL-MCNC: 0.77 MG/DL (ref 0.5–1.4)
CRP SERPL HS-MCNC: <0.3 MG/DL (ref 0–0.75)
EOSINOPHIL # BLD AUTO: 0.24 K/UL (ref 0–0.51)
EOSINOPHIL NFR BLD: 4.2 % (ref 0–6.9)
ERYTHROCYTE [DISTWIDTH] IN BLOOD BY AUTOMATED COUNT: 49.1 FL (ref 35.9–50)
ERYTHROCYTE [SEDIMENTATION RATE] IN BLOOD BY WESTERGREN METHOD: 6 MM/HOUR (ref 0–20)
EST. AVERAGE GLUCOSE BLD GHB EST-MCNC: 120 MG/DL
FASTING STATUS PATIENT QL REPORTED: NORMAL
GFR SERPLBLD CREATININE-BSD FMLA CKD-EPI: 100 ML/MIN/1.73 M 2
GLOBULIN SER CALC-MCNC: 2.6 G/DL (ref 1.9–3.5)
GLUCOSE SERPL-MCNC: 104 MG/DL (ref 65–99)
HBA1C MFR BLD: 5.8 % (ref 4–5.6)
HCT VFR BLD AUTO: 50.9 % (ref 42–52)
HDLC SERPL-MCNC: 61 MG/DL
HGB BLD-MCNC: 16.3 G/DL (ref 14–18)
IMM GRANULOCYTES # BLD AUTO: 0.03 K/UL (ref 0–0.11)
IMM GRANULOCYTES NFR BLD AUTO: 0.5 % (ref 0–0.9)
LDLC SERPL CALC-MCNC: 65 MG/DL
LYMPHOCYTES # BLD AUTO: 1.58 K/UL (ref 1–4.8)
LYMPHOCYTES NFR BLD: 28 % (ref 22–41)
MCH RBC QN AUTO: 30.4 PG (ref 27–33)
MCHC RBC AUTO-ENTMCNC: 32 G/DL (ref 33.7–35.3)
MCV RBC AUTO: 94.8 FL (ref 81.4–97.8)
MONOCYTES # BLD AUTO: 0.54 K/UL (ref 0–0.85)
MONOCYTES NFR BLD AUTO: 9.6 % (ref 0–13.4)
NEUTROPHILS # BLD AUTO: 3.21 K/UL (ref 1.82–7.42)
NEUTROPHILS NFR BLD: 56.8 % (ref 44–72)
NRBC # BLD AUTO: 0 K/UL
NRBC BLD-RTO: 0 /100 WBC
PLATELET # BLD AUTO: 270 K/UL (ref 164–446)
PMV BLD AUTO: 10.8 FL (ref 9–12.9)
POTASSIUM SERPL-SCNC: 4.1 MMOL/L (ref 3.6–5.5)
PROT SERPL-MCNC: 7 G/DL (ref 6–8.2)
RBC # BLD AUTO: 5.37 M/UL (ref 4.7–6.1)
SODIUM SERPL-SCNC: 144 MMOL/L (ref 135–145)
TRIGL SERPL-MCNC: 46 MG/DL (ref 0–149)
TSH SERPL DL<=0.005 MIU/L-ACNC: 2.83 UIU/ML (ref 0.38–5.33)
URATE SERPL-MCNC: 5.9 MG/DL (ref 2.5–8.3)
VIT B12 SERPL-MCNC: 514 PG/ML (ref 211–911)
WBC # BLD AUTO: 5.7 K/UL (ref 4.8–10.8)

## 2023-01-10 PROCEDURE — 84550 ASSAY OF BLOOD/URIC ACID: CPT

## 2023-01-10 PROCEDURE — 85652 RBC SED RATE AUTOMATED: CPT

## 2023-01-10 PROCEDURE — 82306 VITAMIN D 25 HYDROXY: CPT

## 2023-01-10 PROCEDURE — 86038 ANTINUCLEAR ANTIBODIES: CPT

## 2023-01-10 PROCEDURE — 83036 HEMOGLOBIN GLYCOSYLATED A1C: CPT

## 2023-01-10 PROCEDURE — 80061 LIPID PANEL: CPT

## 2023-01-10 PROCEDURE — 99213 OFFICE O/P EST LOW 20 MIN: CPT | Performed by: NURSE PRACTITIONER

## 2023-01-10 PROCEDURE — 82607 VITAMIN B-12: CPT

## 2023-01-10 PROCEDURE — 36415 COLL VENOUS BLD VENIPUNCTURE: CPT

## 2023-01-10 PROCEDURE — 82550 ASSAY OF CK (CPK): CPT

## 2023-01-10 PROCEDURE — 86140 C-REACTIVE PROTEIN: CPT

## 2023-01-10 PROCEDURE — 80053 COMPREHEN METABOLIC PANEL: CPT

## 2023-01-10 PROCEDURE — 84443 ASSAY THYROID STIM HORMONE: CPT

## 2023-01-10 PROCEDURE — 85025 COMPLETE CBC W/AUTO DIFF WBC: CPT

## 2023-01-10 ASSESSMENT — FIBROSIS 4 INDEX: FIB4 SCORE: 0.94

## 2023-01-10 NOTE — ASSESSMENT & PLAN NOTE
The last 3 months he has had pain in left heel. Associated leg fatigue. Aggravated by walking and standing. The heel pain is described as sharp. His feet can feel numb and tingling. Recent labs have A1C ordered. He is wearing good support shoes. 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 He states the soles of his feet are red. Denies falls, trauma or bruising.

## 2023-01-10 NOTE — PROGRESS NOTES
Subjective:     CC: leg fatigue and left heel pain    HPI:   Venkat presents today with the following:    Plantar fasciitis of left foot  The last 3 months he has had pain in left heel. Associated leg fatigue. Aggravated by walking and standing. The heel pain is described as sharp. His feet can feel numb and tingling. Recent labs have A1C ordered. He is wearing good support shoes. 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 He states the soles of his feet are red. Denies falls, trauma or bruising.      Past Medical History:   Diagnosis Date    Gastroparesis     2016    Hypertension     7 yrs    Kidney stones     6-7 yrs ago       Social History     Tobacco Use    Smoking status: Never    Smokeless tobacco: Former     Types: Chew     Quit date: 5/1/1980   Vaping Use    Vaping Use: Never used   Substance Use Topics    Alcohol use: Yes     Alcohol/week: 1.2 oz     Types: 2 Cans of beer per week     Comment: occasionally    Drug use: No       Current Outpatient Medications Ordered in Epic   Medication Sig Dispense Refill    triamcinolone acetonide (KENALOG) 0.1 % Cream Apply to dry itchy rash, twice daily up to 2 weeks as needed. Do not apply to face, axilla, or genitals.      sertraline (ZOLOFT) 50 MG Tab Take 1 Tablet by mouth every day. 30 Tablet 2    hydrOXYzine HCl (ATARAX) 25 MG Tab Take 1 Tablet by mouth 3 times a day as needed for Anxiety. 30 Tablet 2    metFORMIN ER (GLUCOPHAGE XR) 500 MG TABLET SR 24 HR Take 1 Tablet by mouth every day. 90 Tablet 3    losartan (COZAAR) 100 MG Tab Take 1 Tablet by mouth every day. 90 Tablet 3    amLODIPine (NORVASC) 2.5 MG Tab Take 1 Tablet by mouth every day. 90 Tablet 3    allopurinol (ZYLOPRIM) 300 MG Tab Take 1 Tablet by mouth every day. 90 Tablet 3    cyclobenzaprine (FLEXERIL) 10 mg Tab Take 10 mg by mouth 3 times a day as needed.      hydroCHLOROthiazide (HYDRODIURIL) 25 MG Tab Take 1 Tablet by mouth every day. Due for appointment with PCP. Last  "refill. 90 Tablet 0    lansoprazole (PREVACID) 30 MG CAPSULE DELAYED RELEASE TAKE ONE CAPSULE BY MOUTH ONE TIME DAILY 90 Capsule 3    diclofenac sodium (VOLTAREN) 1 % Gel apply 4 grams topically to shoulder(S) and neck four times a day for pain 100 g 3    Potassium 99 MG Tab Take 1 Tablet by mouth every day.      Probiotic Product (PROBIOTIC-10 PO) Take  by mouth every day.      Magnesium 250 MG Tab Take  by mouth every day.       No current [facility]-ordered facility-administered medications on file.       Allergies:  Iodine    Health Maintenance: Reviewed      Objective:     Vital signs reviewed  Exam:  /64 (BP Location: Left arm, Patient Position: Sitting, BP Cuff Size: Adult)   Pulse 65   Temp 36.5 °C (97.7 °F) (Temporal)   Ht 1.727 m (5' 8\")   Wt 98.9 kg (218 lb)   SpO2 96%   BMI 33.15 kg/m²  Body mass index is 33.15 kg/m².    Gen: Alert and oriented, No apparent distress.  Neck: Neck is supple, full ROM.  Lungs: Normal effort, no audible wheezes  CV: Skin pink, warm and dry.  Ext: No clubbing, cyanosis, edema.    Monofilament testing with a 10 gram force: sensation intact: decreased bilaterally  Visual Inspection: Feet without maceration, ulcers, fissures.  Pedal pulses: decreased bilaterally.  Capillary refill 4 seconds.      Assessment & Plan:     62 y.o. male with the following -     1. Plantar fasciitis of left foot  Chronic exacerbated problem.  Recommend wearing well fitted shoes.  May use over-the-counter Voltaren to the left heel to see if this improves.  He does have labs completed and in process for results.  Referral to podiatry.  - Referral to Podiatry    2. Decreased dorsalis pedis pulse  Acute uncomplicated problem.  Bilateral pedal pulses 1+ today with cool toes.  Checking arterial ultrasound.  - US-EXTREMITY ARTERY LOWER BILAT W/GENIE (COMBO); Future    3. Numbness and tingling of both lower extremities  Chronic exacerbated problem.  Checking updated A1c given his decreased pedal pulses " checking arterial ultrasound.  Referral to podiatry for his Planter fasciitis.  May use over-the-counter Voltaren.  Differential diagnosis includes diabetes, neuropathy, arterial insufficiency.  - US-EXTREMITY ARTERY LOWER BILAT W/GENIE (COMBO); Future  -MONOFILAMENT EXAM      Return if symptoms worsen or fail to improve.    Please note that this dictation was created using voice recognition software. I have made every reasonable attempt to correct obvious errors, but I expect that there are errors of grammar and possibly content that I did not discover before finalizing the note.

## 2023-01-13 LAB — NUCLEAR IGG SER QL IA: NORMAL

## 2023-02-06 ENCOUNTER — HOSPITAL ENCOUNTER (OUTPATIENT)
Dept: RADIOLOGY | Facility: MEDICAL CENTER | Age: 63
End: 2023-02-06
Attending: NURSE PRACTITIONER
Payer: COMMERCIAL

## 2023-02-06 DIAGNOSIS — R20.2 NUMBNESS AND TINGLING OF BOTH LOWER EXTREMITIES: ICD-10-CM

## 2023-02-06 DIAGNOSIS — R20.0 NUMBNESS AND TINGLING OF BOTH LOWER EXTREMITIES: ICD-10-CM

## 2023-02-06 DIAGNOSIS — R09.89 DECREASED DORSALIS PEDIS PULSE: ICD-10-CM

## 2023-02-06 PROCEDURE — 93922 UPR/L XTREMITY ART 2 LEVELS: CPT

## 2023-02-07 ENCOUNTER — OFFICE VISIT (OUTPATIENT)
Dept: MEDICAL GROUP | Facility: PHYSICIAN GROUP | Age: 63
End: 2023-02-07
Payer: COMMERCIAL

## 2023-02-07 VITALS
TEMPERATURE: 97.5 F | HEART RATE: 68 BPM | OXYGEN SATURATION: 97 % | BODY MASS INDEX: 33.74 KG/M2 | SYSTOLIC BLOOD PRESSURE: 130 MMHG | DIASTOLIC BLOOD PRESSURE: 82 MMHG | HEIGHT: 67 IN | WEIGHT: 215 LBS

## 2023-02-07 DIAGNOSIS — R25.2 BILATERAL LEG CRAMPS: ICD-10-CM

## 2023-02-07 DIAGNOSIS — F41.1 GAD (GENERALIZED ANXIETY DISORDER): ICD-10-CM

## 2023-02-07 DIAGNOSIS — M72.2 PLANTAR FASCIITIS OF LEFT FOOT: ICD-10-CM

## 2023-02-07 PROCEDURE — 99214 OFFICE O/P EST MOD 30 MIN: CPT | Performed by: NURSE PRACTITIONER

## 2023-02-07 RX ORDER — ESCITALOPRAM OXALATE 10 MG/1
10 TABLET ORAL DAILY
Qty: 30 TABLET | Refills: 2 | Status: SHIPPED | OUTPATIENT
Start: 2023-02-07 | End: 2023-02-07 | Stop reason: SDUPTHER

## 2023-02-07 RX ORDER — ESCITALOPRAM OXALATE 10 MG/1
10 TABLET ORAL DAILY
Qty: 30 TABLET | Refills: 2 | Status: SHIPPED
Start: 2023-02-07 | End: 2023-04-17

## 2023-02-07 ASSESSMENT — FIBROSIS 4 INDEX: FIB4 SCORE: .986133682178185106

## 2023-02-07 NOTE — ASSESSMENT & PLAN NOTE
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 He is noticing spasms in his left leg. 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  A referral to vascular surgery has been in place.  We discussed that this may be the cause of some of his left leg complaints.

## 2023-02-07 NOTE — ASSESSMENT & PLAN NOTE
He has followed up with podiatry. Podiatry noticed a small spur. He was recommend orthothotics and compression socks. With the compression socks his legs would go to sleep. He did state the compression socks were tight.  Recommend that he try diabetic compression socks.

## 2023-02-07 NOTE — ASSESSMENT & PLAN NOTE
He felt spacey with taking the medication. He tried sertraline 50 mg daily for 1 week and the hydroxyzine made him irritable. He did take hydroxyzine as needed. He stopped the medication and his symptoms resolved. He has been more focused and not irritable with stopping the medication.  Declines referral to psychiatry or behavioral health at this time.  He is interested in starting a different medication.

## 2023-02-07 NOTE — TELEPHONE ENCOUNTER
Requested Prescriptions     Signed Prescriptions Disp Refills    escitalopram (LEXAPRO) 10 MG Tab 30 Tablet 2     Sig: Take 1 Tablet by mouth every day.     Authorizing Provider: LEIGH SOUZA A.P.R.N.

## 2023-02-07 NOTE — TELEPHONE ENCOUNTER
Received request via: Patient    Was the patient seen in the last year in this department? Yes    Does the patient have an active prescription (recently filled or refills available) for medication(s) requested? No    Does the patient have assisted Plus and need 100 day supply (blood pressure, diabetes and cholesterol meds only)? Patient does not have SCP    Pt wants it resent to [facility]

## 2023-02-07 NOTE — PROGRESS NOTES
Recent artery US:    1.  No hemodynamically significant abnormality is seen.  2.  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

## 2023-02-07 NOTE — PROGRESS NOTES
Subjective:     CC: leg cramps    HPI:   Venkat presents today with the following:    Plantar fasciitis of left foot  He has followed up with podiatry. Podiatry noticed a small spur. He was recommend orthothotics and compression socks. With the compression socks his legs would go to sleep. He did state the compression socks were tight.  Recommend that he try diabetic compression socks.    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 He is noticing spasms in his left leg. 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  A referral to vascular surgery has been in place.  We discussed that this may be the cause of some of his left leg complaints.    DION (generalized anxiety disorder)  He felt spacey with taking the medication. He tried sertraline 50 mg daily for 1 week and the hydroxyzine made him irritable. He did take hydroxyzine as needed. He stopped the medication and his symptoms resolved. He has been more focused and not irritable with stopping the medication.  Declines referral to psychiatry or behavioral health at this time.  He is interested in starting a different medication.    Past Medical History:   Diagnosis Date    Gastroparesis     2016    Hypertension     7 yrs    Kidney stones     6-7 yrs ago       Social History     Tobacco Use    Smoking status: Never    Smokeless tobacco: Former     Types: Chew     Quit date: 5/1/1980   Vaping Use    Vaping Use: Never used   Substance Use Topics    Alcohol use: Yes     Alcohol/week: 1.2 oz     Types: 2 Cans of beer per week     Comment: occasionally    Drug use: No       Current Outpatient Medications Ordered in Epic   Medication Sig Dispense Refill    escitalopram (LEXAPRO) 10 MG Tab Take 1 Tablet by mouth every day. 30 Tablet 2     "triamcinolone acetonide (KENALOG) 0.1 % Cream Apply to dry itchy rash, twice daily up to 2 weeks as needed. Do not apply to face, axilla, or genitals.      metFORMIN ER (GLUCOPHAGE XR) 500 MG TABLET SR 24 HR Take 1 Tablet by mouth every day. 90 Tablet 3    losartan (COZAAR) 100 MG Tab Take 1 Tablet by mouth every day. 90 Tablet 3    amLODIPine (NORVASC) 2.5 MG Tab Take 1 Tablet by mouth every day. 90 Tablet 3    allopurinol (ZYLOPRIM) 300 MG Tab Take 1 Tablet by mouth every day. 90 Tablet 3    cyclobenzaprine (FLEXERIL) 10 mg Tab Take 10 mg by mouth 3 times a day as needed.      hydroCHLOROthiazide (HYDRODIURIL) 25 MG Tab Take 1 Tablet by mouth every day. Due for appointment with PCP. Last refill. 90 Tablet 0    lansoprazole (PREVACID) 30 MG CAPSULE DELAYED RELEASE TAKE ONE CAPSULE BY MOUTH ONE TIME DAILY 90 Capsule 3    diclofenac sodium (VOLTAREN) 1 % Gel apply 4 grams topically to shoulder(S) and neck four times a day for pain 100 g 3    Potassium 99 MG Tab Take 1 Tablet by mouth every day.      Magnesium 250 MG Tab Take  by mouth every day.      Probiotic Product (PROBIOTIC-10 PO) Take  by mouth every day. (Patient not taking: Reported on 2/7/2023)       No current [facility]-ordered facility-administered medications on file.       Allergies:  Iodine    Health Maintenance: Reviewed      Objective:     Vital signs reviewed  Exam:  /82 (BP Location: Right arm, Patient Position: Sitting, BP Cuff Size: Adult)   Pulse 68   Temp 36.4 °C (97.5 °F) (Temporal)   Ht 1.702 m (5' 7\")   Wt 97.5 kg (215 lb)   SpO2 97%   BMI 33.67 kg/m²  Body mass index is 33.67 kg/m².    Gen: Alert and oriented, No apparent distress.  Lungs: Normal effort, CTA bilaterally, no wheezes, rhonchi, or rales  CV: Regular rate and rhythm. No murmurs, rubs, or gallops.    Assessment & Plan:     62 y.o. male with the following -     1. DION (generalized anxiety disorder)  Chronic exacerbated problem.  He did not tolerate sertraline or " hydroxyzine.  We will stop his medications.  We discussed trialing a different SSRI and he is in agreement.  Start escitalopram 10 mg daily.  We discussed that if he experiences similar symptoms to let me know via a [facility] message or telephone call and I will try different medication class for his anxiety.  Offered him referrals to psychiatry and behavioral health and he declines today.  He will return in 6 weeks for follow-up.  - escitalopram (LEXAPRO) 10 MG Tab; Take 1 Tablet by mouth every day.  Dispense: 30 Tablet; Refill: 2    2. Bilateral leg cramps  Chronic exacerbated problem.  Discussed and reviewed his recent ultrasound results.  Recommend he try diabetic compression socks and follow-up with vascular surgery.  Referral is in place.    3. Plantar fasciitis of left foot  Chronic stable problem.  Continue follow-up with podiatry.  Continue with orthotics and continue with compression socks.      Return in about 6 weeks (around 3/21/2023) for anxiety .    Please note that this dictation was created using voice recognition software. I have made every reasonable attempt to correct obvious errors, but I expect that there are errors of grammar and possibly content that I did not discover before finalizing the note.

## 2023-02-24 ENCOUNTER — PATIENT MESSAGE (OUTPATIENT)
Dept: HEALTH INFORMATION MANAGEMENT | Facility: OTHER | Age: 63
End: 2023-02-24

## 2023-03-25 ENCOUNTER — TELEPHONE (OUTPATIENT)
Dept: URGENT CARE | Facility: PHYSICIAN GROUP | Age: 63
End: 2023-03-25
Payer: COMMERCIAL

## 2023-04-17 ENCOUNTER — HOSPITAL ENCOUNTER (OUTPATIENT)
Dept: LAB | Facility: MEDICAL CENTER | Age: 63
End: 2023-04-17
Attending: NURSE PRACTITIONER
Payer: COMMERCIAL

## 2023-04-17 ENCOUNTER — OFFICE VISIT (OUTPATIENT)
Dept: MEDICAL GROUP | Facility: PHYSICIAN GROUP | Age: 63
End: 2023-04-17
Payer: COMMERCIAL

## 2023-04-17 VITALS
OXYGEN SATURATION: 96 % | TEMPERATURE: 97.7 F | SYSTOLIC BLOOD PRESSURE: 130 MMHG | DIASTOLIC BLOOD PRESSURE: 80 MMHG | BODY MASS INDEX: 35.36 KG/M2 | HEIGHT: 66 IN | HEART RATE: 64 BPM | WEIGHT: 220 LBS

## 2023-04-17 DIAGNOSIS — R73.03 PREDIABETES: ICD-10-CM

## 2023-04-17 DIAGNOSIS — Z12.11 SCREENING FOR COLORECTAL CANCER: ICD-10-CM

## 2023-04-17 DIAGNOSIS — H91.93 DECREASED HEARING, BILATERAL: ICD-10-CM

## 2023-04-17 DIAGNOSIS — I49.9 IRREGULAR HEARTBEAT: ICD-10-CM

## 2023-04-17 DIAGNOSIS — R41.3 MEMORY DIFFICULTY: ICD-10-CM

## 2023-04-17 DIAGNOSIS — Z12.12 SCREENING FOR COLORECTAL CANCER: ICD-10-CM

## 2023-04-17 LAB
ALBUMIN SERPL BCP-MCNC: 4.3 G/DL (ref 3.2–4.9)
ALBUMIN/GLOB SERPL: 1.5 G/DL
ALP SERPL-CCNC: 84 U/L (ref 30–99)
ALT SERPL-CCNC: 13 U/L (ref 2–50)
ANION GAP SERPL CALC-SCNC: 14 MMOL/L (ref 7–16)
AST SERPL-CCNC: 17 U/L (ref 12–45)
BILIRUB SERPL-MCNC: 0.6 MG/DL (ref 0.1–1.5)
BUN SERPL-MCNC: 16 MG/DL (ref 8–22)
CALCIUM ALBUM COR SERPL-MCNC: 9.1 MG/DL (ref 8.5–10.5)
CALCIUM SERPL-MCNC: 9.3 MG/DL (ref 8.5–10.5)
CHLORIDE SERPL-SCNC: 104 MMOL/L (ref 96–112)
CO2 SERPL-SCNC: 25 MMOL/L (ref 20–33)
CREAT SERPL-MCNC: 0.73 MG/DL (ref 0.5–1.4)
EST. AVERAGE GLUCOSE BLD GHB EST-MCNC: 120 MG/DL
GFR SERPLBLD CREATININE-BSD FMLA CKD-EPI: 101 ML/MIN/1.73 M 2
GLOBULIN SER CALC-MCNC: 2.9 G/DL (ref 1.9–3.5)
GLUCOSE SERPL-MCNC: 78 MG/DL (ref 65–99)
HBA1C MFR BLD: 5.8 % (ref 4–5.6)
MAGNESIUM SERPL-MCNC: 2.1 MG/DL (ref 1.5–2.5)
POTASSIUM SERPL-SCNC: 3.8 MMOL/L (ref 3.6–5.5)
PROT SERPL-MCNC: 7.2 G/DL (ref 6–8.2)
SODIUM SERPL-SCNC: 143 MMOL/L (ref 135–145)
TSH SERPL DL<=0.005 MIU/L-ACNC: 3.11 UIU/ML (ref 0.38–5.33)

## 2023-04-17 PROCEDURE — 99214 OFFICE O/P EST MOD 30 MIN: CPT | Performed by: NURSE PRACTITIONER

## 2023-04-17 PROCEDURE — 36415 COLL VENOUS BLD VENIPUNCTURE: CPT

## 2023-04-17 PROCEDURE — 84443 ASSAY THYROID STIM HORMONE: CPT

## 2023-04-17 PROCEDURE — 93000 ELECTROCARDIOGRAM COMPLETE: CPT | Performed by: NURSE PRACTITIONER

## 2023-04-17 PROCEDURE — 83036 HEMOGLOBIN GLYCOSYLATED A1C: CPT

## 2023-04-17 PROCEDURE — 80053 COMPREHEN METABOLIC PANEL: CPT

## 2023-04-17 PROCEDURE — 83735 ASSAY OF MAGNESIUM: CPT

## 2023-04-17 ASSESSMENT — FIBROSIS 4 INDEX: FIB4 SCORE: .986133682178185106

## 2023-04-17 NOTE — ASSESSMENT & PLAN NOTE
In the last 3-4 months he has noticed short-term memory loss while working at [employer].  He states that he is having difficulty remembering what he was told the day before. He has to be shown multiple times. Associated long term memory issues. He has not noticed worsening hearing. He can get flustered and this will worsen his anxiety. Continues to have stress with his work. His last eye exam was 18 months ago but does have blurry vision. His last hearing test was years ago.

## 2023-04-17 NOTE — ASSESSMENT & PLAN NOTE
He was seen by cardiology almost 2 years ago.  A nuclear medicine stress test, echocardiogram and cardiac event monitor were ordered.  He did not complete.  In the last month he has noted worsening irregular heartbeats. When he takes his pulse the pulse is irregular. He drinks occasional energy drink. Does not drink coffee and drinks 1 glass of tea once a month. Drinks mostly water or bubly water. Denies chest pain, shortness of breath, dizziness or headaches.

## 2023-04-17 NOTE — PROGRESS NOTES
Subjective:     CC: Memory loss and irregular heartbeat    HPI:   Venkat presents today with the following:    Memory difficulty  In the last 3-4 months he has noticed short-term memory loss while working at [employer].  He states that he is having difficulty remembering what he was told the day before. He has to be shown multiple times. Associated long term memory issues. He has not noticed worsening hearing. He can get flustered and this will worsen his anxiety. Continues to have stress with his work. His last eye exam was 18 months ago but does have blurry vision. His last hearing test was years ago.     Irregular heartbeat  He was seen by cardiology almost 2 years ago.  A nuclear medicine stress test, echocardiogram and cardiac event monitor were ordered.  He did not complete.  In the last month he has noted worsening irregular heartbeats. When he takes his pulse the pulse is irregular. He drinks occasional energy drink. Does not drink coffee and drinks 1 glass of tea once a month. Drinks mostly water or bubly water. Denies chest pain, shortness of breath, dizziness or headaches.     Past Medical History:   Diagnosis Date    Gastroparesis     2016    Hypertension     7 yrs    Kidney stones     6-7 yrs ago       Social History     Tobacco Use    Smoking status: Never    Smokeless tobacco: Former     Types: Chew     Quit date: 5/1/1980   Vaping Use    Vaping Use: Never used   Substance Use Topics    Alcohol use: Yes     Alcohol/week: 1.2 oz     Types: 2 Cans of beer per week     Comment: occasionally    Drug use: No       Current Outpatient Medications Ordered in Epic   Medication Sig Dispense Refill    triamcinolone acetonide (KENALOG) 0.1 % Cream Apply to dry itchy rash, twice daily up to 2 weeks as needed. Do not apply to face, axilla, or genitals.      losartan (COZAAR) 100 MG Tab Take 1 Tablet by mouth every day. 90 Tablet 3    amLODIPine (NORVASC) 2.5 MG Tab Take 1 Tablet by mouth every day. 90 Tablet 3     "allopurinol (ZYLOPRIM) 300 MG Tab Take 1 Tablet by mouth every day. 90 Tablet 3    cyclobenzaprine (FLEXERIL) 10 mg Tab Take 10 mg by mouth 3 times a day as needed.      hydroCHLOROthiazide (HYDRODIURIL) 25 MG Tab Take 1 Tablet by mouth every day. Due for appointment with PCP. Last refill. 90 Tablet 0    lansoprazole (PREVACID) 30 MG CAPSULE DELAYED RELEASE TAKE ONE CAPSULE BY MOUTH ONE TIME DAILY 90 Capsule 3    diclofenac sodium (VOLTAREN) 1 % Gel apply 4 grams topically to shoulder(S) and neck four times a day for pain 100 g 3    Potassium 99 MG Tab Take 1 Tablet by mouth every day.      Magnesium 250 MG Tab Take  by mouth every day.      metFORMIN ER (GLUCOPHAGE XR) 500 MG TABLET SR 24 HR Take 1 Tablet by mouth every day. 90 Tablet 3    Probiotic Product (PROBIOTIC-10 PO) Take  by mouth every day. (Patient not taking: Reported on 4/17/2023)       No current Epic-ordered facility-administered medications on file.       Allergies:  Iodine    Health Maintenance: Reviewed      Objective:     Vital signs reviewed  Exam:  /80 (BP Location: Right arm, Patient Position: Sitting, BP Cuff Size: Adult)   Pulse 64   Temp 36.5 °C (97.7 °F) (Temporal)   Ht 1.67 m (5' 5.75\")   Wt 99.8 kg (220 lb)   SpO2 96%   BMI 35.78 kg/m²  Body mass index is 35.78 kg/m².    Gen: Alert and oriented, No apparent distress.  Neck: Neck is supple without lymphadenopathy.  Lungs: Normal effort, CTA bilaterally, no wheezes, rhonchi, or rales  CV: Regular rate and irregular rhythm. No murmurs, rubs, or gallops.    EKG Interpretation-HR is 49-50 normal sinus rhythm, PAC's noted, first degree heart block.    Assessment & Plan:     62 y.o. male with the following -     1. Irregular heartbeat  Chronic exacerbated problem.  EKG did show PACs today along with a first-degree heart block.  Checking electrolytes and thyroid function as below.  Will check echocardiogram.  Referral placed to cardiology.  Differential diagnosis includes thyroid " dysfunction, electrolyte abnormality, arrhythmia.  Continue with potassium 99, magnesium 2050 mg daily, losartan 100 mg daily, hydrochlorothiazide 25 mg daily and amlodipine 2.5 mg.  - EKG  - Comp Metabolic Panel; Future  - MAGNESIUM; Future  - TSH WITH REFLEX TO FT4; Future  - EC-ECHOCARDIOGRAM COMPLETE W/O CONT; Future  - REFERRAL TO CARDIOLOGY    2. Memory difficulty  Chronic exacerbated problem.  He has not had any formal testing and we discussed referral to neurology and he is in agreement.  - Referral to Neurology    3. Decreased hearing, bilateral  Chronic stable problem.  Encouraged updating his hearing test.  Referral to audiology.  - Referral to Audiology    4. Prediabetes  Chronic stable problem.  Given his irregular heartbeat and memory difficulties we will check updated A1c.  Continue metformin  mg daily.  - HEMOGLOBIN A1C; Future    5. Screening for colorectal cancer  Chronic stable problem.  Due for recall colonoscopy reordered.  Encouraged patient to schedule.  - Referral to GI for Colonoscopy      Return in about 1 week (around 4/24/2023) for Multiple Issues.    Please note that this dictation was created using voice recognition software. I have made every reasonable attempt to correct obvious errors, but I expect that there are errors of grammar and possibly content that I did not discover before finalizing the note.

## 2023-05-15 ENCOUNTER — ANCILLARY PROCEDURE (OUTPATIENT)
Dept: CARDIOLOGY | Facility: MEDICAL CENTER | Age: 63
End: 2023-05-15
Attending: NURSE PRACTITIONER
Payer: COMMERCIAL

## 2023-05-15 DIAGNOSIS — I49.9 IRREGULAR HEARTBEAT: ICD-10-CM

## 2023-05-15 LAB
LV EJECT FRACT  99904: 55
LV EJECT FRACT MOD 2C 99903: 40.09
LV EJECT FRACT MOD 4C 99902: 66.54
LV EJECT FRACT MOD BP 99901: 54.17

## 2023-05-15 PROCEDURE — 93306 TTE W/DOPPLER COMPLETE: CPT | Mod: 26 | Performed by: INTERNAL MEDICINE

## 2023-05-15 PROCEDURE — 93306 TTE W/DOPPLER COMPLETE: CPT

## 2023-05-23 ENCOUNTER — OFFICE VISIT (OUTPATIENT)
Dept: CARDIOLOGY | Facility: MEDICAL CENTER | Age: 63
End: 2023-05-23
Attending: NURSE PRACTITIONER
Payer: COMMERCIAL

## 2023-05-23 VITALS
HEART RATE: 57 BPM | SYSTOLIC BLOOD PRESSURE: 128 MMHG | HEIGHT: 68 IN | OXYGEN SATURATION: 97 % | BODY MASS INDEX: 32.89 KG/M2 | RESPIRATION RATE: 17 BRPM | WEIGHT: 217 LBS | DIASTOLIC BLOOD PRESSURE: 80 MMHG

## 2023-05-23 DIAGNOSIS — I34.0 MILD MITRAL REGURGITATION: ICD-10-CM

## 2023-05-23 DIAGNOSIS — I77.810 DILATED AORTIC ROOT (HCC): ICD-10-CM

## 2023-05-23 DIAGNOSIS — I10 ESSENTIAL HYPERTENSION: ICD-10-CM

## 2023-05-23 DIAGNOSIS — I27.20 PULMONARY HTN (HCC): ICD-10-CM

## 2023-05-23 DIAGNOSIS — I77.810 ASCENDING AORTA DILATATION (HCC): ICD-10-CM

## 2023-05-23 DIAGNOSIS — I49.9 IRREGULAR HEART BEAT: ICD-10-CM

## 2023-05-23 DIAGNOSIS — I35.1 MILD AORTIC INSUFFICIENCY: ICD-10-CM

## 2023-05-23 PROCEDURE — 3074F SYST BP LT 130 MM HG: CPT | Performed by: PHYSICIAN ASSISTANT

## 2023-05-23 PROCEDURE — 99214 OFFICE O/P EST MOD 30 MIN: CPT | Performed by: PHYSICIAN ASSISTANT

## 2023-05-23 PROCEDURE — 99212 OFFICE O/P EST SF 10 MIN: CPT | Performed by: PHYSICIAN ASSISTANT

## 2023-05-23 PROCEDURE — 3079F DIAST BP 80-89 MM HG: CPT | Performed by: PHYSICIAN ASSISTANT

## 2023-05-23 ASSESSMENT — FIBROSIS 4 INDEX: FIB4 SCORE: 1.12

## 2023-05-23 ASSESSMENT — ENCOUNTER SYMPTOMS
GASTROINTESTINAL NEGATIVE: 1
NAUSEA: 0
EYES NEGATIVE: 1
DOUBLE VISION: 0
PALPITATIONS: 0
FEVER: 0
COUGH: 0
HEADACHES: 0
LOSS OF CONSCIOUSNESS: 0
PSYCHIATRIC NEGATIVE: 1
BLURRED VISION: 0
DIZZINESS: 1
ABDOMINAL PAIN: 0
CLAUDICATION: 0
SHORTNESS OF BREATH: 0
ORTHOPNEA: 0
MYALGIAS: 0
CHILLS: 0
VOMITING: 0
BRUISES/BLEEDS EASILY: 0
PND: 0
MUSCULOSKELETAL NEGATIVE: 1

## 2023-05-23 NOTE — PROGRESS NOTES
Chief Complaint   Patient presents with    Shortness of Breath     F/V DX: Dyspnea on exertion        Subjective     Venkat Mackenzie is a 62 y.o. male with a history of an irregular heartbeat, hypertension, diabetes, GERD, and sleep apnea who presents today for overdue follow-up.    His primary cardiologist is Dr. Broussard.  He was seen initially 6/9/2021 for cardiac consultation due to an irregular heartbeat.  He did not have any other cardiac concerns at that time.  He was recommended to have an echocardiogram performed, stress testing, and event monitor.  He was to return in 8 weeks, but was lost to follow-up.    Today, he reports he is doing well, but that he has had continued irregular heart beats.when he checks his pulse and he feels as though it is worsening in frequency though he does not feel palpitations. He does have some dizziness when standing too quickly and fatigue. No chest pain or palpitations. No shortness of breath, dyspnea on exertion, orthopnea or PND. No lower extremity edema.No syncope or presyncope.      He previously worked in a [employer] as a [employer] person and was a  in [place] for 20+ years. He now works at [employer].     Cardiovascular Risk Factors:  1. Smoking status: No  2. Type II Diabetes Mellitus: No   Lab Results   Component Value Date/Time    HBA1C 5.8 (H) 04/17/2023 11:49 AM    HBA1C 5.8 (H) 01/10/2023 08:22 AM     3. Hypertension: Yes  4. Dyslipidemia: No   Cholesterol,Tot   Date Value Ref Range Status   01/10/2023 135 100 - 199 mg/dL Final     LDL   Date Value Ref Range Status   01/10/2023 65 <100 mg/dL Final     HDL   Date Value Ref Range Status   01/10/2023 61 >=40 mg/dL Final     Triglycerides   Date Value Ref Range Status   01/10/2023 46 0 - 149 mg/dL Final     5. Family history of early Coronary Artery Disease in a first degree relative (Male less than 55 years of age; Female less than 65 years of age): No  6.  Obesity and/or Metabolic Syndrome: Yes  7.  Sedentary lifestyle: No     Past Medical History:   Diagnosis Date    Gastroparesis     2016    Hypertension     7 yrs    Kidney stones     6-7 yrs ago     Past Surgical History:   Procedure Laterality Date    APPENDECTOMY      CERVICAL FUSION POSTERIOR      7-8 yrs back    VAGOTOMY  at age 19    due to hiatal hernia     Family History   Problem Relation Age of Onset    Stroke Mother     Heart Disease Mother     Diabetes Mother     Lung Disease Mother         COPD    Diabetes Father     Heart Disease Father     Stroke Father     No Known Problems Son     No Known Problems Son      Social History     Socioeconomic History    Marital status:      Spouse name: Not on file    Number of children: Not on file    Years of education: Not on file    Highest education level: Not on file   Occupational History    Occupation: surveillance     Employer: [employer]   Tobacco Use    Smoking status: Never    Smokeless tobacco: Former     Types: Chew     Quit date: 5/1/1980   Vaping Use    Vaping Use: Never used   Substance and Sexual Activity    Alcohol use: Yes     Alcohol/week: 1.2 oz     Types: 2 Cans of beer per week     Comment: occasionally    Drug use: No    Sexual activity: Yes     Partners: Female   Other Topics Concern    Not on file   Social History Narrative    Not on file     Social Determinants of Health     Financial Resource Strain: Not on file   Food Insecurity: Not on file   Transportation Needs: Not on file   Physical Activity: Not on file   Stress: Not on file   Social Connections: Not on file   Intimate Partner Violence: Not on file   Housing Stability: Not on file     Allergies   Allergen Reactions    Iodine Unspecified     dizziness     Outpatient Encounter Medications as of 5/23/2023   Medication Sig Dispense Refill    triamcinolone acetonide (KENALOG) 0.1 % Cream Apply to dry itchy rash, twice daily up to 2 weeks as needed. Do not apply to face, axilla, or genitals.      metFORMIN ER (GLUCOPHAGE XR)  500 MG TABLET SR 24 HR Take 1 Tablet by mouth every day. 90 Tablet 3    losartan (COZAAR) 100 MG Tab Take 1 Tablet by mouth every day. 90 Tablet 3    amLODIPine (NORVASC) 2.5 MG Tab Take 1 Tablet by mouth every day. 90 Tablet 3    allopurinol (ZYLOPRIM) 300 MG Tab Take 1 Tablet by mouth every day. 90 Tablet 3    cyclobenzaprine (FLEXERIL) 10 mg Tab Take 10 mg by mouth 3 times a day as needed.      hydroCHLOROthiazide (HYDRODIURIL) 25 MG Tab Take 1 Tablet by mouth every day. Due for appointment with PCP. Last refill. 90 Tablet 0    lansoprazole (PREVACID) 30 MG CAPSULE DELAYED RELEASE TAKE ONE CAPSULE BY MOUTH ONE TIME DAILY 90 Capsule 3    diclofenac sodium (VOLTAREN) 1 % Gel apply 4 grams topically to shoulder(S) and neck four times a day for pain 100 g 3    Potassium 99 MG Tab Take 1 Tablet by mouth every day.      Probiotic Product (PROBIOTIC-10 PO) Take  by mouth every day.      Magnesium 250 MG Tab Take  by mouth every day.       No facility-administered encounter medications on file as of 5/23/2023.     Review of Systems   Constitutional:  Positive for malaise/fatigue. Negative for chills and fever.   HENT: Negative.     Eyes: Negative.  Negative for blurred vision and double vision.   Respiratory:  Negative for cough and shortness of breath.    Cardiovascular:  Negative for chest pain, palpitations, orthopnea, claudication, leg swelling and PND.   Gastrointestinal: Negative.  Negative for abdominal pain, nausea and vomiting.   Genitourinary: Negative.    Musculoskeletal: Negative.  Negative for myalgias.   Skin: Negative.  Negative for rash.   Neurological:  Positive for dizziness (when standing up too quickly). Negative for loss of consciousness and headaches.   Endo/Heme/Allergies: Negative.  Does not bruise/bleed easily.   Psychiatric/Behavioral: Negative.                Objective     /80 (BP Location: Left arm, Patient Position: Sitting, BP Cuff Size: Adult)   Pulse (!) 57   Resp 17   Ht 1.727 m  "(5' 8\")   Wt 98.4 kg (217 lb)   SpO2 97%   BMI 32.99 kg/m²     Physical Exam  Vitals reviewed.   Constitutional:       General: He is not in acute distress.     Appearance: Normal appearance.   HENT:      Head: Normocephalic and atraumatic.      Right Ear: External ear normal.      Left Ear: External ear normal.   Eyes:      General: No scleral icterus.     Extraocular Movements: Extraocular movements intact.      Conjunctiva/sclera: Conjunctivae normal.      Pupils: Pupils are equal, round, and reactive to light.   Cardiovascular:      Rate and Rhythm: Normal rate and regular rhythm.      Pulses: Normal pulses.      Heart sounds: Normal heart sounds. No murmur heard.     No friction rub. No gallop.   Pulmonary:      Effort: Pulmonary effort is normal.      Breath sounds: Normal breath sounds.   Abdominal:      General: Bowel sounds are normal.      Palpations: Abdomen is soft.      Tenderness: There is no abdominal tenderness.   Musculoskeletal:         General: Normal range of motion.      Cervical back: Normal range of motion and neck supple.      Right lower leg: No edema.      Left lower leg: No edema.   Skin:     General: Skin is warm and dry.      Capillary Refill: Capillary refill takes less than 2 seconds.   Neurological:      General: No focal deficit present.      Mental Status: He is alert and oriented to person, place, and time.   Psychiatric:         Mood and Affect: Mood normal.         Behavior: Behavior normal.         Judgment: Judgment normal.       Lab Results   Component Value Date/Time    CHOLSTRLTOT 135 01/10/2023 08:22 AM    LDL 65 01/10/2023 08:22 AM    HDL 61 01/10/2023 08:22 AM    TRIGLYCERIDE 46 01/10/2023 08:22 AM       Lab Results   Component Value Date/Time    SODIUM 143 04/17/2023 11:49 AM    POTASSIUM 3.8 04/17/2023 11:49 AM    CHLORIDE 104 04/17/2023 11:49 AM    CO2 25 04/17/2023 11:49 AM    GLUCOSE 78 04/17/2023 11:49 AM    BUN 16 04/17/2023 11:49 AM    CREATININE 0.73 " 04/17/2023 11:49 AM     Lab Results   Component Value Date/Time    ALKPHOSPHAT 84 04/17/2023 11:49 AM    ASTSGOT 17 04/17/2023 11:49 AM    ALTSGPT 13 04/17/2023 11:49 AM    TBILIRUBIN 0.6 04/17/2023 11:49 AM      Cardiovascular imaging and procedures:    Echocardiogram 5/15/2023  CONCLUSIONS  No prior study is available for comparison.   Dilated Aortic root (5.3 cm)  and ascending aorta (4.1 cm).  Normal LV size, thickness and systolic function with estimated LVEF   55%.  Normal right ventricular size and systolic function.  Mild AI  Mild MR  Dilated IVC  Mild pulmonary hypertension with estimated RVSP 30-35 mmHg         Assessment & Plan     1. Irregular heart beat  Cardiac Event Monitor      2. Dilated aortic root (HCC)  Basic Metabolic Panel    CT-CTA CHEST WITH & W/O-POST PROCESS    CANCELED: CT-CTA CHEST WITH & W/O-POST PROCESS      3. Ascending aorta dilatation (HCC)  Basic Metabolic Panel    CT-CTA CHEST WITH & W/O-POST PROCESS    CANCELED: CT-CTA CHEST WITH & W/O-POST PROCESS      4. Pulmonary HTN (HCC)        5. Mild aortic insufficiency        6. Mild mitral regurgitation        7. Essential hypertension  Basic Metabolic Panel    CT-CTA CHEST WITH & W/O-POST PROCESS    CANCELED: CT-CTA CHEST WITH & W/O-POST PROCESS          Medical Decision Making: Today's Assessment/Status/Plan:          Irregular heartbeat  - He notes the frequency has increased but he has not changes anything in his routine.  - An echocardiogram was recently performed which demonstrated a normal LVEF of 55%, dilated aortic root and ascending aorta, and mild pulmonary hypertension.  - Cardiac event monitoring previously ordered but never completed. Reordered event monitor.   - We will proceed with stress testing after we assess his ascending aorta better.   - Discussed avoiding triggering factors such as increased stress, alcohol/substances, and excessive caffeine.     Dilated ascending aorta  Dilated aortic root  - Asymptomatic  -  Incidentally found dilatated Aortic root (5.3 cm)  and ascending aorta (4.1 cm) on recent echo.  - We will proceed with urgent CTA chest for more precise measurements.  - Discussed controlling his blood pressure well and avoiding strenuous activities until more is known.     Pulmonary HTN  Mild AI  Mild MR  - Asymptomatic  - Continue to monitor clinically    Hypertension  - Blood pressure is well controlled  - Continue amlodipine 2.5mg daily, HCTZ 25mg daily, and losartan 100mg daily.     Follow up in 6 weeks or sooner with clinical changes.    Thank you for allowing me to participate in the care of Venkat Holly Gurdeep .    Shannan Mauricio PA-C, Cardiology  [facility], [facility]  [address] [address]  Phone: [phone]  Fax: [phone]    PLEASE NOTE: This note was created using voice recognition software. I have made every reasonable attempt to correct obvious errors, but I expect that there are errors of grammar and possibly content that I did not discover before finalizing the note.     I personally spent a total of 33 minutes which includes face-to-face time and non-face-to-face time spent on preparing to see the patient, reviewing hospital notes and tests, obtaining history from the patient, performing a medically appropriate exam, counseling and educating the patient, ordering medications/tests/procedures/referrals as clinically indicated, and documenting information in the electronic medical record.

## 2023-06-02 NOTE — PROGRESS NOTES
Home enrollment completed for the 14 day [employer] X[employer] Holter monitoring program per Shannan Mauricio PA-C  >Monitor mailed to patient by [employer].  >Currently pending EOS.

## 2023-06-07 ENCOUNTER — NON-PROVIDER VISIT (OUTPATIENT)
Dept: CARDIOLOGY | Facility: MEDICAL CENTER | Age: 63
End: 2023-06-07
Attending: PHYSICIAN ASSISTANT
Payer: COMMERCIAL

## 2023-06-07 DIAGNOSIS — I44.1 AV BLOCK, MOBITZ 1: ICD-10-CM

## 2023-06-07 DIAGNOSIS — I49.9 IRREGULAR HEART BEAT: ICD-10-CM

## 2023-06-07 DIAGNOSIS — I49.1 PREMATURE ATRIAL CONTRACTIONS: ICD-10-CM

## 2023-06-07 DIAGNOSIS — I49.3 PVCS (PREMATURE VENTRICULAR CONTRACTIONS): ICD-10-CM

## 2023-06-19 ENCOUNTER — HOSPITAL ENCOUNTER (OUTPATIENT)
Dept: LAB | Facility: MEDICAL CENTER | Age: 63
End: 2023-06-19
Attending: PHYSICIAN ASSISTANT
Payer: COMMERCIAL

## 2023-06-19 DIAGNOSIS — I77.810 ASCENDING AORTA DILATATION (HCC): ICD-10-CM

## 2023-06-19 DIAGNOSIS — I10 ESSENTIAL HYPERTENSION: ICD-10-CM

## 2023-06-19 DIAGNOSIS — I77.810 DILATED AORTIC ROOT (HCC): ICD-10-CM

## 2023-06-19 LAB
ANION GAP SERPL CALC-SCNC: 13 MMOL/L (ref 7–16)
BUN SERPL-MCNC: 24 MG/DL (ref 8–22)
CALCIUM SERPL-MCNC: 9.2 MG/DL (ref 8.5–10.5)
CHLORIDE SERPL-SCNC: 103 MMOL/L (ref 96–112)
CO2 SERPL-SCNC: 25 MMOL/L (ref 20–33)
CREAT SERPL-MCNC: 1.05 MG/DL (ref 0.5–1.4)
GFR SERPLBLD CREATININE-BSD FMLA CKD-EPI: 79 ML/MIN/1.73 M 2
GLUCOSE SERPL-MCNC: 94 MG/DL (ref 65–99)
POTASSIUM SERPL-SCNC: 4.1 MMOL/L (ref 3.6–5.5)
SODIUM SERPL-SCNC: 141 MMOL/L (ref 135–145)

## 2023-06-19 PROCEDURE — 80048 BASIC METABOLIC PNL TOTAL CA: CPT

## 2023-06-19 PROCEDURE — 36415 COLL VENOUS BLD VENIPUNCTURE: CPT

## 2023-06-20 ENCOUNTER — HOSPITAL ENCOUNTER (OUTPATIENT)
Dept: RADIOLOGY | Facility: MEDICAL CENTER | Age: 63
End: 2023-06-20
Attending: PHYSICIAN ASSISTANT
Payer: COMMERCIAL

## 2023-06-20 DIAGNOSIS — Z88.8 ALLERGY TO IODINE: ICD-10-CM

## 2023-06-20 DIAGNOSIS — I10 ESSENTIAL HYPERTENSION: ICD-10-CM

## 2023-06-20 DIAGNOSIS — I77.810 DILATED AORTIC ROOT (HCC): ICD-10-CM

## 2023-06-20 DIAGNOSIS — I77.810 ASCENDING AORTA DILATATION (HCC): ICD-10-CM

## 2023-06-20 RX ORDER — PREDNISONE 50 MG/1
50 TABLET ORAL EVERY 6 HOURS
Qty: 3 TABLET | Refills: 0 | Status: SHIPPED | OUTPATIENT
Start: 2023-06-20 | End: 2023-07-21 | Stop reason: ALTCHOICE

## 2023-06-26 ENCOUNTER — TELEPHONE (OUTPATIENT)
Dept: CARDIOLOGY | Facility: MEDICAL CENTER | Age: 63
End: 2023-06-26
Payer: COMMERCIAL

## 2023-06-26 DIAGNOSIS — I49.9 IRREGULAR HEARTBEAT: ICD-10-CM

## 2023-06-26 PROCEDURE — 93248 EXT ECG>7D<15D REV&INTERPJ: CPT | Performed by: STUDENT IN AN ORGANIZED HEALTH CARE EDUCATION/TRAINING PROGRAM

## 2023-06-26 RX ORDER — METOPROLOL SUCCINATE 25 MG/1
25 TABLET, EXTENDED RELEASE ORAL DAILY
Qty: 90 TABLET | Refills: 3 | Status: SHIPPED
Start: 2023-06-26 | End: 2023-07-18

## 2023-06-26 NOTE — TELEPHONE ENCOUNTER
Sent RX to preferred pharmacy on file.  Called and notified patient of Hk's recommendations, patient verbalized understanding.

## 2023-06-26 NOTE — TELEPHONE ENCOUNTER
Luzmaria Broussard M.D.  You 13 minutes ago (4:38 PM)       Patient with frequent SVTs and frequent PACs.  Recommend starting metoprolol XL 25 mg daily      You  Luzmaria Broussard M.D. 3 hours ago (1:50 PM)       To ADD HK- EOS tracings in today, please read

## 2023-06-27 ENCOUNTER — TELEPHONE (OUTPATIENT)
Dept: CARDIOLOGY | Facility: MEDICAL CENTER | Age: 63
End: 2023-06-27
Payer: COMMERCIAL

## 2023-06-27 DIAGNOSIS — I10 ESSENTIAL HYPERTENSION: ICD-10-CM

## 2023-06-27 RX ORDER — HYDROCHLOROTHIAZIDE 12.5 MG/1
12.5 TABLET ORAL DAILY
Qty: 90 TABLET | Refills: 3 | Status: SHIPPED | OUTPATIENT
Start: 2023-06-27

## 2023-06-27 NOTE — TELEPHONE ENCOUNTER
Message  Received: Yesterday  ELIZABETH Rosas R.N.  Looks like he may be mildly dehydrated on these labs. Please encourage adequate hydration. It also appears as though his event monitor was read today and he was recommended to start metoprolol. Please have him cut his hctz in half as well and repeat a BMP prior to his follow up with me. Thank you.    Associated Results     Contains abnormal data Basic Metabolic Panel  ----------------------------------------------------------------------------------------------    Called pt and d/w him. He verbalized understanding and will follow recommendations.

## 2023-06-30 ENCOUNTER — HOSPITAL ENCOUNTER (OUTPATIENT)
Dept: LAB | Facility: MEDICAL CENTER | Age: 63
End: 2023-06-30
Attending: PHYSICIAN ASSISTANT
Payer: COMMERCIAL

## 2023-06-30 DIAGNOSIS — I10 ESSENTIAL HYPERTENSION: ICD-10-CM

## 2023-06-30 LAB
ANION GAP SERPL CALC-SCNC: 14 MMOL/L (ref 7–16)
BUN SERPL-MCNC: 25 MG/DL (ref 8–22)
CALCIUM SERPL-MCNC: 9.3 MG/DL (ref 8.5–10.5)
CHLORIDE SERPL-SCNC: 102 MMOL/L (ref 96–112)
CO2 SERPL-SCNC: 23 MMOL/L (ref 20–33)
CREAT SERPL-MCNC: 0.78 MG/DL (ref 0.5–1.4)
GFR SERPLBLD CREATININE-BSD FMLA CKD-EPI: 99 ML/MIN/1.73 M 2
GLUCOSE SERPL-MCNC: 88 MG/DL (ref 65–99)
POTASSIUM SERPL-SCNC: 3.7 MMOL/L (ref 3.6–5.5)
SODIUM SERPL-SCNC: 139 MMOL/L (ref 135–145)

## 2023-06-30 PROCEDURE — 36415 COLL VENOUS BLD VENIPUNCTURE: CPT

## 2023-06-30 PROCEDURE — 80048 BASIC METABOLIC PNL TOTAL CA: CPT

## 2023-07-07 ENCOUNTER — TELEPHONE (OUTPATIENT)
Dept: HEALTH INFORMATION MANAGEMENT | Facility: OTHER | Age: 63
End: 2023-07-07

## 2023-07-11 ENCOUNTER — HOSPITAL ENCOUNTER (OUTPATIENT)
Dept: RADIOLOGY | Facility: MEDICAL CENTER | Age: 63
End: 2023-07-11
Attending: PHYSICIAN ASSISTANT
Payer: COMMERCIAL

## 2023-07-11 PROCEDURE — 71275 CT ANGIOGRAPHY CHEST: CPT

## 2023-07-11 PROCEDURE — 700117 HCHG RX CONTRAST REV CODE 255: Performed by: PHYSICIAN ASSISTANT

## 2023-07-11 RX ADMIN — IOHEXOL 100 ML: 350 INJECTION, SOLUTION INTRAVENOUS at 11:17

## 2023-07-13 ENCOUNTER — TELEPHONE (OUTPATIENT)
Dept: CARDIOLOGY | Facility: MEDICAL CENTER | Age: 63
End: 2023-07-13
Payer: COMMERCIAL

## 2023-07-13 DIAGNOSIS — I77.810 DILATED AORTIC ROOT (HCC): ICD-10-CM

## 2023-07-13 DIAGNOSIS — I77.810 ASCENDING AORTA DILATATION (HCC): ICD-10-CM

## 2023-07-13 NOTE — TELEPHONE ENCOUNTER
FELIX    Caller: Venkat Mackenzie     Topic/issue: Pt called in regards to the referral placed by AM, for cardiothoracic surgery, pt would like a call back to discuss please advise.      Callback Number: [phone] (home)     Thank you,     Reynaldo MONTAGUE,

## 2023-07-13 NOTE — TELEPHONE ENCOUNTER
FELIX    Caller: Venkat Mackenzie     Topic/issue: Patient is returning RN's phone call.     Callback Number: [phone] (home)     Thank you,   -Lynnette WOO

## 2023-07-15 NOTE — PROGRESS NOTES
REFERRING PHYSICIAN: Noe Dash MD    CONSULTING PHYSICIAN: Emanuel Zamudio MD, FACS    CHIEF COMPLAINT: Dizziness    HISTORY OF PRESENT ILLNESS: The patient is a 62 y.o. male with past medical history of hypertension, metabolic syndrome, GERD, hyperlipidemia, obstructive sleep apnea, generalized anxiety disorder, PTSD, SVT, PAC just starting BB and ascending aortic aneurysm.  He complains of dizziness, loss of equilibrium and palpitations. Today he states he can walk a mile without having to stop to rest. He denies shortness of breath, edema and PND.    PAST MEDICAL HISTORY:   Active Ambulatory Problems     Diagnosis Date Noted    Gastroparesis     Essential hypertension     Chronic gout 03/22/2017    Vitamin D deficiency 04/04/2017    History of adenomatous polyp of colon 04/18/2017    Metabolic syndrome 05/09/2017    Gastroesophageal reflux disease without esophagitis 05/09/2017    Memory difficulty 07/13/2017    Plantar fasciitis of right foot 01/18/2018    Other sleep apnea 06/13/2018    Hiatal hernia 06/16/2018    Nocturnal leg cramps 09/11/2018    Chronic pain of multiple joints 09/11/2018    Multiple atypical skin moles 05/01/2020    Irregular heartbeat 05/12/2021    Obesity (BMI 30-39.9) 05/12/2021    DION (generalized anxiety disorder) 05/19/2021    Current mild episode of major depressive disorder without prior episode ([facility]) 05/19/2021    PTSD (post-traumatic stress disorder) 05/19/2021    Bilateral leg cramps 10/11/2021    Acute right-sided low back pain with bilateral sciatica 05/02/2022    Plantar fasciitis of left foot 01/10/2023     Resolved Ambulatory Problems     Diagnosis Date Noted    Morbid obesity with BMI of 40.0-44.9, adult (HCC) 03/22/2017    Cutaneous wart 07/13/2017    Left buttock pain 07/13/2017    Left hip pain 07/13/2017    Class 2 obesity due to excess calories without serious comorbidity in adult 10/16/2018    Class 2 obesity due to excess calories without serious comorbidity with  body mass index (BMI) of 37.0 to 37.9 in adult 04/03/2019     Past Medical History:   Diagnosis Date    Hypertension     Kidney stones      PAST SURGICAL HISTORY:   Past Surgical History:   Procedure Laterality Date    APPENDECTOMY      CERVICAL FUSION POSTERIOR      7-8 yrs back    VAGOTOMY  at age 19    due to hiatal hernia     ALLERGIES:   Allergies   Allergen Reactions    Iodine Unspecified     dizziness     CURRENT MEDICATIONS:   Current Outpatient Medications:     carvedilol (COREG) 6.25 MG Tab, Take 1 Tablet by mouth 2 times a day with meals., Disp: 60 Tablet, Rfl: 2    hydroCHLOROthiazide (HYDRODIURIL) 12.5 MG tablet, Take 1 Tablet by mouth every day., Disp: 90 Tablet, Rfl: 3    predniSONE (DELTASONE) 50 MG Tab, Take 1 Tablet by mouth every 6 hours., Disp: 3 Tablet, Rfl: 0    triamcinolone acetonide (KENALOG) 0.1 % Cream, Apply to dry itchy rash, twice daily up to 2 weeks as needed. Do not apply to face, axilla, or genitals., Disp: , Rfl:     metFORMIN ER (GLUCOPHAGE XR) 500 MG TABLET SR 24 HR, Take 1 Tablet by mouth every day., Disp: 90 Tablet, Rfl: 3    losartan (COZAAR) 100 MG Tab, Take 1 Tablet by mouth every day., Disp: 90 Tablet, Rfl: 3    amLODIPine (NORVASC) 2.5 MG Tab, Take 1 Tablet by mouth every day., Disp: 90 Tablet, Rfl: 3    allopurinol (ZYLOPRIM) 300 MG Tab, Take 1 Tablet by mouth every day., Disp: 90 Tablet, Rfl: 3    cyclobenzaprine (FLEXERIL) 10 mg Tab, Take 10 mg by mouth 3 times a day as needed., Disp: , Rfl:     lansoprazole (PREVACID) 30 MG CAPSULE DELAYED RELEASE, TAKE ONE CAPSULE BY MOUTH ONE TIME DAILY, Disp: 90 Capsule, Rfl: 3    diclofenac sodium (VOLTAREN) 1 % Gel, apply 4 grams topically to shoulder(S) and neck four times a day for pain, Disp: 100 g, Rfl: 3    Potassium 99 MG Tab, Take 1 Tablet by mouth every day., Disp: , Rfl:     Probiotic Product (PROBIOTIC-10 PO), Take  by mouth every day., Disp: , Rfl:     Magnesium 250 MG Tab, Take  by mouth every day., Disp: , Rfl:  "    FAMILY HISTORY:   Family History   Problem Relation Age of Onset    Stroke Mother     Heart Disease Mother     Diabetes Mother     Lung Disease Mother         COPD    Diabetes Father     Heart Disease Father     Stroke Father     No Known Problems Son     No Known Problems Son      SOCIAL HISTORY:   Social History     Socioeconomic History    Marital status:      Spouse name: Not on file    Number of children: Not on file    Years of education: Not on file    Highest education level: Not on file   Occupational History    Occupation: surveillance     Employer: [employer]   Tobacco Use    Smoking status: Never    Smokeless tobacco: Former     Types: Chew     Quit date: 5/1/1980   Vaping Use    Vaping Use: Never used   Substance and Sexual Activity    Alcohol use: Yes     Alcohol/week: 1.2 oz     Types: 2 Cans of beer per week     Comment: occasionally    Drug use: No    Sexual activity: Yes     Partners: Female   Other Topics Concern    Not on file   Social History Narrative    Not on file     Social Determinants of Health     Financial Resource Strain: Not on file   Food Insecurity: Not on file   Transportation Needs: Not on file   Physical Activity: Not on file   Stress: Not on file   Social Connections: Not on file   Intimate Partner Violence: Not on file   Housing Stability: Not on file     REVIEW OF SYSTEMS:  Review of Systems   Constitutional: Negative.    HENT: Negative.     Eyes: Negative.    Respiratory: Negative.     Cardiovascular:  Positive for chest pain and palpitations.   Gastrointestinal: Negative.    Genitourinary: Negative.    Musculoskeletal: Negative.    Skin: Negative.    Neurological:  Positive for dizziness.   Endo/Heme/Allergies: Negative.    Psychiatric/Behavioral: Negative.       PHYSICAL EXAMINATION:  BP (!) 140/82 (BP Location: Left arm, Patient Position: Sitting, BP Cuff Size: Adult)   Pulse 60   Temp 36.5 °C (97.7 °F) (Temporal)   Ht 1.727 m (5' 8\")   Wt 98.8 kg (217 lb 12.8 " oz)   SpO2 96%   BMI 33.12 kg/m²      Physical Exam  Constitutional:       General: He is not in acute distress.  HENT:      Head: Normocephalic.   Eyes:      Pupils: Pupils are equal, round, and reactive to light.   Cardiovascular:      Rate and Rhythm: Normal rate and regular rhythm.      Heart sounds: Murmur heard.      Systolic murmur is present with a grade of 1/6.      No gallop.   Pulmonary:      Effort: Pulmonary effort is normal. No respiratory distress.      Breath sounds: Normal breath sounds. No wheezing or rales.   Abdominal:      General: Bowel sounds are normal. There is no distension.      Palpations: Abdomen is soft.      Tenderness: There is no abdominal tenderness.   Musculoskeletal:         General: Normal range of motion.      Cervical back: Neck supple.   Skin:     General: Skin is warm and dry.   Neurological:      Mental Status: He is alert and oriented to person, place, and time.   Psychiatric:         Mood and Affect: Mood and affect normal.         Cognition and Memory: Memory normal.         Judgment: Judgment normal.     LABS REVIEWED:  Lab Results   Component Value Date/Time    SODIUM 139 06/30/2023 07:18 AM    POTASSIUM 3.7 06/30/2023 07:18 AM    CHLORIDE 102 06/30/2023 07:18 AM    CO2 23 06/30/2023 07:18 AM    GLUCOSE 88 06/30/2023 07:18 AM    BUN 25 (H) 06/30/2023 07:18 AM    CREATININE 0.78 06/30/2023 07:18 AM      No results found for: PROTHROMBTM, INR   Lab Results   Component Value Date/Time    WBC 5.7 01/10/2023 08:22 AM    RBC 5.37 01/10/2023 08:22 AM    HEMOGLOBIN 16.3 01/10/2023 08:22 AM    HEMATOCRIT 50.9 01/10/2023 08:22 AM    MCV 94.8 01/10/2023 08:22 AM    MCH 30.4 01/10/2023 08:22 AM    MCHC 32.0 (L) 01/10/2023 08:22 AM    MPV 10.8 01/10/2023 08:22 AM    NEUTSPOLYS 56.80 01/10/2023 08:22 AM    LYMPHOCYTES 28.00 01/10/2023 08:22 AM    MONOCYTES 9.60 01/10/2023 08:22 AM    EOSINOPHILS 4.20 01/10/2023 08:22 AM    BASOPHILS 0.90 01/10/2023 08:22 AM     IMAGING REVIEWED AND  INTERPRETED:    ECHOCARDIOGRAM [facility] 5/15/2023:  Dilated Aortic root (5.3 cm)  and ascending aorta (4.1 cm).  Normal LV size, thickness and systolic function with estimated LVEF   55%.  Normal right ventricular size and systolic function.  Mild AI  Mild MR  Dilated IVC  Mild pulmonary hypertension with estimated RVSP 30-35 mmHg    CARDIAC CATHETERIZATION   none    CT SCAN CHEST [facility] 7/11/2023:  1.  Dilatation of the aortic root measuring 5.4 x 5.5 cm.  2.  Borderline dilatation of the ascending aorta measuring 3.9 cm.  3.  5 mm nodule right middle lobe and 4.3 mm nodule left lower lobe.  4.  Coronary artery calcifications.  5.  Large hiatal hernia.  6.  Cholelithiasis.      IMPRESSION:  Aortic root aneurysm (5.5 to 6 cm), mild aortic regurgitation, supraventricular tachycardia, hypertension, obstructive sleep apnea, anxiety, GERD    PLAN:  I recommend that he undergo aortic root replacement and intraoperative transesophageal echocardiography.    The procedure, its risks, benefits, potential complications and alternative treatments were discussed with the patient in detail including the risks should he decide not to undergo my recommended treatment. All of his questions were answered to his satisfaction and he is willing to proceed with the operation. The risks include death, stroke, infection: to include a rare bacterial infection related to the use of the heart/lung machine, jennifer-operative myocardial infarction, dysrhythmias, diaphragmatic paralysis, chest wall paresthesia, tracheostomy, kidney or other organ failure, possible return to the operating room for bleeding, bleeding requiring transfusion with its attendant risks including AIDS or hepatitis, dehiscence of surgical incisions, respiratory complications including the need for prolonged ventilator support, Protamine or other drug reaction, peripheral neuropathy, loss of limb, and miscount of surgical items. The operative mortality risk is approximately 1-2%.  The STS mortality risk score is 0.4% and the morbidity and mortality risk score is 4.3%. The scores were discussed with patient.    The operation is scheduled for Tuesday, August 22, 2023 at 7:30 AM at [facility].  Cardiac catheterization will be performed prior to the operation.    Findings and recommendations have been discussed with the patient’s cardiologist, Noe Dash MD.  Thank you for this very challenging consultation and participation in the patient’s care.  I will keep you apprised of all future developments.    Sincerely,    Emanuel Zamudio MD, FACS

## 2023-07-18 ENCOUNTER — OFFICE VISIT (OUTPATIENT)
Dept: CARDIOLOGY | Facility: MEDICAL CENTER | Age: 63
End: 2023-07-18
Attending: PHYSICIAN ASSISTANT
Payer: COMMERCIAL

## 2023-07-18 VITALS
WEIGHT: 218 LBS | BODY MASS INDEX: 34.21 KG/M2 | SYSTOLIC BLOOD PRESSURE: 142 MMHG | RESPIRATION RATE: 16 BRPM | HEIGHT: 67 IN | DIASTOLIC BLOOD PRESSURE: 72 MMHG | HEART RATE: 71 BPM | OXYGEN SATURATION: 95 %

## 2023-07-18 DIAGNOSIS — I27.20 PULMONARY HTN (HCC): ICD-10-CM

## 2023-07-18 DIAGNOSIS — I77.810 DILATED AORTIC ROOT (HCC): ICD-10-CM

## 2023-07-18 DIAGNOSIS — I49.9 IRREGULAR HEARTBEAT: ICD-10-CM

## 2023-07-18 DIAGNOSIS — I77.810 ASCENDING AORTA DILATATION (HCC): ICD-10-CM

## 2023-07-18 DIAGNOSIS — I49.3 PVC'S (PREMATURE VENTRICULAR CONTRACTIONS): ICD-10-CM

## 2023-07-18 DIAGNOSIS — I49.1 PREMATURE ATRIAL COMPLEXES: ICD-10-CM

## 2023-07-18 DIAGNOSIS — R91.8 PULMONARY NODULES: ICD-10-CM

## 2023-07-18 DIAGNOSIS — I10 ESSENTIAL HYPERTENSION: ICD-10-CM

## 2023-07-18 DIAGNOSIS — I47.10 SVT (SUPRAVENTRICULAR TACHYCARDIA) (HCC): ICD-10-CM

## 2023-07-18 DIAGNOSIS — I35.1 MILD AORTIC INSUFFICIENCY: ICD-10-CM

## 2023-07-18 DIAGNOSIS — R07.89 CHEST PRESSURE: ICD-10-CM

## 2023-07-18 DIAGNOSIS — I34.0 MILD MITRAL REGURGITATION: ICD-10-CM

## 2023-07-18 PROCEDURE — 3078F DIAST BP <80 MM HG: CPT | Performed by: PHYSICIAN ASSISTANT

## 2023-07-18 PROCEDURE — 99213 OFFICE O/P EST LOW 20 MIN: CPT | Performed by: PHYSICIAN ASSISTANT

## 2023-07-18 PROCEDURE — 3077F SYST BP >= 140 MM HG: CPT | Performed by: PHYSICIAN ASSISTANT

## 2023-07-18 PROCEDURE — 99214 OFFICE O/P EST MOD 30 MIN: CPT | Performed by: PHYSICIAN ASSISTANT

## 2023-07-18 PROCEDURE — 99212 OFFICE O/P EST SF 10 MIN: CPT | Performed by: PHYSICIAN ASSISTANT

## 2023-07-18 RX ORDER — CARVEDILOL 6.25 MG/1
6.25 TABLET ORAL 2 TIMES DAILY WITH MEALS
Qty: 60 TABLET | Refills: 2 | Status: ON HOLD
Start: 2023-07-18 | End: 2023-08-18

## 2023-07-18 ASSESSMENT — ENCOUNTER SYMPTOMS
PALPITATIONS: 0
LOSS OF CONSCIOUSNESS: 0
PSYCHIATRIC NEGATIVE: 1
SHORTNESS OF BREATH: 0
BLURRED VISION: 0
ABDOMINAL PAIN: 0
PND: 0
EYES NEGATIVE: 1
DIZZINESS: 1
HEADACHES: 0
FEVER: 0
MUSCULOSKELETAL NEGATIVE: 1
WEAKNESS: 0
CLAUDICATION: 0
CONSTITUTIONAL NEGATIVE: 1
MYALGIAS: 0
COUGH: 0
VOMITING: 0
NAUSEA: 0
BRUISES/BLEEDS EASILY: 0
CHILLS: 0
DOUBLE VISION: 0
ORTHOPNEA: 0
GASTROINTESTINAL NEGATIVE: 1

## 2023-07-18 ASSESSMENT — FIBROSIS 4 INDEX: FIB4 SCORE: 1.12

## 2023-07-18 NOTE — Clinical Note
Dr. Zamudio, It looks like he is scheduled to see you 7/20 in consultation regarding his dilated aortic root. He has also had some intermittent chest pressure. If he is deemed to be a good surgical candidate, please let me know if you would like a coronary angiogram and I can help facilitate this if needed. Sincerely, Shannan Mauricio PA-C

## 2023-07-18 NOTE — PROGRESS NOTES
Chief Complaint   Patient presents with    Hypertension    Irregular Heart Beat       Subjective     Venkat Mackenzie is a 62 y.o. male with a history of an irregular heartbeat, hypertension, diabetes, GERD, and sleep apnea who presents today for follow-up.     His primary cardiologist is Dr. Broussard.  He was seen initially 6/9/2021 for cardiac consultation due to an irregular heartbeat.  He did not have any other cardiac concerns at that time.  He was recommended to have an echocardiogram performed, stress testing, and event monitor.  He was to return in 8 weeks, but was lost to follow-up. Last seen 5/23/2023 by me. At that exam, he was doing well. He did have continued irregular heart beats when he checked his pulse without palpitations. He was found to have a dilated aortic root and ascending aorta on echo and was recommended to complete a CT for further evaluation. He was also recommended to complete cardiac event monitoring and was to follow up in 6 weeks.     Today, he reports he is feeling well. He does still have some occasional dizziness with standing.  He does also have some occasional chest pressure at the left side of his chest on exertion alleviated by rest.  No chest pain or palpitations. No shortness of breath, dyspnea on exertion, orthopnea or PND. No lower extremity edema. No syncope or presyncope.      He previously worked in a [employer] as a [employer] person and was a  in [place] for 20+ years. He now works at [employer].     Past Medical History:   Diagnosis Date    Gastroparesis     2016    Hypertension     7 yrs    Kidney stones     6-7 yrs ago     Past Surgical History:   Procedure Laterality Date    APPENDECTOMY      CERVICAL FUSION POSTERIOR      7-8 yrs back    VAGOTOMY  at age 19    due to hiatal hernia     Family History   Problem Relation Age of Onset    Stroke Mother     Heart Disease Mother     Diabetes Mother     Lung Disease Mother         COPD    Diabetes Father      Heart Disease Father     Stroke Father     No Known Problems Son     No Known Problems Son      Social History     Socioeconomic History    Marital status:      Spouse name: Not on file    Number of children: Not on file    Years of education: Not on file    Highest education level: Not on file   Occupational History    Occupation: surveillance     Employer: [employer]   Tobacco Use    Smoking status: Never    Smokeless tobacco: Former     Types: Chew     Quit date: 5/1/1980   Vaping Use    Vaping Use: Never used   Substance and Sexual Activity    Alcohol use: Yes     Alcohol/week: 1.2 oz     Types: 2 Cans of beer per week     Comment: occasionally    Drug use: No    Sexual activity: Yes     Partners: Female   Other Topics Concern    Not on file   Social History Narrative    Not on file     Social Determinants of Health     Financial Resource Strain: Not on file   Food Insecurity: Not on file   Transportation Needs: Not on file   Physical Activity: Not on file   Stress: Not on file   Social Connections: Not on file   Intimate Partner Violence: Not on file   Housing Stability: Not on file     Allergies   Allergen Reactions    Iodine Unspecified     dizziness     Outpatient Encounter Medications as of 7/18/2023   Medication Sig Dispense Refill    carvedilol (COREG) 6.25 MG Tab Take 1 Tablet by mouth 2 times a day with meals. 60 Tablet 2    hydroCHLOROthiazide (HYDRODIURIL) 12.5 MG tablet Take 1 Tablet by mouth every day. 90 Tablet 3    predniSONE (DELTASONE) 50 MG Tab Take 1 Tablet by mouth every 6 hours. 3 Tablet 0    triamcinolone acetonide (KENALOG) 0.1 % Cream Apply to dry itchy rash, twice daily up to 2 weeks as needed. Do not apply to face, axilla, or genitals.      metFORMIN ER (GLUCOPHAGE XR) 500 MG TABLET SR 24 HR Take 1 Tablet by mouth every day. 90 Tablet 3    losartan (COZAAR) 100 MG Tab Take 1 Tablet by mouth every day. 90 Tablet 3    amLODIPine (NORVASC) 2.5 MG Tab Take 1 Tablet by mouth every day.  "90 Tablet 3    allopurinol (ZYLOPRIM) 300 MG Tab Take 1 Tablet by mouth every day. 90 Tablet 3    cyclobenzaprine (FLEXERIL) 10 mg Tab Take 10 mg by mouth 3 times a day as needed.      lansoprazole (PREVACID) 30 MG CAPSULE DELAYED RELEASE TAKE ONE CAPSULE BY MOUTH ONE TIME DAILY 90 Capsule 3    diclofenac sodium (VOLTAREN) 1 % Gel apply 4 grams topically to shoulder(S) and neck four times a day for pain 100 g 3    Potassium 99 MG Tab Take 1 Tablet by mouth every day.      Probiotic Product (PROBIOTIC-10 PO) Take  by mouth every day.      Magnesium 250 MG Tab Take  by mouth every day.      [DISCONTINUED] metoprolol SR (TOPROL XL) 25 MG TABLET SR 24 HR Take 1 Tablet by mouth every day. 90 Tablet 3     No facility-administered encounter medications on file as of 7/18/2023.     Review of Systems   Constitutional: Negative.  Negative for chills, fever and malaise/fatigue.   HENT: Negative.     Eyes: Negative.  Negative for blurred vision and double vision.   Respiratory:  Negative for cough and shortness of breath.    Cardiovascular:  Negative for chest pain, palpitations, orthopnea, claudication, leg swelling and PND.        Chest pressure   Gastrointestinal: Negative.  Negative for abdominal pain, nausea and vomiting.   Genitourinary: Negative.    Musculoskeletal: Negative.  Negative for myalgias.   Skin: Negative.  Negative for rash.   Neurological:  Positive for dizziness. Negative for loss of consciousness, weakness and headaches.   Endo/Heme/Allergies: Negative.  Does not bruise/bleed easily.   Psychiatric/Behavioral: Negative.                Objective     BP (!) 142/72 (BP Location: Right arm, Patient Position: Sitting, BP Cuff Size: Adult)   Pulse 71   Resp 16   Ht 1.702 m (5' 7\")   Wt 98.9 kg (218 lb)   SpO2 95%   BMI 34.14 kg/m²     Physical Exam  Vitals reviewed.   Constitutional:       General: He is not in acute distress.     Appearance: Normal appearance.   HENT:      Head: Normocephalic and " atraumatic.      Right Ear: External ear normal.      Left Ear: External ear normal.   Eyes:      General: No scleral icterus.     Extraocular Movements: Extraocular movements intact.      Conjunctiva/sclera: Conjunctivae normal.      Pupils: Pupils are equal, round, and reactive to light.   Cardiovascular:      Rate and Rhythm: Normal rate and regular rhythm.      Pulses: Normal pulses.      Heart sounds: Normal heart sounds. No murmur heard.     No friction rub. No gallop.   Pulmonary:      Effort: Pulmonary effort is normal.      Breath sounds: Normal breath sounds.   Abdominal:      General: Bowel sounds are normal.      Palpations: Abdomen is soft.      Tenderness: There is no abdominal tenderness.   Musculoskeletal:         General: Normal range of motion.      Cervical back: Normal range of motion and neck supple.      Right lower leg: No edema.      Left lower leg: No edema.   Skin:     General: Skin is warm and dry.      Capillary Refill: Capillary refill takes less than 2 seconds.   Neurological:      General: No focal deficit present.      Mental Status: He is alert and oriented to person, place, and time.   Psychiatric:         Mood and Affect: Mood normal.         Behavior: Behavior normal.         Judgment: Judgment normal.       Lab Results   Component Value Date/Time    CHOLSTRLTOT 135 01/10/2023 08:22 AM    LDL 65 01/10/2023 08:22 AM    HDL 61 01/10/2023 08:22 AM    TRIGLYCERIDE 46 01/10/2023 08:22 AM       Lab Results   Component Value Date/Time    SODIUM 139 06/30/2023 07:18 AM    POTASSIUM 3.7 06/30/2023 07:18 AM    CHLORIDE 102 06/30/2023 07:18 AM    CO2 23 06/30/2023 07:18 AM    GLUCOSE 88 06/30/2023 07:18 AM    BUN 25 (H) 06/30/2023 07:18 AM    CREATININE 0.78 06/30/2023 07:18 AM     Lab Results   Component Value Date/Time    ALKPHOSPHAT 84 04/17/2023 11:49 AM    ASTSGOT 17 04/17/2023 11:49 AM    ALTSGPT 13 04/17/2023 11:49 AM    TBILIRUBIN 0.6 04/17/2023 11:49 AM       Cardiovascular imaging  and procedures:    Echocardiogram 5/15/2023  CONCLUSIONS  No prior study is available for comparison.   Dilated Aortic root (5.3 cm)  and ascending aorta (4.1 cm).  Normal LV size, thickness and systolic function with estimated LVEF   55%.  Normal right ventricular size and systolic function.  Mild AI  Mild MR  Dilated IVC  Mild pulmonary hypertension with estimated RVSP 30-35 mmHg    Cardiac event monitor 6/26/2023  DOS: 6/6/2023-6/19/2023   Summary:   The patient was monitored for 12 days and 6 hours.    Predominant underlying rhythm was sinus rhythm.  First-degree AV block was present.  Mobitz I was present.   Frequent runs of supraventricular tachycardia occurred, the fastest interval lasting 12.4 seconds with a max rate of 231 bpm, and the longest run lasting 48.7 seconds with an average rate of 164 bpm.   Frequent PACs with PAC burden of 10.2%.   Occasional PVCs with PVC burden of 1.4%.   Symptoms correlated with sinus rhythm with heart rate 54 to 112 bpm with PACs and PVCs as well as with SVT.     Cardiac CTA 7/11/2023  FINDINGS:  Noncontrast images:  There is no acute intramural hematoma.  Calcifications are located within the coronary arteries.  Contrast images:  Aorta and vasculature:  The aortic root is dilated measuring 5.4 x 5.5 cm.  The ascending aorta measures 3.9 x 3.9 cm.  No aneurysmal dilatation of the aortic arch and descending thoracic aorta.  No evidence of aortic dissection.  Origins of the arch vessels are opacified and patent.  Origins of celiac, superior mesenteric, and renal arteries are opacified and patent.  No evidence of pulmonary artery emboli.  Borderline cardiomegaly. Trace pericardial fluid.  There is no mediastinal or hilar adenopathy.  There is a 5 mm pleural-based nodule right middle lobe image 160.  There is a 4.3 mm nodule left lower lobe image 156.  No airspace consolidation or pleural effusions.  Large hiatal hernia. Adjacent surgical clips are demonstrated.  There are  surgical clips adjacent to the right lobe of the thyroid.  There is a 1.7 cm nonenhancing cyst right kidney and 2.5 cm nonenhancing cyst left kidney. No follow-up required.  There are tiny calcified gallstones.  3D angiographic/MIP images of the vasculature confirm the vascular findings as described above.  IMPRESSION:  1.  Dilatation of the aortic root measuring 5.4 x 5.5 cm.  2.  Borderline dilatation of the ascending aorta measuring 3.9 cm.  3.  5 mm nodule right middle lobe and 4.3 mm nodule left lower lobe.  4.  Coronary artery calcifications.  5.  Large hiatal hernia.  6.  Cholelithiasis.         Assessment & Plan     1. Irregular heartbeat        2. SVT (supraventricular tachycardia) (HCC)  carvedilol (COREG) 6.25 MG Tab      3. Premature atrial complexes  carvedilol (COREG) 6.25 MG Tab      4. PVC's (premature ventricular contractions)        5. Dilated aortic root (HCC)        6. Ascending aorta dilatation (HCC)        7. Chest pressure        8. Pulmonary HTN (HCC)        9. Mild aortic insufficiency        10. Mild mitral regurgitation        11. Essential hypertension  carvedilol (COREG) 6.25 MG Tab      12. Pulmonary nodules            Medical Decision Making: Today's Assessment/Status/Plan:        Irregular heartbeat  SVT  PVCs  - He continues to have a frequent irregular rhythm on palpation.   - Event monitoring showed frequent runs of SVT and a high PVC burden of 10.2  - An echocardiogram was recently performed which demonstrated a normal LVEF of 55%, dilated aortic root and ascending aorta, and mild pulmonary hypertension.  - Discussed avoiding triggering factors such as increased stress, alcohol/substances, and excessive caffeine.   - Switch from metoprolol to carvedilol 6.25mg BID for dual purpose of palpitations and better BP management.      Dilated ascending aorta  Dilated aortic root  - Asymptomatic  - Incidentally found dilatated Aortic root (5.3 cm)  and ascending aorta (4.1 cm) on recent  echo. CTA confirmed dilated aortic root of 5.4 x5.5cm and borderline dilation of the ascending aorta to 3.9  -Placed referral to CT surgery for consideration of repair.   - Discussed controlling his blood pressure better and avoiding strenuous activities until after his consultation with CT surgery.   - He has had some occasional chest pressure and coronary artery calcifications seen on CT. Discussed possibly proceeding with stress testing vs coronary angiogram depending on CT surgery recommendations.      Pulmonary HTN  Mild AI  Mild MR  - Asymptomatic  - Continue to monitor clinically     Hypertension  - Blood pressure is not well controlled  - Renal function did have a slight decline to GFR of 79 in June. His HCTZ was reduced to 12.5mg with return to normal levels.   - Encouraged at home BP monitoring.   - Continue amlodipine 2.5mg daily, HCTZ 12.5mg daily, and losartan 100mg daily and start carvedilol per above.     Pulmonary nodules  -Incidental finding of 5 mm nodule right middle lobe and 4.3 mm nodule left lower lobe.  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   - Encouraged him to follow up with his PCP in regard to this and consider repeating a CT scan in 1 year.       Follow up in 2 weeks for BP management.      Thank you for allowing me to participate in the care of Venkat Mackenzie .    Shannan Mauricio PA-C, Cardiology  [facility]  [facility], [facility]  [address] [address]  Phone: [phone]  Fax: [phone]     PLEASE NOTE: This note was created using voice recognition software. I have made every reasonable attempt to correct obvious errors, but I expect that there are errors of grammar and possibly content that I did not discover before finalizing the note.      I personally spent a total of 34  minutes which includes face-to-face time and non-face-to-face time spent on preparing to see the patient, reviewing hospital notes and tests, obtaining history from the patient, performing a medically appropriate exam, counseling and educating the patient, ordering medications/tests/procedures/referrals as clinically indicated, and documenting information in the electronic medical record.

## 2023-07-18 NOTE — LETTER
July 18, 2023    To Whom It May Concern:         This is confirmation that Venkat Mackenzie attended his scheduled appointment with Shannan Mauricio P.A.-C. on 7/18/23. He has been seen in the office for this condition since 5/15/2023. Please allow him to use intermittent leave as needed for office visits, testing and procedures.          If you have any questions please do not hesitate to call me at the phone number listed below.    Sincerely,          Shannan Mauricio P.A.-C.  [phone]

## 2023-07-20 ENCOUNTER — APPOINTMENT (OUTPATIENT)
Dept: ADMISSIONS | Facility: MEDICAL CENTER | Age: 63
DRG: 219 | End: 2023-07-20
Attending: THORACIC SURGERY (CARDIOTHORACIC VASCULAR SURGERY)
Payer: COMMERCIAL

## 2023-07-20 ENCOUNTER — OFFICE VISIT (OUTPATIENT)
Dept: CARDIOTHORACIC SURGERY | Facility: MEDICAL CENTER | Age: 63
End: 2023-07-20
Payer: COMMERCIAL

## 2023-07-20 VITALS
BODY MASS INDEX: 33.01 KG/M2 | WEIGHT: 217.8 LBS | OXYGEN SATURATION: 96 % | SYSTOLIC BLOOD PRESSURE: 140 MMHG | HEIGHT: 68 IN | TEMPERATURE: 97.7 F | HEART RATE: 60 BPM | DIASTOLIC BLOOD PRESSURE: 82 MMHG

## 2023-07-20 DIAGNOSIS — I71.21 ANEURYSM OF ASCENDING AORTA WITHOUT RUPTURE (HCC): ICD-10-CM

## 2023-07-20 DIAGNOSIS — I35.1 AORTIC VALVE INSUFFICIENCY, ETIOLOGY OF CARDIAC VALVE DISEASE UNSPECIFIED: Primary | ICD-10-CM

## 2023-07-20 PROCEDURE — 99205 OFFICE O/P NEW HI 60 MIN: CPT | Performed by: THORACIC SURGERY (CARDIOTHORACIC VASCULAR SURGERY)

## 2023-07-20 PROCEDURE — 3079F DIAST BP 80-89 MM HG: CPT | Performed by: THORACIC SURGERY (CARDIOTHORACIC VASCULAR SURGERY)

## 2023-07-20 PROCEDURE — 3077F SYST BP >= 140 MM HG: CPT | Performed by: THORACIC SURGERY (CARDIOTHORACIC VASCULAR SURGERY)

## 2023-07-20 ASSESSMENT — ENCOUNTER SYMPTOMS
PALPITATIONS: 1
MUSCULOSKELETAL NEGATIVE: 1
PSYCHIATRIC NEGATIVE: 1
CONSTITUTIONAL NEGATIVE: 1
DIZZINESS: 1
GASTROINTESTINAL NEGATIVE: 1
RESPIRATORY NEGATIVE: 1
EYES NEGATIVE: 1

## 2023-07-20 ASSESSMENT — FIBROSIS 4 INDEX: FIB4 SCORE: 1.12

## 2023-07-20 NOTE — PROGRESS NOTES
Problem: Prehabilitation    Goal: optimize identified modifiable risk factors prior to cardiac surgery.     Intervention: Screening and interventions for the following  risk factors with educational materials provided if indicated  and patient demonstrates readiness to participate.  Dentition, malnutrition, CAD and dietary cholesterol,  obesity, alcohol and tobacco abuse, illegal drug use, and   social support system for post discharge planning.    Additionally, home exercise regimen initiation or continuation  (if appropriate), and teaching of and participation of  inspiratory muscle training via incentive spirometer (provided).    Review of post-surgical physical limitations, upcoming  appointments & testing, ordered diagnostics, and medication review  to identify and educate on anticoagulant and antihypertensives  that need cessation in the days prior to surgery.    The cardiac surgery prehabilitation sheet, surgery instruction sheet,  MAP, education booklet, vitals logbook, normals after heart surgery,  and cardiac rehab flier was provided for patient to read and review.    Surgical CHG wipes were also provided with instructions on use.    DENTITION:  Routine dental appointment prior to surgery is   indicated for valve procedure.    he was encouraged to get a dental   cleaning as he  does not get regular  dental cleaning but denies current dental   infection or issues that should be  addressed prior to surgery.    INCENTIVE SPIROMETRY:  Discussed importance of incentive spirometry (IS) use,  20 times a day AT MINIMUM but more often if possible to  optimize cardio-pulmonary function prior to surgery.  They were instructed to allow a 5 minute rest in  between uses to achieve max IS volume.  Education with return demonstration performed.   Patient is effective, coaching was needed.    Prehabilitation IS baseline is 3000.    HOME EXERCISE REGIMEN:  We discussed importance of preventing deconditioning and  muscle  wasting in the time preceding surgery as this will occur  post surgery.    > 50 % left main disease present? Unknown-angio pending  EF-- 65%  Active sub lingual nitro use? NO  he is appropriate for initiation  of a home exercise regimen.    he is minimally physically active; current  exercise tolerance/level is high due to no  symptoms with activity.    he  was educated to start an   exercise regimen of walking on   a flat surface 30 minutes a day, adjusting the  length for tolerance level.     he was educated  that if chest pain or SOB occurs patient is to stop  immediately and if symptoms do not  resolve after stopping to call 911.    he was also encouraged to practice sit to stand  from a chair with one arm to assist or no arm assistance  12 times a day to strengthen quadriceps and improve balance.    CAD:  Patient does not have known CAD; education on  cholesterol, diet, and the heart are not indicated  and were not provided.    OBESITY:  Patient's BMI is over 30.  Education regarding portion sizing  and diet are indicated and were provided.    MALNUTRITION:  Malnutrition screening tool (MST) shows he is not at risk  with a score of 1. (0-1 not at risk; greater or equal to 2 is at risk).    Given MST score, functional capacity,   and no reported muscle loss, it was not  recommended they increase their protein  intake to 1.2 to 2.5 gram/kg/day    SMOKING:  Patient denies current smoking history.  Smoking risks  and cessation education not indicated and was not provided.    ALCOHOL ABUSE:  Patient denies alcohol abuse.  Alcohol risks and cessation  education not indicated and was not provided.    ILLEGAL DRUG USE:  Patient denies illicit drug use.  Illicit drug use risks  and cessation education not indicated and was not provided    SOCIAL SUPPORT SYSTEM FOR DISCHARGE NEEDS:    Patient does have family,  his wife Cornelia to stay for  1-week post discharge to assist with ADL's. No barriers  to hospital discharge  anticipated.    PSYCHOLOGICAL PREPARATION:  We discussed the basics of physical limitation post op to include:               No driving for 4 weeks               No lifting, pushing or pulling > 10 lbs for 6 weeks  Sternal precautions to include moving within the tube and  safe mobility in and out of bed and the chair.    ANTIBIOTIC STEWARDSHIP:  MRSA swab will be collected by Javi during pre-admit testing.    MEDICATION AN UPCOMING APPOINTMENTS REVIEW:  Current medications were reviewed that may need  specific stop dates prior to surgery. The patient was instructed   to stop the following medications after the indicated date.    Losartan to stop 2 full days prior to surgery; last dose 8/19    Vascular scheduling sheet was provided and explained.    Walk test Times: 5 4 4    A phone appointment for pre op education was made  for August 18th at 0930 to review all provided education materials.

## 2023-07-21 ENCOUNTER — TELEPHONE (OUTPATIENT)
Dept: CARDIOLOGY | Facility: MEDICAL CENTER | Age: 63
End: 2023-07-21
Payer: COMMERCIAL

## 2023-07-21 ENCOUNTER — HOSPITAL ENCOUNTER (OUTPATIENT)
Dept: RADIOLOGY | Facility: MEDICAL CENTER | Age: 63
End: 2023-07-21
Payer: COMMERCIAL

## 2023-07-21 DIAGNOSIS — Z88.8 ALLERGY TO IODINE: ICD-10-CM

## 2023-07-21 DIAGNOSIS — I35.1 AORTIC VALVE INSUFFICIENCY, ETIOLOGY OF CARDIAC VALVE DISEASE UNSPECIFIED: ICD-10-CM

## 2023-07-21 DIAGNOSIS — I71.21 ANEURYSM OF ASCENDING AORTA WITHOUT RUPTURE (HCC): ICD-10-CM

## 2023-07-21 PROCEDURE — 93880 EXTRACRANIAL BILAT STUDY: CPT

## 2023-07-21 RX ORDER — PREDNISONE 50 MG/1
TABLET ORAL
Qty: 3 TABLET | Refills: 0 | Status: ON HOLD
Start: 2023-07-21 | End: 2023-08-18

## 2023-07-21 NOTE — TELEPHONE ENCOUNTER
Patient scheduled for [facility] w/shot of aortic root on 8-15-23 with Dr. Davis. Patient has been instructed to check in at 7:30 for 9:30 case time. Hold Metformin am of and 48 hours after. Message sent to authorizations

## 2023-07-21 NOTE — TELEPHONE ENCOUNTER
----- Message from Segun Jernigan sent at 7/20/2023  4:29 PM PDT -----  Regarding: I didn't ge to this one either  I didn't get to this one.Orders are on 's desk to sign.  ----- Message -----  From: VIKKI Stoner  Sent: 7/20/2023  10:20 AM PDT  To: Segun Jernigan    Good morning can we get a cath for this patient? He is scheduled for surgery 8/22/23.  Thank you

## 2023-07-21 NOTE — TELEPHONE ENCOUNTER
Called patient and discussed iodine allergy protocol.   Prednisone ordered and sent to pharmacy on file.   Instructed patient to take 1 tablet 13 hours prior to procedure, 1 tablet 7 hours prior to procedure and last tablet upon arrival to the hospital, per protocol. Patient verbalized understanding.   Included instructions on medication signature.    Instructed patient to call our office with any questions. Patient verbalized understanding.

## 2023-07-21 NOTE — TELEPHONE ENCOUNTER
Marlen,    This patient has an Iodine allergy. I have emailed to orders to you. Can you please address this allergy with the patient and e-mail the orders back to me?    Thank You,  Dyan

## 2023-07-24 ENCOUNTER — APPOINTMENT (OUTPATIENT)
Dept: ADMISSIONS | Facility: MEDICAL CENTER | Age: 63
End: 2023-07-24
Attending: INTERNAL MEDICINE
Payer: COMMERCIAL

## 2023-07-27 NOTE — TELEPHONE ENCOUNTER
Shannan Jimenez, KRISTINE.  Dyan Jimenez, Med Ass't; Segun Jernigan  Any chance we can move his cardiac catheterization up to next week? Dr. Zamudio would like him to have surgery 8/9.     Thanks in advance,     Shannan

## 2023-07-27 NOTE — TELEPHONE ENCOUNTER
Patient now scheduled for [facility] w/shot of aortic root on 8-1-23 with Dr. Dsah. Patient has been instructed to check in at 6:30 for 8:30 case time. Hold Metformin am of and 48 hours after. Message sent to authorizations.

## 2023-07-28 ENCOUNTER — OFFICE VISIT (OUTPATIENT)
Dept: CARDIOLOGY | Facility: MEDICAL CENTER | Age: 63
End: 2023-07-28
Attending: PHYSICIAN ASSISTANT
Payer: COMMERCIAL

## 2023-07-28 ENCOUNTER — TELEPHONE (OUTPATIENT)
Dept: CARDIOTHORACIC SURGERY | Facility: MEDICAL CENTER | Age: 63
End: 2023-07-28
Payer: COMMERCIAL

## 2023-07-28 VITALS
WEIGHT: 217 LBS | SYSTOLIC BLOOD PRESSURE: 116 MMHG | DIASTOLIC BLOOD PRESSURE: 78 MMHG | HEIGHT: 68 IN | BODY MASS INDEX: 32.89 KG/M2 | HEART RATE: 74 BPM | RESPIRATION RATE: 17 BRPM | OXYGEN SATURATION: 95 %

## 2023-07-28 DIAGNOSIS — I77.810 DILATED AORTIC ROOT (HCC): ICD-10-CM

## 2023-07-28 DIAGNOSIS — I27.20 PULMONARY HTN (HCC): ICD-10-CM

## 2023-07-28 DIAGNOSIS — I77.810 ASCENDING AORTA DILATATION (HCC): ICD-10-CM

## 2023-07-28 DIAGNOSIS — I10 ESSENTIAL HYPERTENSION: ICD-10-CM

## 2023-07-28 DIAGNOSIS — R91.8 PULMONARY NODULES: ICD-10-CM

## 2023-07-28 DIAGNOSIS — I49.9 IRREGULAR HEARTBEAT: ICD-10-CM

## 2023-07-28 DIAGNOSIS — I49.3 PVC'S (PREMATURE VENTRICULAR CONTRACTIONS): ICD-10-CM

## 2023-07-28 DIAGNOSIS — I35.1 MILD AORTIC INSUFFICIENCY: ICD-10-CM

## 2023-07-28 DIAGNOSIS — I47.10 SVT (SUPRAVENTRICULAR TACHYCARDIA) (HCC): ICD-10-CM

## 2023-07-28 DIAGNOSIS — I34.0 MILD MITRAL REGURGITATION: ICD-10-CM

## 2023-07-28 DIAGNOSIS — I49.9 IRREGULAR HEART BEAT: ICD-10-CM

## 2023-07-28 PROCEDURE — 3074F SYST BP LT 130 MM HG: CPT | Performed by: PHYSICIAN ASSISTANT

## 2023-07-28 PROCEDURE — 99212 OFFICE O/P EST SF 10 MIN: CPT | Performed by: PHYSICIAN ASSISTANT

## 2023-07-28 PROCEDURE — 99214 OFFICE O/P EST MOD 30 MIN: CPT | Performed by: PHYSICIAN ASSISTANT

## 2023-07-28 PROCEDURE — 3078F DIAST BP <80 MM HG: CPT | Performed by: PHYSICIAN ASSISTANT

## 2023-07-28 PROCEDURE — 99213 OFFICE O/P EST LOW 20 MIN: CPT | Performed by: PHYSICIAN ASSISTANT

## 2023-07-28 ASSESSMENT — FIBROSIS 4 INDEX: FIB4 SCORE: 1.12

## 2023-07-28 NOTE — PATIENT INSTRUCTIONS
[facility]  [address] [facility], [address]  [facility] [address]  Phone: [phone]

## 2023-07-31 ENCOUNTER — PRE-ADMISSION TESTING (OUTPATIENT)
Dept: ADMISSIONS | Facility: MEDICAL CENTER | Age: 63
End: 2023-07-31
Payer: COMMERCIAL

## 2023-07-31 ENCOUNTER — TELEPHONE (OUTPATIENT)
Dept: CARDIOTHORACIC SURGERY | Facility: MEDICAL CENTER | Age: 63
End: 2023-07-31
Payer: COMMERCIAL

## 2023-07-31 DIAGNOSIS — Z01.810 PRE-OPERATIVE CARDIOVASCULAR EXAMINATION: ICD-10-CM

## 2023-07-31 DIAGNOSIS — Z01.812 PRE-OPERATIVE LABORATORY EXAMINATION: ICD-10-CM

## 2023-07-31 LAB
ALBUMIN SERPL BCP-MCNC: 4.4 G/DL (ref 3.2–4.9)
ALBUMIN/GLOB SERPL: 1.5 G/DL
ALP SERPL-CCNC: 76 U/L (ref 30–99)
ALT SERPL-CCNC: 15 U/L (ref 2–50)
ANION GAP SERPL CALC-SCNC: 12 MMOL/L (ref 7–16)
APTT PPP: 27.4 SEC (ref 24.7–36)
AST SERPL-CCNC: 7 U/L (ref 12–45)
BILIRUB SERPL-MCNC: 0.6 MG/DL (ref 0.1–1.5)
BUN SERPL-MCNC: 20 MG/DL (ref 8–22)
CALCIUM ALBUM COR SERPL-MCNC: 9.3 MG/DL (ref 8.5–10.5)
CALCIUM SERPL-MCNC: 9.6 MG/DL (ref 8.5–10.5)
CHLORIDE SERPL-SCNC: 103 MMOL/L (ref 96–112)
CO2 SERPL-SCNC: 24 MMOL/L (ref 20–33)
CREAT SERPL-MCNC: 0.82 MG/DL (ref 0.5–1.4)
EKG IMPRESSION: NORMAL
ERYTHROCYTE [DISTWIDTH] IN BLOOD BY AUTOMATED COUNT: 41.5 FL (ref 35.9–50)
GFR SERPLBLD CREATININE-BSD FMLA CKD-EPI: 97 ML/MIN/1.73 M 2
GLOBULIN SER CALC-MCNC: 2.9 G/DL (ref 1.9–3.5)
GLUCOSE SERPL-MCNC: 87 MG/DL (ref 65–99)
HCT VFR BLD AUTO: 46.4 % (ref 42–52)
HGB BLD-MCNC: 15.9 G/DL (ref 14–18)
INR PPP: 1.14 (ref 0.87–1.13)
MCH RBC QN AUTO: 30.5 PG (ref 27–33)
MCHC RBC AUTO-ENTMCNC: 34.3 G/DL (ref 32.3–36.5)
MCV RBC AUTO: 88.9 FL (ref 81.4–97.8)
PLATELET # BLD AUTO: 272 K/UL (ref 164–446)
PMV BLD AUTO: 10.6 FL (ref 9–12.9)
POTASSIUM SERPL-SCNC: 3.5 MMOL/L (ref 3.6–5.5)
PROT SERPL-MCNC: 7.3 G/DL (ref 6–8.2)
PROTHROMBIN TIME: 14.5 SEC (ref 12–14.6)
RBC # BLD AUTO: 5.22 M/UL (ref 4.7–6.1)
SODIUM SERPL-SCNC: 139 MMOL/L (ref 135–145)
WBC # BLD AUTO: 8.5 K/UL (ref 4.8–10.8)

## 2023-07-31 PROCEDURE — 80053 COMPREHEN METABOLIC PANEL: CPT

## 2023-07-31 PROCEDURE — 36415 COLL VENOUS BLD VENIPUNCTURE: CPT

## 2023-07-31 PROCEDURE — 85027 COMPLETE CBC AUTOMATED: CPT

## 2023-07-31 PROCEDURE — 93010 ELECTROCARDIOGRAM REPORT: CPT | Performed by: INTERNAL MEDICINE

## 2023-07-31 PROCEDURE — 85730 THROMBOPLASTIN TIME PARTIAL: CPT

## 2023-07-31 PROCEDURE — 93005 ELECTROCARDIOGRAM TRACING: CPT

## 2023-07-31 PROCEDURE — 85610 PROTHROMBIN TIME: CPT

## 2023-07-31 ASSESSMENT — ENCOUNTER SYMPTOMS
ORTHOPNEA: 0
MUSCULOSKELETAL NEGATIVE: 1
DOUBLE VISION: 0
ABDOMINAL PAIN: 0
PND: 0
PALPITATIONS: 0
EYES NEGATIVE: 1
DIZZINESS: 0
BRUISES/BLEEDS EASILY: 0
VOMITING: 0
HEADACHES: 0
COUGH: 0
BLURRED VISION: 0
CHILLS: 0
CLAUDICATION: 0
SHORTNESS OF BREATH: 0
LOSS OF CONSCIOUSNESS: 0
NEUROLOGICAL NEGATIVE: 1
CONSTITUTIONAL NEGATIVE: 1
MYALGIAS: 0
FEVER: 0
NAUSEA: 0
GASTROINTESTINAL NEGATIVE: 1
PSYCHIATRIC NEGATIVE: 1
WEAKNESS: 0

## 2023-07-31 NOTE — TELEPHONE ENCOUNTER
Patient mistook dates at the end of the month and started the prophylactic prednisone early and needs another pill/direction on taking.  Sent staff message to ROSITA Mauricio.    Call time 3 minutes

## 2023-08-01 ENCOUNTER — APPOINTMENT (OUTPATIENT)
Dept: CARDIOLOGY | Facility: MEDICAL CENTER | Age: 63
End: 2023-08-01
Payer: COMMERCIAL

## 2023-08-01 ENCOUNTER — HOSPITAL ENCOUNTER (OUTPATIENT)
Facility: MEDICAL CENTER | Age: 63
End: 2023-08-01
Attending: INTERNAL MEDICINE | Admitting: INTERNAL MEDICINE
Payer: COMMERCIAL

## 2023-08-01 VITALS
OXYGEN SATURATION: 93 % | SYSTOLIC BLOOD PRESSURE: 139 MMHG | TEMPERATURE: 97.7 F | RESPIRATION RATE: 16 BRPM | BODY MASS INDEX: 32.11 KG/M2 | WEIGHT: 211.86 LBS | DIASTOLIC BLOOD PRESSURE: 89 MMHG | HEIGHT: 68 IN | HEART RATE: 64 BPM

## 2023-08-01 DIAGNOSIS — I35.1 AORTIC VALVE INSUFFICIENCY, ETIOLOGY OF CARDIAC VALVE DISEASE UNSPECIFIED: ICD-10-CM

## 2023-08-01 DIAGNOSIS — I71.21 ANEURYSM OF ASCENDING AORTA WITHOUT RUPTURE (HCC): ICD-10-CM

## 2023-08-01 PROCEDURE — 160046 HCHG PACU - 1ST 60 MINS PHASE II

## 2023-08-01 PROCEDURE — 93567 NJX CAR CTH SPRVLV AORTGRPHY: CPT | Performed by: INTERNAL MEDICINE

## 2023-08-01 PROCEDURE — 700105 HCHG RX REV CODE 258: Performed by: INTERNAL MEDICINE

## 2023-08-01 PROCEDURE — 700111 HCHG RX REV CODE 636 W/ 250 OVERRIDE (IP)

## 2023-08-01 PROCEDURE — 700117 HCHG RX CONTRAST REV CODE 255: Performed by: INTERNAL MEDICINE

## 2023-08-01 PROCEDURE — 700101 HCHG RX REV CODE 250

## 2023-08-01 PROCEDURE — 160035 HCHG PACU - 1ST 60 MINS PHASE I

## 2023-08-01 PROCEDURE — 700102 HCHG RX REV CODE 250 W/ 637 OVERRIDE(OP): Performed by: INTERNAL MEDICINE

## 2023-08-01 PROCEDURE — A9270 NON-COVERED ITEM OR SERVICE: HCPCS | Performed by: INTERNAL MEDICINE

## 2023-08-01 PROCEDURE — 93458 L HRT ARTERY/VENTRICLE ANGIO: CPT | Mod: 26 | Performed by: INTERNAL MEDICINE

## 2023-08-01 PROCEDURE — 99153 MOD SED SAME PHYS/QHP EA: CPT

## 2023-08-01 PROCEDURE — 160002 HCHG RECOVERY MINUTES (STAT)

## 2023-08-01 PROCEDURE — 99152 MOD SED SAME PHYS/QHP 5/>YRS: CPT | Performed by: INTERNAL MEDICINE

## 2023-08-01 RX ORDER — DIPHENHYDRAMINE HCL 25 MG
50 TABLET ORAL ONCE
Status: COMPLETED | OUTPATIENT
Start: 2023-08-01 | End: 2023-08-01

## 2023-08-01 RX ORDER — LIDOCAINE HYDROCHLORIDE 20 MG/ML
INJECTION, SOLUTION INFILTRATION; PERINEURAL
Status: COMPLETED
Start: 2023-08-01 | End: 2023-08-01

## 2023-08-01 RX ORDER — HEPARIN SODIUM 200 [USP'U]/100ML
INJECTION, SOLUTION INTRAVENOUS
Status: COMPLETED
Start: 2023-08-01 | End: 2023-08-01

## 2023-08-01 RX ORDER — VERAPAMIL HYDROCHLORIDE 2.5 MG/ML
INJECTION, SOLUTION INTRAVENOUS
Status: COMPLETED
Start: 2023-08-01 | End: 2023-08-01

## 2023-08-01 RX ORDER — MIDAZOLAM HYDROCHLORIDE 1 MG/ML
INJECTION INTRAMUSCULAR; INTRAVENOUS
Status: COMPLETED
Start: 2023-08-01 | End: 2023-08-01

## 2023-08-01 RX ORDER — SODIUM CHLORIDE 9 MG/ML
1000 INJECTION, SOLUTION INTRAVENOUS CONTINUOUS
Status: DISCONTINUED | OUTPATIENT
Start: 2023-08-01 | End: 2023-08-01 | Stop reason: HOSPADM

## 2023-08-01 RX ORDER — HEPARIN SODIUM 1000 [USP'U]/ML
INJECTION, SOLUTION INTRAVENOUS; SUBCUTANEOUS
Status: COMPLETED
Start: 2023-08-01 | End: 2023-08-01

## 2023-08-01 RX ADMIN — DIPHENHYDRAMINE HYDROCHLORIDE 50 MG: 25 TABLET ORAL at 07:44

## 2023-08-01 RX ADMIN — FENTANYL CITRATE 100 MCG: 50 INJECTION, SOLUTION INTRAMUSCULAR; INTRAVENOUS at 09:55

## 2023-08-01 RX ADMIN — MIDAZOLAM 2 MG: 1 INJECTION, SOLUTION INTRAMUSCULAR; INTRAVENOUS at 09:55

## 2023-08-01 RX ADMIN — LIDOCAINE HYDROCHLORIDE: 20 INJECTION, SOLUTION INFILTRATION; PERINEURAL at 09:32

## 2023-08-01 RX ADMIN — MIDAZOLAM 1 MG: 1 INJECTION, SOLUTION INTRAMUSCULAR; INTRAVENOUS at 10:08

## 2023-08-01 RX ADMIN — VERAPAMIL HYDROCHLORIDE 5 MG: 2.5 INJECTION, SOLUTION INTRAVENOUS at 09:32

## 2023-08-01 RX ADMIN — FENTANYL CITRATE 50 MCG: 50 INJECTION, SOLUTION INTRAMUSCULAR; INTRAVENOUS at 10:08

## 2023-08-01 RX ADMIN — HEPARIN SODIUM: 1000 INJECTION, SOLUTION INTRAVENOUS; SUBCUTANEOUS at 09:32

## 2023-08-01 RX ADMIN — HEPARIN SODIUM 2000 UNITS: 200 INJECTION, SOLUTION INTRAVENOUS at 09:32

## 2023-08-01 RX ADMIN — SODIUM CHLORIDE 1000 ML: 9 INJECTION, SOLUTION INTRAVENOUS at 07:44

## 2023-08-01 RX ADMIN — IOHEXOL 98 ML: 350 INJECTION, SOLUTION INTRAVENOUS at 10:10

## 2023-08-01 RX ADMIN — NITROGLYCERIN 10 ML: 20 INJECTION INTRAVENOUS at 09:32

## 2023-08-01 ASSESSMENT — FIBROSIS 4 INDEX
FIB4 SCORE: 0.43
FIB4 SCORE: 0.43

## 2023-08-01 NOTE — DISCHARGE INSTRUCTIONS
HOME CARE INSTRUCTIONS    ACTIVITY: Rest and take it easy for the first 24 hours.  A responsible adult is recommended to remain with you during that time.  It is normal to feel sleepy.  We encourage you to not do anything that requires balance, judgment or coordination.    FOR 24 HOURS DO NOT:  Drive, operate machinery or run household appliances.  Drink beer or alcoholic beverages.  Make important decisions or sign legal documents.    SPECIAL INSTRUCTIONS: wear sling for comfort    DIET: To avoid nausea, slowly advance diet as tolerated, avoiding spicy or greasy foods for the first day.  Add more substantial food to your diet according to your physician's instructions.  Babies can be fed formula or breast milk as soon as they are hungry.  INCREASE FLUIDS AND FIBER TO AVOID CONSTIPATION.    SURGICAL DRESSING/BATHIN hours no soaking in tub , poll, or hot tub, shower only, no lotion, creams, or powders    MEDICATIONS: Resume taking daily medication.  Take prescribed pain medication with food.  If no medication is prescribed, you may take non-aspirin pain medication if needed.  PAIN MEDICATION CAN BE VERY CONSTIPATING.  Take a stool softener or laxative such as senokot, pericolace, or milk of magnesia if needed.      A follow-up appointment should be arranged with your doctor in 1-2 weeks  ; call to schedule.    You should CALL YOUR PHYSICIAN if you develop:  Fever greater than 101 degrees F.  Pain not relieved by medication, or persistent nausea or vomiting.  Excessive bleeding (blood soaking through dressing) or unexpected drainage from the wound.  Extreme redness or swelling around the incision site, drainage of pus or foul smelling drainage.  Inability to urinate or empty your bladder within 8 hours.  Problems with breathing or chest pain.    You should call 911 if you develop problems with breathing or chest pain.  If you are unable to contact your doctor or surgical center, you should go to the nearest  emergency room or urgent care center.  Physician's telephone #: [phone]    MILD FLU-LIKE SYMPTOMS ARE NORMAL.  YOU MAY EXPERIENCE GENERALIZED MUSCLE ACHES, THROAT IRRITATION, HEADACHE AND/OR SOME NAUSEA.    If any questions arise, call your doctor.  If your doctor is not available, please feel free to call the Surgical Center at [phone].  The Center is open Monday through Friday from 7AM to 7PM.      A registered nurse may call you a few days after your surgery to see how you are doing after your procedure.    You may also receive a survey in the mail within the next two weeks and we ask that you take a few moments to complete the survey and return it to us.  Our goal is to provide you with very good care and we value your comments.     Depression / Suicide Risk    As you are discharged from this [facility] facility, it is important to learn how to keep safe from harming yourself.    Recognize the warning signs:  Abrupt changes in personality, positive or negative- including increase in energy   Giving away possessions  Change in eating patterns- significant weight changes-  positive or negative  Change in sleeping patterns- unable to sleep or sleeping all the time   Unwillingness or inability to communicate  Depression  Unusual sadness, discouragement and loneliness  Talk of wanting to die  Neglect of personal appearance   Rebelliousness- reckless behavior  Withdrawal from people/activities they love  Confusion- inability to concentrate     If you or a loved one observes any of these behaviors or has concerns about self-harm, here's what you can do:  Talk about it- your feelings and reasons for harming yourself  Remove any means that you might use to hurt yourself (examples: pills, rope, extension cords, firearm)  Get professional help from the community (Mental Health, Substance Abuse, psychological counseling)  Do not be alone:Call your Safe Contact- someone whom you trust who will be there for  you.  Call your local CRISIS HOTLINE 474-8248 or 934-539-7602  Call your local Children's Mobile Crisis Response Team [place] (642) 352-7902 or www.Blaze.io  Call the toll free National Suicide Prevention Hotlines   National Suicide Prevention Lifeline 650-350-JZLS (2029)  [facility] Line Network 800-SUICIDE (538-2944)    I acknowledge receipt and understanding of these Home Care instructions.

## 2023-08-01 NOTE — PROCEDURES
Cardiac Catheterization Laboratory Procedure Note    DATE: 8/1/2023    : Noe Dash MD, MPH    PROCEDURES PERFORMED:  Left heart catheterization  Coronary angiography  Moderate conscious sedation  Supravalvular aortography     INDICATIONS:  preoperative cardiovascular exam  CONSENT:  The complete alternatives, risks, and benefits of the procedure were explained to the patient. Signed informed consent was obtained and placed in the chart prior to the procedure.    MEDICATIONS:  Lidocaine  Fentanyl  Midazolam  Nitroglycerin  Verapamil  Heparin    MODERATE CONSCIOUS SEDATION:  I personally supervised the administration of moderate conscious sedation by the nursing staff for 2 I did order 2 L ready once I did not do anything7 minutes.  Start time: 0943AM  End time: 1010 AM    CONTRAST: Omnipaque 98 cc (difficult to engage the RCA needed more contrast than anticipated)    RADIATION DOSE (Air Kerma): 449 mGy    FLUOROSCOPY TIME: 7.2 minutes    ACCESS:  6-Moroccan Glidesheath in the right radial artery    ESTIMATED BLOOD LOSS: <10 cc    COMPLICATIONS: None    PROCEDURE IN DETAIL:  The patient was brought to the cardiac catheterization laboratory in the fasting state. The skin over the right wrist was prepped and draped in the usual sterile fashion. Lidocaine infiltration was used to anesthetize the tissue over the right radial artery. Using the micropuncture technique, a 6-Moroccan Glidesheath was inserted in the right radial artery. A 6-Fr AR1 diagnostic catheter was then advanced over a standard J-wire into the aortic root and was unable to cross into the LV or engage the RCA.  This catheter was exchanged over a J-wire  to a 6-Fr AL diagnostic catheter which was used to cross into the LV and LVEDP was obtained.  Then this catheter was then used to engage the ostium of the right coronary artery and cineangiograms were obtained in multiple projections for complete evaluation of the right coronary system. This  catheter was then exchanged over J-wire to a 6 Turkish JL 4 diagnostic catheter which was used to engage the ostium of the left coronary artery and cineangiograms were obtained in multiple projections for complete evaluation of the left coronary system. Following completion of coronary angiography, supravalvular aortography was performed using a 6 Turkish angled pigtail catheter.      Then, all wires, catheters, and sheaths were removed.  A TR band was placed over the right wrist using the patent hemostasis technique.     HEMODYNAMICS:  Aortic pressure: 140 /83 mmHg  LVEDP: 22 mmHg  No significant aortic gradient on pullback    CORONARY ANGIOGRAPHY:  The left main coronary artery : Large caliber normal-appearing vessel that trifurcates to LAD, ramus intermedius and left circumflex  Ramus intermedius coronary artery: Normal-appearing large caliber vessel  The left anterior descending coronary artery :  transapical vessel with mild luminal irregularities.  Large D1 branch  The left circumflex coronary artery :  large caliber vessel with bifurcating OM, mild luminal irregularities  The right coronary artery  : Large-caliber dominant vessel with slow coronary flow phenomena.  High anterior takeoff /huff's crook takeoff    Supravalvular aortography:  Aortic root: 5.1-5.2cm  Ascending aorta: 3.6cm  No significant AI    IMPRESSION:  1.  No significant epicardial coronary artery disease. Dominant RCA with high anterior takeoff.  2.  Elevated resting LVEDP 22 mmHg with no significant transaortic gradient on pullback.   3.  Slow coronary flow  4.  Aneurysmal aortic root >5cm    RECOMMENDATIONS:  -Proceed with scheduled aortic root surgery as planned next week  -TR band protocol  - Primary ASCVD prevention    NOTIFICATION:  The patient's wife was called and notified of the results.    Referring CTS notified.    Noe Dash MD, MPH [facility]  Interventional Cardiologist  [facility]   of Clinical Internal Medicine - [facility] [place]

## 2023-08-01 NOTE — OR NURSING
1235  Pt arrived from PACU, report received from RN. VSS, Dressing Clean, dry intact to  right wrist. No bruising nor hematoma noted                    Pt ambulated to chair with minimal SBA. Tolerating Solids, liquids. Denies pain, nausea at present. Will monitor.  1300  Discharge Instructions reviewed with patient and family. Understanding verbalized .   Adequate pain control no active nausea in post op period.   IV DC with cath intact    1300  Left via WC with belongings.

## 2023-08-01 NOTE — OR NURSING
1020 Pt arrived to PACU with Cath lab RN. AAOx4. Even, unlabored respirations. VSS. Denies pain. Denies nausea. Right radial TR Band is CDI and soft. CMS intact. Pt denies numbness/tingling. RUE elevated on pillow.    1035 POC update given to Cornelia, wife, over the phone. All questions answered.     1115 2cc air removed from TR band. CDI with no bleeding.   1130 3cc air removed from TR band. CDI with no bleeding.   1145 3cc air removed from TR band. CDI with no bleeding.   *** 3cc air removed from TR band. CDI with no bleeding.   *** 3cc air removed from TR band. CDI with no bleeding. TR band removed and replaced with tegaderm, gauze and coban wrap. CMS intact. Pt denies numbness/tinging.     *** Pt meets phase II criteria. Report called to ***, RN.     *** Pt transported to pre op *** with ***. Chart and *** with patient.

## 2023-08-01 NOTE — PROGRESS NOTES
Chief Complaint   Patient presents with    Irregular Heart Beat       F/V DX: Irregular heartbeat       Subjective     Venkat Mackenzie is a 62 y.o. male with a history of an irregular heartbeat, hypertension, diabetes, GERD, and sleep apnea who presents today for follow-up on his blood pressure.    His primary cardiologist is Dr. Broussard. Last seen 7/18/2023 by me. At that exam, he was doing well.  He did have some occasional dizziness upon standing and occasional chest pressure on exertion alleviated by rest.  He had recently completed an echocardiogram which was suggestive of a dilated aortic root.  He was later sent for a CTA which confirmed a dilated root of 5.4 x 5.5 cm and borderline dilation of the ascending aorta.  He was referred to CT surgery and advised to control his blood pressure well.  An event monitor was also performed which demonstrated frequent runs of SVT and a high PVC burden.  He was recommended to switch from metoprolol to carvedilol for the purpose of palpitations and better blood pressure management given his blood pressure was not well controlled on exam.  He was recommended to follow-up in 2 weeks for blood pressure management.    Today, he reports he is feeling well.  He has had very infrequent episodes of chest pressure upon exertion.  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 No shortness of breath, dyspnea on exertion, orthopnea or PND. No lower extremity edema. No dizziness or lightheadedness. No syncope or presyncope.      His blood pressure has been within normal limits.     He previously worked in a [employer] as a [employer] person and was a  in [place] for 20+ years. 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     Past Medical History:   Diagnosis Date    Arrhythmia Uknown, Date    Bowel habit changes     diarrhea with anxiety    Diabetes (HCC)     borderline    Gastroparesis     2016    Heart burn Just after  eating spicey food    Heart valve disease This is why Doctor is ordering the surgery    Hypertension     7 yrs    Metabolic syndrome      Past Surgical History:   Procedure Laterality Date    APPENDECTOMY      CERVICAL FUSION POSTERIOR      7-8 yrs back    OTHER  Neck    VAGOTOMY  at age 19    due to hiatal hernia     Family History   Problem Relation Age of Onset    Stroke Mother     Heart Disease Mother     Diabetes Mother     Lung Disease Mother         COPD    Diabetes Father     Heart Disease Father     Stroke Father     No Known Problems Son     No Known Problems Son      Social History     Socioeconomic History    Marital status:      Spouse name: Not on file    Number of children: Not on file    Years of education: Not on file    Highest education level: Not on file   Occupational History    Occupation: surveillance     Employer: [employer]   Tobacco Use    Smoking status: Never    Smokeless tobacco: Former     Types: Chew     Quit date: 5/1/1980    Tobacco comments:     None   Vaping Use    Vaping Use: Never used   Substance and Sexual Activity    Alcohol use: Yes     Alcohol/week: 1.2 oz     Types: 2 Cans of beer per week     Comment: Drink Beer once in while    Drug use: No    Sexual activity: Yes     Partners: Female   Other Topics Concern    Not on file   Social History Narrative    Not on file     Social Determinants of Health     Financial Resource Strain: Not on file   Food Insecurity: Not on file   Transportation Needs: Not on file   Physical Activity: Not on file   Stress: Not on file   Social Connections: Not on file   Intimate Partner Violence: Not on file   Housing Stability: Not on file     Allergies   Allergen Reactions    Iodine Unspecified and Hives     dizziness  dizziness     Outpatient Encounter Medications as of 7/28/2023   Medication Sig Dispense Refill    predniSONE (DELTASONE) 50 MG Tab Take 1 tablet 13 hours prior to procedure, Take 1 tablet 7 hours prior to procedure, Take 1  tablet when you arrive at the hospital for your procedure. 3 Tablet 0    carvedilol (COREG) 6.25 MG Tab Take 1 Tablet by mouth 2 times a day with meals. 60 Tablet 2    hydroCHLOROthiazide (HYDRODIURIL) 12.5 MG tablet Take 1 Tablet by mouth every day. (Patient taking differently: Take 12.5 mg by mouth every morning.) 90 Tablet 3    metFORMIN ER (GLUCOPHAGE XR) 500 MG TABLET SR 24 HR Take 1 Tablet by mouth every day. (Patient taking differently: Take 500 mg by mouth every morning.) 90 Tablet 3    losartan (COZAAR) 100 MG Tab Take 1 Tablet by mouth every day. (Patient taking differently: Take 100 mg by mouth every morning.) 90 Tablet 3    allopurinol (ZYLOPRIM) 300 MG Tab Take 1 Tablet by mouth every day. (Patient not taking: Reported on 7/31/2023) 90 Tablet 3    [DISCONTINUED] amLODIPine (NORVASC) 2.5 MG Tab Take 1 Tablet by mouth every day. (Patient not taking: Reported on 7/31/2023) 90 Tablet 3    cyclobenzaprine (FLEXERIL) 10 mg Tab Take 10 mg by mouth 3 times a day as needed.      [DISCONTINUED] lansoprazole (PREVACID) 30 MG CAPSULE DELAYED RELEASE TAKE ONE CAPSULE BY MOUTH ONE TIME DAILY 90 Capsule 3    Potassium 99 MG Tab Take 1 Tablet by mouth every morning.      [DISCONTINUED] Probiotic Product (PROBIOTIC-10 PO) Take  by mouth every day.      [DISCONTINUED] Magnesium 250 MG Tab Take  by mouth every day.      [DISCONTINUED] triamcinolone acetonide (KENALOG) 0.1 % Cream Apply to dry itchy rash, twice daily up to 2 weeks as needed. Do not apply to face, axilla, or genitals. (Patient not taking: Reported on 7/28/2023)      diclofenac sodium (VOLTAREN) 1 % Gel apply 4 grams topically to shoulder(S) and neck four times a day for pain (Patient not taking: Reported on 7/28/2023) 100 g 3     No facility-administered encounter medications on file as of 7/28/2023.     Review of Systems   Constitutional: Negative.  Negative for chills, fever and malaise/fatigue.   HENT: Negative.     Eyes: Negative.  Negative for blurred  "vision and double vision.   Respiratory:  Negative for cough and shortness of breath.    Cardiovascular:  Negative for chest pain, palpitations, orthopnea, claudication, leg swelling and PND.   Gastrointestinal: Negative.  Negative for abdominal pain, nausea and vomiting.   Genitourinary: Negative.    Musculoskeletal: Negative.  Negative for myalgias.   Skin: Negative.  Negative for rash.   Neurological: Negative.  Negative for dizziness, loss of consciousness, weakness and headaches.   Endo/Heme/Allergies: Negative.  Does not bruise/bleed easily.   Psychiatric/Behavioral: Negative.                Objective     /78 (BP Location: Left arm, Patient Position: Sitting, BP Cuff Size: Adult)   Pulse 74   Resp 17   Ht 1.727 m (5' 8\")   Wt 98.4 kg (217 lb)   SpO2 95%   BMI 32.99 kg/m²     Physical Exam  Vitals reviewed.   Constitutional:       General: He is not in acute distress.     Appearance: Normal appearance.   HENT:      Head: Normocephalic and atraumatic.      Right Ear: External ear normal.      Left Ear: External ear normal.   Eyes:      General: No scleral icterus.     Extraocular Movements: Extraocular movements intact.      Conjunctiva/sclera: Conjunctivae normal.      Pupils: Pupils are equal, round, and reactive to light.   Cardiovascular:      Rate and Rhythm: Normal rate and regular rhythm.      Pulses: Normal pulses.      Heart sounds: Normal heart sounds. No murmur heard.     No friction rub. No gallop.   Pulmonary:      Effort: Pulmonary effort is normal.      Breath sounds: Normal breath sounds.   Abdominal:      General: Bowel sounds are normal.      Palpations: Abdomen is soft.      Tenderness: There is no abdominal tenderness.   Musculoskeletal:         General: Normal range of motion.      Cervical back: Normal range of motion and neck supple.      Right lower leg: No edema.      Left lower leg: No edema.   Skin:     General: Skin is warm and dry.      Capillary Refill: Capillary refill " takes less than 2 seconds.   Neurological:      General: No focal deficit present.      Mental Status: He is alert and oriented to person, place, and time.   Psychiatric:         Mood and Affect: Mood normal.         Behavior: Behavior normal.         Judgment: Judgment normal.       Lab Results   Component Value Date/Time    CHOLSTRLTOT 135 01/10/2023 08:22 AM    LDL 65 01/10/2023 08:22 AM    HDL 61 01/10/2023 08:22 AM    TRIGLYCERIDE 46 01/10/2023 08:22 AM       Lab Results   Component Value Date/Time    SODIUM 139 07/31/2023 01:13 PM    POTASSIUM 3.5 (L) 07/31/2023 01:13 PM    CHLORIDE 103 07/31/2023 01:13 PM    CO2 24 07/31/2023 01:13 PM    GLUCOSE 87 07/31/2023 01:13 PM    BUN 20 07/31/2023 01:13 PM    CREATININE 0.82 07/31/2023 01:13 PM     Lab Results   Component Value Date/Time    ALKPHOSPHAT 76 07/31/2023 01:13 PM    ASTSGOT 7 (L) 07/31/2023 01:13 PM    ALTSGPT 15 07/31/2023 01:13 PM    TBILIRUBIN 0.6 07/31/2023 01:13 PM         Cardiovascular imaging and procedures:     Echocardiogram 5/15/2023  CONCLUSIONS  No prior study is available for comparison.   Dilated Aortic root (5.3 cm)  and ascending aorta (4.1 cm).  Normal LV size, thickness and systolic function with estimated LVEF   55%.  Normal right ventricular size and systolic function.  Mild AI  Mild MR  Dilated IVC  Mild pulmonary hypertension with estimated RVSP 30-35 mmHg     Cardiac event monitor 6/26/2023  DOS: 6/6/2023-6/19/2023   Summary:   The patient was monitored for 12 days and 6 hours.    Predominant underlying rhythm was sinus rhythm.  First-degree AV block was present.  Mobitz I was present.   Frequent runs of supraventricular tachycardia occurred, the fastest interval lasting 12.4 seconds with a max rate of 231 bpm, and the longest run lasting 48.7 seconds with an average rate of 164 bpm.   Frequent PACs with PAC burden of 10.2%.   Occasional PVCs with PVC burden of 1.4%.   Symptoms correlated with sinus rhythm with heart rate 54 to 112  bpm with PACs and PVCs as well as with SVT.      Cardiac CTA 7/11/2023  FINDINGS:  Noncontrast images:  There is no acute intramural hematoma.  Calcifications are located within the coronary arteries.  Contrast images:  Aorta and vasculature:  The aortic root is dilated measuring 5.4 x 5.5 cm.  The ascending aorta measures 3.9 x 3.9 cm.  No aneurysmal dilatation of the aortic arch and descending thoracic aorta.  No evidence of aortic dissection.  Origins of the arch vessels are opacified and patent.  Origins of celiac, superior mesenteric, and renal arteries are opacified and patent.  No evidence of pulmonary artery emboli.  Borderline cardiomegaly. Trace pericardial fluid.  There is no mediastinal or hilar adenopathy.  There is a 5 mm pleural-based nodule right middle lobe image 160.  There is a 4.3 mm nodule left lower lobe image 156.  No airspace consolidation or pleural effusions.  Large hiatal hernia. Adjacent surgical clips are demonstrated.  There are surgical clips adjacent to the right lobe of the thyroid.  There is a 1.7 cm nonenhancing cyst right kidney and 2.5 cm nonenhancing cyst left kidney. No follow-up required.  There are tiny calcified gallstones.  3D angiographic/MIP images of the vasculature confirm the vascular findings as described above.  IMPRESSION:  1.  Dilatation of the aortic root measuring 5.4 x 5.5 cm.  2.  Borderline dilatation of the ascending aorta measuring 3.9 cm.  3.  5 mm nodule right middle lobe and 4.3 mm nodule left lower lobe.  4.  Coronary artery calcifications.  5.  Large hiatal hernia.  6.  Cholelithiasis.         Assessment & Plan     1. Essential hypertension        2. Irregular heart beat        3. SVT (supraventricular tachycardia) (HCC)        4. PVC's (premature ventricular contractions)        5. Dilated aortic root (HCC)        6. Ascending aorta dilatation (HCC)        7. Pulmonary HTN (HCC)        8. Mild mitral regurgitation        9. Mild aortic insufficiency         10. Irregular heartbeat        11. Pulmonary nodules            Medical Decision Making: Today's Assessment/Status/Plan:        Irregular heartbeat  SVT  PVCs  - He has not noticed any palpitations since staring the carvedilol.   - Event monitoring showed frequent runs of SVT and a high PVC burden of 10.2  - An echocardiogram was recently performed which demonstrated a normal LVEF of 55%, dilated aortic root and ascending aorta, and mild pulmonary hypertension.  - Discussed avoiding triggering factors such as increased stress, alcohol/substances, and excessive caffeine.   - Continue carvedilol 6.25mg BID for dual purpose of palpitations and better BP management.      Dilated ascending aorta  Dilated aortic root  - Asymptomatic  - Incidentally found dilatated Aortic root (5.3 cm)  and ascending aorta (4.1 cm) on recent echo. CTA confirmed dilated aortic root of 5.4 x5.5cm and borderline dilation of the ascending aorta to 3.9  - He was evaluated by CT surgery who recommended surgical repair. He indicates he is to do a cleaning prior to the procedure. He was only able to get an appointment the day prior to his surgery and is concerned there may be a conflict with this. Encouraged him to reach out to CT surgery about the timing of the cleaning. Plan is for surgical repair 8/9/2023.  - Discussed controlling his blood pressure and avoiding strenuous activities.  - He has had some occasional chest pressure and coronary artery calcifications seen on CT. CT surgery has ordered a [facility] for further work up prior to surgery.      Pulmonary HTN  Mild AI  Mild MR  - Asymptomatic  - Continue to monitor clinically     Hypertension  - Blood pressure is now well controlled  - Renal function did have a slight decline to GFR of 79 in June. His HCTZ was reduced to 12.5mg with return to normal levels.   - Encouraged at home BP monitoring.   - Continue amlodipine 2.5mg daily, HCTZ 12.5mg daily, and losartan 100mg daily. Continue  carvedilol per above.      Pulmonary nodules  -Incidental finding of 5 mm nodule right middle lobe and 4.3 mm nodule left lower lobe.  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   - Encouraged him to follow up with his PCP in regard to this and consider repeating a CT scan in 1 year.         Follow up in 4 weeks.      Thank you for allowing me to participate in the care of Venkat Mackenzie .    Shannan Mauricio PA-C, Cardiology  [facility], [facility]  [address] [address]  Phone: [phone]  Fax: [phone]     PLEASE NOTE: This note was created using voice recognition software. I have made every reasonable attempt to correct obvious errors, but I expect that there are errors of grammar and possibly content that I did not discover before finalizing the note.      I personally spent a total of 31 minutes which includes face-to-face time and non-face-to-face time spent on preparing to see the patient, reviewing hospital notes and tests, obtaining history from the patient, performing a medically appropriate exam, counseling and educating the patient, ordering medications/tests/procedures/referrals as clinically indicated, and documenting information in the electronic medical record.

## 2023-08-01 NOTE — OR NURSING
Assume care for pt in pre-op. Patient allergies and NPO status verified. Belongings secured. Patient verbalizes understanding of pain scale, expected course of stay and plan of care. Surgical procedure verified with patient. IV access established. Benadryl po given per mar. Call light within reach. No further needs at this time. Hourly rounding in place.

## 2023-08-03 ENCOUNTER — APPOINTMENT (OUTPATIENT)
Dept: ADMISSIONS | Facility: MEDICAL CENTER | Age: 63
DRG: 219 | End: 2023-08-03
Attending: THORACIC SURGERY (CARDIOTHORACIC VASCULAR SURGERY)
Payer: COMMERCIAL

## 2023-08-07 ENCOUNTER — TELEPHONE (OUTPATIENT)
Dept: CARDIOTHORACIC SURGERY | Facility: MEDICAL CENTER | Age: 63
End: 2023-08-07
Payer: COMMERCIAL

## 2023-08-07 ENCOUNTER — HOSPITAL ENCOUNTER (OUTPATIENT)
Dept: RADIOLOGY | Facility: MEDICAL CENTER | Age: 63
DRG: 219 | End: 2023-08-07
Attending: THORACIC SURGERY (CARDIOTHORACIC VASCULAR SURGERY)
Payer: COMMERCIAL

## 2023-08-07 ENCOUNTER — PRE-ADMISSION TESTING (OUTPATIENT)
Dept: ADMISSIONS | Facility: MEDICAL CENTER | Age: 63
DRG: 219 | End: 2023-08-07
Attending: THORACIC SURGERY (CARDIOTHORACIC VASCULAR SURGERY)
Payer: COMMERCIAL

## 2023-08-07 DIAGNOSIS — Z01.812 PRE-OPERATIVE LABORATORY EXAMINATION: ICD-10-CM

## 2023-08-07 DIAGNOSIS — Z01.811 PRE-OPERATIVE RESPIRATORY EXAMINATION: ICD-10-CM

## 2023-08-07 DIAGNOSIS — Z01.810 PRE-OPERATIVE CARDIOVASCULAR EXAMINATION: ICD-10-CM

## 2023-08-07 LAB
ABO GROUP BLD: NORMAL
ALBUMIN SERPL BCP-MCNC: 4.6 G/DL (ref 3.2–4.9)
ALBUMIN/GLOB SERPL: 1.6 G/DL
ALP SERPL-CCNC: 80 U/L (ref 30–99)
ALT SERPL-CCNC: 20 U/L (ref 2–50)
AMPHET UR QL SCN: NEGATIVE
ANION GAP SERPL CALC-SCNC: 13 MMOL/L (ref 7–16)
APPEARANCE UR: CLEAR
APTT PPP: 28.7 SEC (ref 24.7–36)
AST SERPL-CCNC: 13 U/L (ref 12–45)
BARBITURATES UR QL SCN: NEGATIVE
BASOPHILS # BLD AUTO: 0.5 % (ref 0–1.8)
BASOPHILS # BLD: 0.03 K/UL (ref 0–0.12)
BENZODIAZ UR QL SCN: NEGATIVE
BILIRUB SERPL-MCNC: 0.6 MG/DL (ref 0.1–1.5)
BILIRUB UR QL STRIP.AUTO: NEGATIVE
BLD GP AB SCN SERPL QL: NORMAL
BUN SERPL-MCNC: 16 MG/DL (ref 8–22)
BZE UR QL SCN: NEGATIVE
CALCIUM ALBUM COR SERPL-MCNC: 9 MG/DL (ref 8.5–10.5)
CALCIUM SERPL-MCNC: 9.5 MG/DL (ref 8.5–10.5)
CANNABINOIDS UR QL SCN: NEGATIVE
CHLORIDE SERPL-SCNC: 103 MMOL/L (ref 96–112)
CO2 SERPL-SCNC: 22 MMOL/L (ref 20–33)
COLOR UR: YELLOW
CREAT SERPL-MCNC: 0.8 MG/DL (ref 0.5–1.4)
EKG IMPRESSION: NORMAL
EOSINOPHIL # BLD AUTO: 0.23 K/UL (ref 0–0.51)
EOSINOPHIL NFR BLD: 3.5 % (ref 0–6.9)
ERYTHROCYTE [DISTWIDTH] IN BLOOD BY AUTOMATED COUNT: 42.2 FL (ref 35.9–50)
EST. AVERAGE GLUCOSE BLD GHB EST-MCNC: 126 MG/DL
FENTANYL UR QL: NEGATIVE
GFR SERPLBLD CREATININE-BSD FMLA CKD-EPI: 98 ML/MIN/1.73 M 2
GLOBULIN SER CALC-MCNC: 2.9 G/DL (ref 1.9–3.5)
GLUCOSE SERPL-MCNC: 117 MG/DL (ref 65–99)
GLUCOSE UR STRIP.AUTO-MCNC: NEGATIVE MG/DL
HBA1C MFR BLD: 6 % (ref 4–5.6)
HCT VFR BLD AUTO: 49.8 % (ref 42–52)
HGB BLD-MCNC: 17.1 G/DL (ref 14–18)
IMM GRANULOCYTES # BLD AUTO: 0.11 K/UL (ref 0–0.11)
IMM GRANULOCYTES NFR BLD AUTO: 1.7 % (ref 0–0.9)
INR PPP: 1.07 (ref 0.87–1.13)
KETONES UR STRIP.AUTO-MCNC: ABNORMAL MG/DL
LEUKOCYTE ESTERASE UR QL STRIP.AUTO: NEGATIVE
LYMPHOCYTES # BLD AUTO: 1.51 K/UL (ref 1–4.8)
LYMPHOCYTES NFR BLD: 22.9 % (ref 22–41)
MCH RBC QN AUTO: 30.6 PG (ref 27–33)
MCHC RBC AUTO-ENTMCNC: 34.3 G/DL (ref 32.3–36.5)
MCV RBC AUTO: 89.2 FL (ref 81.4–97.8)
METHADONE UR QL SCN: NEGATIVE
MICRO URNS: ABNORMAL
MONOCYTES # BLD AUTO: 0.5 K/UL (ref 0–0.85)
MONOCYTES NFR BLD AUTO: 7.6 % (ref 0–13.4)
NEUTROPHILS # BLD AUTO: 4.22 K/UL (ref 1.82–7.42)
NEUTROPHILS NFR BLD: 63.8 % (ref 44–72)
NITRITE UR QL STRIP.AUTO: NEGATIVE
NRBC # BLD AUTO: 0 K/UL
NRBC BLD-RTO: 0 /100 WBC (ref 0–0.2)
OPIATES UR QL SCN: NEGATIVE
OXYCODONE UR QL SCN: NEGATIVE
PCP UR QL SCN: NEGATIVE
PH UR STRIP.AUTO: 5.5 [PH] (ref 5–8)
PLATELET # BLD AUTO: 281 K/UL (ref 164–446)
PMV BLD AUTO: 10.4 FL (ref 9–12.9)
POTASSIUM SERPL-SCNC: 3.5 MMOL/L (ref 3.6–5.5)
PROPOXYPH UR QL SCN: NEGATIVE
PROT SERPL-MCNC: 7.5 G/DL (ref 6–8.2)
PROT UR QL STRIP: NEGATIVE MG/DL
PROTHROMBIN TIME: 13.8 SEC (ref 12–14.6)
RBC # BLD AUTO: 5.58 M/UL (ref 4.7–6.1)
RBC UR QL AUTO: NEGATIVE
RH BLD: NORMAL
SCCMEC + MECA PNL NOSE NAA+PROBE: NEGATIVE
SCCMEC + MECA PNL NOSE NAA+PROBE: POSITIVE
SODIUM SERPL-SCNC: 138 MMOL/L (ref 135–145)
SP GR UR STRIP.AUTO: 1.02
UROBILINOGEN UR STRIP.AUTO-MCNC: 0.2 MG/DL
WBC # BLD AUTO: 6.6 K/UL (ref 4.8–10.8)

## 2023-08-07 PROCEDURE — 80053 COMPREHEN METABOLIC PANEL: CPT

## 2023-08-07 PROCEDURE — 85025 COMPLETE CBC W/AUTO DIFF WBC: CPT

## 2023-08-07 PROCEDURE — 87641 MR-STAPH DNA AMP PROBE: CPT

## 2023-08-07 PROCEDURE — 71046 X-RAY EXAM CHEST 2 VIEWS: CPT

## 2023-08-07 PROCEDURE — 83036 HEMOGLOBIN GLYCOSYLATED A1C: CPT

## 2023-08-07 PROCEDURE — 87640 STAPH A DNA AMP PROBE: CPT

## 2023-08-07 PROCEDURE — 93010 ELECTROCARDIOGRAM REPORT: CPT | Performed by: INTERNAL MEDICINE

## 2023-08-07 PROCEDURE — 85610 PROTHROMBIN TIME: CPT

## 2023-08-07 PROCEDURE — 85730 THROMBOPLASTIN TIME PARTIAL: CPT

## 2023-08-07 PROCEDURE — 86850 RBC ANTIBODY SCREEN: CPT

## 2023-08-07 PROCEDURE — 86901 BLOOD TYPING SEROLOGIC RH(D): CPT

## 2023-08-07 PROCEDURE — 93005 ELECTROCARDIOGRAM TRACING: CPT

## 2023-08-07 PROCEDURE — 80307 DRUG TEST PRSMV CHEM ANLYZR: CPT

## 2023-08-07 PROCEDURE — 81003 URINALYSIS AUTO W/O SCOPE: CPT

## 2023-08-07 PROCEDURE — 36415 COLL VENOUS BLD VENIPUNCTURE: CPT

## 2023-08-07 PROCEDURE — 86900 BLOOD TYPING SEROLOGIC ABO: CPT

## 2023-08-07 NOTE — TELEPHONE ENCOUNTER
Patient was reminded that aortic root replacement surgery with  Dr. Zamudio is on 8/9 at 0730 in the morning. He   are aware check in is at 0515 am.    Baseline IS was 3000. he has been compliant   with the IS. Volume has improved to 3500.    he was prescribed a walking regimen or  told to continue he current regimen and  he has been compliant.   he has been practicing sit to stand.    The CHG wipes instructions were reviewed and understood.    PAT date and time was reviewed with patient and  verified it is within 72 hours of surgery.    Vascular studies and procedures: carotids and angiogram  were scheduled, completed,  and reviewed by myself for concerning  results did not need escalation to the MARK/MD.    he did not need a dental check/work.    he was reminded that losartan needs to stop after 8/6.    he was told that allopurinol medication (s) are okay to  take the morning of surgery prior to check in.     he was reminded no food after midnight.    Call time 10 minutes

## 2023-08-08 ENCOUNTER — ANESTHESIA EVENT (OUTPATIENT)
Dept: SURGERY | Facility: MEDICAL CENTER | Age: 63
DRG: 219 | End: 2023-08-08
Payer: COMMERCIAL

## 2023-08-08 RX ORDER — DEXMEDETOMIDINE HYDROCHLORIDE 4 UG/ML
0-1.5 INJECTION, SOLUTION INTRAVENOUS CONTINUOUS
Status: DISCONTINUED | OUTPATIENT
Start: 2023-08-09 | End: 2023-08-09

## 2023-08-08 RX ORDER — METHADONE HYDROCHLORIDE 10 MG/ML
20 INJECTION, SOLUTION INTRAMUSCULAR; INTRAVENOUS; SUBCUTANEOUS ONCE
Status: COMPLETED | OUTPATIENT
Start: 2023-08-09 | End: 2023-08-09

## 2023-08-08 RX ORDER — NOREPINEPHRINE BITARTRATE 0.03 MG/ML
0-1 INJECTION, SOLUTION INTRAVENOUS CONTINUOUS
Status: DISCONTINUED | OUTPATIENT
Start: 2023-08-09 | End: 2023-08-09

## 2023-08-08 RX ORDER — EPINEPHRINE HCL IN 0.9 % NACL 4MG/250ML
0-.5 PLASTIC BAG, INJECTION (ML) INTRAVENOUS CONTINUOUS
Status: DISCONTINUED | OUTPATIENT
Start: 2023-08-09 | End: 2023-08-09

## 2023-08-09 ENCOUNTER — APPOINTMENT (OUTPATIENT)
Dept: CARDIOLOGY | Facility: MEDICAL CENTER | Age: 63
DRG: 219 | End: 2023-08-09
Attending: STUDENT IN AN ORGANIZED HEALTH CARE EDUCATION/TRAINING PROGRAM
Payer: COMMERCIAL

## 2023-08-09 ENCOUNTER — APPOINTMENT (OUTPATIENT)
Dept: RADIOLOGY | Facility: MEDICAL CENTER | Age: 63
DRG: 219 | End: 2023-08-09
Attending: THORACIC SURGERY (CARDIOTHORACIC VASCULAR SURGERY)
Payer: COMMERCIAL

## 2023-08-09 ENCOUNTER — HOSPITAL ENCOUNTER (INPATIENT)
Facility: MEDICAL CENTER | Age: 63
LOS: 9 days | DRG: 219 | End: 2023-08-18
Attending: THORACIC SURGERY (CARDIOTHORACIC VASCULAR SURGERY) | Admitting: THORACIC SURGERY (CARDIOTHORACIC VASCULAR SURGERY)
Payer: COMMERCIAL

## 2023-08-09 ENCOUNTER — ANESTHESIA (OUTPATIENT)
Dept: SURGERY | Facility: MEDICAL CENTER | Age: 63
DRG: 219 | End: 2023-08-09
Payer: COMMERCIAL

## 2023-08-09 DIAGNOSIS — Z98.890 S/P ASCENDING AORTIC ANEURYSM REPAIR: Primary | ICD-10-CM

## 2023-08-09 DIAGNOSIS — Z86.79 S/P ASCENDING AORTIC ANEURYSM REPAIR: Primary | ICD-10-CM

## 2023-08-09 DIAGNOSIS — J93.0 TENSION PNEUMOTHORAX: ICD-10-CM

## 2023-08-09 PROBLEM — Q25.43 AORTIC ROOT ANEURYSM: Status: ACTIVE | Noted: 2023-08-09

## 2023-08-09 PROBLEM — E11.9 TYPE 2 DIABETES MELLITUS WITHOUT COMPLICATION, WITHOUT LONG-TERM CURRENT USE OF INSULIN (HCC): Status: ACTIVE | Noted: 2023-08-09

## 2023-08-09 PROBLEM — I71.21 AORTIC ROOT ANEURYSM (HCC): Status: ACTIVE | Noted: 2023-08-09

## 2023-08-09 LAB
ABO + RH BLD: NORMAL
ACT BLD: 113 SEC (ref 74–137)
ACT BLD: 137 SEC (ref 74–137)
ACT BLD: 528 SEC (ref 74–137)
ACT BLD: 528 SEC (ref 74–137)
ACT BLD: 552 SEC (ref 74–137)
ACT BLD: 564 SEC (ref 74–137)
ACT BLD: 588 SEC (ref 74–137)
APTT PPP: 30.5 SEC (ref 24.7–36)
BASE EXCESS BLDA CALC-SCNC: -1 MMOL/L (ref -4–3)
BASE EXCESS BLDA CALC-SCNC: -2 MMOL/L (ref -4–3)
BASE EXCESS BLDA CALC-SCNC: -3 MMOL/L (ref -4–3)
BASE EXCESS BLDA CALC-SCNC: -3 MMOL/L (ref -4–3)
BASE EXCESS BLDA CALC-SCNC: 0 MMOL/L (ref -4–3)
BASE EXCESS BLDA CALC-SCNC: 2 MMOL/L (ref -4–3)
BASE EXCESS BLDA CALC-SCNC: 3 MMOL/L (ref -4–3)
BASE EXCESS BLDA CALC-SCNC: 3 MMOL/L (ref -4–3)
BASE EXCESS BLDV CALC-SCNC: -2 MMOL/L (ref -4–3)
BODY TEMPERATURE: ABNORMAL DEGREES
CA-I BLD ISE-SCNC: 0.93 MMOL/L (ref 1.1–1.3)
CA-I BLD ISE-SCNC: 0.98 MMOL/L (ref 1.1–1.3)
CA-I BLD ISE-SCNC: 1.02 MMOL/L (ref 1.1–1.3)
CA-I BLD ISE-SCNC: 1.02 MMOL/L (ref 1.1–1.3)
CA-I BLD ISE-SCNC: 1.14 MMOL/L (ref 1.1–1.3)
CA-I BLD ISE-SCNC: 1.18 MMOL/L (ref 1.1–1.3)
CA-I BLD ISE-SCNC: 1.21 MMOL/L (ref 1.1–1.3)
CA-I BLD ISE-SCNC: 1.22 MMOL/L (ref 1.1–1.3)
CA-I BLD ISE-SCNC: 1.22 MMOL/L (ref 1.1–1.3)
CO2 BLDA-SCNC: 23 MMOL/L (ref 20–33)
CO2 BLDA-SCNC: 23 MMOL/L (ref 20–33)
CO2 BLDA-SCNC: 24 MMOL/L (ref 20–33)
CO2 BLDA-SCNC: 25 MMOL/L (ref 20–33)
CO2 BLDA-SCNC: 26 MMOL/L (ref 20–33)
CO2 BLDA-SCNC: 28 MMOL/L (ref 20–33)
CO2 BLDA-SCNC: 29 MMOL/L (ref 20–33)
CO2 BLDA-SCNC: 30 MMOL/L (ref 20–33)
CO2 BLDV-SCNC: 26 MMOL/L (ref 20–33)
DELSYS IDSYS: ABNORMAL
EKG IMPRESSION: NORMAL
END TIDAL CARBON DIOXIDE IECO2: 28 MMHG
END TIDAL CARBON DIOXIDE IECO2: 33 MMHG
GLUCOSE BLD STRIP.AUTO-MCNC: 101 MG/DL (ref 65–99)
GLUCOSE BLD STRIP.AUTO-MCNC: 108 MG/DL (ref 65–99)
GLUCOSE BLD STRIP.AUTO-MCNC: 126 MG/DL (ref 65–99)
GLUCOSE BLD STRIP.AUTO-MCNC: 135 MG/DL (ref 65–99)
GLUCOSE BLD STRIP.AUTO-MCNC: 140 MG/DL (ref 65–99)
GLUCOSE BLD STRIP.AUTO-MCNC: 142 MG/DL (ref 65–99)
GLUCOSE BLD STRIP.AUTO-MCNC: 161 MG/DL (ref 65–99)
GLUCOSE BLD STRIP.AUTO-MCNC: 173 MG/DL (ref 65–99)
HCO3 BLDA-SCNC: 21.8 MMOL/L (ref 17–25)
HCO3 BLDA-SCNC: 22 MMOL/L (ref 17–25)
HCO3 BLDA-SCNC: 22.7 MMOL/L (ref 17–25)
HCO3 BLDA-SCNC: 23.4 MMOL/L (ref 17–25)
HCO3 BLDA-SCNC: 24.4 MMOL/L (ref 17–25)
HCO3 BLDA-SCNC: 24.7 MMOL/L (ref 17–25)
HCO3 BLDA-SCNC: 25.1 MMOL/L (ref 17–25)
HCO3 BLDA-SCNC: 27.1 MMOL/L (ref 17–25)
HCO3 BLDA-SCNC: 27.8 MMOL/L (ref 17–25)
HCO3 BLDA-SCNC: 28.9 MMOL/L (ref 17–25)
HCO3 BLDV-SCNC: 24.3 MMOL/L (ref 24–28)
HCT VFR BLD AUTO: 44.1 % (ref 42–52)
HCT VFR BLD CALC: 31 % (ref 42–52)
HCT VFR BLD CALC: 33 % (ref 42–52)
HCT VFR BLD CALC: 35 % (ref 42–52)
HCT VFR BLD CALC: 36 % (ref 42–52)
HCT VFR BLD CALC: 39 % (ref 42–52)
HCT VFR BLD CALC: 44 % (ref 42–52)
HCT VFR BLD CALC: 45 % (ref 42–52)
HGB BLD-MCNC: 10.5 G/DL (ref 14–18)
HGB BLD-MCNC: 11.2 G/DL (ref 14–18)
HGB BLD-MCNC: 11.9 G/DL (ref 14–18)
HGB BLD-MCNC: 12.2 G/DL (ref 14–18)
HGB BLD-MCNC: 13.3 G/DL (ref 14–18)
HGB BLD-MCNC: 14.7 G/DL (ref 14–18)
HGB BLD-MCNC: 15 G/DL (ref 14–18)
HGB BLD-MCNC: 15.3 G/DL (ref 14–18)
HOROWITZ INDEX BLDA+IHG-RTO: 218 MM[HG]
HOROWITZ INDEX BLDA+IHG-RTO: 244 MM[HG]
HOROWITZ INDEX BLDA+IHG-RTO: 552 MM[HG]
HOROWITZ INDEX BLDA+IHG-RTO: 97 MM[HG]
INR PPP: 1.38 (ref 0.87–1.13)
LV EJECT FRACT  99904: 55
MAGNESIUM SERPL-MCNC: 3.1 MG/DL (ref 1.5–2.5)
MODE IMODE: ABNORMAL
O2/TOTAL GAS SETTING VFR VENT: 100 %
O2/TOTAL GAS SETTING VFR VENT: 100 %
O2/TOTAL GAS SETTING VFR VENT: 50 %
O2/TOTAL GAS SETTING VFR VENT: 50 %
PATHOLOGY CONSULT NOTE: NORMAL
PCO2 BLDA: 34.5 MMHG (ref 26–37)
PCO2 BLDA: 39.5 MMHG (ref 26–37)
PCO2 BLDA: 40.4 MMHG (ref 26–37)
PCO2 BLDA: 40.4 MMHG (ref 26–37)
PCO2 BLDA: 41.6 MMHG (ref 26–37)
PCO2 BLDA: 43.1 MMHG (ref 26–37)
PCO2 BLDA: 43.3 MMHG (ref 26–37)
PCO2 BLDA: 44.3 MMHG (ref 26–37)
PCO2 BLDA: 47.6 MMHG (ref 26–37)
PCO2 BLDA: 48 MMHG (ref 26–37)
PCO2 BLDV: 48.4 MMHG (ref 41–51)
PCO2 TEMP ADJ BLDA: 33.5 MMHG (ref 26–37)
PCO2 TEMP ADJ BLDA: 37.8 MMHG (ref 26–37)
PCO2 TEMP ADJ BLDA: 39.6 MMHG (ref 26–37)
PCO2 TEMP ADJ BLDA: 39.7 MMHG (ref 26–37)
PCO2 TEMP ADJ BLDA: 40.4 MMHG (ref 26–37)
PCO2 TEMP ADJ BLDA: 40.4 MMHG (ref 26–37)
PCO2 TEMP ADJ BLDA: 41.6 MMHG (ref 26–37)
PCO2 TEMP ADJ BLDA: 42.5 MMHG (ref 26–37)
PCO2 TEMP ADJ BLDA: 43.8 MMHG (ref 26–37)
PCO2 TEMP ADJ BLDA: 47 MMHG (ref 26–37)
PCO2 TEMP ADJ BLDV: 46.5 MMHG (ref 41–51)
PEEP END EXPIRATORY PRESSURE IPEEP: 8 CMH20
PERCENT MINUTE VOLUME IPMV: 100
PERCENT MINUTE VOLUME IPMV: 130
PERCENT MINUTE VOLUME IPMV: 160
PERCENT MINUTE VOLUME IPMV: 160
PH BLDA: 7.32 [PH] (ref 7.4–7.5)
PH BLDA: 7.35 [PH] (ref 7.4–7.5)
PH BLDA: 7.36 [PH] (ref 7.4–7.5)
PH BLDA: 7.36 [PH] (ref 7.4–7.5)
PH BLDA: 7.37 [PH] (ref 7.4–7.5)
PH BLDA: 7.39 [PH] (ref 7.4–7.5)
PH BLDA: 7.39 [PH] (ref 7.4–7.5)
PH BLDA: 7.41 [PH] (ref 7.4–7.5)
PH BLDV: 7.31 [PH] (ref 7.31–7.45)
PH TEMP ADJ BLDA: 7.33 [PH] (ref 7.4–7.5)
PH TEMP ADJ BLDA: 7.36 [PH] (ref 7.4–7.5)
PH TEMP ADJ BLDA: 7.36 [PH] (ref 7.4–7.5)
PH TEMP ADJ BLDA: 7.37 [PH] (ref 7.4–7.5)
PH TEMP ADJ BLDA: 7.38 [PH] (ref 7.4–7.5)
PH TEMP ADJ BLDA: 7.39 [PH] (ref 7.4–7.5)
PH TEMP ADJ BLDA: 7.42 [PH] (ref 7.4–7.5)
PH TEMP ADJ BLDA: 7.42 [PH] (ref 7.4–7.5)
PH TEMP ADJ BLDA: 7.43 [PH] (ref 7.4–7.5)
PH TEMP ADJ BLDA: 7.44 [PH] (ref 7.4–7.5)
PH TEMP ADJ BLDV: 7.32 [PH] (ref 7.31–7.45)
PLATELET # BLD AUTO: 184 K/UL (ref 164–446)
PO2 BLDA: 122 MMHG (ref 64–87)
PO2 BLDA: 203 MMHG (ref 64–87)
PO2 BLDA: 218 MMHG (ref 64–87)
PO2 BLDA: 223 MMHG (ref 64–87)
PO2 BLDA: 275 MMHG (ref 64–87)
PO2 BLDA: 276 MMHG (ref 64–87)
PO2 BLDA: 290 MMHG (ref 64–87)
PO2 BLDA: 357 MMHG (ref 64–87)
PO2 BLDA: 365 MMHG (ref 64–87)
PO2 BLDA: 97 MMHG (ref 64–87)
PO2 BLDV: 58 MMHG (ref 25–40)
PO2 TEMP ADJ BLDA: 116 MMHG (ref 64–87)
PO2 TEMP ADJ BLDA: 203 MMHG (ref 64–87)
PO2 TEMP ADJ BLDA: 214 MMHG (ref 64–87)
PO2 TEMP ADJ BLDA: 221 MMHG (ref 64–87)
PO2 TEMP ADJ BLDA: 274 MMHG (ref 64–87)
PO2 TEMP ADJ BLDA: 275 MMHG (ref 64–87)
PO2 TEMP ADJ BLDA: 283 MMHG (ref 64–87)
PO2 TEMP ADJ BLDA: 343 MMHG (ref 64–87)
PO2 TEMP ADJ BLDA: 355 MMHG (ref 64–87)
PO2 TEMP ADJ BLDA: 95 MMHG (ref 64–87)
PO2 TEMP ADJ BLDV: 54 MMHG (ref 25–40)
POTASSIUM BLD-SCNC: 3.6 MMOL/L (ref 3.6–5.5)
POTASSIUM BLD-SCNC: 3.7 MMOL/L (ref 3.6–5.5)
POTASSIUM BLD-SCNC: 4.1 MMOL/L (ref 3.6–5.5)
POTASSIUM BLD-SCNC: 4.2 MMOL/L (ref 3.6–5.5)
POTASSIUM BLD-SCNC: 4.3 MMOL/L (ref 3.6–5.5)
POTASSIUM BLD-SCNC: 4.4 MMOL/L (ref 3.6–5.5)
POTASSIUM BLD-SCNC: 4.4 MMOL/L (ref 3.6–5.5)
POTASSIUM BLD-SCNC: 4.5 MMOL/L (ref 3.6–5.5)
POTASSIUM BLD-SCNC: 4.6 MMOL/L (ref 3.6–5.5)
POTASSIUM SERPL-SCNC: 4 MMOL/L (ref 3.6–5.5)
POTASSIUM SERPL-SCNC: 4.2 MMOL/L (ref 3.6–5.5)
PROTHROMBIN TIME: 16.7 SEC (ref 12–14.6)
SAO2 % BLDA: 100 % (ref 93–99)
SAO2 % BLDA: 97 % (ref 93–99)
SAO2 % BLDA: 99 % (ref 93–99)
SAO2 % BLDV: 87 %
SODIUM BLD-SCNC: 136 MMOL/L (ref 135–145)
SODIUM BLD-SCNC: 136 MMOL/L (ref 135–145)
SODIUM BLD-SCNC: 137 MMOL/L (ref 135–145)
SODIUM BLD-SCNC: 138 MMOL/L (ref 135–145)
SODIUM BLD-SCNC: 139 MMOL/L (ref 135–145)
SODIUM BLD-SCNC: 139 MMOL/L (ref 135–145)
SODIUM BLD-SCNC: 140 MMOL/L (ref 135–145)
SPECIMEN DRAWN FROM PATIENT: ABNORMAL

## 2023-08-09 PROCEDURE — 700105 HCHG RX REV CODE 258: Performed by: THORACIC SURGERY (CARDIOTHORACIC VASCULAR SURGERY)

## 2023-08-09 PROCEDURE — 02RF08Z REPLACEMENT OF AORTIC VALVE WITH ZOOPLASTIC TISSUE, OPEN APPROACH: ICD-10-PCS | Performed by: THORACIC SURGERY (CARDIOTHORACIC VASCULAR SURGERY)

## 2023-08-09 PROCEDURE — 5A1221Z PERFORMANCE OF CARDIAC OUTPUT, CONTINUOUS: ICD-10-PCS | Performed by: THORACIC SURGERY (CARDIOTHORACIC VASCULAR SURGERY)

## 2023-08-09 PROCEDURE — C1729 CATH, DRAINAGE: HCPCS | Performed by: THORACIC SURGERY (CARDIOTHORACIC VASCULAR SURGERY)

## 2023-08-09 PROCEDURE — 85730 THROMBOPLASTIN TIME PARTIAL: CPT

## 2023-08-09 PROCEDURE — 160042 HCHG SURGERY MINUTES - EA ADDL 1 MIN LEVEL 5: Performed by: THORACIC SURGERY (CARDIOTHORACIC VASCULAR SURGERY)

## 2023-08-09 PROCEDURE — 83735 ASSAY OF MAGNESIUM: CPT

## 2023-08-09 PROCEDURE — 700105 HCHG RX REV CODE 258: Mod: JZ | Performed by: STUDENT IN AN ORGANIZED HEALTH CARE EDUCATION/TRAINING PROGRAM

## 2023-08-09 PROCEDURE — 84132 ASSAY OF SERUM POTASSIUM: CPT | Mod: 91

## 2023-08-09 PROCEDURE — 85610 PROTHROMBIN TIME: CPT

## 2023-08-09 PROCEDURE — 160009 HCHG ANES TIME/MIN: Performed by: THORACIC SURGERY (CARDIOTHORACIC VASCULAR SURGERY)

## 2023-08-09 PROCEDURE — 02RX0KZ REPLACEMENT OF THORACIC AORTA, ASCENDING/ARCH WITH NONAUTOLOGOUS TISSUE SUBSTITUTE, OPEN APPROACH: ICD-10-PCS | Performed by: THORACIC SURGERY (CARDIOTHORACIC VASCULAR SURGERY)

## 2023-08-09 PROCEDURE — 82962 GLUCOSE BLOOD TEST: CPT | Mod: 91

## 2023-08-09 PROCEDURE — 88305 TISSUE EXAM BY PATHOLOGIST: CPT

## 2023-08-09 PROCEDURE — B24BZZ4 ULTRASONOGRAPHY OF HEART WITH AORTA, TRANSESOPHAGEAL: ICD-10-PCS | Performed by: THORACIC SURGERY (CARDIOTHORACIC VASCULAR SURGERY)

## 2023-08-09 PROCEDURE — A9270 NON-COVERED ITEM OR SERVICE: HCPCS

## 2023-08-09 PROCEDURE — 85049 AUTOMATED PLATELET COUNT: CPT

## 2023-08-09 PROCEDURE — 33863 ASCENDING AORTIC GRAFT: CPT | Mod: AS

## 2023-08-09 PROCEDURE — 503001 HCHG PERFUSION: Performed by: THORACIC SURGERY (CARDIOTHORACIC VASCULAR SURGERY)

## 2023-08-09 PROCEDURE — 84295 ASSAY OF SERUM SODIUM: CPT | Mod: 91

## 2023-08-09 PROCEDURE — 94003 VENT MGMT INPAT SUBQ DAY: CPT

## 2023-08-09 PROCEDURE — 93325 DOPPLER ECHO COLOR FLOW MAPG: CPT

## 2023-08-09 PROCEDURE — C1751 CATH, INF, PER/CENT/MIDLINE: HCPCS | Performed by: THORACIC SURGERY (CARDIOTHORACIC VASCULAR SURGERY)

## 2023-08-09 PROCEDURE — 94150 VITAL CAPACITY TEST: CPT

## 2023-08-09 PROCEDURE — 94760 N-INVAS EAR/PLS OXIMETRY 1: CPT

## 2023-08-09 PROCEDURE — 82330 ASSAY OF CALCIUM: CPT

## 2023-08-09 PROCEDURE — 94799 UNLISTED PULMONARY SVC/PX: CPT

## 2023-08-09 PROCEDURE — 94002 VENT MGMT INPAT INIT DAY: CPT

## 2023-08-09 PROCEDURE — 700102 HCHG RX REV CODE 250 W/ 637 OVERRIDE(OP): Performed by: THORACIC SURGERY (CARDIOTHORACIC VASCULAR SURGERY)

## 2023-08-09 PROCEDURE — C1768 GRAFT, VASCULAR: HCPCS | Performed by: THORACIC SURGERY (CARDIOTHORACIC VASCULAR SURGERY)

## 2023-08-09 PROCEDURE — 700111 HCHG RX REV CODE 636 W/ 250 OVERRIDE (IP): Performed by: THORACIC SURGERY (CARDIOTHORACIC VASCULAR SURGERY)

## 2023-08-09 PROCEDURE — C1889 IMPLANT/INSERT DEVICE, NOC: HCPCS | Performed by: THORACIC SURGERY (CARDIOTHORACIC VASCULAR SURGERY)

## 2023-08-09 PROCEDURE — 770022 HCHG ROOM/CARE - ICU (200)

## 2023-08-09 PROCEDURE — 700111 HCHG RX REV CODE 636 W/ 250 OVERRIDE (IP): Mod: JZ

## 2023-08-09 PROCEDURE — 700111 HCHG RX REV CODE 636 W/ 250 OVERRIDE (IP): Performed by: STUDENT IN AN ORGANIZED HEALTH CARE EDUCATION/TRAINING PROGRAM

## 2023-08-09 PROCEDURE — 160031 HCHG SURGERY MINUTES - 1ST 30 MINS LEVEL 5: Performed by: THORACIC SURGERY (CARDIOTHORACIC VASCULAR SURGERY)

## 2023-08-09 PROCEDURE — 700101 HCHG RX REV CODE 250

## 2023-08-09 PROCEDURE — 700105 HCHG RX REV CODE 258

## 2023-08-09 PROCEDURE — 93005 ELECTROCARDIOGRAM TRACING: CPT

## 2023-08-09 PROCEDURE — 85014 HEMATOCRIT: CPT | Mod: 91

## 2023-08-09 PROCEDURE — A9270 NON-COVERED ITEM OR SERVICE: HCPCS | Performed by: THORACIC SURGERY (CARDIOTHORACIC VASCULAR SURGERY)

## 2023-08-09 PROCEDURE — 85347 COAGULATION TIME ACTIVATED: CPT | Mod: 91

## 2023-08-09 PROCEDURE — 700101 HCHG RX REV CODE 250: Performed by: STUDENT IN AN ORGANIZED HEALTH CARE EDUCATION/TRAINING PROGRAM

## 2023-08-09 PROCEDURE — 700101 HCHG RX REV CODE 250: Performed by: THORACIC SURGERY (CARDIOTHORACIC VASCULAR SURGERY)

## 2023-08-09 PROCEDURE — 99291 CRITICAL CARE FIRST HOUR: CPT | Performed by: INTERNAL MEDICINE

## 2023-08-09 PROCEDURE — P9047 ALBUMIN (HUMAN), 25%, 50ML: HCPCS

## 2023-08-09 PROCEDURE — 88304 TISSUE EXAM BY PATHOLOGIST: CPT

## 2023-08-09 PROCEDURE — 71045 X-RAY EXAM CHEST 1 VIEW: CPT

## 2023-08-09 PROCEDURE — 33863 ASCENDING AORTIC GRAFT: CPT | Performed by: THORACIC SURGERY (CARDIOTHORACIC VASCULAR SURGERY)

## 2023-08-09 PROCEDURE — 700111 HCHG RX REV CODE 636 W/ 250 OVERRIDE (IP)

## 2023-08-09 PROCEDURE — 82803 BLOOD GASES ANY COMBINATION: CPT | Mod: 91

## 2023-08-09 PROCEDURE — 700102 HCHG RX REV CODE 250 W/ 637 OVERRIDE(OP)

## 2023-08-09 PROCEDURE — 160048 HCHG OR STATISTICAL LEVEL 1-5: Performed by: THORACIC SURGERY (CARDIOTHORACIC VASCULAR SURGERY)

## 2023-08-09 PROCEDURE — 94669 MECHANICAL CHEST WALL OSCILL: CPT

## 2023-08-09 PROCEDURE — 5A1223Z PERFORMANCE OF CARDIAC PACING, CONTINUOUS: ICD-10-PCS | Performed by: THORACIC SURGERY (CARDIOTHORACIC VASCULAR SURGERY)

## 2023-08-09 PROCEDURE — 700105 HCHG RX REV CODE 258: Mod: JZ | Performed by: THORACIC SURGERY (CARDIOTHORACIC VASCULAR SURGERY)

## 2023-08-09 PROCEDURE — 93010 ELECTROCARDIOGRAM REPORT: CPT | Performed by: INTERNAL MEDICINE

## 2023-08-09 PROCEDURE — C1898 LEAD, PMKR, OTHER THAN TRANS: HCPCS | Performed by: THORACIC SURGERY (CARDIOTHORACIC VASCULAR SURGERY)

## 2023-08-09 PROCEDURE — C9248 INJ, CLEVIDIPINE BUTYRATE: HCPCS | Performed by: STUDENT IN AN ORGANIZED HEALTH CARE EDUCATION/TRAINING PROGRAM

## 2023-08-09 PROCEDURE — 36415 COLL VENOUS BLD VENIPUNCTURE: CPT

## 2023-08-09 PROCEDURE — 85018 HEMOGLOBIN: CPT

## 2023-08-09 DEVICE — VALVE KONECT RESILIA CONDUIT 27MM (1/EA): Type: IMPLANTABLE DEVICE | Site: HEART | Status: FUNCTIONAL

## 2023-08-09 DEVICE — MARKER GRAFT AC VEIN DISK SHAPED (10EA/BX): Type: IMPLANTABLE DEVICE | Site: HEART | Status: FUNCTIONAL

## 2023-08-09 RX ORDER — MAGNESIUM SULFATE 1 G/100ML
1 INJECTION INTRAVENOUS DAILY
Status: COMPLETED | OUTPATIENT
Start: 2023-08-09 | End: 2023-08-11

## 2023-08-09 RX ORDER — DEXTROSE MONOHYDRATE 25 G/50ML
12.5-25 INJECTION, SOLUTION INTRAVENOUS PRN
Status: DISCONTINUED | OUTPATIENT
Start: 2023-08-09 | End: 2023-08-10

## 2023-08-09 RX ORDER — SODIUM CHLORIDE, SODIUM GLUCONATE, SODIUM ACETATE, POTASSIUM CHLORIDE AND MAGNESIUM CHLORIDE 526; 502; 368; 37; 30 MG/100ML; MG/100ML; MG/100ML; MG/100ML; MG/100ML
INJECTION, SOLUTION INTRAVENOUS
Status: DISCONTINUED | OUTPATIENT
Start: 2023-08-09 | End: 2023-08-09 | Stop reason: SURG

## 2023-08-09 RX ORDER — PROCHLORPERAZINE EDISYLATE 5 MG/ML
10 INJECTION INTRAMUSCULAR; INTRAVENOUS EVERY 6 HOURS PRN
Status: DISCONTINUED | OUTPATIENT
Start: 2023-08-09 | End: 2023-08-13

## 2023-08-09 RX ORDER — OXYCODONE HYDROCHLORIDE 5 MG/1
5 TABLET ORAL
Status: DISCONTINUED | OUTPATIENT
Start: 2023-08-09 | End: 2023-08-18 | Stop reason: HOSPADM

## 2023-08-09 RX ORDER — ACETAMINOPHEN 500 MG
1000 TABLET ORAL EVERY 6 HOURS PRN
Status: DISCONTINUED | OUTPATIENT
Start: 2023-08-14 | End: 2023-08-18 | Stop reason: HOSPADM

## 2023-08-09 RX ORDER — SODIUM CHLORIDE, SODIUM LACTATE, POTASSIUM CHLORIDE, CALCIUM CHLORIDE 600; 310; 30; 20 MG/100ML; MG/100ML; MG/100ML; MG/100ML
INJECTION, SOLUTION INTRAVENOUS
Status: DISCONTINUED | OUTPATIENT
Start: 2023-08-09 | End: 2023-08-09 | Stop reason: SURG

## 2023-08-09 RX ORDER — DIPHENHYDRAMINE HCL 25 MG
25 TABLET ORAL
Status: DISCONTINUED | OUTPATIENT
Start: 2023-08-09 | End: 2023-08-18 | Stop reason: HOSPADM

## 2023-08-09 RX ORDER — BISACODYL 10 MG
10 SUPPOSITORY, RECTAL RECTAL
Status: DISCONTINUED | OUTPATIENT
Start: 2023-08-09 | End: 2023-08-18 | Stop reason: HOSPADM

## 2023-08-09 RX ORDER — POTASSIUM CHLORIDE 7.45 MG/ML
10 INJECTION INTRAVENOUS ONCE
Status: COMPLETED | OUTPATIENT
Start: 2023-08-09 | End: 2023-08-09

## 2023-08-09 RX ORDER — SODIUM CHLORIDE 9 MG/ML
INJECTION, SOLUTION INTRAVENOUS CONTINUOUS
Status: DISCONTINUED | OUTPATIENT
Start: 2023-08-09 | End: 2023-08-18 | Stop reason: HOSPADM

## 2023-08-09 RX ORDER — MIDAZOLAM HYDROCHLORIDE 1 MG/ML
INJECTION INTRAMUSCULAR; INTRAVENOUS PRN
Status: DISCONTINUED | OUTPATIENT
Start: 2023-08-09 | End: 2023-08-09 | Stop reason: SURG

## 2023-08-09 RX ORDER — SODIUM CHLORIDE, SODIUM LACTATE, POTASSIUM CHLORIDE, CALCIUM CHLORIDE 600; 310; 30; 20 MG/100ML; MG/100ML; MG/100ML; MG/100ML
INJECTION, SOLUTION INTRAVENOUS CONTINUOUS
Status: ACTIVE | OUTPATIENT
Start: 2023-08-09 | End: 2023-08-09

## 2023-08-09 RX ORDER — ACETAMINOPHEN 500 MG
1000 TABLET ORAL ONCE
Status: COMPLETED | OUTPATIENT
Start: 2023-08-09 | End: 2023-08-09

## 2023-08-09 RX ORDER — HEPARIN SODIUM,PORCINE 1000/ML
VIAL (ML) INJECTION PRN
Status: DISCONTINUED | OUTPATIENT
Start: 2023-08-09 | End: 2023-08-09 | Stop reason: SURG

## 2023-08-09 RX ORDER — INSULIN LISPRO 100 [IU]/ML
0-14 INJECTION, SOLUTION INTRAVENOUS; SUBCUTANEOUS
Status: DISPENSED | OUTPATIENT
Start: 2023-08-09 | End: 2023-08-10

## 2023-08-09 RX ORDER — EPINEPHRINE HCL IN 0.9 % NACL 4MG/250ML
0-.5 PLASTIC BAG, INJECTION (ML) INTRAVENOUS CONTINUOUS
Status: DISCONTINUED | OUTPATIENT
Start: 2023-08-09 | End: 2023-08-10

## 2023-08-09 RX ORDER — SODIUM CHLORIDE 9 MG/ML
INJECTION, SOLUTION INTRAVENOUS
Status: DISCONTINUED | OUTPATIENT
Start: 2023-08-09 | End: 2023-08-09 | Stop reason: SURG

## 2023-08-09 RX ORDER — MIDAZOLAM HYDROCHLORIDE 1 MG/ML
2 INJECTION INTRAMUSCULAR; INTRAVENOUS
Status: DISCONTINUED | OUTPATIENT
Start: 2023-08-09 | End: 2023-08-11

## 2023-08-09 RX ORDER — LIDOCAINE HYDROCHLORIDE 20 MG/ML
INJECTION, SOLUTION EPIDURAL; INFILTRATION; INTRACAUDAL; PERINEURAL PRN
Status: DISCONTINUED | OUTPATIENT
Start: 2023-08-09 | End: 2023-08-09 | Stop reason: SURG

## 2023-08-09 RX ORDER — CEFAZOLIN SODIUM 1 G/3ML
INJECTION, POWDER, FOR SOLUTION INTRAMUSCULAR; INTRAVENOUS PRN
Status: DISCONTINUED | OUTPATIENT
Start: 2023-08-09 | End: 2023-08-09 | Stop reason: SURG

## 2023-08-09 RX ORDER — ROCURONIUM BROMIDE 10 MG/ML
INJECTION, SOLUTION INTRAVENOUS PRN
Status: DISCONTINUED | OUTPATIENT
Start: 2023-08-09 | End: 2023-08-09 | Stop reason: SURG

## 2023-08-09 RX ORDER — NITROGLYCERIN 20 MG/100ML
0-100 INJECTION INTRAVENOUS CONTINUOUS
Status: DISCONTINUED | OUTPATIENT
Start: 2023-08-09 | End: 2023-08-10

## 2023-08-09 RX ORDER — ACETAMINOPHEN 650 MG/1
650 SUPPOSITORY RECTAL EVERY 4 HOURS PRN
Status: DISCONTINUED | OUTPATIENT
Start: 2023-08-09 | End: 2023-08-18 | Stop reason: HOSPADM

## 2023-08-09 RX ORDER — CEFAZOLIN 2 G/1
2 INJECTION, POWDER, FOR SOLUTION INTRAMUSCULAR; INTRAVENOUS ONCE
Status: DISCONTINUED | OUTPATIENT
Start: 2023-08-09 | End: 2023-08-09 | Stop reason: HOSPADM

## 2023-08-09 RX ORDER — ONDANSETRON 2 MG/ML
8 INJECTION INTRAMUSCULAR; INTRAVENOUS EVERY 6 HOURS PRN
Status: DISCONTINUED | OUTPATIENT
Start: 2023-08-09 | End: 2023-08-18 | Stop reason: HOSPADM

## 2023-08-09 RX ORDER — ASPIRIN 81 MG/1
81 TABLET ORAL DAILY
Status: DISCONTINUED | OUTPATIENT
Start: 2023-08-10 | End: 2023-08-18 | Stop reason: HOSPADM

## 2023-08-09 RX ORDER — SODIUM CHLORIDE, SODIUM GLUCONATE, SODIUM ACETATE, POTASSIUM CHLORIDE AND MAGNESIUM CHLORIDE 526; 502; 368; 37; 30 MG/100ML; MG/100ML; MG/100ML; MG/100ML; MG/100ML
INJECTION, SOLUTION INTRAVENOUS PRN
Status: DISCONTINUED | OUTPATIENT
Start: 2023-08-09 | End: 2023-08-18 | Stop reason: HOSPADM

## 2023-08-09 RX ORDER — ACETAMINOPHEN 500 MG
1000 TABLET ORAL EVERY 6 HOURS
Status: COMPLETED | OUTPATIENT
Start: 2023-08-09 | End: 2023-08-14

## 2023-08-09 RX ORDER — OXYCODONE HYDROCHLORIDE 10 MG/1
10 TABLET ORAL
Status: DISCONTINUED | OUTPATIENT
Start: 2023-08-09 | End: 2023-08-18 | Stop reason: HOSPADM

## 2023-08-09 RX ORDER — OMEPRAZOLE 20 MG/1
20 CAPSULE, DELAYED RELEASE ORAL DAILY
Status: DISCONTINUED | OUTPATIENT
Start: 2023-08-10 | End: 2023-08-18 | Stop reason: HOSPADM

## 2023-08-09 RX ORDER — ENOXAPARIN SODIUM 100 MG/ML
40 INJECTION SUBCUTANEOUS DAILY
Status: DISCONTINUED | OUTPATIENT
Start: 2023-08-10 | End: 2023-08-18 | Stop reason: HOSPADM

## 2023-08-09 RX ORDER — POLYETHYLENE GLYCOL 3350 17 G/17G
1 POWDER, FOR SOLUTION ORAL DAILY
Status: DISCONTINUED | OUTPATIENT
Start: 2023-08-10 | End: 2023-08-18 | Stop reason: HOSPADM

## 2023-08-09 RX ORDER — CARVEDILOL 3.12 MG/1
3.12 TABLET ORAL 2 TIMES DAILY WITH MEALS
Status: DISCONTINUED | OUTPATIENT
Start: 2023-08-10 | End: 2023-08-18 | Stop reason: HOSPADM

## 2023-08-09 RX ORDER — AMOXICILLIN 250 MG
2 CAPSULE ORAL 2 TIMES DAILY
Status: DISCONTINUED | OUTPATIENT
Start: 2023-08-09 | End: 2023-08-18 | Stop reason: HOSPADM

## 2023-08-09 RX ORDER — PROTAMINE SULFATE 10 MG/ML
INJECTION, SOLUTION INTRAVENOUS PRN
Status: DISCONTINUED | OUTPATIENT
Start: 2023-08-09 | End: 2023-08-09 | Stop reason: SURG

## 2023-08-09 RX ORDER — DEXMEDETOMIDINE HYDROCHLORIDE 4 UG/ML
0-1.5 INJECTION, SOLUTION INTRAVENOUS CONTINUOUS
Status: DISCONTINUED | OUTPATIENT
Start: 2023-08-09 | End: 2023-08-12

## 2023-08-09 RX ORDER — ALUMINA, MAGNESIA, AND SIMETHICONE 2400; 2400; 240 MG/30ML; MG/30ML; MG/30ML
30 SUSPENSION ORAL EVERY 4 HOURS PRN
Status: DISCONTINUED | OUTPATIENT
Start: 2023-08-09 | End: 2023-08-18 | Stop reason: HOSPADM

## 2023-08-09 RX ORDER — NOREPINEPHRINE BITARTRATE 0.03 MG/ML
0-1 INJECTION, SOLUTION INTRAVENOUS CONTINUOUS
Status: DISCONTINUED | OUTPATIENT
Start: 2023-08-09 | End: 2023-08-10

## 2023-08-09 RX ORDER — ACETAMINOPHEN 325 MG/1
650 TABLET ORAL EVERY 4 HOURS PRN
Status: DISCONTINUED | OUTPATIENT
Start: 2023-08-09 | End: 2023-08-18 | Stop reason: HOSPADM

## 2023-08-09 RX ORDER — TRAMADOL HYDROCHLORIDE 50 MG/1
50 TABLET ORAL EVERY 4 HOURS PRN
Status: DISCONTINUED | OUTPATIENT
Start: 2023-08-09 | End: 2023-08-18 | Stop reason: HOSPADM

## 2023-08-09 RX ADMIN — CEFAZOLIN 2 G: 1 INJECTION, POWDER, FOR SOLUTION INTRAMUSCULAR; INTRAVENOUS at 08:07

## 2023-08-09 RX ADMIN — ACETAMINOPHEN 1000 MG: 500 TABLET, FILM COATED ORAL at 06:42

## 2023-08-09 RX ADMIN — SODIUM CHLORIDE: 9 INJECTION, SOLUTION INTRAVENOUS at 08:14

## 2023-08-09 RX ADMIN — POTASSIUM CHLORIDE 10 MEQ: 7.46 INJECTION, SOLUTION INTRAVENOUS at 19:13

## 2023-08-09 RX ADMIN — MAGNESIUM SULFATE HEPTAHYDRATE 1 G: 1 INJECTION, SOLUTION INTRAVENOUS at 13:45

## 2023-08-09 RX ADMIN — ROCURONIUM BROMIDE 30 MG: 50 INJECTION, SOLUTION INTRAVENOUS at 09:17

## 2023-08-09 RX ADMIN — FENTANYL CITRATE 100 MCG: 50 INJECTION, SOLUTION INTRAMUSCULAR; INTRAVENOUS at 08:43

## 2023-08-09 RX ADMIN — SODIUM CHLORIDE: 9 INJECTION, SOLUTION INTRAVENOUS at 12:40

## 2023-08-09 RX ADMIN — LIDOCAINE HYDROCHLORIDE 70 MG: 20 INJECTION, SOLUTION EPIDURAL; INFILTRATION; INTRACAUDAL at 08:07

## 2023-08-09 RX ADMIN — ONDANSETRON 8 MG: 2 INJECTION INTRAMUSCULAR; INTRAVENOUS at 18:43

## 2023-08-09 RX ADMIN — AMINOCAPROIC ACID 9.61 G: 250 INJECTION, SOLUTION INTRAVENOUS at 08:38

## 2023-08-09 RX ADMIN — SODIUM CHLORIDE 2 UNITS/HR: 9 INJECTION, SOLUTION INTRAVENOUS at 14:36

## 2023-08-09 RX ADMIN — SODIUM CHLORIDE, SODIUM GLUCONATE, SODIUM ACETATE, POTASSIUM CHLORIDE AND MAGNESIUM CHLORIDE: 526; 502; 368; 37; 30 INJECTION, SOLUTION INTRAVENOUS at 08:14

## 2023-08-09 RX ADMIN — TRAMADOL HYDROCHLORIDE 50 MG: 50 TABLET ORAL at 19:31

## 2023-08-09 RX ADMIN — MORPHINE SULFATE 4 MG: 10 INJECTION INTRAVENOUS at 12:23

## 2023-08-09 RX ADMIN — METHADONE HYDROCHLORIDE 5 MG: 10 INJECTION, SOLUTION INTRAMUSCULAR; INTRAVENOUS; SUBCUTANEOUS at 08:00

## 2023-08-09 RX ADMIN — MIDAZOLAM 2 MG: 1 INJECTION, SOLUTION INTRAMUSCULAR; INTRAVENOUS at 08:00

## 2023-08-09 RX ADMIN — FENTANYL CITRATE 100 MCG: 50 INJECTION, SOLUTION INTRAMUSCULAR; INTRAVENOUS at 08:07

## 2023-08-09 RX ADMIN — PROCHLORPERAZINE EDISYLATE 10 MG: 5 INJECTION INTRAMUSCULAR; INTRAVENOUS at 21:50

## 2023-08-09 RX ADMIN — METOPROLOL TARTRATE 12.5 MG: 25 TABLET, FILM COATED ORAL at 06:41

## 2023-08-09 RX ADMIN — CEFAZOLIN 2 G: 1 INJECTION, POWDER, FOR SOLUTION INTRAMUSCULAR; INTRAVENOUS at 11:40

## 2023-08-09 RX ADMIN — ROCURONIUM BROMIDE 70 MG: 50 INJECTION, SOLUTION INTRAVENOUS at 08:08

## 2023-08-09 RX ADMIN — CLEVIPIDINE 2 MG/HR: 0.5 EMULSION INTRAVENOUS at 08:57

## 2023-08-09 RX ADMIN — FENTANYL CITRATE 50 MCG: 50 INJECTION, SOLUTION INTRAMUSCULAR; INTRAVENOUS at 08:38

## 2023-08-09 RX ADMIN — PROPOFOL 100 MG: 10 INJECTION, EMULSION INTRAVENOUS at 08:07

## 2023-08-09 RX ADMIN — HEPARIN SODIUM 38000 UNITS: 1000 INJECTION, SOLUTION INTRAVENOUS; SUBCUTANEOUS at 08:52

## 2023-08-09 RX ADMIN — SODIUM CHLORIDE, POTASSIUM CHLORIDE, SODIUM LACTATE AND CALCIUM CHLORIDE: 600; 310; 30; 20 INJECTION, SOLUTION INTRAVENOUS at 07:58

## 2023-08-09 RX ADMIN — CEFAZOLIN 2 G: 2 INJECTION, POWDER, FOR SOLUTION INTRAMUSCULAR; INTRAVENOUS at 18:36

## 2023-08-09 RX ADMIN — ACETAMINOPHEN 1000 MG: 500 TABLET, FILM COATED ORAL at 23:13

## 2023-08-09 RX ADMIN — SODIUM CHLORIDE: 9 INJECTION, SOLUTION INTRAVENOUS at 13:40

## 2023-08-09 RX ADMIN — METHADONE HYDROCHLORIDE 5 MG: 10 INJECTION, SOLUTION INTRAMUSCULAR; INTRAVENOUS; SUBCUTANEOUS at 08:07

## 2023-08-09 RX ADMIN — NOREPINEPHRINE BITARTRATE 0.05 MCG/KG/MIN: 1 INJECTION, SOLUTION, CONCENTRATE INTRAVENOUS at 12:30

## 2023-08-09 RX ADMIN — SENNOSIDES AND DOCUSATE SODIUM 2 TABLET: 50; 8.6 TABLET ORAL at 17:57

## 2023-08-09 RX ADMIN — Medication 1 APPLICATOR: at 21:04

## 2023-08-09 RX ADMIN — SODIUM CHLORIDE, SODIUM GLUCONATE, SODIUM ACETATE, POTASSIUM CHLORIDE AND MAGNESIUM CHLORIDE: 526; 502; 368; 37; 30 INJECTION, SOLUTION INTRAVENOUS at 20:28

## 2023-08-09 RX ADMIN — Medication 1 APPLICATOR: at 13:48

## 2023-08-09 RX ADMIN — ACETAMINOPHEN 1000 MG: 500 TABLET, FILM COATED ORAL at 17:56

## 2023-08-09 RX ADMIN — OXYCODONE HYDROCHLORIDE 5 MG: 5 TABLET ORAL at 23:13

## 2023-08-09 RX ADMIN — PROTAMINE SULFATE 380 MG: 10 INJECTION, SOLUTION INTRAVENOUS at 11:05

## 2023-08-09 RX ADMIN — NOREPINEPHRINE BITARTRATE 0.06 MCG/KG/MIN: 1 INJECTION INTRAVENOUS at 12:37

## 2023-08-09 RX ADMIN — FENTANYL CITRATE 50 MCG: 50 INJECTION, SOLUTION INTRAMUSCULAR; INTRAVENOUS at 17:14

## 2023-08-09 RX ADMIN — AMINOCAPROIC ACID 1 G/HR: 250 INJECTION, SOLUTION INTRAVENOUS at 09:11

## 2023-08-09 RX ADMIN — POTASSIUM CHLORIDE 10 MEQ: 7.46 INJECTION, SOLUTION INTRAVENOUS at 13:41

## 2023-08-09 RX ADMIN — NOREPINEPHRINE BITARTRATE 0.08 MCG/KG/MIN: 1 INJECTION, SOLUTION, CONCENTRATE INTRAVENOUS at 11:13

## 2023-08-09 RX ADMIN — METHADONE HYDROCHLORIDE 10 MG: 10 INJECTION, SOLUTION INTRAMUSCULAR; INTRAVENOUS; SUBCUTANEOUS at 09:09

## 2023-08-09 RX ADMIN — PROPOFOL 50 MG: 10 INJECTION, EMULSION INTRAVENOUS at 08:08

## 2023-08-09 RX ADMIN — DEXMEDETOMIDINE HYDROCHLORIDE 0.7 MCG/KG/HR: 100 INJECTION, SOLUTION INTRAVENOUS at 12:42

## 2023-08-09 RX ADMIN — DEXMEDETOMIDINE 0.3 MCG/KG/HR: 100 INJECTION, SOLUTION INTRAVENOUS at 08:38

## 2023-08-09 ASSESSMENT — FIBROSIS 4 INDEX: FIB4 SCORE: 0.67

## 2023-08-09 ASSESSMENT — PULMONARY FUNCTION TESTS: FVC: 2.2

## 2023-08-09 ASSESSMENT — PAIN DESCRIPTION - PAIN TYPE
TYPE: ACUTE PAIN
TYPE: SURGICAL PAIN
TYPE: SURGICAL PAIN

## 2023-08-09 ASSESSMENT — PAIN SCALES - GENERAL: PAIN_LEVEL: 0

## 2023-08-09 NOTE — PROGRESS NOTES
Extubation    Cuff leak noted? Yes  Stridor present? No     FiO2%: 50 % (08/09/23 1418)  O2 (LPM): 4 (08/09/23 1607)     Patient toleration? Well  RCP Complete? Yes  Events/Summary/Plan: Extubation (08/09/23 1607)

## 2023-08-09 NOTE — ANESTHESIA PROCEDURE NOTES
Central Venous Line    Performed by: Eliza Pope M.D.  Authorized by: Eliza Pope M.D.    Start Time:  8/9/2023 8:14 AM  End Time:  8/9/2023 8:24 AM  Patient Location:  OR  Indication: central venous access        provider hand hygiene performed prior to central venous catheter insertion, all 5 sterile barriers used (gloves, gown, cap, mask, large sterile drape) during central venous catheter insertion and skin prep agent completely dried prior to procedure    Patient Position:  Trendelenburg  Site:  Internal jugular  Prep:  Chlorhexidine  Catheter Size:  8 Fr  Catheter Length (cm):  16  Catheter Type:  Introducer  Number of Lumens:  Double lumen  target vein identified, needle advanced into vein and blood aspirated and guidewire advanced into vein    Seldinger Technique?: Yes    Ultrasound-Guided: ultrasound-guided  Image captured, interpreted and electronically stored.  Sterile Gel and Probe Cover Used for Ultrasound?: Yes    Intravenous Verification: verified by ultrasound, venous blood return and chest x-ray pending    all ports aspirated, all ports flushed easily, guidewire was removed intact, biopatch was applied, line was sutured in place and dressing was applied    Events: patient tolerated procedure well with no complications    PA Catheter Placed?: No

## 2023-08-09 NOTE — OR NURSING
Assumed care in pre op , Patient on bed alert , oriented , MYLENE Hernandez clipping him . SO Cornelia at bedside. Breathing on RA , denies any chest pain , palpitation, sob.   Surgical procedure , blood transfusion consent and contrast consent form verified and signed by the patient.   Iv started at right FA g 18 and blood sent for #2 COD.   0630- FSBS - 101mg/dl.  Pre meds given as ordered with HR 69bpmin. And /93mmHG left upper arm.   Waiting for MD's .   Belongings to [facility] .

## 2023-08-09 NOTE — ANESTHESIA PROCEDURE NOTES
BELEN    Date/Time: 8/9/2023 8:25 AM    Performed by: Eliza Pope M.D.  Authorized by: Eliza Pope M.D.    Start Time:8/9/2023 8:25 AM  Preanesthetic Checklist: patient identified, IV checked, site marked, risks and benefits discussed, surgical consent, monitors and equipment checked, pre-op evaluation and timeout performed    Indication for BELEN: diagnostic   Patient Location: OR  Intubated: Yes  Bite Block: Yes  Heart Visualized: Yes  Insertion: atraumatic    **See FULL BELEN report in patient's chart via CV Synapse**

## 2023-08-09 NOTE — ANESTHESIA POSTPROCEDURE EVALUATION
Patient: Venkat Mackenzie    Procedure Summary     Date: 08/09/23 Room / Location: [facility] 02 / SURGERY [facility]    Anesthesia Start: 0758 Anesthesia Stop: 1209    Procedures:       AORTIC ROOT AND VALVE REPLACEMENT, TRANSESOPHAGEAL ECHOCARDIOGRAM (Chest)      ECHOCARDIOGRAM, TRANSESOPHAGEAL, INTRAOPERATIVE (Esophagus) Diagnosis: (ASCENDING AORTIC ANEURYSM)    Surgeons: Emanuel Zamudio M.D. Responsible Provider: Eliza Pope M.D.    Anesthesia Type: general ASA Status: 3          Final Anesthesia Type: general  Last vitals  BP   Blood Pressure : (!) 141/93    Temp   36.6 °C (97.9 °F)    Pulse   69   Resp   20    SpO2   95 %      Anesthesia Post Evaluation    Patient location during evaluation: ICU  Patient participation: complete - patient cannot participate  Level of consciousness: obtunded/minimal responses  Pain score: 0    Airway patency: patent  Anesthetic complications: no  Cardiovascular status: adequate  Respiratory status: acceptable, ETT and intubated  Hydration status: euvolemic    PONV: none  patient was unable to participate        No notable events documented.     Nurse Pain Score: 0 (NPRS)

## 2023-08-09 NOTE — CONSULTS
Critical Care Consultation    Date of consult: 8/9/2023    Referring Physician  Emanuel Zamudio M.D.    Reason for Consultation  Post op co-management    History of Presenting Illness  63 y.o. male with PMH HTN, DM, gastroparessis, DANIELA, on chronic prednisone (for unknown reason) and hx of aortic root aneurysm  (dilated aortic root 5.3cm and ascending aorta 4.1cm) who's admitted today for aortic root replacement by Dr. Zamudio    TTE preop in 5/2023 with LVEF 55%, normal RV size/function. Mild MR/AI. RVSP 35mmHg. LHC normal coronaries    Intraop, from anesthesia standpoint, pt was easy intubation, right IJ central line, left art line. Pt underwent aortic root replacement Bentall procedure) with 27mm bovine pericardial valve conduit, coronary artery reimplantation. Post op BELEN with LVEF 60%. Cell saver 450cc. Total 1200cc of crystalloid given. UOP 800cc. No issues coming out of pump. No arrhythmias    Upon ICU arrival, pt intubated with 100%, sedated with precedex.  Initially on NE gtt at 0.05, but SBP in 180-190s. NE gtt was stopped and clevidipine was started. Chest tube output about 70cc      Code Status  Full Code    Review of Systems  Review of Systems   Reason unable to perform ROS: intubated, sedated, RASS -4, unable to perform.   All other systems reviewed and are negative.      Past Medical History   has a past medical history of Arrhythmia (Uknown, Date), Bowel habit changes, Diabetes (HCC), Gastroparesis, Heart burn (Just after eating spicey food), Heart valve disease (This is why Doctor is ordering the surgery), Hypertension, and Metabolic syndrome.    Surgical History   has a past surgical history that includes vagotomy (at age 19); appendectomy; cervical fusion posterior; and other (Neck).    Family History  family history includes Diabetes in his father and mother; Heart Disease in his father and mother; Lung Disease in his mother; No Known Problems in his son and son; Stroke in his father and  mother.    Social History   reports that he has never smoked. He quit smokeless tobacco use about 43 years ago.  His smokeless tobacco use included chew. He reports current alcohol use of about 1.2 oz of alcohol per week. He reports that he does not use drugs.    Medications  Home Medications       Reviewed by Jazz Devries R.N. (Registered Nurse) on 08/09/23 at 0619  Med List Status: Not Addressed     Medication Last Dose Status   allopurinol (ZYLOPRIM) 300 MG Tab 8/8/2023 Active   carvedilol (COREG) 6.25 MG Tab 8/8/2023 Active   Cyanocobalamin (VITAMIN B-12) 5000 MCG SL Tab 8/8/2023 Active   hydroCHLOROthiazide (HYDRODIURIL) 12.5 MG tablet 8/8/2023 Active   losartan (COZAAR) 100 MG Tab 8/8/2023 Active   Magnesium 250 MG Tab 8/8/2023 Active   metFORMIN ER (GLUCOPHAGE XR) 500 MG TABLET SR 24 HR 8/8/2023 Active   metoprolol SR (TOPROL XL) 25 MG TABLET SR 24 HR 8/8/2023 Active   Potassium 99 MG Tab 8/8/2023 Active   predniSONE (DELTASONE) 50 MG Tab  Active                  Current Facility-Administered Medications   Medication Dose Route Frequency Provider Last Rate Last Admin    lidocaine (Xylocaine) 1 % injection 0.5 mL  0.5 mL Intradermal Once PRN Emanuel Zamudio M.D.        ceFAZolin (Ancef) injection 2 g  2 g Intravenous Once Emanuel Zamudio M.D.        insulin regular (Humulin R) 100 Units in  mL Infusion  1-6 Units/hr Intravenous Continuous Emanuel Zamudio M.D.        EPINEPHrine (Adrenalin) infusion 4 mg/250 mL (premix)  0-0.5 mcg/kg/min (Ideal) Intravenous Continuous Emanuel Zamudio M.D.        norepinephrine (Levophed) 8 mg in 250 mL NS infusion (premix)  0-1 mcg/kg/min (Ideal) Intravenous Continuous Emanuel Zamudio M.D.        dexmedetomidine (PRECEDEX) 400 mcg/100mL NS premix infusion  0-1.5 mcg/kg/hr (Ideal) Intravenous Continuous Emanuel Zamudio M.D.        aminocaproic acid (Amicar) 9.61 g in  mL IV Bolus  100 mg/kg Intravenous Once Emanuel Zamudio M.D.         Facility-Administered  Medications Ordered in Other Encounters   Medication Dose Route Frequency Provider Last Rate Last Admin    heparin OR USE ONLY   Intravenous PRN Eliza Pope M.D.   38,000 Units at 08/09/23 0852    midazolam (Versed) injection   Intravenous PRN Eliza Pope M.D.   2 mg at 08/09/23 0800    fentaNYL (Sublimaze) injection   Intravenous PRN Eliza Pope M.D.   100 mcg at 08/09/23 0843    lidocaine PF (Xylocaine-MPF) 2 % injection PF   Intravenous PRN Eliza Pope M.D.   70 mg at 08/09/23 0807    propofol (DIPRIVAN) injection   Intravenous PRN Eliza Pope M.D.   50 mg at 08/09/23 0808    rocuronium (Zemuron) injection   Intravenous PRN Eliza Pope M.D.   30 mg at 08/09/23 0917    ceFAZolin (Ancef) injection   Intravenous PRN Eliza Pope M.D.   2 g at 08/09/23 1140    clevidipine (Cleviprex) IV emulsion   Intravenous Intra-Op Continuous Eliza Pope M.D.   Stopped at 08/09/23 0909    dexmedetomidine (Precedex) 400 mcg in  mL infusion   Intravenous Intra-Op Continuous Eliza Pope M.D. 11.97 mL/hr at 08/09/23 1140 0.7 mcg/kg/hr at 08/09/23 1140    aminocaproic acid (Amicar) IVPB 9.61 g   Intravenous Intra-Op Continuous Eliza Pope M.D.   9.61 g at 08/09/23 0838    protamine injection   Intravenous PRN Eliza Pope M.D.   380 mg at 08/09/23 1105    norepinephrine (Levophed) 32 mg in  mL Infusion   Intravenous Intra-Op Continuous Eliza Pope M.D.   Stopped at 08/09/23 1117       Allergies  Allergies   Allergen Reactions    Iodine Unspecified and Hives     dizziness  dizziness       Vital Signs last 24 hours  Temp:  [36.6 °C (97.9 °F)] 36.6 °C (97.9 °F)  Pulse:  [69-75] 69  Resp:  [20] 20  BP: (141-156)/() 141/93  SpO2:  [95 %-96 %] 95 %    Physical Exam  Physical Exam  Vitals and nursing note reviewed.   Constitutional:       Appearance: He is ill-appearing and toxic-appearing.   HENT:      Head: Normocephalic.      Mouth/Throat:      Mouth: Mucous membranes are moist.      Comments: Et tube in  place  Cardiovascular:      Rate and Rhythm: Normal rate and regular rhythm.      Pulses: Normal pulses.      Heart sounds: Normal heart sounds. No murmur heard.     Comments: Chest tube in place  Pulmonary:      Effort: No respiratory distress.      Breath sounds: No wheezing, rhonchi or rales.   Abdominal:      General: Bowel sounds are normal. There is no distension.      Palpations: Abdomen is soft.      Tenderness: There is no abdominal tenderness. There is no guarding.   Musculoskeletal:         General: No swelling or tenderness.      Right lower leg: No edema.      Left lower leg: No edema.   Skin:     General: Skin is warm.      Capillary Refill: Capillary refill takes less than 2 seconds.      Coloration: Skin is not jaundiced.   Neurological:      Comments: Intubated, sedated         Fluids    Intake/Output Summary (Last 24 hours) at 8/9/2023 1147  Last data filed at 8/9/2023 1127  Gross per 24 hour   Intake 450 ml   Output 800 ml   Net -350 ml       Laboratory  Recent Results (from the past 48 hour(s))   CBC WITH DIFFERENTIAL    Collection Time: 08/07/23  1:18 PM   Result Value Ref Range    WBC 6.6 4.8 - 10.8 K/uL    RBC 5.58 4.70 - 6.10 M/uL    Hemoglobin 17.1 14.0 - 18.0 g/dL    Hematocrit 49.8 42.0 - 52.0 %    MCV 89.2 81.4 - 97.8 fL    MCH 30.6 27.0 - 33.0 pg    MCHC 34.3 32.3 - 36.5 g/dL    RDW 42.2 35.9 - 50.0 fL    Platelet Count 281 164 - 446 K/uL    MPV 10.4 9.0 - 12.9 fL    Neutrophils-Polys 63.80 44.00 - 72.00 %    Lymphocytes 22.90 22.00 - 41.00 %    Monocytes 7.60 0.00 - 13.40 %    Eosinophils 3.50 0.00 - 6.90 %    Basophils 0.50 0.00 - 1.80 %    Immature Granulocytes 1.70 (H) 0.00 - 0.90 %    Nucleated RBC 0.00 0.00 - 0.20 /100 WBC    Neutrophils (Absolute) 4.22 1.82 - 7.42 K/uL    Lymphs (Absolute) 1.51 1.00 - 4.80 K/uL    Monos (Absolute) 0.50 0.00 - 0.85 K/uL    Eos (Absolute) 0.23 0.00 - 0.51 K/uL    Baso (Absolute) 0.03 0.00 - 0.12 K/uL    Immature Granulocytes (abs) 0.11 0.00 - 0.11  K/uL    NRBC (Absolute) 0.00 K/uL   Comp Metabolic Panel    Collection Time: 08/07/23  1:18 PM   Result Value Ref Range    Sodium 138 135 - 145 mmol/L    Potassium 3.5 (L) 3.6 - 5.5 mmol/L    Chloride 103 96 - 112 mmol/L    Co2 22 20 - 33 mmol/L    Anion Gap 13.0 7.0 - 16.0    Glucose 117 (H) 65 - 99 mg/dL    Bun 16 8 - 22 mg/dL    Creatinine 0.80 0.50 - 1.40 mg/dL    Calcium 9.5 8.5 - 10.5 mg/dL    Correct Calcium 9.0 8.5 - 10.5 mg/dL    AST(SGOT) 13 12 - 45 U/L    ALT(SGPT) 20 2 - 50 U/L    Alkaline Phosphatase 80 30 - 99 U/L    Total Bilirubin 0.6 0.1 - 1.5 mg/dL    Albumin 4.6 3.2 - 4.9 g/dL    Total Protein 7.5 6.0 - 8.2 g/dL    Globulin 2.9 1.9 - 3.5 g/dL    A-G Ratio 1.6 g/dL   PT    Collection Time: 08/07/23  1:18 PM   Result Value Ref Range    PT 13.8 12.0 - 14.6 sec    INR 1.07 0.87 - 1.13   HEMOGLOBIN A1C    Collection Time: 08/07/23  1:18 PM   Result Value Ref Range    Glycohemoglobin 6.0 (H) 4.0 - 5.6 %    Est Avg Glucose 126 mg/dL   APTT    Collection Time: 08/07/23  1:18 PM   Result Value Ref Range    APTT 28.7 24.7 - 36.0 sec   ESTIMATED GFR    Collection Time: 08/07/23  1:18 PM   Result Value Ref Range    GFR (CKD-EPI) 98 >60 mL/min/1.73 m 2   URINALYSIS    Collection Time: 08/07/23  1:19 PM    Specimen: Urine   Result Value Ref Range    Color Yellow     Character Clear     Specific Gravity 1.023 <1.035    Ph 5.5 5.0 - 8.0    Glucose Negative Negative mg/dL    Ketones Trace (A) Negative mg/dL    Protein Negative Negative mg/dL    Bilirubin Negative Negative    Urobilinogen, Urine 0.2 Negative    Nitrite Negative Negative    Leukocyte Esterase Negative Negative    Occult Blood Negative Negative    Micro Urine Req see below    Urine Drug Screen    Collection Time: 08/07/23  1:19 PM   Result Value Ref Range    Amphetamines Urine Negative Negative    Barbiturates Negative Negative    Benzodiazepines Negative Negative    Cocaine Metabolite Negative Negative    Fentanyl, Urine Negative Negative     Methadone Negative Negative    Opiates Negative Negative    Oxycodone Negative Negative    Phencyclidine -Pcp Negative Negative    Propoxyphene Negative Negative    Cannabinoid Metab Negative Negative   S. Aureus By PCR, Nasal Complete    Collection Time: 23  1:19 PM    Specimen: Respirate   Result Value Ref Range    Staph aureus by PCR POSITIVE (A) Negative    MRSA by PCR Negative Negative   PreAdmit COD    Collection Time: 23  1:21 PM   Result Value Ref Range    ABO Grouping Only O     Rh Grouping Only POS     Antibody Screen Scrn NEG    EKG    Collection Time: 23  1:38 PM   Result Value Ref Range    Report       [facility]    Test Date:  2023  Pt Name:    DIAZ WOO                 Department: [facility]  MRN:        [id number]                      Room:  Gender:     Male                         Technician: WAYNE  :        [id number]                   Requested By:NICOLE GRIDER  Order #:    [id number]                    Reading MD: Justo Leiva MD    Measurements  Intervals                                Axis  Rate:       83                           P:          19  NJ:         153                          QRS:        69  QRSD:       101                          T:          32  QT:         350  QTc:        412    Interpretive Statements  Sinus rhythm  Electronically Signed On 2023 20:43:03 PDT by Justo Leiva MD     POCT glucose device results    Collection Time: 23  6:29 AM   Result Value Ref Range    POC Glucose, Blood 101 (H) 65 - 99 mg/dL   ABO Rh Confirm    Collection Time: 23  6:30 AM   Result Value Ref Range    ABO Rh Confirm O POS    POCT activated clotting time device results    Collection Time: 23  8:32 AM   Result Value Ref Range    Istat Activated Clotting Time 137 74 - 137 sec   POCT arterial blood gas device results    Collection Time: 23  8:34 AM   Result Value Ref Range    Ph 7.358 (L) 7.400 - 7.500    Pco2 41.6 (H) 26.0 - 37.0 mmHg    Po2  203 (H) 64 - 87 mmHg    Tco2 25 20 - 33 mmol/L    S02 100 (H) 93 - 99 %    Hco3 23.4 17.0 - 25.0 mmol/L    BE -2 -4 - 3 mmol/L    Body Temp 37.0 C degrees    Ph Temp Jose 7.358 (L) 7.400 - 7.500    Pco2 Temp Co 41.6 (H) 26.0 - 37.0 mmHg    Po2 Temp Cor 203 (H) 64 - 87 mmHg    Specimen Arterial    POCT sodium device results    Collection Time: 08/09/23  8:34 AM   Result Value Ref Range    Istat Sodium 139 135 - 145 mmol/L   POCT potassium device results    Collection Time: 08/09/23  8:34 AM   Result Value Ref Range    Istat Potassium 3.6 3.6 - 5.5 mmol/L   POCT ionized CA device results    Collection Time: 08/09/23  8:34 AM   Result Value Ref Range    Istat Ionized Calcium 1.21 1.10 - 1.30 mmol/L   POCT hematocrit and hemoglobin device results    Collection Time: 08/09/23  8:34 AM   Result Value Ref Range    Istat Hematocrit 45 42 - 52 %    Istat Hemoglobin 15.3 14.0 - 18.0 g/dL   POCT activated clotting time device results    Collection Time: 08/09/23  9:01 AM   Result Value Ref Range    Istat Activated Clotting Time 528 (H) 74 - 137 sec   POCT arterial blood gas device results    Collection Time: 08/09/23  9:02 AM   Result Value Ref Range    Ph 7.319 (L) 7.400 - 7.500    Pco2 48.0 (H) 26.0 - 37.0 mmHg    Po2 223 (H) 64 - 87 mmHg    Tco2 26 20 - 33 mmol/L    S02 100 (H) 93 - 99 %    Hco3 24.7 17.0 - 25.0 mmol/L    BE -2 -4 - 3 mmol/L    Body Temp 36.5 C degrees    Ph Temp Jose 7.326 (L) 7.400 - 7.500    Pco2 Temp Co 47.0 (H) 26.0 - 37.0 mmHg    Po2 Temp Cor 221 (H) 64 - 87 mmHg    Specimen Arterial    POCT sodium device results    Collection Time: 08/09/23  9:02 AM   Result Value Ref Range    Istat Sodium 138 135 - 145 mmol/L   POCT potassium device results    Collection Time: 08/09/23  9:02 AM   Result Value Ref Range    Istat Potassium 3.7 3.6 - 5.5 mmol/L   POCT ionized CA device results    Collection Time: 08/09/23  9:02 AM   Result Value Ref Range    Istat Ionized Calcium 1.18 1.10 - 1.30 mmol/L   POCT  hematocrit and hemoglobin device results    Collection Time: 08/09/23  9:02 AM   Result Value Ref Range    Istat Hematocrit 44 42 - 52 %    Istat Hemoglobin 15.0 14.0 - 18.0 g/dL   POCT activated clotting time device results    Collection Time: 08/09/23  9:24 AM   Result Value Ref Range    Istat Activated Clotting Time 588 (H) 74 - 137 sec   POCT venous blood gas device results    Collection Time: 08/09/23  9:25 AM   Result Value Ref Range    Ph 7.309 (L) 7.310 - 7.450    Pco2 48.4 41.0 - 51.0 mmHg    Po2 58 (H) 25 - 40 mmHg    Tco2 26 20 - 33 mmol/L    SO2 87 %    Hco3 24.3 24.0 - 28.0 mmol/L    BE -2 -4 - 3 mmol/L    Body Temp 36.1 C degrees    Ph Temp Correc 7.322 7.310 - 7.450    Pco2 Temp Jose 46.5 41.0 - 51.0 mmHg    Po2 Temp Corre 54 (H) 25 - 40 mmHg    Specimen Venous    POCT sodium device results    Collection Time: 08/09/23  9:25 AM   Result Value Ref Range    Istat Sodium 136 135 - 145 mmol/L   POCT potassium device results    Collection Time: 08/09/23  9:25 AM   Result Value Ref Range    Istat Potassium 4.3 3.6 - 5.5 mmol/L   POCT ionized CA device results    Collection Time: 08/09/23  9:25 AM   Result Value Ref Range    Istat Ionized Calcium 0.93 (L) 1.10 - 1.30 mmol/L   POCT hematocrit and hemoglobin device results    Collection Time: 08/09/23  9:25 AM   Result Value Ref Range    Istat Hematocrit 31 (L) 42 - 52 %    Istat Hemoglobin 10.5 (L) 14.0 - 18.0 g/dL   POCT activated clotting time device results    Collection Time: 08/09/23  9:40 AM   Result Value Ref Range    Istat Activated Clotting Time 528 (H) 74 - 137 sec   POCT arterial blood gas device results    Collection Time: 08/09/23  9:41 AM   Result Value Ref Range    Ph 7.391 (L) 7.400 - 7.500    Pco2 47.6 (H) 26.0 - 37.0 mmHg    Po2 365 (H) 64 - 87 mmHg    Tco2 30 20 - 33 mmol/L    S02 100 (H) 93 - 99 %    Hco3 28.9 (H) 17.0 - 25.0 mmol/L    BE 3 -4 - 3 mmol/L    Body Temp 35.1 C degrees    Ph Temp Jose 7.419 7.400 - 7.500    Pco2 Temp Co 43.8  (H) 26.0 - 37.0 mmHg    Po2 Temp Cor 355 (H) 64 - 87 mmHg    Specimen Arterial    POCT sodium device results    Collection Time: 08/09/23  9:41 AM   Result Value Ref Range    Istat Sodium 136 135 - 145 mmol/L   POCT potassium device results    Collection Time: 08/09/23  9:41 AM   Result Value Ref Range    Istat Potassium 4.6 3.6 - 5.5 mmol/L   POCT ionized CA device results    Collection Time: 08/09/23  9:41 AM   Result Value Ref Range    Istat Ionized Calcium 0.98 (L) 1.10 - 1.30 mmol/L   POCT hematocrit and hemoglobin device results    Collection Time: 08/09/23  9:41 AM   Result Value Ref Range    Istat Hematocrit 35 (L) 42 - 52 %    Istat Hemoglobin 11.9 (L) 14.0 - 18.0 g/dL   POCT activated clotting time device results    Collection Time: 08/09/23 10:07 AM   Result Value Ref Range    Istat Activated Clotting Time 552 (H) 74 - 137 sec   POCT arterial blood gas device results    Collection Time: 08/09/23 10:08 AM   Result Value Ref Range    Ph 7.405 7.400 - 7.500    Pco2 44.3 (H) 26.0 - 37.0 mmHg    Po2 357 (H) 64 - 87 mmHg    Tco2 29 20 - 33 mmol/L    S02 100 (H) 93 - 99 %    Hco3 27.8 (H) 17.0 - 25.0 mmol/L    BE 3 -4 - 3 mmol/L    Body Temp 34.4 C degrees    Ph Temp Jose 7.443 7.400 - 7.500    Pco2 Temp Co 39.6 (H) 26.0 - 37.0 mmHg    Po2 Temp Cor 343 (H) 64 - 87 mmHg    Specimen Arterial    POCT sodium device results    Collection Time: 08/09/23 10:08 AM   Result Value Ref Range    Istat Sodium 137 135 - 145 mmol/L   POCT potassium device results    Collection Time: 08/09/23 10:08 AM   Result Value Ref Range    Istat Potassium 4.5 3.6 - 5.5 mmol/L   POCT ionized CA device results    Collection Time: 08/09/23 10:08 AM   Result Value Ref Range    Istat Ionized Calcium 1.02 (L) 1.10 - 1.30 mmol/L   POCT hematocrit and hemoglobin device results    Collection Time: 08/09/23 10:08 AM   Result Value Ref Range    Istat Hematocrit 36 (L) 42 - 52 %    Istat Hemoglobin 12.2 (L) 14.0 - 18.0 g/dL   POCT activated clotting  time device results    Collection Time: 08/09/23 10:37 AM   Result Value Ref Range    Istat Activated Clotting Time 564 (H) 74 - 137 sec   POCT arterial blood gas device results    Collection Time: 08/09/23 10:37 AM   Result Value Ref Range    Ph 7.406 7.400 - 7.500    Pco2 43.1 (H) 26.0 - 37.0 mmHg    Po2 290 (H) 64 - 87 mmHg    Tco2 28 20 - 33 mmol/L    S02 100 (H) 93 - 99 %    Hco3 27.1 (H) 17.0 - 25.0 mmol/L    BE 2 -4 - 3 mmol/L    Body Temp 35.5 C degrees    Ph Temp Jose 7.428 7.400 - 7.500    Pco2 Temp Co 40.4 (H) 26.0 - 37.0 mmHg    Po2 Temp Cor 283 (H) 64 - 87 mmHg    Specimen Arterial    POCT sodium device results    Collection Time: 08/09/23 10:37 AM   Result Value Ref Range    Istat Sodium 137 135 - 145 mmol/L   POCT potassium device results    Collection Time: 08/09/23 10:37 AM   Result Value Ref Range    Istat Potassium 4.4 3.6 - 5.5 mmol/L   POCT ionized CA device results    Collection Time: 08/09/23 10:37 AM   Result Value Ref Range    Istat Ionized Calcium 1.02 (L) 1.10 - 1.30 mmol/L   POCT hematocrit and hemoglobin device results    Collection Time: 08/09/23 10:37 AM   Result Value Ref Range    Istat Hematocrit 33 (L) 42 - 52 %    Istat Hemoglobin 11.2 (L) 14.0 - 18.0 g/dL   POCT activated clotting time device results    Collection Time: 08/09/23 11:25 AM   Result Value Ref Range    Istat Activated Clotting Time 113 74 - 137 sec   POCT arterial blood gas device results    Collection Time: 08/09/23 11:26 AM   Result Value Ref Range    Ph 7.389 (L) 7.400 - 7.500    Pco2 40.4 (H) 26.0 - 37.0 mmHg    Po2 275 (H) 64 - 87 mmHg    Tco2 26 20 - 33 mmol/L    S02 100 (H) 93 - 99 %    Hco3 24.4 17.0 - 25.0 mmol/L    BE -1 -4 - 3 mmol/L    Body Temp 37.0 C degrees    Ph Temp Jose 7.389 (L) 7.400 - 7.500    Pco2 Temp Co 40.4 (H) 26.0 - 37.0 mmHg    Po2 Temp Cor 275 (H) 64 - 87 mmHg    Specimen Arterial    POCT sodium device results    Collection Time: 08/09/23 11:26 AM   Result Value Ref Range    Istat Sodium  137 135 - 145 mmol/L   POCT potassium device results    Collection Time: 08/09/23 11:26 AM   Result Value Ref Range    Istat Potassium 4.2 3.6 - 5.5 mmol/L   POCT ionized CA device results    Collection Time: 08/09/23 11:26 AM   Result Value Ref Range    Istat Ionized Calcium 1.22 1.10 - 1.30 mmol/L   POCT hematocrit and hemoglobin device results    Collection Time: 08/09/23 11:26 AM   Result Value Ref Range    Istat Hematocrit 39 (L) 42 - 52 %    Istat Hemoglobin 13.3 (L) 14.0 - 18.0 g/dL       Imaging  DX-CHEST-PORTABLE (1 VIEW)   Final Result      Satisfactory postoperative appearance the chest with bibasilar underinflation atelectasis      EC-BELEN W/O CONT    (Results Pending)       Assessment/Plan  Type 2 diabetes mellitus without complication, without long-term current use of insulin (HCC)  Assessment & Plan  a1c 6.0  On metformin at home. Will hold while in hospital     Aortic root aneurysm (HCC)  Assessment & Plan  Dilated aortic root 5.3cm and ascending aorta 4.1cm  Preop LHC with normal coronaries.   S/p aortic root replacement Bentall procedure) with 27mm bovine pericardial valve conduit, coronary artery reimplantation.   Post op BELEN with LVEF 60%.    Plan:   Plan to extubate via fast track. Post bypass protocol   Serial ABGs, labs  Monitor hemodynamics  Monitor chest tube output  Monitor UOP       Essential hypertension- (present on admission)  Assessment & Plan  Hx of. Hold home antiHTN today and reassess        Discussed patient condition and risk of morbidity and/or mortality with RN, RT, Pharmacy, Charge nurse / hot rounds, and Patient.    The patient remains critically ill.  Critical care time = 65 minutes in directly providing and coordinating critical care and extensive data review.  No time overlap and excludes procedures.

## 2023-08-09 NOTE — ANESTHESIA PROCEDURE NOTES
Airway    Date/Time: 8/9/2023 8:08 AM    Performed by: Eliza Pope M.D.  Authorized by: Eliza Pope M.D.    Location:  OR  Urgency:  Elective  Indications for Airway Management:  Anesthesia      Spontaneous Ventilation: absent    Sedation Level:  Deep  Preoxygenated: Yes    Patient Position:  Sniffing  Mask Difficulty Assessment:  1 - vent by mask  Final Airway Type:  Endotracheal airway  Final Endotracheal Airway:  ETT  Cuffed: Yes    Technique Used for Successful ETT Placement:  Direct laryngoscopy    Insertion Site:  Oral  Blade Type:  [place]  Laryngoscope Blade/Videolaryngoscope Blade Size:  2  ETT Size (mm):  8.0  Measured from:  Teeth  ETT to Teeth (cm):  25  Placement Verified by: auscultation and capnometry    Cormack-Lehane Classification:  Grade I - full view of glottis  Number of Attempts at Approach:  1

## 2023-08-09 NOTE — ANESTHESIA TIME REPORT
Anesthesia Start and Stop Event Times     Date Time Event    8/9/2023 0720 Ready for Procedure     0758 Anesthesia Start     1209 Anesthesia Stop        Responsible Staff  08/09/23    Name Role Begin End    Eliza Pope M.D. Anesth 0758 1209        Overtime Reason:  no overtime (within assigned shift)    Comments:

## 2023-08-09 NOTE — ASSESSMENT & PLAN NOTE
Dilated aortic root 5.3cm and ascending aorta 4.1cm  Preop LHC with normal coronaries.   S/p aortic root replacement Bentall procedure) with 27mm bovine pericardial valve conduit, coronary artery reimplantation.   Post op BELEN with LVEF 60%.  8/9 extubated via fast track with no issues  Early mobility   ASA, carvedilol

## 2023-08-09 NOTE — ANESTHESIA PROCEDURE NOTES
Arterial Line    Performed by: Eliza Pope M.D.  Authorized by: Eliza Pope M.D.    Start Time:  8/9/2023 8:03 AM  End Time:  8/9/2023 8:05 AM  Localization: ultrasound guidance  Image captured, interpreted and electronically stored.  Patient Location:  OR  Indication: continuous blood pressure monitoring        Catheter Size:  20 G  Seldinger Technique?: Yes    Laterality:  Left  Site:  Radial artery  Line Secured:  Antimicrobial disc, tape and transparent dressing  Events: patient tolerated procedure well with no complications

## 2023-08-09 NOTE — PROGRESS NOTES
Patient extubated to 4 L NC by Jason CARVAJAL per protocol    NIF -30 goal < -25  PEEP 8 PEEP < equal to 10  FIO2 40 < equal to 50  PInsp 5 < 15  VC 2.2 10ml/kg  RSBI < 100   SPO2 97 > 92%  RR 17 < 20  Ph 7.368 Ph > 7.30  CO2 37.8 < 45    Hemodynamically stable with less than 2 pressors.

## 2023-08-09 NOTE — ANESTHESIA PREPROCEDURE EVALUATION
Case: [id number] Date/Time: 08/09/23 0745    Procedures:       AORTIC ROOT REPLACEMENT, TRANSESOPHAGEAL ECHOCARDIOGRAM      ECHOCARDIOGRAM, TRANSESOPHAGEAL, INTRAOPERATIVE    Pre-op diagnosis: ASCENDING AORTIC ANEURYSM    Location: [facility] OR 02 / SURGERY [facility]    Surgeons: Emanuel Zamudio M.D.        62 yo man p/w aortic root aneurysm.  - HTN  - DM and gastroparesis  - on prednisone 50mg for unknown reason. Says he's been taking it for a while as prescribed by one of his doctors. Last dose was 2 days ago.  - DANIELA    Tolerated anesthesia in the past without issues.   NPO>8hrs. No N/V.  Denies MI, SOB or chest pain on exertion or at rest.  Occasional EtOH. No tobacco or other recreational drugs.     TTE 5/15/23  CONCLUSIONS  No prior study is available for comparison.   Dilated Aortic root (5.3 cm)  and ascending aorta (4.1 cm).  Normal LV size, thickness and systolic function with estimated LVEF 55%.  Normal right ventricular size and systolic function.  Mild AI  Mild MR  Dilated IVC  Mild pulmonary hypertension with estimated RVSP 30-35 mmHg.    Cardiac cath 8/1/23  IMPRESSION:  1.  No significant epicardial coronary artery disease. Dominant RCA with high anterior takeoff.  2.  Elevated resting LVEDP 22 mmHg with no significant transaortic gradient on pullback.   3.  Slow coronary flow  4.  Aneurysmal aortic root >5cm    Relevant Problems   ANESTHESIA   (positive) Other sleep apnea      CARDIAC   (positive) Essential hypertension      GI   (positive) Gastroesophageal reflux disease without esophagitis   (positive) Hiatal hernia       Physical Exam    Airway   Mallampati: II  TM distance: >3 FB  Neck ROM: full       Cardiovascular - normal exam  Rhythm: regular  Rate: normal  (-) murmur     Dental - normal exam        Facial Hair   Pulmonary - normal exam  Breath sounds clear to auscultation     Abdominal    Neurological - normal exam         Other findings: No loose teeth           Anesthesia Plan    ASA 3        Plan - general       Airway plan will be ETT  BELEN Planned        Induction: intravenous    Postoperative Plan: Postoperative administration of opioids is intended.    Pertinent diagnostic labs and testing reviewed    Informed Consent:    Anesthetic plan and risks discussed with patient.    Use of blood products discussed with: patient whom consented to blood products.

## 2023-08-09 NOTE — DIETARY
"Nutrition Services: Day 0 of admit.  Venkat Mackenzie is a 63 y.o. male with admitting DX of Ascending aortic aneurysm, unspecified whether ruptured (HCC).    Consult received for Cardiac Rehab Diet Education.  S/p AORTIC ROOT REPLACEMENT.  \"Cardiac catheterization did not show any coronary artery disease.\"  Lipid panel WNL.  No indication for specific cardiac diet education for the above Dx/procedure.  "

## 2023-08-09 NOTE — PROGRESS NOTES
Venkat Beltrán is a 62-year-old male with past medical history of hypertension, metabolic syndrome, GERD, hyperlipidemia, obstructive sleep apnea, generalized anxiety disorder, PTSD, SVT, PAC just starting beta-blocker and ascending aortic aneurysm.  There have been no interval changes in his H&P.  Plan to proceed with aortic root replacement and intraoperative transesophageal echocardiography today August 8, 2023.

## 2023-08-09 NOTE — OR SURGEON
OPERATIVE REPORT  Operation date: 8/9/2023    Referring physician: Noe Dash MD    PreOp Diagnosis: Aortic root aneurysm (5.5 to 6 cm), mild aortic regurgitation, supraventricular tachycardia, hypertension, obstructive sleep apnea, anxiety, GERD    PostOp Diagnosis: Aortic root aneurysm (5.5 to 6 cm), mild aortic regurgitation, supraventricular tachycardia, hypertension, obstructive sleep apnea, anxiety, GERD    Procedure(s):  AORTIC ROOT REPLACEMENT (27 mm Konect Duong bovine pericardial valved conduit, coronary artery reimplantation) and intraoperative transesophageal echocardiography    Surgeon(s):  Emanuel Zamudio M.D.    Assistant:  LISE Brown    Anesthesiologist/Type of Anesthesia:  Anesthesiologist: Eliza Pope M.D./General    Surgical Staff:  Circulator: Keith Lares R.N.; Cheyenne Lujan R.N.  Perfusionist: Aurora Mckeon  Scrub Person: Jaime Reich  First Assist: VIKKI Stoner    Specimens removed if any:  ID Type Source Tests Collected by Time Destination   A : ascending aortic aneurysm Tissue Heart PATHOLOGY SPECIMEN Emanuel Zamudio M.D. 8/9/2023  9:24 AM    B : aortic Tissue Heart Valve PATHOLOGY SPECIMEN Emanuel Zamudio M.D. 8/9/2023  9:50 AM        Estimated Blood Loss: Minimal    Findings: Ascending aortic and root aneurysm, mild aortic regurgitation    Complications: None    Indications:  Mr. Mackenzie is a 63-year-old male with an ascending and root aneurysm.  Echocardiography showed mild aortic regurgitation and cardiac catheterization did not show any coronary artery disease.    Procedure:  The patient was brought to the operating room and placed on the operating room table in the supine position.  After successful induction of general anesthesia endotracheal intubation, the patient was prepped and draped in the usual sterile fashion.  LISE Brown assisted with retraction and exposure during the operation and closed the sternal wound.   Intraoperative transesophageal echocardiography showed a 5.3 cm aortic root aneurysm and mild aortic regurgitation.  His left ventricular ejection fraction was normal approximately 60%.    An incision was made from the sternal notch to the xiphoid.  The sternum was opened longitudinally with a sternal saw.  Hemostasis was obtained with electrocautery at the sternal edges.  The patient was systemically heparinized.  The pericardium was opened longitudinally and tented anteriorly with Ethibond stay stitches.  The aortic cannula was inserted first in the proximal arch of the aorta followed by a dual stage venous cannula.  An antegrade cardioplegia cannula was placed in the distal ascending aorta.  Cardiopulmonary bypass was instituted.  The aorta was crossclamped and the patient was given 1 L of cold [place] cardioplegia in an antegrade fashion.  There was prompt cardiac arrest.  Ice slush was placed on the heart for further myocardial protection.  A phrenic nerve protector pad was used.  An additional 200 mL dose of [place] cardioplegia was given through the left main ostium with a coronary perfuser after 55 minutes.  The ascending aortic aneurysm was excised from the sinotubular junction to just below the aortic cross-clamp.  The aortic root was very dilated and the aortic valve leaflets were large and stretched out.  A formal aortic root replacement (bio-Bentall) with a 27 mm Konect Duong bovine pericardial valved (27 mm Inspiris valve and 30 mm tube graft) was performed.  The aortic valve leaflets were excised.  Pledgeted #2-0 Ethibond stitches were then placed around the aortic valve annulus in a horizontal mattress fashion with the pledgets in the subannular position.  The Konect conduit was then placed in the aortic valve annulus and secured in place with Ethibond stitches utilizing the CorKnot device.  The coronary ostia were reanastomosed onto the tube graft using #6-0 Prolene sutures in a running fashion.   AC locators were placed at the anastomoses.  Rewarming of the patient was initiated.  A distal anastomosis was performed between the aorta and the tube graft using #5-0 Prolene suture in a running fashion.  Approximately 300 mL of warm blood was given in antegrade fashion and the aortic cross-clamp was removed.  Aortic cross-clamp time was 92 minutes and total cardiopulmonary bypass time was 107 minutes.  The left ventricle was de-aired in the usual fashion.  The carbon dioxide which had been released over the operative field during the operation was discontinued.  A straight and an angled 32 Irish chest tubes were placed in the mediastinum.  Tamper epicardial ventricular pacemaker wires were inserted.  There was spontaneous conversion into sinus rhythm.  The antegrade cardioplegia cannula was removed.  When he was adequately warmed, he was slowly taken off cardiopulmonary bypass which he tolerated well.  The dual stage venous cannula was removed.  Protamine was given to reverse the effects of the heparin.  The aortic cannula was removed.  When adequate hemostasis had been obtained, the sternum was reapproximated using size 5 sternal wires and the remainder of the incision was closed in several layers using Vicryl sutures.    Intraoperative transesophageal echocardiography showed a properly positioned and functioning aortic valve bioprosthesis without any paravalvular or central leaks.  His left ventricular ejection fraction remained normal approximately 60%.  There were no apparent complications.  The patient tolerated the procedure well and left the operating room in guarded condition.      8/9/2023 11:36 AM Emanuel Zamudio MD, FACS

## 2023-08-10 ENCOUNTER — HOSPITAL ENCOUNTER (OUTPATIENT)
Dept: RADIOLOGY | Facility: MEDICAL CENTER | Age: 63
End: 2023-08-10
Payer: COMMERCIAL

## 2023-08-10 ENCOUNTER — TELEPHONE (OUTPATIENT)
Dept: CARDIOLOGY | Facility: MEDICAL CENTER | Age: 63
End: 2023-08-10
Payer: COMMERCIAL

## 2023-08-10 LAB
ANION GAP SERPL CALC-SCNC: 10 MMOL/L (ref 7–16)
BUN SERPL-MCNC: 21 MG/DL (ref 8–22)
CALCIUM SERPL-MCNC: 8.1 MG/DL (ref 8.5–10.5)
CHLORIDE SERPL-SCNC: 103 MMOL/L (ref 96–112)
CO2 SERPL-SCNC: 21 MMOL/L (ref 20–33)
CREAT SERPL-MCNC: 0.6 MG/DL (ref 0.5–1.4)
EKG IMPRESSION: NORMAL
EKG IMPRESSION: NORMAL
ERYTHROCYTE [DISTWIDTH] IN BLOOD BY AUTOMATED COUNT: 43.5 FL (ref 35.9–50)
GFR SERPLBLD CREATININE-BSD FMLA CKD-EPI: 107 ML/MIN/1.73 M 2
GLUCOSE BLD STRIP.AUTO-MCNC: 103 MG/DL (ref 65–99)
GLUCOSE BLD STRIP.AUTO-MCNC: 109 MG/DL (ref 65–99)
GLUCOSE BLD STRIP.AUTO-MCNC: 120 MG/DL (ref 65–99)
GLUCOSE BLD STRIP.AUTO-MCNC: 123 MG/DL (ref 65–99)
GLUCOSE BLD STRIP.AUTO-MCNC: 125 MG/DL (ref 65–99)
GLUCOSE BLD STRIP.AUTO-MCNC: 126 MG/DL (ref 65–99)
GLUCOSE BLD STRIP.AUTO-MCNC: 130 MG/DL (ref 65–99)
GLUCOSE BLD STRIP.AUTO-MCNC: 134 MG/DL (ref 65–99)
GLUCOSE BLD STRIP.AUTO-MCNC: 139 MG/DL (ref 65–99)
GLUCOSE BLD STRIP.AUTO-MCNC: 140 MG/DL (ref 65–99)
GLUCOSE BLD STRIP.AUTO-MCNC: 147 MG/DL (ref 65–99)
GLUCOSE BLD STRIP.AUTO-MCNC: 149 MG/DL (ref 65–99)
GLUCOSE BLD STRIP.AUTO-MCNC: 154 MG/DL (ref 65–99)
GLUCOSE BLD STRIP.AUTO-MCNC: 155 MG/DL (ref 65–99)
GLUCOSE BLD STRIP.AUTO-MCNC: 158 MG/DL (ref 65–99)
GLUCOSE BLD STRIP.AUTO-MCNC: 161 MG/DL (ref 65–99)
GLUCOSE BLD STRIP.AUTO-MCNC: 162 MG/DL (ref 65–99)
GLUCOSE BLD STRIP.AUTO-MCNC: 171 MG/DL (ref 65–99)
GLUCOSE BLD STRIP.AUTO-MCNC: 195 MG/DL (ref 65–99)
GLUCOSE BLD STRIP.AUTO-MCNC: 95 MG/DL (ref 65–99)
GLUCOSE SERPL-MCNC: 136 MG/DL (ref 65–99)
HCT VFR BLD AUTO: 39.6 % (ref 42–52)
HGB BLD-MCNC: 13.4 G/DL (ref 14–18)
MAGNESIUM SERPL-MCNC: 1.9 MG/DL (ref 1.5–2.5)
MCH RBC QN AUTO: 30.7 PG (ref 27–33)
MCHC RBC AUTO-ENTMCNC: 33.8 G/DL (ref 32.3–36.5)
MCV RBC AUTO: 90.8 FL (ref 81.4–97.8)
PLATELET # BLD AUTO: 173 K/UL (ref 164–446)
PMV BLD AUTO: 10.8 FL (ref 9–12.9)
POTASSIUM SERPL-SCNC: 4.1 MMOL/L (ref 3.6–5.5)
POTASSIUM SERPL-SCNC: 4.3 MMOL/L (ref 3.6–5.5)
POTASSIUM SERPL-SCNC: 4.4 MMOL/L (ref 3.6–5.5)
RBC # BLD AUTO: 4.36 M/UL (ref 4.7–6.1)
SODIUM SERPL-SCNC: 134 MMOL/L (ref 135–145)
WBC # BLD AUTO: 18.8 K/UL (ref 4.8–10.8)

## 2023-08-10 PROCEDURE — 700111 HCHG RX REV CODE 636 W/ 250 OVERRIDE (IP): Mod: JZ | Performed by: NURSE PRACTITIONER

## 2023-08-10 PROCEDURE — 82962 GLUCOSE BLOOD TEST: CPT | Mod: 91

## 2023-08-10 PROCEDURE — A9270 NON-COVERED ITEM OR SERVICE: HCPCS

## 2023-08-10 PROCEDURE — 93010 ELECTROCARDIOGRAM REPORT: CPT | Performed by: INTERNAL MEDICINE

## 2023-08-10 PROCEDURE — 700102 HCHG RX REV CODE 250 W/ 637 OVERRIDE(OP): Mod: JZ | Performed by: NURSE PRACTITIONER

## 2023-08-10 PROCEDURE — 700111 HCHG RX REV CODE 636 W/ 250 OVERRIDE (IP): Mod: JZ

## 2023-08-10 PROCEDURE — 700102 HCHG RX REV CODE 250 W/ 637 OVERRIDE(OP): Performed by: THORACIC SURGERY (CARDIOTHORACIC VASCULAR SURGERY)

## 2023-08-10 PROCEDURE — 93005 ELECTROCARDIOGRAM TRACING: CPT

## 2023-08-10 PROCEDURE — 770022 HCHG ROOM/CARE - ICU (200)

## 2023-08-10 PROCEDURE — 85027 COMPLETE CBC AUTOMATED: CPT

## 2023-08-10 PROCEDURE — 700102 HCHG RX REV CODE 250 W/ 637 OVERRIDE(OP)

## 2023-08-10 PROCEDURE — 84132 ASSAY OF SERUM POTASSIUM: CPT

## 2023-08-10 PROCEDURE — 99233 SBSQ HOSP IP/OBS HIGH 50: CPT | Performed by: INTERNAL MEDICINE

## 2023-08-10 PROCEDURE — 83735 ASSAY OF MAGNESIUM: CPT

## 2023-08-10 PROCEDURE — A9270 NON-COVERED ITEM OR SERVICE: HCPCS | Mod: JZ | Performed by: NURSE PRACTITIONER

## 2023-08-10 PROCEDURE — 80048 BASIC METABOLIC PNL TOTAL CA: CPT

## 2023-08-10 PROCEDURE — 71045 X-RAY EXAM CHEST 1 VIEW: CPT

## 2023-08-10 PROCEDURE — 94669 MECHANICAL CHEST WALL OSCILL: CPT

## 2023-08-10 PROCEDURE — 93005 ELECTROCARDIOGRAM TRACING: CPT | Performed by: NURSE PRACTITIONER

## 2023-08-10 RX ORDER — DEXTROSE MONOHYDRATE 25 G/50ML
25 INJECTION, SOLUTION INTRAVENOUS
Status: DISCONTINUED | OUTPATIENT
Start: 2023-08-10 | End: 2023-08-18 | Stop reason: HOSPADM

## 2023-08-10 RX ORDER — FUROSEMIDE 10 MG/ML
20 INJECTION INTRAMUSCULAR; INTRAVENOUS
Status: DISCONTINUED | OUTPATIENT
Start: 2023-08-10 | End: 2023-08-15

## 2023-08-10 RX ORDER — POTASSIUM CHLORIDE 20 MEQ/1
20 TABLET, EXTENDED RELEASE ORAL 2 TIMES DAILY
Status: DISCONTINUED | OUTPATIENT
Start: 2023-08-10 | End: 2023-08-15

## 2023-08-10 RX ORDER — AMIODARONE HYDROCHLORIDE 200 MG/1
400 TABLET ORAL 2 TIMES DAILY
Status: DISCONTINUED | OUTPATIENT
Start: 2023-08-11 | End: 2023-08-11

## 2023-08-10 RX ADMIN — POTASSIUM CHLORIDE 20 MEQ: 1500 TABLET, EXTENDED RELEASE ORAL at 17:20

## 2023-08-10 RX ADMIN — ACETAMINOPHEN 1000 MG: 500 TABLET, FILM COATED ORAL at 23:09

## 2023-08-10 RX ADMIN — ENOXAPARIN SODIUM 40 MG: 100 INJECTION SUBCUTANEOUS at 17:20

## 2023-08-10 RX ADMIN — OXYCODONE HYDROCHLORIDE 5 MG: 5 TABLET ORAL at 16:37

## 2023-08-10 RX ADMIN — SENNOSIDES AND DOCUSATE SODIUM 2 TABLET: 50; 8.6 TABLET ORAL at 05:22

## 2023-08-10 RX ADMIN — ACETAMINOPHEN 1000 MG: 500 TABLET, FILM COATED ORAL at 05:22

## 2023-08-10 RX ADMIN — Medication 1 APPLICATOR: at 20:42

## 2023-08-10 RX ADMIN — OMEPRAZOLE 20 MG: 20 CAPSULE, DELAYED RELEASE ORAL at 05:22

## 2023-08-10 RX ADMIN — Medication 1 APPLICATOR: at 08:54

## 2023-08-10 RX ADMIN — OXYCODONE HYDROCHLORIDE 5 MG: 5 TABLET ORAL at 12:30

## 2023-08-10 RX ADMIN — ASPIRIN 81 MG: 81 TABLET, COATED ORAL at 05:22

## 2023-08-10 RX ADMIN — OXYCODONE HYDROCHLORIDE 10 MG: 10 TABLET ORAL at 21:23

## 2023-08-10 RX ADMIN — CARVEDILOL 3.12 MG: 3.12 TABLET, FILM COATED ORAL at 07:47

## 2023-08-10 RX ADMIN — FUROSEMIDE 20 MG: 10 INJECTION, SOLUTION INTRAVENOUS at 10:07

## 2023-08-10 RX ADMIN — ACETAMINOPHEN 1000 MG: 500 TABLET, FILM COATED ORAL at 11:21

## 2023-08-10 RX ADMIN — SENNOSIDES AND DOCUSATE SODIUM 2 TABLET: 50; 8.6 TABLET ORAL at 17:20

## 2023-08-10 RX ADMIN — AMIODARONE HYDROCHLORIDE 1 MG/MIN: 1.8 INJECTION, SOLUTION INTRAVENOUS at 20:37

## 2023-08-10 RX ADMIN — FUROSEMIDE 20 MG: 10 INJECTION, SOLUTION INTRAVENOUS at 16:31

## 2023-08-10 RX ADMIN — AMIODARONE HYDROCHLORIDE 150 MG: 1.5 INJECTION, SOLUTION INTRAVENOUS at 20:21

## 2023-08-10 RX ADMIN — POTASSIUM CHLORIDE 20 MEQ: 1500 TABLET, EXTENDED RELEASE ORAL at 10:07

## 2023-08-10 RX ADMIN — MAGNESIUM SULFATE HEPTAHYDRATE 1 G: 1 INJECTION, SOLUTION INTRAVENOUS at 05:24

## 2023-08-10 RX ADMIN — ACETAMINOPHEN 1000 MG: 500 TABLET, FILM COATED ORAL at 17:21

## 2023-08-10 RX ADMIN — POLYETHYLENE GLYCOL 3350 1 PACKET: 17 POWDER, FOR SOLUTION ORAL at 05:22

## 2023-08-10 RX ADMIN — CARVEDILOL 3.12 MG: 3.12 TABLET, FILM COATED ORAL at 16:31

## 2023-08-10 ASSESSMENT — ENCOUNTER SYMPTOMS
FEVER: 0
SINUS PAIN: 0
HEADACHES: 0
NERVOUS/ANXIOUS: 0
BACK PAIN: 0
SHORTNESS OF BREATH: 0
STRIDOR: 0
NAUSEA: 0
VOMITING: 0
ABDOMINAL PAIN: 0
DIARRHEA: 0

## 2023-08-10 ASSESSMENT — PAIN DESCRIPTION - PAIN TYPE
TYPE: SURGICAL PAIN
TYPE: ACUTE PAIN
TYPE: SURGICAL PAIN
TYPE: SURGICAL PAIN

## 2023-08-10 ASSESSMENT — COPD QUESTIONNAIRES
HAVE YOU SMOKED AT LEAST 100 CIGARETTES IN YOUR ENTIRE LIFE: NO/DON'T KNOW
DURING THE PAST 4 WEEKS HOW MUCH DID YOU FEEL SHORT OF BREATH: NONE/LITTLE OF THE TIME
DO YOU EVER COUGH UP ANY MUCUS OR PHLEGM?: NO/ONLY WITH OCCASIONAL COLDS OR INFECTIONS
COPD SCREENING SCORE: 2

## 2023-08-10 ASSESSMENT — FIBROSIS 4 INDEX: FIB4 SCORE: 1.09

## 2023-08-10 NOTE — PROGRESS NOTES
Cardiovascular Surgery Progress Note    Name: Venkat Mackenzie  MRN: [id number]  : 1960  Admit Date: 2023  5:05 AM  1 Day Post-Op     Procedure:  Procedure(s) and Anesthesia Type:     * AORTIC ROOT REPLACEMENT (27 mm Konect Duong bovine pericardial valved conduit, coronary artery reimplantation) and intraoperative transesophageal echocardiography     * ECHOCARDIOGRAM, TRANSESOPHAGEAL, INTRAOPERATIVE - General    Vitals:  Vitals:    08/10/23 0600 08/10/23 0700 08/10/23 0747 08/10/23 0800   BP: 92/62 93/59 100/66 100/66   Pulse: 64 67  71   Resp: 13 (!) 11  16   Temp:    36.7 °C (98 °F)   TempSrc:    Temporal   SpO2: 95% 93%  94%   Weight: 96.9 kg (213 lb 10 oz)      Height:          Temp (24hrs), Av.4 °C (97.5 °F), Min:36.1 °C (97 °F), Max:36.7 °C (98 °F)      Respiratory:  Vent Mode: Spont, PEEP/CPAP: 8, PIP: 17, MAP: 12 Respiration: 16, Pulse Oximetry: 94 %       Fluids:    Intake/Output Summary (Last 24 hours) at 8/10/2023 0934  Last data filed at 8/10/2023 0800  Gross per 24 hour   Intake 2982.44 ml   Output 2770 ml   Net 212.44 ml     Admit weight: Weight: 94.9 kg (209 lb 3.5 oz)  Current weight: Weight: 96.9 kg (213 lb 10 oz) (08/10/23 0600)    Labs:  Recent Labs     23  1318 23  1220 08/10/23  0354   WBC 6.6  --  18.8*   RBC 5.58  --  4.36*   HEMOGLOBIN 17.1 14.7 13.4*   HEMATOCRIT 49.8 44.1 39.6*   MCV 89.2  --  90.8   MCH 30.6  --  30.7   MCHC 34.3  --  33.8   RDW 42.2  --  43.5   PLATELETCT 281 184 173   MPV 10.4  --  10.8     Recent Labs     23  1318 23  1220 23  1730 08/10/23  0000 08/10/23  0354   SODIUM 138  --   --   --  134*   POTASSIUM 3.5*   < > 4.0 4.3 4.4   CHLORIDE 103  --   --   --  103   CO2 22  --   --   --  21   GLUCOSE 117*  --   --   --  136*   BUN 16  --   --   --  21   CREATININE 0.80  --   --   --  0.60   CALCIUM 9.5  --   --   --  8.1*    < > = values in this interval not displayed.     Recent Labs     23  1318 23  1220   APTT  28.7 30.5   INR 1.07 1.38*           Medications:  Scheduled Medications   Medication Dose Frequency    insulin regular  2-9 Units 4X/DAY ACHS    Nozin nasal  swab  1 Applicator BID    magnesium sulfate  1 g DAILY    K+ Scale: Goal of 4.5  1 Each Q6HRS    aspirin  81 mg DAILY    acetaminophen  1,000 mg Q6HRS    senna-docusate  2 Tablet BID    And    polyethylene glycol/lytes  1 Packet DAILY    And    [START ON 8/11/2023] magnesium hydroxide  30 mL DAILY    omeprazole  20 mg DAILY    enoxaparin (LOVENOX) injection  40 mg DAILY AT 1800    carvedilol  3.125 mg BID WITH MEALS    insulin regular  0-14 Units Once    insulin lispro  0-14 Units TID AC        Exam:   Physical Exam  Vitals and nursing note reviewed.   Constitutional:       General: He is not in acute distress.     Appearance: Normal appearance.   HENT:      Head: Normocephalic.      Right Ear: External ear normal.      Left Ear: External ear normal.      Nose: Nose normal. No congestion.      Mouth/Throat:      Mouth: Mucous membranes are moist.      Pharynx: Oropharynx is clear.   Eyes:      Extraocular Movements: Extraocular movements intact.      Pupils: Pupils are equal, round, and reactive to light.   Cardiovascular:      Rate and Rhythm: Normal rate and regular rhythm.      Pulses: Normal pulses.      Heart sounds: Normal heart sounds.   Pulmonary:      Effort: Pulmonary effort is normal.      Breath sounds: Decreased breath sounds present.   Abdominal:      General: Bowel sounds are decreased. There is no distension.      Palpations: Abdomen is soft.   Musculoskeletal:      Cervical back: Normal range of motion and neck supple. No tenderness.      Right lower leg: Edema present.      Left lower leg: Edema present.   Skin:     General: Skin is warm and dry.      Comments: Midline surgical incision   Neurological:      General: No focal deficit present.      Mental Status: He is alert and oriented to person, place, and time. Mental status is at  baseline.   Psychiatric:         Mood and Affect: Mood normal.         Behavior: Behavior normal.         Thought Content: Thought content normal.         Cardiac Medications:    ASA - Yes    Plavix - No; contraindicated because of Other No CAD    Post-operative Beta Blockers - Yes    Ace/ARB- No; contraindicated because of Normal EF    Statin - No; contraindicated because of No CAD    Aldactone- No; contraindicated because of Normal EF    SGLT2i-  No contraindicated because of cost prohibitive    Ejection Fraction:  55%    Telemetry:   8/10 SR    Assessment/Plan:  POD 1  HDS, SR, neuro intact, wounds intact, abdomen soft, fluid balance positive, wt up,  2 L NC. 160 mL out of chest tubes overnight.  Plan:  Gentle diuresis, d/c chest tubes and bolivar. IS/ambulate.       Disposition:  TBD  Pt lives with spouse in [place].

## 2023-08-10 NOTE — CARE PLAN
The patient is Watcher - Medium risk of patient condition declining or worsening    Shift Goals  Clinical Goals: Stable hemodynamics, Ambulate, Q1 IS  Patient Goals: Rest/Get better  Family Goals: ANA    Progress made toward(s) clinical / shift goals:    Problem: Knowledge Deficit - Standard  Goal: Patient and family/care givers will demonstrate understanding of plan of care, disease process/condition, diagnostic tests and medications  Outcome: Progressing     Problem: Pain - Standard  Goal: Alleviation of pain or a reduction in pain to the patient’s comfort goal  Outcome: Progressing     Problem: Post Op Day 1 CABG/Heart Valve Replacement  Goal: Optimal care of the post op CABG/heart valve replacement Post Op Day 1  Outcome: Progressing  Intervention: EKG and CXR completed  Note: EKG and CXR complete    Intervention: All valve patients: PT/INR daily  Note: Completed  Intervention: Antibiotics are discontinued within 24 hours of anesthesia end time unless indication documented for continuation beyond 24 hours  Note: Antibiotics D/C'd  Intervention: Daily weights in the morning  Note: Weight done this AM, pt. Weight 96.9 kg.   Intervention: Up in chair for all meals  Note: Pt. Up in chair for all meals  Intervention: Ambulate in am if stable. First ambulation 25 feet. Repeat x 3 as tolerated  Note: Pt. Ambulated 3 times, and furthest distance was 200 feet.  Intervention: Discontinue bolivar catheter unless documented reason for continuation  Note: Bolivar D/C'd  Intervention: Assess surgical dressing and check provider orders for potential removal  Note: Surgical dressing removed and incision site open to air.  Intervention: Ensure referal to intensive cardiac rehab is ordered, and smoking cessation education if appropriate  Note: Rehab ordered  Intervention: [facility] trained RN to remove chest tubes if ordered by provider  Note: [facility] RN Donavan removed chest tubes  Intervention: IS q 1 hour while awake and record best IS  volume  Note: Best IS 2500  Intervention: Knee high ENMANUEL hose, on during the day, off at night  Note: ENMANUEL hose on all day  Intervention: Saline lock IV  Note: IV's Saline locked  Intervention: Transfer to tele status, begin VS q 4 hours  Note: PT. Still ICU status  Intervention: After 24th hour post-anesthesia end time, transition patient to Cardiac Surgery SQ Insulin Protocol  Note: Pt. On SQ Insulin Protocol  Intervention: If patient is CABG or on home beta-blocker, start/resume beta-blocker on POD 1 or POD 2 or document contraindication  Note: Beta blocker started.

## 2023-08-10 NOTE — THERAPY
Occupational Therapy Contact Note    Patient Name: Venkat Mackenzie  Age:  63 y.o., Sex:  male  Medical Record #: [id number]  Today's Date: 8/10/2023       08/10/23 0756   Interdisciplinary Plan of Care Collaboration   Collaboration Comments OT referral received. Pt is POD #1 [facility]. Will complete. OT eval on or after POD #2.

## 2023-08-10 NOTE — PROGRESS NOTES
Mediastinal chest tubes removed per ISHMAEL Zepeda. No air leak noted. No patient report of distress. LDA removed and wound LDA added. Site CDI, bruising noted on left insertion site.

## 2023-08-10 NOTE — PROGRESS NOTES
4 Eyes Skin Assessment Completed by CLUADIA Cheema and Venkat LEON RN.    Head WDL  Ears WDL  Nose WDL  Mouth WDL  Neck WDL  Breast/Chest WDL sternal incision ANA   Shoulder Blades WDL  Spine WDL  (R) Arm/Elbow/Hand WDL  (L) Arm/Elbow/Hand WDL  Abdomen Incision chest tube sites  Groin WDL  Scrotum/Coccyx/Buttocks WDL  (R) Leg WDL  (L) Leg WDL  (R) Heel/Foot/Toe WDL  (L) Heel/Foot/Toe WDL          Devices In Places ECG, Blood Pressure Cuff, Pulse Ox, Billingsley, Arterial Line, SCD's, Central Line, and Pacer      Interventions In Place Gray Ear Foams, Pillows, Q2 Turns, Low Air Loss Mattress, Heels Loaded W/Pillows, and Pressure Redistribution Mattress    Possible Skin Injury No    Pictures Uploaded Into Epic N/A  Wound Consult Placed N/A  RN Wound Prevention Protocol Ordered Yes

## 2023-08-10 NOTE — CARE PLAN
Problem: Pain - Standard  Problem: Day of surgery post CABG/Heart valve replacement  Goal: Stabilization in immediate post op period  Intervention: VS q 15 min x 4 hours, then q 1 hour. Include temperature immediately upon arrival. Check CO/CI q 2-4 hours and PRN  Note: Performed. VSS   Intervention: If radial artery used, elevate arm, no BP checks or needle sticks from affected arm, monitor ulnar pulse and capillary refill  Note: Completed. Radial art line removed per protocol   Intervention: First post op hour labs and EKG per order  Note: Completed   Intervention: Serum K q 6 hours x 24 hours.  ABG and CBC prn.  Note: Completed, K replaced per protocol   Intervention: Initiate post cardiac insulin infusion protocol orders for FSBS greater than 140 and check frequency per protocol  Note: Currently stopped. BG WDL  Intervention: FSBS frequency as per Cardiac Surgery Insulin Drip Protocol  Note: Q1 BG glucose checks currently   Intervention: For patients on Beta Blockers: verify dose given prior to surgery or within 6 hours after arrival to the unit  Note: Not applicable   Intervention: Chest tube to 20 cm suction, record CT drainage with VS, and check for air leak  Note: No air leak noted, Chest tube connected to suction   Intervention: For CT drainage >300 mL in first hour post op and/or 150 mL in subsequent hours: Stat platelets, PT, INR, TEG, iSTAT, and H&H per order  Note: Output wdl   Intervention: Titrate and wean off vasoactive drips per patient's condition and per MD order while maintaining SBP  mmHg per MD order  Note: Off of vasoactive meds  Intervention: IS q 1 hour while awake post extubation  Note: 1750 cc on IS  Intervention: Bedrest until extubated and groin lines out  Note: Out of bed this am. No groin lines  Intervention: Dangle within 4 hours post extubation  Note: Tolerated well   Intervention: Up in chair 4 hours, day of extubation  Note: Tolerated well   Intervention: Maintain all  original surgical dressings per provider orders and specifications  Note: Completed   Intervention: Clear liquids post extubation, order carbohydrate free (post cardiac surgery) diet, advance as tolerated  Note: Clear liquid tolerated   Intervention: Discontinue [place] jo and arterial line 12-18 hours post op if hemodynamically stable and off vasoactive drips  Note: Art line removed   Intervention: A-Fib and DVT prophylaxis per MD order or contraindications documented (refer to DVT/VTE problem on Care Plan)  Note: SCDs in place   Intervention: Amiodarone protocol per MD order  Note: Not applicable Sinus rhythm on monitor during shift      Goal: Alleviation of pain or a reduction in pain to the patient’s comfort goal  Outcome: Progressing   The patient is Stable - Low risk of patient condition declining or worsening    Shift Goals  Clinical Goals: BG checks, edge of bed, Meet hemodynamic goals  Patient Goals: Get stronger  Family Goals: shasta    Progress made toward(s) clinical / shift goals:  Meeting goals. BP within range, Currently off of insulin, on 2L nasal cannula. Tolerated mobility well     Patient is not progressing towards the following goals:

## 2023-08-10 NOTE — TELEPHONE ENCOUNTER
AM     Caller: Maria L from [facility] [employer]     Topic/issue: Un called regarding the recent cath lab procedure that the patient had done. They have questions about the procedure itself for a disability claim. I also connected the representative to medical records. Please advise.    Callback Number: [phone]    Thank you,    Tamiko GARCIA

## 2023-08-10 NOTE — CARE PLAN
Problem: Hyperinflation  Goal: Prevent or improve atelectasis  Description: Target End Date:  3 to 4 days    1. Instruct incentive spirometry usage  2.  Perform hyperinflation therapy as indicated  Outcome: Progressing     PEP QID  IS: 2000

## 2023-08-10 NOTE — THERAPY
Physical Therapy Contact Note    Patient Name: Venkat Mackenzie  Age:  63 y.o., Sex:  male  Medical Record #: [id number]  Today's Date: 8/10/2023       08/10/23 0734   Interdisciplinary Plan of Care Collaboration   Collaboration Comments PT consult received.  Per chart review pt POD #1 [facility], will HOLD until at least POD #2 per orders.

## 2023-08-10 NOTE — PROGRESS NOTES
Monitor Summary   SR rates 60s-70s with occasional PVCs and PACs  No pacing required over night. Pacer disconnected from pacer box    0.2/0.08/0.456  12 hour chart check

## 2023-08-10 NOTE — CARE PLAN
Problem: Pain - Standard  Goal: Alleviation of pain or a reduction in pain to the patient’s comfort goal  Outcome: Progressing  Note: Pain controlled with available PRNs     Problem: Day of surgery post CABG/Heart valve replacement  Goal: Stabilization in immediate post op period  Outcome: Progressing  Intervention: VS q 15 min x 4 hours, then q 1 hour. Include temperature immediately upon arrival. Check CO/CI q 2-4 hours and PRN  Note: Completed see VS flowsheet  Intervention: If radial artery used, elevate arm, no BP checks or needle sticks from affected arm, monitor ulnar pulse and capillary refill  Note: NA  Intervention: First post op hour labs and EKG per order  Note: Completed    Intervention: Serum K q 6 hours x 24 hours.  ABG and CBC prn.  Note: iSTAT K+ 4.1, 10 meq ordered per protocol  Intervention: Initiate post cardiac insulin infusion protocol orders for FSBS greater than 140 and check frequency per protocol  Note: Insulin gtt started for   Intervention: FSBS frequency as per Cardiac Surgery Insulin Drip Protocol  Note: Q 1hr BG checks  Intervention: For patients on Beta Blockers: verify dose given prior to surgery or within 6 hours after arrival to the unit  Note: Given in pre-op  Intervention: Chest tube to 20 cm suction, record CT drainage with VS, and check for air leak  Note: CTs WDL  Intervention: For CT drainage >300 mL in first hour post op and/or 150 mL in subsequent hours: Stat platelets, PT, INR, TEG, iSTAT, and H&H per order  Note: CT drainage WDL  Intervention: Titrate and wean off vasoactive drips per patient's condition and per MD order while maintaining SBP  mmHg per MD order  Note: Weaning Levo  Intervention: VAP protocol in place  Note: Completed  Intervention: Wean from Vent per protocol (see protocol), extubation goal within 6 hours post op  Note: Extubated within 6hrs at 1607  Intervention: IS q 1 hour while awake post extubation  Note: Strong effort,  1250ml  Intervention: Bedrest until extubated and groin lines out  Note: NA    Intervention: Dangle within 4 hours post extubation  Note: NOC RN to complete  Intervention: Up in chair 4 hours, day of extubation  Note: NOC RN to complete  Intervention: Maintain all original surgical dressings per provider orders and specifications  Note: Island dressing to sternal incision CDI  Intervention: Clear liquids post extubation, order carbohydrate free (post cardiac surgery) diet, advance as tolerated  Note: Clear liquid diet ordered  Intervention: Discontinue [place] jo and arterial line 12-18 hours post op if hemodynamically stable and off vasoactive drips  Note: NA  Intervention: A-Fib and DVT prophylaxis per MD order or contraindications documented (refer to DVT/VTE problem on Care Plan)  Note: NA    Intervention: Amiodarone protocol per MD order  Note: NA

## 2023-08-10 NOTE — PROGRESS NOTES
Critical Care Progress Note    Date of admission  8/9/2023    Chief Complaint  63 y.o. male with PMH HTN, DM, gastroparessis, DANIELA, on chronic prednisone (for unknown reason) and hx of aortic root aneurysm  (dilated aortic root 5.3cm and ascending aorta 4.1cm) who's admitted today for aortic root replacement by Dr. Zamudio     TTE preop in 5/2023 with LVEF 55%, normal RV size/function. Mild MR/AI. RVSP 35mmHg. LHC normal coronaries     Intraop, from anesthesia standpoint, pt was easy intubation, right IJ central line, left art line. Pt underwent aortic root replacement Bentall procedure) with 27mm bovine pericardial valve conduit, coronary artery reimplantation. Post op BELEN with LVEF 60%. Cell saver 450cc. Total 1200cc of crystalloid given. UOP 800cc. No issues coming out of pump. No arrhythmias       Hospital Course  8/9 admitted to ICU from OR    Interval Problem Update  Reviewed last 24 hour events:  No acute changes overnight  Extubated via fast track protocol with no issues yesterday  Resp status stable  Pt sitting in chair this am, alert, appropriate and comfortable.   HR in 60-70s  SBP in 100s  On 2L NC, sat >92%  Off insulin gtt  Off dexmedetomidine gtt  Creatinine 0.60, good UOP  Hgb 13.4  Chest tube output ~ 550cc since surgery     Review of Systems  Review of Systems   Constitutional:  Negative for fever and malaise/fatigue.   HENT:  Negative for sinus pain.    Respiratory:  Negative for shortness of breath and stridor.    Cardiovascular:  Positive for chest pain. Negative for leg swelling.   Gastrointestinal:  Negative for abdominal pain, diarrhea, nausea and vomiting.   Musculoskeletal:  Negative for back pain.   Neurological:  Negative for headaches.   Psychiatric/Behavioral:  The patient is not nervous/anxious.    All other systems reviewed and are negative.       Vital Signs for last 24 hours   Temp:  [36.1 °C (97 °F)-36.6 °C (97.9 °F)] 36.4 °C (97.5 °F)  Pulse:  [60-80] 64  Resp:  [11-31] 13  BP:  ()/() 92/62  SpO2:  [92 %-99 %] 95 %    Hemodynamic parameters for last 24 hours  CVP:  [1 MM HG-301 MM HG] 1 MM HG    Respiratory Information for the last 24 hours  Vent Mode: Spont  PEEP/CPAP: 8  MAP: 12  Control VTE (exp VT): 381    Physical Exam   Physical Exam  Vitals and nursing note reviewed.   Constitutional:       General: He is not in acute distress.     Appearance: Normal appearance. He is ill-appearing. He is not toxic-appearing or diaphoretic.   HENT:      Head: Normocephalic.      Mouth/Throat:      Mouth: Mucous membranes are moist.   Cardiovascular:      Rate and Rhythm: Normal rate and regular rhythm.      Pulses: Normal pulses.      Heart sounds: Normal heart sounds. No murmur heard.     Comments: Mediastinal chest tube in place  Surgical dressing over midline sternum  Pulmonary:      Effort: Pulmonary effort is normal. No respiratory distress.      Breath sounds: Normal breath sounds. No wheezing, rhonchi or rales.   Abdominal:      General: Bowel sounds are normal. There is no distension.      Palpations: Abdomen is soft.      Tenderness: There is no abdominal tenderness. There is no guarding.   Musculoskeletal:         General: No swelling or tenderness.      Cervical back: Neck supple.      Right lower leg: No edema.      Left lower leg: No edema.   Skin:     General: Skin is warm.      Capillary Refill: Capillary refill takes less than 2 seconds.      Coloration: Skin is not jaundiced.   Neurological:      General: No focal deficit present.      Mental Status: He is alert and oriented to person, place, and time.      Cranial Nerves: No cranial nerve deficit.   Psychiatric:         Mood and Affect: Mood normal.         Behavior: Behavior normal.         Medications  Current Facility-Administered Medications   Medication Dose Route Frequency Provider Last Rate Last Admin    insulin regular (HumuLIN R,NovoLIN R) injection  2-9 Units Subcutaneous 4X/DAY AYANA Tovar D.O.        And     dextrose 50% (D50W) injection 25 g  25 g Intravenous Q15 MIN PRN Rey Tovar D.O.        Respiratory Therapy Consult   Nebulization Continuous RT GARRETT StonerPFaustoRFaustoN.        NS infusion   Intravenous Continuous GARRETT StonerP.R.N. 10 mL/hr at 08/09/23 1722 Rate Verify at 08/09/23 1722    NS infusion   Intravenous Continuous GARRETT StonerPFaustoR.N. 30 mL/hr at 08/09/23 1447 Rate Verify at 08/09/23 1447    electrolyte-A (Plasmalyte-A) infusion   Intravenous PRN JUDE Stoner.P.R.N.   Stopped at 08/09/23 2100    Nozin nasal  swab  1 Applicator Each Nostril BID GARRETT StonerP.R.N.   1 Applicator at 08/09/23 2104    calcium CHLORIDE 1,000 mg in  mL IVPB  1,000 mg Intravenous Once PRN GARRETT StonerP.R.NFausto        magnesium sulfate in D5W IVPB premix 1 g  1 g Intravenous DAILY JUDE Stoner.P.R.N.   Stopped at 08/10/23 0624    K+ Scale: Goal of 4.5  1 Each Intravenous Q6HRS JUDE Stoner.P.R.N.   1 Each at 08/09/23 1800    aspirin EC tablet 81 mg  81 mg Oral DAILY JUDE Stoner.P.R.N.   81 mg at 08/10/23 0522    clevidipine (Cleviprex) IV emulsion  0-21 mg/hr Intravenous Continuous JUDE Stoner.P.R.N.   Stopped at 08/10/23 0122    nitroglycerin 50 mg in D5W 250 ml infusion  0-100 mcg/min Intravenous Continuous JUDE Stoner.P.R.N.   Dose not Required at 08/09/23 1215    acetaminophen (Tylenol) tablet 1,000 mg  1,000 mg Oral Q6HRS JUDE Stoner.P.R.N.   1,000 mg at 08/10/23 0522    Followed by    [START ON 8/14/2023] acetaminophen (Tylenol) tablet 1,000 mg  1,000 mg Oral Q6HRS PRN JEAN MARIE Aguilar, A.P.R.N.        oxyCODONE immediate-release (Roxicodone) tablet 5 mg  5 mg Oral Q3HRS PRN JEAN MARIE Aguilar, A.P.R.N.   5 mg at 08/09/23 2313    Or    oxyCODONE immediate release (Roxicodone) tablet 10 mg  10 mg Oral Q3HRS PRN JEAN MARIE Aguilar, A.P.R.N.        Or    fentaNYL (Sublimaze) injection 50  mcg  50 mcg Intravenous Q3HRS PRN JEAN MARIE Aguilar A.P.R.N.   50 mcg at 08/09/23 1714    traMADol (Ultram) 50 MG tablet 50 mg  50 mg Oral Q4HRS PRN JEAN MARIE Aguilar A.P.R.N.   50 mg at 08/09/23 1931    midazolam (Versed) injection 2 mg  2 mg Intravenous Q HOUR PRN JEAN MARIE Aguilar A.P.R.N.        dexmedetomidine (PRECEDEX) 400 mcg/100mL NS premix infusion  0-1.5 mcg/kg/hr (Ideal) Intravenous Continuous JUDE Stoner.P.R.N. 3.4 mL/hr at 08/10/23 0401 0.2 mcg/kg/hr at 08/10/23 0401    sodium bicarbonate 8.4 % injection 50 mEq  50 mEq Intravenous Q HOUR PRN JEAN MARIE Aguilar A.P.R.N.        ondansetron (Zofran) syringe/vial injection 8 mg  8 mg Intravenous Q6HRS PRN JEAN MARIE Aguilar A.P.R.N.   8 mg at 08/09/23 1843    Or    prochlorperazine (Compazine) injection 10 mg  10 mg Intravenous Q6HRS PRN JEAN MARIE Aguilar, A.P.R.N.   10 mg at 08/09/23 2150    acetaminophen (Tylenol) tablet 650 mg  650 mg Oral Q4HRS PRN JEAN MARIE Aguilar A.P.R.N.        Or    acetaminophen (Tylenol) suppository 650 mg  650 mg Rectal Q4HRS PRN JEAN MARIE Aguilar, A.P.R.N.        senna-docusate (Pericolace Or Senokot S) 8.6-50 MG per tablet 2 Tablet  2 Tablet Oral BID JEAN MARIE Aguilar A.P.R.N.   2 Tablet at 08/10/23 0522    And    polyethylene glycol/lytes (Miralax) PACKET 1 Packet  1 Packet Oral DAILY JUDE Stoner.P.R.N.   1 Packet at 08/10/23 0522    And    [START ON 8/11/2023] magnesium hydroxide (Milk Of Magnesia) suspension 30 mL  30 mL Oral DAILY JEAN MARIE Aguilar, A.P.R.N.        And    bisacodyl (Dulcolax) suppository 10 mg  10 mg Rectal QDAY PRN JEAN MARIE Aguilar, A.P.R.N.        omeprazole (PriLOSEC) capsule 20 mg  20 mg Oral DAILY JEAN MARIE Aguilar, A.P.R.N.   20 mg at 08/10/23 0522    mag hydrox-al hydrox-simeth (Maalox Plus Es Or Mylanta Ds) suspension 30 mL  30 mL Oral Q4HRS PRN JEAN MARIE Aguilar, A.P.R.N.        diphenhydrAMINE (Benadryl) tablet/capsule 25 mg  25 mg Oral HS  PRN - MR X 1 VIKKI Stoner        enoxaparin (Lovenox) inj 40 mg  40 mg Subcutaneous DAILY AT 1800 VIKKI Stoner        carvedilol (Coreg) tablet 3.125 mg  3.125 mg Oral BID WITH MEALS VIKKI Stoner        norepinephrine (Levophed) 8 mg in 250 mL NS infusion (premix)  0-1 mcg/kg/min (Ideal) Intravenous Continuous Emanuel Zamudio M.D.   Stopped at 08/09/23 1855    EPINEPHrine (Adrenalin) infusion 4 mg/250 mL (premix)  0-0.5 mcg/kg/min (Ideal) Intravenous Continuous Emanuel Zamudio M.D.   Dose not Required at 08/09/23 1245    insulin regular (HumuLIN R,NovoLIN R) injection  0-14 Units Intravenous Once Emanuel Zamudio M.D.        insulin lispro (HumaLOG,AdmeLOG) injection  0-14 Units Subcutaneous TID AC Emanuel Zamudio M.D.        insulin regular (Humulin R) 100 Units in  mL Infusion  0-29 Units/hr Intravenous Continuous Emanuel Zamudio M.D.   Stopped at 08/10/23 0555       Fluids    Intake/Output Summary (Last 24 hours) at 8/10/2023 0739  Last data filed at 8/10/2023 0600  Gross per 24 hour   Intake 2932.44 ml   Output 2680 ml   Net 252.44 ml       Laboratory  Recent Labs     08/09/23  1307 08/09/23  1425 08/09/23  1529   ISTATAPH 7.408 7.372* 7.354*   ISTATAPCO2 34.5 43.3* 39.5*   ISTATAPO2 218* 276* 122*   ISTATATCO2 23 26 23   [id number] 100* 100* 99   ISTATARTHCO3 21.8 25.1* 22.0   ISTATARTBE -2 0 -3   ISTATTEMP 36.3 C 36.6 C 36.0 C   ISTATFIO2 100 50 50   ISTATSPEC Arterial Arterial Arterial   ISTATAPHTC 7.419 7.377* 7.368*   [id number] 214* 274* 116*         Recent Labs     08/07/23  1318 08/09/23  1220 08/09/23  1730 08/10/23  0000 08/10/23  0354   SODIUM 138  --   --   --  134*   POTASSIUM 3.5* 4.2 4.0 4.3 4.4   CHLORIDE 103  --   --   --  103   CO2 22  --   --   --  21   BUN 16  --   --   --  21   CREATININE 0.80  --   --   --  0.60   MAGNESIUM  --  3.1*  --   --   --    CALCIUM 9.5  --   --   --  8.1*     Recent Labs     08/07/23 1318 08/10/23  0354    ALTSGPT 20  --    ASTSGOT 13  --    ALKPHOSPHAT 80  --    TBILIRUBIN 0.6  --    GLUCOSE 117* 136*     Recent Labs     08/07/23  1318 08/10/23  0354   WBC 6.6 18.8*   NEUTSPOLYS 63.80  --    LYMPHOCYTES 22.90  --    MONOCYTES 7.60  --    EOSINOPHILS 3.50  --    BASOPHILS 0.50  --    ASTSGOT 13  --    ALTSGPT 20  --    ALKPHOSPHAT 80  --    TBILIRUBIN 0.6  --      Recent Labs     08/07/23  1318 08/09/23  1220 08/10/23  0354   RBC 5.58  --  4.36*   HEMOGLOBIN 17.1 14.7 13.4*   HEMATOCRIT 49.8 44.1 39.6*   PLATELETCT 281 184 173   PROTHROMBTM 13.8 16.7*  --    APTT 28.7 30.5  --    INR 1.07 1.38*  --        Imaging  X-Ray:  I have personally reviewed the images and compared with prior images.    Assessment/Plan  * Aortic root aneurysm (HCC)  Assessment & Plan  Dilated aortic root 5.3cm and ascending aorta 4.1cm  Preop C with normal coronaries.   S/p aortic root replacement Bentall procedure) with 27mm bovine pericardial valve conduit, coronary artery reimplantation.   Post op BELEN with LVEF 60%.  8/9 extubated via fast track with no issues    Plan:   Continue IS  Early mobility   Monitor chest tube output, mx per CTS  ASA, carvedilol        Type 2 diabetes mellitus without complication, without long-term current use of insulin (HCC)  Assessment & Plan  a1c 6.0  On metformin at home. Will hold while in hospital     Essential hypertension- (present on admission)  Assessment & Plan  Carvedilol per protocol  On losartan and HCTZ at home         VTE:  Lovenox  Ulcer: Not Indicated  Lines: Central Line  Ongoing indication addressed, Arterial Line  Ongoing indication addressed, and Billingsley Catheter  Ongoing indication addressed    I have performed a physical exam and reviewed and updated ROS and Plan today (8/10/2023). In review of yesterday's note (8/9/2023), there are no changes except as documented above.     Discussed patient condition and risk of morbidity and/or mortality with RN, RT, Pharmacy, Charge nurse / hot  rounds, and Patient

## 2023-08-11 ENCOUNTER — APPOINTMENT (OUTPATIENT)
Dept: RADIOLOGY | Facility: MEDICAL CENTER | Age: 63
DRG: 219 | End: 2023-08-11
Attending: INTERNAL MEDICINE
Payer: COMMERCIAL

## 2023-08-11 ENCOUNTER — APPOINTMENT (OUTPATIENT)
Dept: RADIOLOGY | Facility: MEDICAL CENTER | Age: 63
DRG: 219 | End: 2023-08-11
Attending: NURSE PRACTITIONER
Payer: COMMERCIAL

## 2023-08-11 PROBLEM — R53.83 LETHARGY: Status: ACTIVE | Noted: 2023-08-11

## 2023-08-11 PROBLEM — J93.9 PNEUMOTHORAX: Status: ACTIVE | Noted: 2023-08-11

## 2023-08-11 LAB
ANION GAP SERPL CALC-SCNC: 13 MMOL/L (ref 7–16)
BASE EXCESS BLDA CALC-SCNC: -1 MMOL/L (ref -4–3)
BASE EXCESS BLDA CALC-SCNC: 0 MMOL/L (ref -4–3)
BODY TEMPERATURE: ABNORMAL DEGREES
BODY TEMPERATURE: ABNORMAL DEGREES
BUN SERPL-MCNC: 21 MG/DL (ref 8–22)
CALCIUM SERPL-MCNC: 8.6 MG/DL (ref 8.5–10.5)
CHLORIDE SERPL-SCNC: 99 MMOL/L (ref 96–112)
CO2 BLDA-SCNC: 25 MMOL/L (ref 20–33)
CO2 BLDA-SCNC: 26 MMOL/L (ref 20–33)
CO2 SERPL-SCNC: 24 MMOL/L (ref 20–33)
CREAT SERPL-MCNC: 0.93 MG/DL (ref 0.5–1.4)
DELSYS IDSYS: ABNORMAL
DELSYS IDSYS: ABNORMAL
EKG IMPRESSION: NORMAL
ERYTHROCYTE [DISTWIDTH] IN BLOOD BY AUTOMATED COUNT: 44.6 FL (ref 35.9–50)
GFR SERPLBLD CREATININE-BSD FMLA CKD-EPI: 91 ML/MIN/1.73 M 2
GLUCOSE BLD STRIP.AUTO-MCNC: 105 MG/DL (ref 65–99)
GLUCOSE BLD STRIP.AUTO-MCNC: 137 MG/DL (ref 65–99)
GLUCOSE BLD STRIP.AUTO-MCNC: 141 MG/DL (ref 65–99)
GLUCOSE BLD STRIP.AUTO-MCNC: 149 MG/DL (ref 65–99)
GLUCOSE BLD STRIP.AUTO-MCNC: 189 MG/DL (ref 65–99)
GLUCOSE SERPL-MCNC: 135 MG/DL (ref 65–99)
HCO3 BLDA-SCNC: 24 MMOL/L (ref 17–25)
HCO3 BLDA-SCNC: 24.6 MMOL/L (ref 17–25)
HCT VFR BLD AUTO: 42.7 % (ref 42–52)
HGB BLD-MCNC: 14.2 G/DL (ref 14–18)
LPM ILPM: 10 LPM
LPM ILPM: 6 LPM
MCH RBC QN AUTO: 30.9 PG (ref 27–33)
MCHC RBC AUTO-ENTMCNC: 33.3 G/DL (ref 32.3–36.5)
MCV RBC AUTO: 93 FL (ref 81.4–97.8)
PCO2 BLDA: 38.7 MMHG (ref 26–37)
PCO2 BLDA: 39.2 MMHG (ref 26–37)
PCO2 TEMP ADJ BLDA: 36.4 MMHG (ref 26–37)
PCO2 TEMP ADJ BLDA: 38.5 MMHG (ref 26–37)
PH BLDA: 7.4 [PH] (ref 7.4–7.5)
PH BLDA: 7.41 [PH] (ref 7.4–7.5)
PH TEMP ADJ BLDA: 7.41 [PH] (ref 7.4–7.5)
PH TEMP ADJ BLDA: 7.42 [PH] (ref 7.4–7.5)
PLATELET # BLD AUTO: 186 K/UL (ref 164–446)
PMV BLD AUTO: 11.6 FL (ref 9–12.9)
PO2 BLDA: 65 MMHG (ref 64–87)
PO2 BLDA: 77 MMHG (ref 64–87)
PO2 TEMP ADJ BLDA: 63 MMHG (ref 64–87)
PO2 TEMP ADJ BLDA: 70 MMHG (ref 64–87)
POTASSIUM SERPL-SCNC: 4.4 MMOL/L (ref 3.6–5.5)
RBC # BLD AUTO: 4.59 M/UL (ref 4.7–6.1)
SAO2 % BLDA: 92 % (ref 93–99)
SAO2 % BLDA: 95 % (ref 93–99)
SODIUM SERPL-SCNC: 136 MMOL/L (ref 135–145)
SPECIMEN DRAWN FROM PATIENT: ABNORMAL
SPECIMEN DRAWN FROM PATIENT: ABNORMAL
WBC # BLD AUTO: 26.2 K/UL (ref 4.8–10.8)

## 2023-08-11 PROCEDURE — 99291 CRITICAL CARE FIRST HOUR: CPT | Mod: 25 | Performed by: INTERNAL MEDICINE

## 2023-08-11 PROCEDURE — 32551 INSERTION OF CHEST TUBE: CPT | Mod: LT | Performed by: INTERNAL MEDICINE

## 2023-08-11 PROCEDURE — 0W9B30Z DRAINAGE OF LEFT PLEURAL CAVITY WITH DRAINAGE DEVICE, PERCUTANEOUS APPROACH: ICD-10-PCS | Performed by: INTERNAL MEDICINE

## 2023-08-11 PROCEDURE — 94669 MECHANICAL CHEST WALL OSCILL: CPT

## 2023-08-11 PROCEDURE — 93005 ELECTROCARDIOGRAM TRACING: CPT | Performed by: NURSE PRACTITIONER

## 2023-08-11 PROCEDURE — 700102 HCHG RX REV CODE 250 W/ 637 OVERRIDE(OP): Mod: JZ | Performed by: NURSE PRACTITIONER

## 2023-08-11 PROCEDURE — A9270 NON-COVERED ITEM OR SERVICE: HCPCS

## 2023-08-11 PROCEDURE — 770022 HCHG ROOM/CARE - ICU (200)

## 2023-08-11 PROCEDURE — 700102 HCHG RX REV CODE 250 W/ 637 OVERRIDE(OP): Performed by: INTERNAL MEDICINE

## 2023-08-11 PROCEDURE — 99292 CRITICAL CARE ADDL 30 MIN: CPT | Mod: 25 | Performed by: INTERNAL MEDICINE

## 2023-08-11 PROCEDURE — 36600 WITHDRAWAL OF ARTERIAL BLOOD: CPT

## 2023-08-11 PROCEDURE — 71045 X-RAY EXAM CHEST 1 VIEW: CPT

## 2023-08-11 PROCEDURE — 80048 BASIC METABOLIC PNL TOTAL CA: CPT

## 2023-08-11 PROCEDURE — 700111 HCHG RX REV CODE 636 W/ 250 OVERRIDE (IP): Mod: JZ

## 2023-08-11 PROCEDURE — 82962 GLUCOSE BLOOD TEST: CPT

## 2023-08-11 PROCEDURE — 97535 SELF CARE MNGMENT TRAINING: CPT

## 2023-08-11 PROCEDURE — 32551 INSERTION OF CHEST TUBE: CPT

## 2023-08-11 PROCEDURE — 700111 HCHG RX REV CODE 636 W/ 250 OVERRIDE (IP): Performed by: NURSE PRACTITIONER

## 2023-08-11 PROCEDURE — 700102 HCHG RX REV CODE 250 W/ 637 OVERRIDE(OP)

## 2023-08-11 PROCEDURE — 700111 HCHG RX REV CODE 636 W/ 250 OVERRIDE (IP): Performed by: INTERNAL MEDICINE

## 2023-08-11 PROCEDURE — 97166 OT EVAL MOD COMPLEX 45 MIN: CPT

## 2023-08-11 PROCEDURE — 99024 POSTOP FOLLOW-UP VISIT: CPT | Performed by: NURSE PRACTITIONER

## 2023-08-11 PROCEDURE — 700111 HCHG RX REV CODE 636 W/ 250 OVERRIDE (IP): Mod: JZ | Performed by: NURSE PRACTITIONER

## 2023-08-11 PROCEDURE — C1729 CATH, DRAINAGE: HCPCS

## 2023-08-11 PROCEDURE — 93010 ELECTROCARDIOGRAM REPORT: CPT | Performed by: INTERNAL MEDICINE

## 2023-08-11 PROCEDURE — 700101 HCHG RX REV CODE 250

## 2023-08-11 PROCEDURE — A9270 NON-COVERED ITEM OR SERVICE: HCPCS | Mod: JZ | Performed by: NURSE PRACTITIONER

## 2023-08-11 PROCEDURE — 82803 BLOOD GASES ANY COMBINATION: CPT

## 2023-08-11 PROCEDURE — 71250 CT THORAX DX C-: CPT

## 2023-08-11 PROCEDURE — 85027 COMPLETE CBC AUTOMATED: CPT

## 2023-08-11 RX ORDER — PHENYLEPHRINE HCL IN 0.9% NACL 0.5 MG/5ML
300 SYRINGE (ML) INTRAVENOUS ONCE
Status: COMPLETED | OUTPATIENT
Start: 2023-08-11 | End: 2023-08-11

## 2023-08-11 RX ORDER — MIDAZOLAM HYDROCHLORIDE 1 MG/ML
INJECTION INTRAMUSCULAR; INTRAVENOUS
Status: ACTIVE
Start: 2023-08-11 | End: 2023-08-11

## 2023-08-11 RX ORDER — PHENYLEPHRINE HCL IN 0.9% NACL 0.5 MG/5ML
SYRINGE (ML) INTRAVENOUS
Status: COMPLETED
Start: 2023-08-11 | End: 2023-08-11

## 2023-08-11 RX ORDER — MIDAZOLAM HYDROCHLORIDE 1 MG/ML
1 INJECTION INTRAMUSCULAR; INTRAVENOUS ONCE
Status: COMPLETED | OUTPATIENT
Start: 2023-08-11 | End: 2023-08-11

## 2023-08-11 RX ORDER — NOREPINEPHRINE BITARTRATE 0.03 MG/ML
0-1 INJECTION, SOLUTION INTRAVENOUS CONTINUOUS
Status: DISCONTINUED | OUTPATIENT
Start: 2023-08-11 | End: 2023-08-11

## 2023-08-11 RX ORDER — AMIODARONE HYDROCHLORIDE 200 MG/1
400 TABLET ORAL 2 TIMES DAILY
Status: DISCONTINUED | OUTPATIENT
Start: 2023-08-11 | End: 2023-08-18 | Stop reason: HOSPADM

## 2023-08-11 RX ORDER — ENOXAPARIN SODIUM 100 MG/ML
100 INJECTION SUBCUTANEOUS ONCE
Status: DISCONTINUED | OUTPATIENT
Start: 2023-08-11 | End: 2023-08-11

## 2023-08-11 RX ORDER — MAGNESIUM SULFATE 1 G/100ML
1 INJECTION INTRAVENOUS ONCE
Status: COMPLETED | OUTPATIENT
Start: 2023-08-11 | End: 2023-08-11

## 2023-08-11 RX ORDER — HYDROMORPHONE HYDROCHLORIDE 1 MG/ML
1 INJECTION, SOLUTION INTRAMUSCULAR; INTRAVENOUS; SUBCUTANEOUS ONCE
Status: COMPLETED | OUTPATIENT
Start: 2023-08-11 | End: 2023-08-11

## 2023-08-11 RX ORDER — LIDOCAINE HYDROCHLORIDE 10 MG/ML
INJECTION, SOLUTION INFILTRATION; PERINEURAL
Status: COMPLETED
Start: 2023-08-11 | End: 2023-08-11

## 2023-08-11 RX ADMIN — AMIODARONE HYDROCHLORIDE 0.5 MG/MIN: 1.8 INJECTION, SOLUTION INTRAVENOUS at 17:11

## 2023-08-11 RX ADMIN — MAGNESIUM HYDROXIDE 30 ML: 1200 LIQUID ORAL at 05:22

## 2023-08-11 RX ADMIN — POLYETHYLENE GLYCOL 3350 1 PACKET: 17 POWDER, FOR SOLUTION ORAL at 05:22

## 2023-08-11 RX ADMIN — FENTANYL CITRATE 100 MCG: 50 INJECTION, SOLUTION INTRAMUSCULAR; INTRAVENOUS at 01:36

## 2023-08-11 RX ADMIN — ACETAMINOPHEN 1000 MG: 500 TABLET, FILM COATED ORAL at 05:21

## 2023-08-11 RX ADMIN — Medication 300 MCG: at 01:21

## 2023-08-11 RX ADMIN — ASPIRIN 81 MG: 81 TABLET, COATED ORAL at 05:22

## 2023-08-11 RX ADMIN — FENTANYL CITRATE 50 MCG: 50 INJECTION, SOLUTION INTRAMUSCULAR; INTRAVENOUS at 00:11

## 2023-08-11 RX ADMIN — PROCHLORPERAZINE EDISYLATE 10 MG: 5 INJECTION INTRAMUSCULAR; INTRAVENOUS at 07:15

## 2023-08-11 RX ADMIN — Medication 1 APPLICATOR: at 21:31

## 2023-08-11 RX ADMIN — OXYCODONE HYDROCHLORIDE 10 MG: 10 TABLET ORAL at 02:57

## 2023-08-11 RX ADMIN — OXYCODONE HYDROCHLORIDE 5 MG: 5 TABLET ORAL at 17:56

## 2023-08-11 RX ADMIN — FUROSEMIDE 20 MG: 10 INJECTION, SOLUTION INTRAVENOUS at 17:25

## 2023-08-11 RX ADMIN — INSULIN HUMAN 2 UNITS: 100 INJECTION, SOLUTION PARENTERAL at 07:15

## 2023-08-11 RX ADMIN — HYDROMORPHONE HYDROCHLORIDE 1 MG: 1 INJECTION, SOLUTION INTRAMUSCULAR; INTRAVENOUS; SUBCUTANEOUS at 03:05

## 2023-08-11 RX ADMIN — ALUMINUM HYDROXIDE, MAGNESIUM HYDROXIDE, AND DIMETHICONE 30 ML: 400; 400; 40 SUSPENSION ORAL at 00:43

## 2023-08-11 RX ADMIN — CARVEDILOL 3.12 MG: 3.12 TABLET, FILM COATED ORAL at 06:58

## 2023-08-11 RX ADMIN — SENNOSIDES AND DOCUSATE SODIUM 2 TABLET: 50; 8.6 TABLET ORAL at 17:24

## 2023-08-11 RX ADMIN — MAGNESIUM SULFATE HEPTAHYDRATE 1 G: 1 INJECTION, SOLUTION INTRAVENOUS at 05:49

## 2023-08-11 RX ADMIN — POTASSIUM CHLORIDE 20 MEQ: 1500 TABLET, EXTENDED RELEASE ORAL at 17:24

## 2023-08-11 RX ADMIN — SENNOSIDES AND DOCUSATE SODIUM 2 TABLET: 50; 8.6 TABLET ORAL at 05:22

## 2023-08-11 RX ADMIN — FENTANYL CITRATE 50 MCG: 50 INJECTION, SOLUTION INTRAMUSCULAR; INTRAVENOUS at 01:13

## 2023-08-11 RX ADMIN — POTASSIUM CHLORIDE 20 MEQ: 1500 TABLET, EXTENDED RELEASE ORAL at 05:21

## 2023-08-11 RX ADMIN — ACETAMINOPHEN 1000 MG: 500 TABLET, FILM COATED ORAL at 17:24

## 2023-08-11 RX ADMIN — FENTANYL CITRATE 50 MCG: 50 INJECTION, SOLUTION INTRAMUSCULAR; INTRAVENOUS at 01:21

## 2023-08-11 RX ADMIN — ACETAMINOPHEN 1000 MG: 500 TABLET, FILM COATED ORAL at 23:32

## 2023-08-11 RX ADMIN — ENOXAPARIN SODIUM 40 MG: 100 INJECTION SUBCUTANEOUS at 17:25

## 2023-08-11 RX ADMIN — Medication 1 APPLICATOR: at 11:27

## 2023-08-11 RX ADMIN — MAGNESIUM SULFATE IN DEXTROSE 1 G: 10 INJECTION, SOLUTION INTRAVENOUS at 03:03

## 2023-08-11 RX ADMIN — OMEPRAZOLE 20 MG: 20 CAPSULE, DELAYED RELEASE ORAL at 05:22

## 2023-08-11 RX ADMIN — ACETAMINOPHEN 1000 MG: 500 TABLET, FILM COATED ORAL at 11:28

## 2023-08-11 RX ADMIN — OXYCODONE HYDROCHLORIDE 10 MG: 10 TABLET ORAL at 08:53

## 2023-08-11 RX ADMIN — CARVEDILOL 3.12 MG: 3.12 TABLET, FILM COATED ORAL at 17:23

## 2023-08-11 RX ADMIN — ONDANSETRON 8 MG: 2 INJECTION INTRAMUSCULAR; INTRAVENOUS at 05:29

## 2023-08-11 RX ADMIN — AMIODARONE HYDROCHLORIDE 1 MG/MIN: 1.8 INJECTION, SOLUTION INTRAVENOUS at 11:23

## 2023-08-11 RX ADMIN — FUROSEMIDE 20 MG: 10 INJECTION, SOLUTION INTRAVENOUS at 05:21

## 2023-08-11 RX ADMIN — AMIODARONE HYDROCHLORIDE 400 MG: 200 TABLET ORAL at 23:32

## 2023-08-11 RX ADMIN — MIDAZOLAM HYDROCHLORIDE 1 MG: 1 INJECTION, SOLUTION INTRAMUSCULAR; INTRAVENOUS at 01:14

## 2023-08-11 RX ADMIN — LIDOCAINE HYDROCHLORIDE: 10 INJECTION, SOLUTION INFILTRATION; PERINEURAL at 01:30

## 2023-08-11 ASSESSMENT — PAIN DESCRIPTION - PAIN TYPE
TYPE: SURGICAL PAIN

## 2023-08-11 ASSESSMENT — ENCOUNTER SYMPTOMS
SHORTNESS OF BREATH: 0
ABDOMINAL PAIN: 0
VOMITING: 0
NAUSEA: 0

## 2023-08-11 ASSESSMENT — ACTIVITIES OF DAILY LIVING (ADL): TOILETING: INDEPENDENT

## 2023-08-11 ASSESSMENT — COGNITIVE AND FUNCTIONAL STATUS - GENERAL
DRESSING REGULAR UPPER BODY CLOTHING: A LOT
TOILETING: A LOT
HELP NEEDED FOR BATHING: A LOT
PERSONAL GROOMING: A LITTLE
DRESSING REGULAR LOWER BODY CLOTHING: A LOT
SUGGESTED CMS G CODE MODIFIER DAILY ACTIVITY: CK
EATING MEALS: A LITTLE
DAILY ACTIVITIY SCORE: 14

## 2023-08-11 ASSESSMENT — FIBROSIS 4 INDEX: FIB4 SCORE: 1.02

## 2023-08-11 NOTE — THERAPY
"Occupational Therapy   Initial Evaluation     Patient Name: Venkat Mackenzie  Age:  63 y.o., Sex:  male  Medical Record #: [id number]  Today's Date: 8/11/2023     Precautions: Fall Risk, Cardiac Precautions (See Comments), Sternal Precautions (See Comments)    Assessment    Patient is 63 y.o. male with h/o HTN, HLD, DANIELA, SVT, AAA, admitted for elective aortic root replacement. Course complicated by reinsertion of chest tube due to B PTX. Pt seen for OT eval and treatment. See grid below for details. Pt somnolent throughout session, dozing off at times. Able to mobilize to chair with assist and tolerate switch to NC for oral care (VSS). Treatment included: education on sternal and cardiac precautions (see Education Group). Pt limited by: pain, generalized weakness, lethargy, balance impairment. Pt is below functional baseline; will benefit from ongoing acute OT. As of this assessment, recommend post-acute placement, however pt may progress to being appropriate to DC home with family support. Will follow.     Plan    Occupational Therapy Initial Treatment Plan   Treatment Interventions: Self Care / Activities of Daily Living, Adaptive Equipment, Neuro Re-Education / Balance, Therapeutic Exercises, Therapeutic Activity  Treatment Frequency: 3 Times per Week  Duration: Until Therapy Goals Met    DC Equipment Recommendations: Unable to determine at this time  Discharge Recommendations: Recommend post-acute placement for additional occupational therapy services prior to discharge home     Subjective    \"This is how it started yesterday.\" (referring to pain when up to chair which lead to reinsertion of chest tube)     Objective       08/11/23 0859   Prior Living Situation   Prior Services None;Home-Independent   Housing / Facility 1 Story House   Steps Into Home 1   Steps In Home 0   Bathroom Set up Bathtub / Shower Combination   Equipment Owned Front-Wheel Walker;Single Point Cane   Lives with - Patient's Self Care " Capacity Significant Other   Comments Pt lives with SO who he states can assist as needed on DC. Pt somnolent during interview, occasionally dozing off. May need to confirm details.   Prior Level of ADL Function   Comments Pt was independent with BADL PTA   Prior Level of IADL Function   Prior Level Of Mobility Independent With Device in Community;Independent Without Device in Home  (intermittent use of FWW)   Driving / Transportation Driving Independent   Occupation (Pre-Hospital Vocational) Employed Part Time  ([employer])   Comments Pt was independent with I-ADL and functional mobility PTA   Vitals   Pulse 96   Patient BP Position Sitting   Blood Pressure  134/79   Pulse Oximetry 93 %   O2 (LPM) 6   O2 Delivery Device Silicone Nasal Cannula   Vitals Comments Pt switched from non-rebreather to NC for oral care and breakfast   Cognition    Level of Consciousness   (Lethargic)   New Learning Impaired   Attention Impaired   Comments Pt oriented, pleasant, cooperative, but moderately somnolent throughout session   Active ROM Upper Body   Dominant Hand Right   Comments B shoulders limited to ~120; pt denies limitations prior   Strength Upper Body   Comments proximal resistance deferred due to sternal prec   Coordination Upper Body   Comments slightly decreased due to somnolence   Balance Assessment   Sitting Balance (Static) Fair   Sitting Balance (Dynamic) Fair -   Standing Balance (Static) Fair -   Standing Balance (Dynamic) Poor +   Weight Shift Sitting Fair   Weight Shift Standing Fair   Bed Mobility    Supine to Sit Moderate Assist  (HOB fully elevated)   Sit to Supine   (up to chair post)   Scooting Minimal Assist   ADL Assessment   Grooming Standby Assist;Seated  (suction oral care)   Lower Body Dressing Total Assist  (manage B socks)   Toileting   (declined need)   Functional Mobility   Sit to Stand Contact Guard Assist   Bed, Chair, Wheelchair Transfer Contact Guard Assist   Transfer Method Stand Pivot  (FWW)    Mobility Kirk's > EOB > chair   Activity Tolerance   Sitting in Chair >10 min; up post   Sitting Edge of Bed 6 min   Standing 30 seconds   Comments limited by fatigue, weakness   Short Term Goals   Short Term Goal # 1 Pt will complete ADL transfers with supv   Short Term Goal # 2 Pt will complete LB dressing with supv using AE PRN   Short Term Goal # 3 Pt will complete toileting with supv

## 2023-08-11 NOTE — PROGRESS NOTES
63M POD2 following aortic root replacement.  Has been doing well. I was called because he developed acute onset dyspnea, chest pain.  He was in significant respiratory distress and diaphoretic.  CXR showed bilateral pneumothoraces, L>R.  I emergently placed a chest tube on the L.  See note for details.  Follow up CT shows improvement of L pneumothorax, but small pneumothorax on the R.  Patient's BP, O2 and HR are improved.  Will use high FiO2 to try and resorb the pneumothorax and get a follow-up CXR in 4 hours.  He has ongoing chest pain which is responsive to dilaudid.  EKG was reassuring.  ABG normal after chest tube insertion.     The patient remains critically ill with significant bilateral pneumothoraces requiring emergent chest tube placement.  Critical care time = 35 minutes in directly providing and coordinating critical care and extensive data review.  No time overlap and excludes procedures.

## 2023-08-11 NOTE — PROGRESS NOTES
Cardiovascular Surgery Progress Note    Name: Venkat Mackenzie  MRN: [id number]  : 1960  Admit Date: 2023  5:05 AM  2 Days Post-Op     Procedure:  Procedure(s) and Anesthesia Type:     * AORTIC ROOT REPLACEMENT (27 mm Konect Duong bovine pericardial valved conduit, coronary artery reimplantation) and intraoperative transesophageal echocardiography     * ECHOCARDIOGRAM, TRANSESOPHAGEAL, INTRAOPERATIVE - General    Vitals:  Vitals:    23 0400 23 0500 23 0600 23 0700   BP: 104/73 119/84 113/79 120/69   Pulse: 99 90 84 85   Resp: 17 18 20 20   Temp: 36.1 °C (97 °F)      TempSrc: Oral      SpO2: 97% 97% 90% 96%   Weight:  97 kg (213 lb 13.5 oz)     Height:          Temp (24hrs), Av.8 °C (98.2 °F), Min:36.1 °C (97 °F), Max:37.6 °C (99.7 °F)      Respiratory:    Respiration: 20, Pulse Oximetry: 96 %       Fluids:    Intake/Output Summary (Last 24 hours) at 2023 0916  Last data filed at 2023 0800  Gross per 24 hour   Intake 348.02 ml   Output 3010 ml   Net -2661.98 ml       Admit weight: Weight: 94.9 kg (209 lb 3.5 oz)  Current weight: Weight: 97 kg (213 lb 13.5 oz) (23 0500)    Labs:  Recent Labs     23  1220 08/10/23  0354 23  0256   WBC  --  18.8* 26.2*   RBC  --  4.36* 4.59*   HEMOGLOBIN 14.7 13.4* 14.2   HEMATOCRIT 44.1 39.6* 42.7   MCV  --  90.8 93.0   MCH  --  30.7 30.9   MCHC  --  33.8 33.3   RDW  --  43.5 44.6   PLATELETCT 184 173 186   MPV  --  10.8 11.6       Recent Labs     08/10/23  0354 08/10/23  2002 08/11/23  0256   SODIUM 134*  --  136   POTASSIUM 4.4 4.1 4.4   CHLORIDE 103  --  99   CO2 21  --  24   GLUCOSE 136*  --  135*   BUN 21  --  21   CREATININE 0.60  --  0.93   CALCIUM 8.1*  --  8.6       Recent Labs     23  1220   APTT 30.5   INR 1.38*             Medications:  Scheduled Medications   Medication Dose Frequency    midazolam       insulin regular  2-9 Units 4X/DAY ACHS    furosemide  20 mg BID DIURETIC    potassium chloride SA   20 mEq BID    Nozin nasal  swab  1 Applicator BID    aspirin  81 mg DAILY    acetaminophen  1,000 mg Q6HRS    senna-docusate  2 Tablet BID    And    polyethylene glycol/lytes  1 Packet DAILY    And    magnesium hydroxide  30 mL DAILY    omeprazole  20 mg DAILY    enoxaparin (LOVENOX) injection  40 mg DAILY AT 1800    carvedilol  3.125 mg BID WITH MEALS        Exam:   Physical Exam  Vitals and nursing note reviewed.   Constitutional:       General: He is not in acute distress.     Appearance: Normal appearance.   HENT:      Head: Normocephalic.      Right Ear: External ear normal.      Left Ear: External ear normal.      Nose: Nose normal. No congestion.      Mouth/Throat:      Mouth: Mucous membranes are moist.      Pharynx: Oropharynx is clear.   Eyes:      Extraocular Movements: Extraocular movements intact.      Pupils: Pupils are equal, round, and reactive to light.   Cardiovascular:      Rate and Rhythm: Normal rate and regular rhythm.      Pulses: Normal pulses.      Heart sounds: Normal heart sounds.   Pulmonary:      Effort: Pulmonary effort is normal.      Breath sounds: Decreased breath sounds present.   Abdominal:      General: Bowel sounds are decreased. There is no distension.      Palpations: Abdomen is soft.   Musculoskeletal:      Cervical back: Normal range of motion and neck supple. No tenderness.      Right lower leg: Edema present.      Left lower leg: Edema present.   Skin:     General: Skin is warm and dry.      Comments: Midline surgical incision   Neurological:      General: No focal deficit present.      Mental Status: He is oriented to person, place, and time. Mental status is at baseline. He is lethargic.   Psychiatric:         Mood and Affect: Mood normal.         Behavior: Behavior normal.         Thought Content: Thought content normal.         Cardiac Medications:    ASA - Yes    Plavix - No; contraindicated because of Other No CAD    Post-operative Beta Blockers -  Yes    Ace/ARB- No; contraindicated because of Normal EF    Statin - No; contraindicated because of No CAD    Aldactone- No; contraindicated because of Normal EF    SGLT2i-  No contraindicated because of cost prohibitive    Ejection Fraction:  55%    Telemetry:   8/10 SR  8/11 SR/a fib    Assessment/Plan:  POD 1  HDS, SR, neuro intact, wounds intact, abdomen soft, fluid balance positive, wt up,  2 L NC. 160 mL out of chest tubes overnight.  Plan:  Gentle diuresis, d/c chest tubes and bolivar. IS/ambulate.     POD 2  HDS, SR/a fib, neuro intact, wounds intact, abdomen soft, fluid balance negative, wt up.  Bilateral pneumothoraces overnight, s/p L chest tube, remaining 2 cm R apical pneumo. On 6L NC. Plan:  Repeat CXR to evaluate R pneumo, consider CT. Continue diuresis. Resume amio gtt and PO. IS/ambulate.     Disposition:  TBD  Pt lives with spouse in [place].

## 2023-08-11 NOTE — PROGRESS NOTES
Monitor Summary  SR/ST HR 60s-130s. Runs of Afib 110s-130s. Occasional PACs   0.15/0.07/0.42  Qtc 0.50

## 2023-08-11 NOTE — CARE PLAN
The patient is Stable - Low risk of patient condition declining or worsening    Shift Goals  Clinical Goals: mobility, oxygenation, pain control, nausea control  Patient Goals: rest, mobility  Family Goals: ANA    Progress made toward(s) clinical / shift goals:    Problem: Knowledge Deficit - Standard  Goal: Patient and family/care givers will demonstrate understanding of plan of care, disease process/condition, diagnostic tests and medications  Description: Target End Date:  1-3 days or as soon as patient condition allows    Document in Patient Education    1.  Patient and family/caregiver oriented to unit, equipment, visitation policy and means for communicating concern  2.  Complete/review Learning Assessment  3.  Assess knowledge level of disease process/condition, treatment plan, diagnostic tests and medications  4.  Explain disease process/condition, treatment plan, diagnostic tests and medications  Outcome: Progressing  Note: Patient and family educated on plan of care and interventions throughout shift     Problem: Pain - Standard  Goal: Alleviation of pain or a reduction in pain to the patient’s comfort goal  Description: Target End Date:  Prior to discharge or change in level of care    Document on Vitals flowsheet    1.  Document pain using the appropriate pain scale per order or unit policy  2.  Educate and implement non-pharmacologic comfort measures (i.e. relaxation, distraction, massage, cold/heat therapy, etc.)  3.  Pain management medications as ordered  4.  Reassess pain after pain med administration per policy  5.  If opiods administered assess patient's response to pain medication is appropriate per POSS sedation scale  6.  Follow pain management plan developed in collaboration with patient and interdisciplinary team (including palliative care or pain specialists if applicable)  Outcome: Progressing  Note: Pain monitored throughout as shift and education provided on importance of management,  interventions provided as needed, see MAR     Problem: Post op day 2 CABG/Heart Valve Replacement  Goal: Optimal care of the post op CABG/heart valve replacement post op day 2  Outcome: Progressing  Intervention: FSBS: when 2 consecutive BS < 130 after post op day 2, discontinue FSBS unless patient is insulin dependent diabetic  Note: Continued FSBS till <130 2 consecutive times  Intervention: Daily weights in the morning  Note: Daily weights complete  Intervention: Up in chair for all meals  Note: Up in chair for all meals  Intervention: Ambulate 4 times daily, increasing the distance each time  Note: Ambulated 4x with farthest distance of 50ft 2x  Intervention: Stand at sink and wash up with assistance.  Clean incisions twice daily with soap and water.  Note: Incision cleaned with soap and water and bath  Intervention: IS q 1 hour while awake and record best IS volume  Note: IS performed q1  Intervention: Consider pacer wire removal by MD  Note: Pacer wires insulated  Intervention: Consider removal of bolivar and chest tube if not already done  Note: Bolivar removed, medial chest tubes removed, left chest tube still in place due to left pneumo     Problem: Fall Risk  Goal: Patient will remain free from falls  Description: Target End Date:  Prior to discharge or change in level of care    Document interventions on the Mendo[facility] Jonas Fall Risk Assessment    1.  Assess for fall risk factors  2.  Implement fall precautions  Outcome: Progressing  Note: Fall risk precautions in place

## 2023-08-11 NOTE — PROGRESS NOTES
Dr. Herrera at bedside for left chest tube placement.   0110: Time out  0113: Fentanyl 50mcg  0114: Versed 1mg  0119: Phenylephrine 100mcg  0121: Phenylephrine 200mcg, Fentanyl 50mcg   0136: Fentanyl 100mcg   0137: Left chest tube placed   STAT CT Chest ordered.

## 2023-08-11 NOTE — DOCUMENTATION QUERY
[facility]                                                                       Query Response Note      PATIENT:               DIAZ WOO  ACCT #:                  [id number]  MRN:                     [id number]  :                      1960  ADMIT DATE:       2023 5:05 AM  DISCH DATE:          RESPONDING  PROVIDER #:        [id number]           QUERY TEXT:    Patient admitted  after elective aortic root replacement. On  developed respiratory distress and was placed on supplemental oxygen.  8/10 92% SpO2 on 1L.   SpO2 - @ 0000 91% on 6L oxymask -> @ 0300 93% on 15L NRB.  Based on the clinical indicators documented, can a diagnosis be made?      The patient's clinical indicators include:  8/10 SpO2 w/ supplemental O2 - 92% on 1L NC   SpO2 w/ supplemental O2 - @ 0000 91% on 6L oxymask -> @ 0300 93% on 15L NRB     PN (Rodolfo) - significant respiratory distress...bilateral pneumothoraces...L chest tube    Treatment - Chest tube, supplemental oxygen; CXR    Risk factors - Pneumothorax bilateral    Thank you,  Kemi Reynoso BSN  Clinical   Connect via [employer]  Options provided:   -- Acute respiratory failure with hypoxia   -- Acute respiratory distress   -- Findings of no clinical significance   -- Other explanation, (please specify the other explanation)   -- Unable to determine      Query created by: Kemi Reynoso on 2023 10:50 AM    RESPONSE TEXT:    Acute respiratory failure with hypoxia          Electronically signed by:  SHINE CORTEZ DO 2023 11:16 AM

## 2023-08-11 NOTE — CARE PLAN
The patient is Watcher - Medium risk of patient condition declining or worsening    Shift Goals  Clinical Goals: Ambulate  Patient Goals: Pain control  Family Goals: ANA    Problem: Post Op Day 1 CABG/Heart Valve Replacement  Goal: Optimal care of the post op CABG/heart valve replacement Post Op Day 1  Outcome: Not Progressing  Intervention: Ambulate in am if stable. First ambulation 25 feet. Repeat x 3 as tolerated  Note: Pt. Will be resting for the night due to emergent left chest tube placement.   Intervention: Discontinue bolivar catheter unless documented reason for continuation  Note: Bolivar catheter was discontinued prior to my shift. Pt. Has been voiding in the urinal.   Intervention: Knee high ENMANUEL hose, on during the day, off at night  Note: ENMANUEL hose off while pt. Is in bed.      Problem: Pain - Standard  Goal: Alleviation of pain or a reduction in pain to the patient’s comfort goal  Description: Target End Date:  Prior to discharge or change in level of care    Document on Vitals flowsheet    1.  Document pain using the appropriate pain scale per order or unit policy  2.  Educate and implement non-pharmacologic comfort measures (i.e. relaxation, distraction, massage, cold/heat therapy, etc.)  3.  Pain management medications as ordered  4.  Reassess pain after pain med administration per policy  5.  If opiods administered assess patient's response to pain medication is appropriate per POSS sedation scale  6.  Follow pain management plan developed in collaboration with patient and interdisciplinary team (including palliative care or pain specialists if applicable)  Outcome: Progressing     Problem: Hemodynamics  Goal: Patient's hemodynamics, fluid balance and neurologic status will be stable or improve  Description: Target End Date:  Prior to discharge or change in level of care    Document on Assessment and I/O flowsheet templates    1.  Monitor vital signs, pulse oximetry and cardiac monitor per provider  order and/or policy  2.  Maintain blood pressure per provider order  3.  Hemodynamic monitoring per provider order  4.  Manage IV fluids and IV infusions  5.  Monitor intake and output  6.  Daily weights per unit policy or provider order  7.  Assess peripheral pulses and capillary refill  8.  Assess color and body temperature  9.  Position patient for maximum circulation/cardiac output  10. Monitor for signs/symptoms of excessive bleeding  11. Assess mental status, restlessness and changes in level of consciousness  12. Monitor temperature and report fever or hypothermia to provider immediately. Consideration of targeted temperature management.  Outcome: Progressing

## 2023-08-11 NOTE — ASSESSMENT & PLAN NOTE
Bilateral pneumothoraces s/p left chest tube 8/11  Right side pneumothorax resolved  Weaned down to 3L NC.   Not in resp distress  Left chest tube output 50cc in the past 24 hours  Should be able to place the chest tube to water seal soon    Pt to be transferred to telemetry today. CTS to manage the left chest tube

## 2023-08-11 NOTE — PROGRESS NOTES
2355: Pt. Diaphoretic, complaining of sharp chest pain. Burning sensation in throat. Dizziness and lethargic. Temp 99.7. No nausea. ISHMAEL Uriarte notified. Per ISHMAEL Uriarte stop amiodarone infusion. Pt. Back into bed. 12 lead EKG ordered, CXR ordered. Dr. Herrera notified of pt. Symptoms.

## 2023-08-11 NOTE — PROGRESS NOTES
Critical Care Progress Note    Date of admission  8/9/2023    Chief Complaint  63 y.o. male with PMH HTN, DM, gastroparessis, DANIELA, on chronic prednisone (for unknown reason) and hx of aortic root aneurysm  (dilated aortic root 5.3cm and ascending aorta 4.1cm) who's admitted today for aortic root replacement by Dr. Zamudio     TTE preop in 5/2023 with LVEF 55%, normal RV size/function. Mild MR/AI. RVSP 35mmHg. LHC normal coronaries     Intraop, from anesthesia standpoint, pt was easy intubation, right IJ central line, left art line. Pt underwent aortic root replacement Bentall procedure) with 27mm bovine pericardial valve conduit, coronary artery reimplantation. Post op BELEN with LVEF 60%. Cell saver 450cc. Total 1200cc of crystalloid given. UOP 800cc. No issues coming out of pump. No arrhythmias       Hospital Course  8/9 admitted to ICU from OR  8/10 extubated via fast track protocol    Interval Problem Update  Reviewed last 24 hour events:  Developed bilateral pneumothoraces  Went into afib with RVR last night, had amio bolus 150 and amio gtt at 1mg/min  Left chest tube placement   Chest tube 10 cc output, no air leak  Lethargic this am, but arousable  Not in resp distress  HR in 60-70s  SBP in 100s  On 6L NC, sat >92%  Creatinine 0.93, BUN 21 good UOP  Hgb 13.4    Review of Systems  Review of Systems   Constitutional:  Positive for malaise/fatigue.   Respiratory:  Negative for shortness of breath.    Cardiovascular:  Positive for chest pain.   Gastrointestinal:  Negative for abdominal pain, nausea and vomiting.   All other systems reviewed and are negative.       Vital Signs for last 24 hours   Temp:  [36.1 °C (97 °F)-37.6 °C (99.7 °F)] 36.1 °C (97 °F)  Pulse:  [] 85  Resp:  [12-28] 20  BP: ()/(56-85) 120/69  SpO2:  [90 %-98 %] 96 %    Hemodynamic parameters for last 24 hours       Respiratory Information for the last 24 hours       Physical Exam   Physical Exam  Vitals and nursing note reviewed.    Constitutional:       General: He is not in acute distress.     Appearance: Normal appearance. He is ill-appearing and toxic-appearing. He is not diaphoretic.      Comments: lethargic   HENT:      Head: Normocephalic.   Cardiovascular:      Rate and Rhythm: Normal rate.      Pulses: Normal pulses.      Heart sounds: Normal heart sounds. No murmur heard.     Comments: Left side chest tube in place, no leak, minimal output  Pulmonary:      Effort: Pulmonary effort is normal. No respiratory distress.      Breath sounds: Normal breath sounds. No wheezing, rhonchi or rales.   Abdominal:      General: Bowel sounds are normal. There is no distension.      Palpations: Abdomen is soft.      Tenderness: There is no abdominal tenderness. There is no guarding.   Musculoskeletal:         General: No swelling or tenderness.      Cervical back: Neck supple.      Right lower leg: No edema.      Left lower leg: No edema.   Skin:     General: Skin is warm.      Capillary Refill: Capillary refill takes less than 2 seconds.      Coloration: Skin is not jaundiced.   Neurological:      General: No focal deficit present.      Cranial Nerves: No cranial nerve deficit.         Medications  Current Facility-Administered Medications   Medication Dose Route Frequency Provider Last Rate Last Admin    MIDAZOLAM HCL 5 MG/5ML INJ SOLN (WRAPPER)             norepinephrine (Levophed) 8 mg in 250 mL NS infusion (premix)  0-1 mcg/kg/min (Ideal) Intravenous Continuous Felipa Herrera M.D.   Dose not Required at 08/11/23 0145    insulin regular (HumuLIN R,NovoLIN R) injection  2-9 Units Subcutaneous 4X/DAY ACHS RADHA TerrellO.   2 Units at 08/11/23 0715    And    dextrose 50% (D50W) injection 25 g  25 g Intravenous Q15 MIN PRN RADHA TerrellO.        furosemide (Lasix) injection 20 mg  20 mg Intravenous BID DIURETIC GARRETT LipscombPFaustoRFaustoN.   20 mg at 08/11/23 0521    potassium chloride SA (Kdur) tablet 20 mEq  20 mEq Oral BID Becky Zepeda  A.P.R.N.   20 mEq at 08/11/23 0521    amiodarone (Nexterone) 360 mg/200 mL infusion  0.5 mg/min Intravenous Continuous VIKKI Silva   Held at 08/11/23 0215    amiodarone (Cordarone) tablet 400 mg  400 mg Oral BID VIKKI Silva        Respiratory Therapy Consult   Nebulization Continuous RT HOLDEN StonerRSHEELA        NS infusion   Intravenous Continuous GARRETT StonerP.R.N. 10 mL/hr at 08/09/23 1722 Rate Verify at 08/09/23 1722    NS infusion   Intravenous Continuous SANAZ Stoner.RFaustoN. 30 mL/hr at 08/09/23 1447 Rate Verify at 08/09/23 1447    electrolyte-A (Plasmalyte-A) infusion   Intravenous PRN GARRETT StonerP.RFaustoNFausto   Stopped at 08/09/23 2100    Nozin nasal  swab  1 Applicator Each Nostril BID GARRETT StonerP.R.N.   1 Applicator at 08/10/23 2042    aspirin EC tablet 81 mg  81 mg Oral DAILY JUDE Stoner.P.R.N.   81 mg at 08/11/23 0522    acetaminophen (Tylenol) tablet 1,000 mg  1,000 mg Oral Q6HRS JUDE Stoner.P.R.N.   1,000 mg at 08/11/23 0521    Followed by    [START ON 8/14/2023] acetaminophen (Tylenol) tablet 1,000 mg  1,000 mg Oral Q6HRS PRN JUDE Stoner.P.R.N.        oxyCODONE immediate-release (Roxicodone) tablet 5 mg  5 mg Oral Q3HRS PRN JUDE Stoner.P.R.N.   5 mg at 08/10/23 1637    Or    oxyCODONE immediate release (Roxicodone) tablet 10 mg  10 mg Oral Q3HRS PRN JUDE Stoner.P.R.N.   10 mg at 08/11/23 0853    Or    fentaNYL (Sublimaze) injection 50 mcg  50 mcg Intravenous Q3HRS PRN JEAN MARIE Aguilar, A.P.R.N.   50 mcg at 08/11/23 0011    traMADol (Ultram) 50 MG tablet 50 mg  50 mg Oral Q4HRS PRN JEAN MARIE Aguilar, A.P.R.N.   50 mg at 08/09/23 1931    dexmedetomidine (PRECEDEX) 400 mcg/100mL NS premix infusion  0-1.5 mcg/kg/hr (Ideal) Intravenous Continuous JEAN MARIE Aguilar, A.P.R.N. 3.4 mL/hr at 08/10/23 0401 0.2 mcg/kg/hr at 08/10/23 0401    ondansetron (Zofran)  syringe/vial injection 8 mg  8 mg Intravenous Q6HRS PRN JEAN MARIE Aguilar, A.P.R.N.   8 mg at 08/11/23 0529    Or    prochlorperazine (Compazine) injection 10 mg  10 mg Intravenous Q6HRS PRN JEAN MARIE Aguilar A.P.R.N.   10 mg at 08/11/23 0715    acetaminophen (Tylenol) tablet 650 mg  650 mg Oral Q4HRS PRN JEAN MARIE Aguilar A.P.R.N.        Or    acetaminophen (Tylenol) suppository 650 mg  650 mg Rectal Q4HRS PRN JEAN MARIE Aguilar A.P.R.N.        senna-docusate (Pericolace Or Senokot S) 8.6-50 MG per tablet 2 Tablet  2 Tablet Oral BID JEAN MARIE Aguilar A.P.R.N.   2 Tablet at 08/11/23 0522    And    polyethylene glycol/lytes (Miralax) PACKET 1 Packet  1 Packet Oral DAILY JEAN MARIE Aguilar A.P.R.N.   1 Packet at 08/11/23 0522    And    magnesium hydroxide (Milk Of Magnesia) suspension 30 mL  30 mL Oral DAILY JEAN MARIE Aguilar, A.P.R.N.   30 mL at 08/11/23 0522    And    bisacodyl (Dulcolax) suppository 10 mg  10 mg Rectal QDAY PRN JEAN MARIE Aguilar, A.P.R.N.        omeprazole (PriLOSEC) capsule 20 mg  20 mg Oral DAILY JEAN MARIE Aguilar A.P.R.N.   20 mg at 08/11/23 0522    mag hydrox-al hydrox-simeth (Maalox Plus Es Or Mylanta Ds) suspension 30 mL  30 mL Oral Q4HRS PRN JEAN MAIRE Aguilar, A.P.R.N.   30 mL at 08/11/23 0043    diphenhydrAMINE (Benadryl) tablet/capsule 25 mg  25 mg Oral HS PRN - MR X 1 JEAN MARIE Aguilar A.P.R.N.        enoxaparin (Lovenox) inj 40 mg  40 mg Subcutaneous DAILY AT 1800 J. HOLDEN BrownRFaustoN.   40 mg at 08/10/23 1720    carvedilol (Coreg) tablet 3.125 mg  3.125 mg Oral BID WITH MEALS JEAN MARIE Aguilar A.P.R.NFausto   3.125 mg at 08/11/23 0658       Fluids    Intake/Output Summary (Last 24 hours) at 8/11/2023 0905  Last data filed at 8/11/2023 0800  Gross per 24 hour   Intake 348.02 ml   Output 3010 ml   Net -2661.98 ml         Laboratory  Recent Labs     08/09/23  1307 08/09/23  1425 08/09/23  1529 08/11/23  0158   ISTATAPH 7.408 7.372* 7.354* 7.400   ISTATAPCO2  34.5 43.3* 39.5* 38.7*   ISTATAPO2 218* 276* 122* 77   ISTATATCO2 23 26 23 25   [id number] 100* 100* 99 95   ISTATARTHCO3 21.8 25.1* 22.0 24.0   ISTATARTBE -2 0 -3 -1   ISTATTEMP 36.3 C 36.6 C 36.0 C 96.1 F   ISTATFIO2 100 50 50  --    ISTATSPEC Arterial Arterial Arterial Arterial   ISTATAPHTC 7.419 7.377* 7.368* 7.420   [id number] 214* 274* 116* 70           Recent Labs     08/09/23  1220 08/09/23  1730 08/10/23  0354 08/10/23  2002 08/11/23  0256   SODIUM  --   --  134*  --  136   POTASSIUM 4.2   < > 4.4 4.1 4.4   CHLORIDE  --   --  103  --  99   CO2  --   --  21  --  24   BUN  --   --  21  --  21   CREATININE  --   --  0.60  --  0.93   MAGNESIUM 3.1*  --   --  1.9  --    CALCIUM  --   --  8.1*  --  8.6    < > = values in this interval not displayed.       Recent Labs     08/10/23  0354 08/11/23  0256   GLUCOSE 136* 135*       Recent Labs     08/10/23  0354 08/11/23  0256   WBC 18.8* 26.2*       Recent Labs     08/09/23  1220 08/10/23  0354 08/11/23  0256   RBC  --  4.36* 4.59*   HEMOGLOBIN 14.7 13.4* 14.2   HEMATOCRIT 44.1 39.6* 42.7   PLATELETCT 184 173 186   PROTHROMBTM 16.7*  --   --    APTT 30.5  --   --    INR 1.38*  --   --          Imaging  X-Ray:  I have personally reviewed the images and compared with prior images.    Assessment/Plan  * Aortic root aneurysm (HCC)  Assessment & Plan  Dilated aortic root 5.3cm and ascending aorta 4.1cm  Preop [facility] with normal coronaries.   S/p aortic root replacement Bentall procedure) with 27mm bovine pericardial valve conduit, coronary artery reimplantation.   Post op BELEN with LVEF 60%.  8/9 extubated via fast track with no issues  Early mobility   ASA, carvedilol        Lethargy  Assessment & Plan  Pt arousable, but lethargic  BG in 140s  ABG with good gas exchange.   Monitor    Pneumothorax  Assessment & Plan  Bilateral pneumothoraces s/p left chest tube 8/11  On 6L NC  Not in resp distress  Small right pneumothorax, repeat CXR at noon with improvement of  pneumothorax.   Will repeat CXR at 6pm today   We were able to wean oxygen down to 3L  Right chest tube if pneumothorax worsening    Type 2 diabetes mellitus without complication, without long-term current use of insulin (HCC)  Assessment & Plan  a1c 6.0  On metformin at home. Will hold while in hospital     Essential hypertension- (present on admission)  Assessment & Plan  Carvedilol per protocol  On losartan and HCTZ at home         VTE:  Lovenox  Ulcer: Not Indicated  Lines: Central Line  Ongoing indication addressed, Arterial Line  Ongoing indication addressed, and Billingsley Catheter  Ongoing indication addressed    I have performed a physical exam and reviewed and updated ROS and Plan today (8/11/2023). In review of yesterday's note (8/10/2023), there are no changes except as documented above.     Discussed patient condition and risk of morbidity and/or mortality with RN, RT, Pharmacy, Charge nurse / hot rounds, and Patient    The patient remains critically ill. Critical care time = 45 mins in directly providing and coordinating critical care and extensive data review. No time overlap and excludes procedures.

## 2023-08-11 NOTE — PROGRESS NOTES
0054: Pt. SOB worsening, Pt becoming more fatigued. Dr. Herrera notified. Per Dr. Herrera begin to prepare bedside for chest tube placement as a result from CXR scan.

## 2023-08-12 ENCOUNTER — APPOINTMENT (OUTPATIENT)
Dept: RADIOLOGY | Facility: MEDICAL CENTER | Age: 63
DRG: 219 | End: 2023-08-12
Attending: NURSE PRACTITIONER
Payer: COMMERCIAL

## 2023-08-12 ENCOUNTER — APPOINTMENT (OUTPATIENT)
Dept: RADIOLOGY | Facility: MEDICAL CENTER | Age: 63
DRG: 219 | End: 2023-08-12
Attending: INTERNAL MEDICINE
Payer: COMMERCIAL

## 2023-08-12 LAB
ANION GAP SERPL CALC-SCNC: 9 MMOL/L (ref 7–16)
BUN SERPL-MCNC: 30 MG/DL (ref 8–22)
CALCIUM SERPL-MCNC: 8.6 MG/DL (ref 8.5–10.5)
CHLORIDE SERPL-SCNC: 96 MMOL/L (ref 96–112)
CO2 SERPL-SCNC: 28 MMOL/L (ref 20–33)
CREAT SERPL-MCNC: 0.8 MG/DL (ref 0.5–1.4)
EKG IMPRESSION: NORMAL
EKG IMPRESSION: NORMAL
ERYTHROCYTE [DISTWIDTH] IN BLOOD BY AUTOMATED COUNT: 44.5 FL (ref 35.9–50)
GFR SERPLBLD CREATININE-BSD FMLA CKD-EPI: 98 ML/MIN/1.73 M 2
GLUCOSE BLD STRIP.AUTO-MCNC: 115 MG/DL (ref 65–99)
GLUCOSE BLD STRIP.AUTO-MCNC: 115 MG/DL (ref 65–99)
GLUCOSE BLD STRIP.AUTO-MCNC: 121 MG/DL (ref 65–99)
GLUCOSE BLD STRIP.AUTO-MCNC: 130 MG/DL (ref 65–99)
GLUCOSE BLD STRIP.AUTO-MCNC: 140 MG/DL (ref 65–99)
GLUCOSE SERPL-MCNC: 123 MG/DL (ref 65–99)
HCT VFR BLD AUTO: 36.1 % (ref 42–52)
HGB BLD-MCNC: 12 G/DL (ref 14–18)
MAGNESIUM SERPL-MCNC: 2.3 MG/DL (ref 1.5–2.5)
MCH RBC QN AUTO: 30.5 PG (ref 27–33)
MCHC RBC AUTO-ENTMCNC: 33.2 G/DL (ref 32.3–36.5)
MCV RBC AUTO: 91.9 FL (ref 81.4–97.8)
PLATELET # BLD AUTO: 161 K/UL (ref 164–446)
PMV BLD AUTO: 11.6 FL (ref 9–12.9)
POTASSIUM SERPL-SCNC: 4.8 MMOL/L (ref 3.6–5.5)
POTASSIUM SERPL-SCNC: 4.8 MMOL/L (ref 3.6–5.5)
RBC # BLD AUTO: 3.93 M/UL (ref 4.7–6.1)
SODIUM SERPL-SCNC: 133 MMOL/L (ref 135–145)
WBC # BLD AUTO: 22 K/UL (ref 4.8–10.8)

## 2023-08-12 PROCEDURE — 700111 HCHG RX REV CODE 636 W/ 250 OVERRIDE (IP): Mod: JZ

## 2023-08-12 PROCEDURE — 700102 HCHG RX REV CODE 250 W/ 637 OVERRIDE(OP): Mod: JZ | Performed by: NURSE PRACTITIONER

## 2023-08-12 PROCEDURE — 99233 SBSQ HOSP IP/OBS HIGH 50: CPT | Performed by: INTERNAL MEDICINE

## 2023-08-12 PROCEDURE — 700102 HCHG RX REV CODE 250 W/ 637 OVERRIDE(OP)

## 2023-08-12 PROCEDURE — 80048 BASIC METABOLIC PNL TOTAL CA: CPT

## 2023-08-12 PROCEDURE — 71045 X-RAY EXAM CHEST 1 VIEW: CPT

## 2023-08-12 PROCEDURE — 700111 HCHG RX REV CODE 636 W/ 250 OVERRIDE (IP): Mod: JZ | Performed by: NURSE PRACTITIONER

## 2023-08-12 PROCEDURE — 84132 ASSAY OF SERUM POTASSIUM: CPT

## 2023-08-12 PROCEDURE — 85027 COMPLETE CBC AUTOMATED: CPT

## 2023-08-12 PROCEDURE — 93010 ELECTROCARDIOGRAM REPORT: CPT | Performed by: INTERNAL MEDICINE

## 2023-08-12 PROCEDURE — 770020 HCHG ROOM/CARE - TELE (206)

## 2023-08-12 PROCEDURE — 83735 ASSAY OF MAGNESIUM: CPT

## 2023-08-12 PROCEDURE — 82962 GLUCOSE BLOOD TEST: CPT | Mod: 91

## 2023-08-12 PROCEDURE — 93005 ELECTROCARDIOGRAM TRACING: CPT | Performed by: THORACIC SURGERY (CARDIOTHORACIC VASCULAR SURGERY)

## 2023-08-12 PROCEDURE — A9270 NON-COVERED ITEM OR SERVICE: HCPCS | Mod: JZ | Performed by: NURSE PRACTITIONER

## 2023-08-12 PROCEDURE — 94669 MECHANICAL CHEST WALL OSCILL: CPT

## 2023-08-12 PROCEDURE — 99024 POSTOP FOLLOW-UP VISIT: CPT | Performed by: NURSE PRACTITIONER

## 2023-08-12 PROCEDURE — A9270 NON-COVERED ITEM OR SERVICE: HCPCS

## 2023-08-12 RX ADMIN — FUROSEMIDE 20 MG: 10 INJECTION, SOLUTION INTRAVENOUS at 06:06

## 2023-08-12 RX ADMIN — ACETAMINOPHEN 1000 MG: 500 TABLET, FILM COATED ORAL at 17:18

## 2023-08-12 RX ADMIN — ENOXAPARIN SODIUM 40 MG: 100 INJECTION SUBCUTANEOUS at 17:18

## 2023-08-12 RX ADMIN — AMIODARONE HYDROCHLORIDE 1 MG/MIN: 1.8 INJECTION, SOLUTION INTRAVENOUS at 20:03

## 2023-08-12 RX ADMIN — ACETAMINOPHEN 1000 MG: 500 TABLET, FILM COATED ORAL at 06:06

## 2023-08-12 RX ADMIN — MAGNESIUM HYDROXIDE 30 ML: 1200 LIQUID ORAL at 06:06

## 2023-08-12 RX ADMIN — ASPIRIN 81 MG: 81 TABLET, COATED ORAL at 06:06

## 2023-08-12 RX ADMIN — AMIODARONE HYDROCHLORIDE 0.5 MG/MIN: 1.8 INJECTION, SOLUTION INTRAVENOUS at 06:05

## 2023-08-12 RX ADMIN — AMIODARONE HYDROCHLORIDE 400 MG: 200 TABLET ORAL at 17:18

## 2023-08-12 RX ADMIN — Medication 1 APPLICATOR: at 22:20

## 2023-08-12 RX ADMIN — ACETAMINOPHEN 1000 MG: 500 TABLET, FILM COATED ORAL at 11:49

## 2023-08-12 RX ADMIN — OMEPRAZOLE 20 MG: 20 CAPSULE, DELAYED RELEASE ORAL at 06:06

## 2023-08-12 RX ADMIN — AMIODARONE HYDROCHLORIDE 150 MG: 1.5 INJECTION, SOLUTION INTRAVENOUS at 19:43

## 2023-08-12 RX ADMIN — CARVEDILOL 3.12 MG: 3.12 TABLET, FILM COATED ORAL at 07:11

## 2023-08-12 RX ADMIN — POTASSIUM CHLORIDE 20 MEQ: 1500 TABLET, EXTENDED RELEASE ORAL at 06:06

## 2023-08-12 RX ADMIN — SENNOSIDES AND DOCUSATE SODIUM 2 TABLET: 50; 8.6 TABLET ORAL at 17:19

## 2023-08-12 RX ADMIN — POLYETHYLENE GLYCOL 3350 1 PACKET: 17 POWDER, FOR SOLUTION ORAL at 06:05

## 2023-08-12 RX ADMIN — Medication 1 APPLICATOR: at 09:31

## 2023-08-12 RX ADMIN — TRAMADOL HYDROCHLORIDE 50 MG: 50 TABLET ORAL at 07:11

## 2023-08-12 RX ADMIN — AMIODARONE HYDROCHLORIDE 400 MG: 200 TABLET ORAL at 06:06

## 2023-08-12 RX ADMIN — CARVEDILOL 3.12 MG: 3.12 TABLET, FILM COATED ORAL at 18:13

## 2023-08-12 RX ADMIN — PROCHLORPERAZINE EDISYLATE 10 MG: 5 INJECTION INTRAMUSCULAR; INTRAVENOUS at 22:35

## 2023-08-12 RX ADMIN — SENNOSIDES AND DOCUSATE SODIUM 2 TABLET: 50; 8.6 TABLET ORAL at 06:06

## 2023-08-12 RX ADMIN — ONDANSETRON 8 MG: 2 INJECTION INTRAMUSCULAR; INTRAVENOUS at 19:57

## 2023-08-12 RX ADMIN — FUROSEMIDE 20 MG: 10 INJECTION, SOLUTION INTRAVENOUS at 15:47

## 2023-08-12 RX ADMIN — TRAMADOL HYDROCHLORIDE 50 MG: 50 TABLET ORAL at 00:35

## 2023-08-12 RX ADMIN — POTASSIUM CHLORIDE 20 MEQ: 1500 TABLET, EXTENDED RELEASE ORAL at 17:19

## 2023-08-12 ASSESSMENT — COGNITIVE AND FUNCTIONAL STATUS - GENERAL
TURNING FROM BACK TO SIDE WHILE IN FLAT BAD: A LITTLE
DRESSING REGULAR LOWER BODY CLOTHING: A LITTLE
EATING MEALS: A LITTLE
CLIMB 3 TO 5 STEPS WITH RAILING: A LOT
MOBILITY SCORE: 15
MOVING TO AND FROM BED TO CHAIR: A LOT
TOILETING: A LITTLE
SUGGESTED CMS G CODE MODIFIER DAILY ACTIVITY: CK
MOVING FROM LYING ON BACK TO SITTING ON SIDE OF FLAT BED: A LITTLE
DRESSING REGULAR UPPER BODY CLOTHING: A LITTLE
WALKING IN HOSPITAL ROOM: A LOT
PERSONAL GROOMING: A LITTLE
SUGGESTED CMS G CODE MODIFIER MOBILITY: CK
DAILY ACTIVITIY SCORE: 17
HELP NEEDED FOR BATHING: A LOT
STANDING UP FROM CHAIR USING ARMS: A LITTLE

## 2023-08-12 ASSESSMENT — PAIN DESCRIPTION - PAIN TYPE
TYPE: SURGICAL PAIN

## 2023-08-12 ASSESSMENT — ENCOUNTER SYMPTOMS
ABDOMINAL PAIN: 0
SHORTNESS OF BREATH: 0
VOMITING: 0
NAUSEA: 0

## 2023-08-12 ASSESSMENT — FIBROSIS 4 INDEX
FIB4 SCORE: 1.17
FIB4 SCORE: 1.17

## 2023-08-12 ASSESSMENT — PATIENT HEALTH QUESTIONNAIRE - PHQ9
2. FEELING DOWN, DEPRESSED, IRRITABLE, OR HOPELESS: NOT AT ALL
1. LITTLE INTEREST OR PLEASURE IN DOING THINGS: NOT AT ALL
SUM OF ALL RESPONSES TO PHQ9 QUESTIONS 1 AND 2: 0

## 2023-08-12 NOTE — CARE PLAN
The patient is Watcher - Medium risk of patient condition declining or worsening    Shift Goals  Clinical Goals: Ambulate, Wean 02, Pain control  Patient Goals: Get stronger, Feel more energetic  Family Goals: ANA    Problem: Pain - Standard  Goal: Alleviation of pain or a reduction in pain to the patient’s comfort goal  Description: Target End Date:  Prior to discharge or change in level of care    Document on Vitals flowsheet    1.  Document pain using the appropriate pain scale per order or unit policy  2.  Educate and implement non-pharmacologic comfort measures (i.e. relaxation, distraction, massage, cold/heat therapy, etc.)  3.  Pain management medications as ordered  4.  Reassess pain after pain med administration per policy  5.  If opiods administered assess patient's response to pain medication is appropriate per POSS sedation scale  6.  Follow pain management plan developed in collaboration with patient and interdisciplinary team (including palliative care or pain specialists if applicable)  Outcome: Progressing     Problem: Post op day 2 CABG/Heart Valve Replacement  Goal: Optimal care of the post op CABG/heart valve replacement post op day 2  Outcome: Progressing  Intervention: Daily weights in the morning  Note: Pt. Will be weighed in the morning during ambulation.   Intervention: Up in chair for all meals  Note: Pt. Will ambulate in morning, then go back to chair for breakfast.

## 2023-08-12 NOTE — PROGRESS NOTES
Critical Care Progress Note    Date of admission  8/9/2023    Chief Complaint  63 y.o. male with PMH HTN, DM, gastroparessis, DANIELA, on chronic prednisone (for unknown reason) and hx of aortic root aneurysm  (dilated aortic root 5.3cm and ascending aorta 4.1cm) who's admitted today for aortic root replacement by Dr. Zamudio     TTE preop in 5/2023 with LVEF 55%, normal RV size/function. Mild MR/AI. RVSP 35mmHg. LHC normal coronaries     Intraop, from anesthesia standpoint, pt was easy intubation, right IJ central line, left art line. Pt underwent aortic root replacement Bentall procedure) with 27mm bovine pericardial valve conduit, coronary artery reimplantation. Post op BELEN with LVEF 60%. Cell saver 450cc. Total 1200cc of crystalloid given. UOP 800cc. No issues coming out of pump. No arrhythmias       Hospital Course  8/9 admitted to ICU from OR  8/10 extubated via fast track protocol  8/11 s/p left chest tube placement.     Interval Problem Update  Reviewed last 24 hour events:  No acute changes overnight.   Right pneumothorax seems to have resolved with serial CXRs.   Chest tube output 50cc in the past 24 hours. No air leak  More energy, more alert today   HR in 60-70s  SBP in 100s  On 3L NC, sat >92%  Stable creatinine. good UOP  Stable hgb      Review of Systems  Review of Systems   Constitutional:  Positive for malaise/fatigue.   Respiratory:  Negative for shortness of breath.    Cardiovascular:  Positive for chest pain.   Gastrointestinal:  Negative for abdominal pain, nausea and vomiting.   All other systems reviewed and are negative.       Vital Signs for last 24 hours   Temp:  [37 °C (98.6 °F)-37.2 °C (98.9 °F)] 37.1 °C (98.7 °F)  Pulse:  [76-96] 83  Resp:  [11-21] 12  BP: ()/(65-86) 113/68  SpO2:  [90 %-95 %] 94 %    Hemodynamic parameters for last 24 hours       Respiratory Information for the last 24 hours       Physical Exam   Physical Exam  Vitals and nursing note reviewed.   Constitutional:        General: He is not in acute distress.     Appearance: Normal appearance. He is not ill-appearing, toxic-appearing or diaphoretic.      Comments: More alert, less lethargic   HENT:      Head: Normocephalic.   Cardiovascular:      Rate and Rhythm: Normal rate.      Pulses: Normal pulses.      Heart sounds: Normal heart sounds. No murmur heard.     Comments: Left side chest tube in place, no leak, minimal output  Pulmonary:      Effort: Pulmonary effort is normal. No respiratory distress.      Breath sounds: Normal breath sounds. No wheezing, rhonchi or rales.   Abdominal:      General: Bowel sounds are normal. There is no distension.      Palpations: Abdomen is soft.      Tenderness: There is no abdominal tenderness. There is no guarding.   Musculoskeletal:         General: No swelling or tenderness.      Cervical back: Neck supple.      Right lower leg: No edema.      Left lower leg: No edema.   Skin:     General: Skin is warm.      Capillary Refill: Capillary refill takes less than 2 seconds.      Coloration: Skin is not jaundiced.   Neurological:      General: No focal deficit present.      Cranial Nerves: No cranial nerve deficit.         Medications  Current Facility-Administered Medications   Medication Dose Route Frequency Provider Last Rate Last Admin    amiodarone (Nexterone) 360 mg/200 mL infusion  0.5 mg/min Intravenous Continuous JUDE Lipscomb.P.R.N. 16.7 mL/hr at 08/12/23 0605 0.5 mg/min at 08/12/23 0605    amiodarone (Cordarone) tablet 400 mg  400 mg Oral BID GARRETT LipscombP.R.N.   400 mg at 08/12/23 0606    insulin regular (HumuLIN R,NovoLIN R) injection  2-9 Units Subcutaneous 4X/DAY ACHS RADHA TerrellO.   2 Units at 08/11/23 0715    And    dextrose 50% (D50W) injection 25 g  25 g Intravenous Q15 MIN PRN Rey Tovar D.O.        furosemide (Lasix) injection 20 mg  20 mg Intravenous BID DIURETIC JUDE Lipscomb.P.R.N.   20 mg at 08/12/23 0606    potassium chloride SA (Kdur) tablet 20  mEq  20 mEq Oral BID GARRETT LipscombPFaustoRFaustoNFausto   20 mEq at 08/12/23 0606    Respiratory Therapy Consult   Nebulization Continuous RT GARRETT StonerPFaustoRLEXIS.        NS infusion   Intravenous Continuous GARRETT StonerP.R.N. 10 mL/hr at 08/09/23 1722 Rate Verify at 08/09/23 1722    NS infusion   Intravenous Continuous GARRETT StonerPFaustoR.N. 30 mL/hr at 08/09/23 1447 Rate Verify at 08/09/23 1447    electrolyte-A (Plasmalyte-A) infusion   Intravenous PRN GARRETT StonerP.R.N.   Stopped at 08/09/23 2100    Nozin nasal  swab  1 Applicator Each Nostril BID GARRETT StonerP.R.N.   1 Applicator at 08/11/23 2131    aspirin EC tablet 81 mg  81 mg Oral DAILY JUDE Stoner.P.R.N.   81 mg at 08/12/23 0606    acetaminophen (Tylenol) tablet 1,000 mg  1,000 mg Oral Q6HRS JUDE Stoner.P.R.N.   1,000 mg at 08/12/23 0606    Followed by    [START ON 8/14/2023] acetaminophen (Tylenol) tablet 1,000 mg  1,000 mg Oral Q6HRS PRN JUDE Stoner.P.R.N.        oxyCODONE immediate-release (Roxicodone) tablet 5 mg  5 mg Oral Q3HRS PRN JEAN MARIE Aguilar A.P.R.N.   5 mg at 08/11/23 1756    Or    oxyCODONE immediate release (Roxicodone) tablet 10 mg  10 mg Oral Q3HRS PRN JUDE Stoner.P.R.N.   10 mg at 08/11/23 0853    Or    fentaNYL (Sublimaze) injection 50 mcg  50 mcg Intravenous Q3HRS PRN JUDE Stoner.P.R.N.   50 mcg at 08/11/23 0011    traMADol (Ultram) 50 MG tablet 50 mg  50 mg Oral Q4HRS PRN JEAN MARIE Aguilar, A.P.R.N.   50 mg at 08/12/23 0711    dexmedetomidine (PRECEDEX) 400 mcg/100mL NS premix infusion  0-1.5 mcg/kg/hr (Ideal) Intravenous Continuous JEAN MARIE Aguilar, A.P.R.N. 3.4 mL/hr at 08/10/23 0401 0.2 mcg/kg/hr at 08/10/23 0401    ondansetron (Zofran) syringe/vial injection 8 mg  8 mg Intravenous Q6HRS PRN JEAN MARIE Aguilar, A.P.R.N.   8 mg at 08/11/23 0529    Or    prochlorperazine (Compazine) injection 10 mg  10 mg Intravenous Q6HRS  PRN JEAN MARIE Aguilar A.P.R.N.   10 mg at 08/11/23 0715    acetaminophen (Tylenol) tablet 650 mg  650 mg Oral Q4HRS PRN JEAN MARIE Aguilar A.P.R.N.        Or    acetaminophen (Tylenol) suppository 650 mg  650 mg Rectal Q4HRS PRN JEAN MARIE Aguilar A.P.R.N.        senna-docusate (Pericolace Or Senokot S) 8.6-50 MG per tablet 2 Tablet  2 Tablet Oral BID JUDE Stoner.P.R.N.   2 Tablet at 08/12/23 0606    And    polyethylene glycol/lytes (Miralax) PACKET 1 Packet  1 Packet Oral DAILY JUDE Stoner.P.R.N.   1 Packet at 08/12/23 0605    And    magnesium hydroxide (Milk Of Magnesia) suspension 30 mL  30 mL Oral DAILY JUDE Stoner.P.R.N.   30 mL at 08/12/23 0606    And    bisacodyl (Dulcolax) suppository 10 mg  10 mg Rectal QDAY PRN JEAN MARIE Aguilar A.P.R.N.        omeprazole (PriLOSEC) capsule 20 mg  20 mg Oral DAILY JEAN MARIE Aguilar A.P.R.N.   20 mg at 08/12/23 0606    mag hydrox-al hydrox-simeth (Maalox Plus Es Or Mylanta Ds) suspension 30 mL  30 mL Oral Q4HRS PRN JEAN MARIE Aguilar A.P.R.N.   30 mL at 08/11/23 0043    diphenhydrAMINE (Benadryl) tablet/capsule 25 mg  25 mg Oral HS PRN - MR X 1 JEAN MARIE Aguilar A.P.R.N.        enoxaparin (Lovenox) inj 40 mg  40 mg Subcutaneous DAILY AT 1800 JUDE Stoner.P.R.N.   40 mg at 08/11/23 1725    carvedilol (Coreg) tablet 3.125 mg  3.125 mg Oral BID WITH MEALS JUDE Stoner.P.R.N.   3.125 mg at 08/12/23 0711       Fluids    Intake/Output Summary (Last 24 hours) at 8/12/2023 0801  Last data filed at 8/12/2023 0700  Gross per 24 hour   Intake 229.76 ml   Output 1196 ml   Net -966.24 ml         Laboratory  Recent Labs     08/09/23  1307 08/09/23  1425 08/09/23  1529 08/11/23  0158 08/11/23  1142   ISTATAPH 7.408 7.372* 7.354* 7.400 7.405   ISTATAPCO2 34.5 43.3* 39.5* 38.7* 39.2*   ISTATAPO2 218* 276* 122* 77 65   ISTATATCO2 23 26 23 25 26   [id number] 100* 100* 99 95 92*   ISTATARTHCO3 21.8 25.1* 22.0 24.0 24.6    ISTATARTBE -2 0 -3 -1 0   ISTATTEMP 36.3 C 36.6 C 36.0 C 96.1 F 97.8 F   ISTATFIO2 100 50 50  --   --    ISTATSPEC Arterial Arterial Arterial Arterial Arterial   ISTATAPHTC 7.419 7.377* 7.368* 7.420 7.412   [id number] 214* 274* 116* 70 63*           Recent Labs     08/09/23  1220 08/09/23  1730 08/10/23  0354 08/10/23  2002 08/11/23  0256 08/12/23  0242   SODIUM  --   --  134*  --  136 133*   POTASSIUM 4.2   < > 4.4 4.1 4.4 4.8   CHLORIDE  --   --  103  --  99 96   CO2  --   --  21  --  24 28   BUN  --   --  21  --  21 30*   CREATININE  --   --  0.60  --  0.93 0.80   MAGNESIUM 3.1*  --   --  1.9  --   --    CALCIUM  --   --  8.1*  --  8.6 8.6    < > = values in this interval not displayed.       Recent Labs     08/10/23  0354 08/11/23 0256 08/12/23  0242   GLUCOSE 136* 135* 123*       Recent Labs     08/10/23  0354 08/11/23 0256 08/12/23  0242   WBC 18.8* 26.2* 22.0*       Recent Labs     08/09/23  1220 08/10/23  0354 08/11/23  0256 08/12/23  0242   RBC  --  4.36* 4.59* 3.93*   HEMOGLOBIN 14.7 13.4* 14.2 12.0*   HEMATOCRIT 44.1 39.6* 42.7 36.1*   PLATELETCT 184 173 186 161*   PROTHROMBTM 16.7*  --   --   --    APTT 30.5  --   --   --    INR 1.38*  --   --   --          Imaging  X-Ray:  I have personally reviewed the images and compared with prior images.    Assessment/Plan  * Aortic root aneurysm (HCC)  Assessment & Plan  Dilated aortic root 5.3cm and ascending aorta 4.1cm  Preop [facility] with normal coronaries.   S/p aortic root replacement Bentall procedure) with 27mm bovine pericardial valve conduit, coronary artery reimplantation.   Post op BELEN with LVEF 60%.  8/9 extubated via fast track with no issues  Early mobility   ASA, carvedilol        Pneumothorax  Assessment & Plan  Bilateral pneumothoraces s/p left chest tube 8/11  Right side pneumothorax resolved  Weaned down to 3L NC.   Not in resp distress  Left chest tube output 50cc in the past 24 hours  Should be able to place the chest tube to water seal soon     Pt to be transferred to telemetry today. CTS to manage the left chest tube    Type 2 diabetes mellitus without complication, without long-term current use of insulin (HCC)  Assessment & Plan  a1c 6.0  On metformin at home. Will hold while in hospital     Essential hypertension- (present on admission)  Assessment & Plan  Carvedilol per protocol  On losartan and HCTZ at home    Lethargy  Assessment & Plan  Resolved. More alert, and energy  Tolerating mobility            VTE:  Lovenox  Ulcer: Not Indicated  Lines: Central Line  Ongoing indication addressed, Arterial Line  Ongoing indication addressed, and Billingsley Catheter  Ongoing indication addressed    I have performed a physical exam and reviewed and updated ROS and Plan today (8/12/2023). In review of yesterday's note (8/11/2023), there are no changes except as documented above.     Discussed patient condition and risk of morbidity and/or mortality with RN, RT, Pharmacy, Charge nurse / hot rounds, and Patient

## 2023-08-12 NOTE — PROGRESS NOTES
Assumed care of pt. Bedside report received from [facility]. Pt resting comfortably in chair. Pt was updated on plan of care. Call light, phone and personal belongings in reach. Bed alarm on and working properly, bed in lowest position, and locked.

## 2023-08-12 NOTE — PROGRESS NOTES
Cardiovascular Surgery Progress Note    Name: Venkat Mackenzie  MRN: [id number]  : 1960  Admit Date: 2023  5:05 AM  3 Days Post-Op     Procedure:  Procedure(s) and Anesthesia Type:     * AORTIC ROOT REPLACEMENT (27 mm Konect Duong bovine pericardial valved conduit, coronary artery reimplantation) and intraoperative transesophageal echocardiography     * ECHOCARDIOGRAM, TRANSESOPHAGEAL, INTRAOPERATIVE - General    Vitals:  Vitals:    23 0135 23 0200 23 0300 23 0400   BP: 116/73 116/73 108/80 109/73   Pulse: 80 90 83 83   Resp:       Temp:    37.1 °C (98.7 °F)   TempSrc:    Temporal   SpO2: 94% 94% 93% 94%   Weight:       Height:          Temp (24hrs), Av.1 °C (98.7 °F), Min:37 °C (98.6 °F), Max:37.2 °C (98.9 °F)      Respiratory:    Respiration: 12, Pulse Oximetry: 94 %       Fluids:    Intake/Output Summary (Last 24 hours) at 2023 0639  Last data filed at 2023 0100  Gross per 24 hour   Intake 627.78 ml   Output 1070 ml   Net -442.22 ml       Admit weight: Weight: 94.9 kg (209 lb 3.5 oz)  Current weight: Weight: 97 kg (213 lb 13.5 oz) (23 0500)    Labs:  Recent Labs     08/10/23  0354 23  0256 23  0242   WBC 18.8* 26.2* 22.0*   RBC 4.36* 4.59* 3.93*   HEMOGLOBIN 13.4* 14.2 12.0*   HEMATOCRIT 39.6* 42.7 36.1*   MCV 90.8 93.0 91.9   MCH 30.7 30.9 30.5   MCHC 33.8 33.3 33.2   RDW 43.5 44.6 44.5   PLATELETCT 173 186 161*   MPV 10.8 11.6 11.6       Recent Labs     08/10/23  0354 08/10/23  2002 08/11/23  0256 23  0242   SODIUM 134*  --  136 133*   POTASSIUM 4.4 4.1 4.4 4.8   CHLORIDE 103  --  99 96   CO2 21  --  24 28   GLUCOSE 136*  --  135* 123*   BUN 21  --  21 30*   CREATININE 0.60  --  0.93 0.80   CALCIUM 8.1*  --  8.6 8.6       Recent Labs     23  1220   APTT 30.5   INR 1.38*             Medications:  Scheduled Medications   Medication Dose Frequency    amiodarone  400 mg BID    insulin regular  2-9 Units 4X/DAY ACHS    furosemide  20 mg  BID DIURETIC    potassium chloride SA  20 mEq BID    Nozin nasal  swab  1 Applicator BID    aspirin  81 mg DAILY    acetaminophen  1,000 mg Q6HRS    senna-docusate  2 Tablet BID    And    polyethylene glycol/lytes  1 Packet DAILY    And    magnesium hydroxide  30 mL DAILY    omeprazole  20 mg DAILY    enoxaparin (LOVENOX) injection  40 mg DAILY AT 1800    carvedilol  3.125 mg BID WITH MEALS        Exam:   Physical Exam  Vitals and nursing note reviewed.   Constitutional:       General: He is not in acute distress.     Appearance: Normal appearance.   HENT:      Head: Normocephalic.      Right Ear: External ear normal.      Left Ear: External ear normal.      Nose: Nose normal. No congestion.      Mouth/Throat:      Mouth: Mucous membranes are moist.      Pharynx: Oropharynx is clear.   Eyes:      Extraocular Movements: Extraocular movements intact.      Pupils: Pupils are equal, round, and reactive to light.   Cardiovascular:      Rate and Rhythm: Normal rate and regular rhythm.      Pulses: Normal pulses.      Heart sounds: Normal heart sounds.   Pulmonary:      Effort: Pulmonary effort is normal.      Breath sounds: Decreased breath sounds present.   Abdominal:      General: Bowel sounds are decreased. There is no distension.      Palpations: Abdomen is soft.   Musculoskeletal:      Cervical back: Normal range of motion and neck supple. No tenderness.      Right lower leg: Edema present.      Left lower leg: Edema present.   Skin:     General: Skin is warm and dry.      Comments: Midline surgical incision   Neurological:      General: No focal deficit present.      Mental Status: He is alert and oriented to person, place, and time. Mental status is at baseline.   Psychiatric:         Mood and Affect: Mood normal.         Behavior: Behavior normal.         Thought Content: Thought content normal.         Cardiac Medications:    ASA - Yes    Plavix - No; contraindicated because of Other No  CAD    Post-operative Beta Blockers - Yes    Ace/ARB- No; contraindicated because of Normal EF    Statin - No; contraindicated because of No CAD    Aldactone- No; contraindicated because of Normal EF    SGLT2i-  No contraindicated because of cost prohibitive    Ejection Fraction:  55%    Telemetry:   8/10 SR  8/11 SR/a fib  8/12 SR    Assessment/Plan:  POD 1  HDS, SR, neuro intact, wounds intact, abdomen soft, fluid balance positive, wt up,  2 L NC. 160 mL out of chest tubes overnight.  Plan:  Gentle diuresis, d/c chest tubes and bolivar. IS/ambulate.     POD 2  HDS, SR/a fib, neuro intact, wounds intact, abdomen soft, fluid balance negative, wt up.  Bilateral pneumothoraces overnight, s/p L chest tube, remaining 2 cm R apical pneumo. On 6L NC. Plan:  Repeat CXR to evaluate R pneumo, consider CT. Continue diuresis. Resume amio gtt and PO. IS/ambulate.     POD 3  HDS, SR, neuro intact, wounds intact, abdomen soft ,no BM yet. Fluid balance negative, wt above admit wt, 3L NC. L chest tube in place, stable small R pneumo. Plan: DC pacing wires. Continue diuresis, IS/ambulate. Transfer to [facility].       Disposition:  OT rec post acute, PT eval pending  Pt lives with spouse in [place].

## 2023-08-12 NOTE — CARE PLAN
Problem: Hyperinflation  Goal: Prevent or improve atelectasis  Description: Target End Date:  3 to 4 days    1. Instruct incentive spirometry usage  2.  Perform hyperinflation therapy as indicated  Outcome: Progressing   PEP QID

## 2023-08-12 NOTE — THERAPY
Physical Therapy Contact Note    Patient Name: Venkat Mackenzie  Age:  63 y.o., Sex:  male  Medical Record #: [id number]  Today's Date: 8/12/2023 08/12/23 1321   Interdisciplinary Plan of Care Collaboration   IDT Collaboration with  Nursing   Collaboration Comments Pt had bilat pneumothorax with placement of chest tube R yesterday. Pt's CT is still to suction. Spoke with RN going to hold therapy at this time also due to pt not feeling well and in 1st degree heart block. Will monitor EMR and eval when appropriate.   Session Information   Date / Session Number  8/12: hold (eval)

## 2023-08-12 NOTE — PROGRESS NOTES
"Patient states he \"feels dizzy and off.\" Patient diaphoretic, vitals stable, pt on 3L O2.  Tele monitor showed 1 degree. Stat EKG ordered. ISHMAEL Zepeda notified. CICU RN at bedside. CXR ordered.   "

## 2023-08-13 ENCOUNTER — APPOINTMENT (OUTPATIENT)
Dept: RADIOLOGY | Facility: MEDICAL CENTER | Age: 63
DRG: 219 | End: 2023-08-13
Attending: NURSE PRACTITIONER
Payer: COMMERCIAL

## 2023-08-13 ENCOUNTER — APPOINTMENT (OUTPATIENT)
Dept: RADIOLOGY | Facility: MEDICAL CENTER | Age: 63
DRG: 219 | End: 2023-08-13
Attending: INTERNAL MEDICINE
Payer: COMMERCIAL

## 2023-08-13 LAB
ANION GAP SERPL CALC-SCNC: 10 MMOL/L (ref 7–16)
BUN SERPL-MCNC: 34 MG/DL (ref 8–22)
CALCIUM SERPL-MCNC: 8.5 MG/DL (ref 8.5–10.5)
CHLORIDE SERPL-SCNC: 93 MMOL/L (ref 96–112)
CO2 SERPL-SCNC: 29 MMOL/L (ref 20–33)
CREAT SERPL-MCNC: 0.69 MG/DL (ref 0.5–1.4)
ERYTHROCYTE [DISTWIDTH] IN BLOOD BY AUTOMATED COUNT: 42.5 FL (ref 35.9–50)
GFR SERPLBLD CREATININE-BSD FMLA CKD-EPI: 103 ML/MIN/1.73 M 2
GLUCOSE BLD STRIP.AUTO-MCNC: 100 MG/DL (ref 65–99)
GLUCOSE BLD STRIP.AUTO-MCNC: 110 MG/DL (ref 65–99)
GLUCOSE BLD STRIP.AUTO-MCNC: 115 MG/DL (ref 65–99)
GLUCOSE BLD STRIP.AUTO-MCNC: 182 MG/DL (ref 65–99)
GLUCOSE SERPL-MCNC: 120 MG/DL (ref 65–99)
HCT VFR BLD AUTO: 32.3 % (ref 42–52)
HGB BLD-MCNC: 10.8 G/DL (ref 14–18)
MCH RBC QN AUTO: 30.1 PG (ref 27–33)
MCHC RBC AUTO-ENTMCNC: 33.4 G/DL (ref 32.3–36.5)
MCV RBC AUTO: 90 FL (ref 81.4–97.8)
PLATELET # BLD AUTO: 174 K/UL (ref 164–446)
PMV BLD AUTO: 10.9 FL (ref 9–12.9)
POTASSIUM SERPL-SCNC: 4.4 MMOL/L (ref 3.6–5.5)
RBC # BLD AUTO: 3.59 M/UL (ref 4.7–6.1)
SODIUM SERPL-SCNC: 132 MMOL/L (ref 135–145)
WBC # BLD AUTO: 17.7 K/UL (ref 4.8–10.8)

## 2023-08-13 PROCEDURE — A9270 NON-COVERED ITEM OR SERVICE: HCPCS | Mod: JZ | Performed by: NURSE PRACTITIONER

## 2023-08-13 PROCEDURE — 770020 HCHG ROOM/CARE - TELE (206)

## 2023-08-13 PROCEDURE — 94669 MECHANICAL CHEST WALL OSCILL: CPT

## 2023-08-13 PROCEDURE — 71046 X-RAY EXAM CHEST 2 VIEWS: CPT

## 2023-08-13 PROCEDURE — 700102 HCHG RX REV CODE 250 W/ 637 OVERRIDE(OP): Mod: JZ | Performed by: NURSE PRACTITIONER

## 2023-08-13 PROCEDURE — 700111 HCHG RX REV CODE 636 W/ 250 OVERRIDE (IP): Mod: JZ

## 2023-08-13 PROCEDURE — 99024 POSTOP FOLLOW-UP VISIT: CPT | Performed by: NURSE PRACTITIONER

## 2023-08-13 PROCEDURE — A9270 NON-COVERED ITEM OR SERVICE: HCPCS

## 2023-08-13 PROCEDURE — 700102 HCHG RX REV CODE 250 W/ 637 OVERRIDE(OP)

## 2023-08-13 PROCEDURE — 700111 HCHG RX REV CODE 636 W/ 250 OVERRIDE (IP): Mod: JZ | Performed by: NURSE PRACTITIONER

## 2023-08-13 PROCEDURE — 71045 X-RAY EXAM CHEST 1 VIEW: CPT

## 2023-08-13 PROCEDURE — 80048 BASIC METABOLIC PNL TOTAL CA: CPT

## 2023-08-13 PROCEDURE — 85027 COMPLETE CBC AUTOMATED: CPT

## 2023-08-13 PROCEDURE — 82962 GLUCOSE BLOOD TEST: CPT | Mod: 91

## 2023-08-13 RX ORDER — METOCLOPRAMIDE HYDROCHLORIDE 5 MG/ML
10 INJECTION INTRAMUSCULAR; INTRAVENOUS EVERY 6 HOURS
Status: DISCONTINUED | OUTPATIENT
Start: 2023-08-13 | End: 2023-08-16

## 2023-08-13 RX ORDER — GUAIFENESIN 600 MG/1
600 TABLET, EXTENDED RELEASE ORAL EVERY 12 HOURS
Status: DISCONTINUED | OUTPATIENT
Start: 2023-08-13 | End: 2023-08-18 | Stop reason: HOSPADM

## 2023-08-13 RX ADMIN — AMIODARONE HYDROCHLORIDE 400 MG: 200 TABLET ORAL at 05:05

## 2023-08-13 RX ADMIN — ACETAMINOPHEN 1000 MG: 500 TABLET, FILM COATED ORAL at 18:06

## 2023-08-13 RX ADMIN — AMIODARONE HYDROCHLORIDE 0.5 MG/MIN: 1.8 INJECTION, SOLUTION INTRAVENOUS at 01:50

## 2023-08-13 RX ADMIN — INSULIN HUMAN 2 UNITS: 100 INJECTION, SOLUTION PARENTERAL at 21:15

## 2023-08-13 RX ADMIN — ONDANSETRON 8 MG: 2 INJECTION INTRAMUSCULAR; INTRAVENOUS at 02:24

## 2023-08-13 RX ADMIN — ACETAMINOPHEN 1000 MG: 500 TABLET, FILM COATED ORAL at 06:21

## 2023-08-13 RX ADMIN — METOCLOPRAMIDE 10 MG: 5 INJECTION, SOLUTION INTRAMUSCULAR; INTRAVENOUS at 08:22

## 2023-08-13 RX ADMIN — ACETAMINOPHEN 1000 MG: 500 TABLET, FILM COATED ORAL at 01:13

## 2023-08-13 RX ADMIN — CARVEDILOL 3.12 MG: 3.12 TABLET, FILM COATED ORAL at 08:35

## 2023-08-13 RX ADMIN — FUROSEMIDE 20 MG: 10 INJECTION, SOLUTION INTRAVENOUS at 05:04

## 2023-08-13 RX ADMIN — FUROSEMIDE 20 MG: 10 INJECTION, SOLUTION INTRAVENOUS at 15:55

## 2023-08-13 RX ADMIN — METOCLOPRAMIDE 10 MG: 5 INJECTION, SOLUTION INTRAMUSCULAR; INTRAVENOUS at 13:05

## 2023-08-13 RX ADMIN — Medication 1 APPLICATOR: at 21:09

## 2023-08-13 RX ADMIN — SENNOSIDES AND DOCUSATE SODIUM 2 TABLET: 50; 8.6 TABLET ORAL at 18:07

## 2023-08-13 RX ADMIN — GUAIFENESIN 600 MG: 600 TABLET, EXTENDED RELEASE ORAL at 18:31

## 2023-08-13 RX ADMIN — ACETAMINOPHEN 1000 MG: 500 TABLET, FILM COATED ORAL at 13:05

## 2023-08-13 RX ADMIN — MAGNESIUM HYDROXIDE 30 ML: 1200 LIQUID ORAL at 05:04

## 2023-08-13 RX ADMIN — METOCLOPRAMIDE 10 MG: 5 INJECTION, SOLUTION INTRAMUSCULAR; INTRAVENOUS at 18:05

## 2023-08-13 RX ADMIN — ENOXAPARIN SODIUM 40 MG: 100 INJECTION SUBCUTANEOUS at 18:07

## 2023-08-13 RX ADMIN — ASPIRIN 81 MG: 81 TABLET, COATED ORAL at 05:05

## 2023-08-13 RX ADMIN — POTASSIUM CHLORIDE 20 MEQ: 1500 TABLET, EXTENDED RELEASE ORAL at 05:04

## 2023-08-13 RX ADMIN — SENNOSIDES AND DOCUSATE SODIUM 2 TABLET: 50; 8.6 TABLET ORAL at 05:05

## 2023-08-13 RX ADMIN — POTASSIUM CHLORIDE 20 MEQ: 1500 TABLET, EXTENDED RELEASE ORAL at 18:07

## 2023-08-13 RX ADMIN — POLYETHYLENE GLYCOL 3350 1 PACKET: 17 POWDER, FOR SOLUTION ORAL at 08:23

## 2023-08-13 RX ADMIN — OMEPRAZOLE 20 MG: 20 CAPSULE, DELAYED RELEASE ORAL at 05:05

## 2023-08-13 RX ADMIN — Medication 1 APPLICATOR: at 08:22

## 2023-08-13 RX ADMIN — GUAIFENESIN 600 MG: 600 TABLET, EXTENDED RELEASE ORAL at 08:21

## 2023-08-13 RX ADMIN — CARVEDILOL 3.12 MG: 3.12 TABLET, FILM COATED ORAL at 18:05

## 2023-08-13 RX ADMIN — AMIODARONE HYDROCHLORIDE 400 MG: 200 TABLET ORAL at 18:05

## 2023-08-13 ASSESSMENT — FIBROSIS 4 INDEX: FIB4 SCORE: 1.09

## 2023-08-13 ASSESSMENT — PAIN DESCRIPTION - PAIN TYPE: TYPE: ACUTE PAIN

## 2023-08-13 ASSESSMENT — PATIENT HEALTH QUESTIONNAIRE - PHQ9
SUM OF ALL RESPONSES TO PHQ9 QUESTIONS 1 AND 2: 0
2. FEELING DOWN, DEPRESSED, IRRITABLE, OR HOPELESS: NOT AT ALL
1. LITTLE INTEREST OR PLEASURE IN DOING THINGS: NOT AT ALL

## 2023-08-13 NOTE — PROGRESS NOTES
Pt back in SR with a 1st degree. EKG ordered per protocol to confirm.   Becky QUIÑONES notified. Orders to turn off amino gtt if HR drops below 60.

## 2023-08-13 NOTE — PROGRESS NOTES
Monitor Summary:   Rhythm: afib/sr 1 deg HB  Rate: 64-79  Measurement: .31/.08/.43  Ectopy: 1.8 sec pause x2

## 2023-08-13 NOTE — PROGRESS NOTES
Cardiovascular Surgery Progress Note    Name: Venkat Mackenzie  MRN: [id number]  : 1960  Admit Date: 2023  5:05 AM  4 Days Post-Op     Procedure:  Procedure(s) and Anesthesia Type:     * AORTIC ROOT REPLACEMENT (27 mm Konect Duong bovine pericardial valved conduit, coronary artery reimplantation) and intraoperative transesophageal echocardiography     * ECHOCARDIOGRAM, TRANSESOPHAGEAL, INTRAOPERATIVE - General    Vitals:  Vitals:    23 2314 23 0350 23 0503 23 0646   BP: 102/64 121/74 124/82    Pulse: 66 (!) 59 73    Resp: 18 18     Temp: 36.5 °C (97.7 °F) 36.5 °C (97.7 °F)     TempSrc: Temporal Temporal     SpO2: 94% 95% 93%    Weight:    95.4 kg (210 lb 5.1 oz)   Height:          Temp (24hrs), Av.3 °C (97.4 °F), Min:36.1 °C (97 °F), Max:36.5 °C (97.7 °F)      Respiratory:    Respiration: 18, Pulse Oximetry: 93 %       Fluids:    Intake/Output Summary (Last 24 hours) at 2023 0714  Last data filed at 2023 0646  Gross per 24 hour   Intake 469.19 ml   Output 890 ml   Net -420.81 ml       Admit weight: Weight: 94.9 kg (209 lb 3.5 oz)  Current weight: Weight: 95.4 kg (210 lb 5.1 oz) (23 0646)    Labs:  Recent Labs     23  0256 23  0242 23  0125   WBC 26.2* 22.0* 17.7*   RBC 4.59* 3.93* 3.59*   HEMOGLOBIN 14.2 12.0* 10.8*   HEMATOCRIT 42.7 36.1* 32.3*   MCV 93.0 91.9 90.0   MCH 30.9 30.5 30.1   MCHC 33.3 33.2 33.4   RDW 44.6 44.5 42.5   PLATELETCT 186 161* 174   MPV 11.6 11.6 10.9       Recent Labs     23  0256 23  0242 23  1930 23  0125   SODIUM 136 133*  --  132*   POTASSIUM 4.4 4.8 4.8 4.4   CHLORIDE 99 96  --  93*   CO2 24 28  --  29   GLUCOSE 135* 123*  --  120*   BUN 21 30*  --  34*   CREATININE 0.93 0.80  --  0.69   CALCIUM 8.6 8.6  --  8.5                   Medications:  Scheduled Medications   Medication Dose Frequency    amiodarone  400 mg BID    insulin regular  2-9 Units 4X/DAY ACHS    furosemide  20 mg BID  DIURETIC    potassium chloride SA  20 mEq BID    Nozin nasal  swab  1 Applicator BID    aspirin  81 mg DAILY    acetaminophen  1,000 mg Q6HRS    senna-docusate  2 Tablet BID    And    polyethylene glycol/lytes  1 Packet DAILY    And    magnesium hydroxide  30 mL DAILY    omeprazole  20 mg DAILY    enoxaparin (LOVENOX) injection  40 mg DAILY AT 1800    carvedilol  3.125 mg BID WITH MEALS        Exam:   Physical Exam  Vitals and nursing note reviewed.   Constitutional:       General: He is not in acute distress.     Appearance: Normal appearance.   HENT:      Head: Normocephalic.      Right Ear: External ear normal.      Left Ear: External ear normal.      Nose: Nose normal. No congestion.      Mouth/Throat:      Mouth: Mucous membranes are moist.      Pharynx: Oropharynx is clear.   Eyes:      Extraocular Movements: Extraocular movements intact.      Pupils: Pupils are equal, round, and reactive to light.   Cardiovascular:      Rate and Rhythm: Normal rate and regular rhythm.      Pulses: Normal pulses.      Heart sounds: Normal heart sounds.   Pulmonary:      Effort: Pulmonary effort is normal.      Breath sounds: Decreased breath sounds present.   Abdominal:      General: Bowel sounds are decreased. There is no distension.      Palpations: Abdomen is soft.   Musculoskeletal:      Cervical back: Normal range of motion and neck supple. No tenderness.      Right lower leg: Edema present.      Left lower leg: Edema present.   Skin:     General: Skin is warm and dry.      Comments: Midline surgical incision   Neurological:      General: No focal deficit present.      Mental Status: He is alert and oriented to person, place, and time. Mental status is at baseline.   Psychiatric:         Mood and Affect: Mood normal.         Behavior: Behavior normal.         Thought Content: Thought content normal.       Cardiac Medications:    ASA - Yes    Plavix - No; contraindicated because of Other No CAD    Post-operative  Beta Blockers - Yes    Ace/ARB- No; contraindicated because of Normal EF    Statin - No; contraindicated because of No CAD    Aldactone- No; contraindicated because of Normal EF    SGLT2i-  No contraindicated because of cost prohibitive    Ejection Fraction:  55%    Telemetry:   8/10 SR  8/11 SR/a fib  8/12 SR  8/13 a fib to SR    Assessment/Plan:  POD 1  HDS, SR, neuro intact, wounds intact, abdomen soft, fluid balance positive, wt up,  2 L NC. 160 mL out of chest tubes overnight.  Plan:  Gentle diuresis, d/c chest tubes and bolivar. IS/ambulate.     POD 2  HDS, SR/a fib, neuro intact, wounds intact, abdomen soft, fluid balance negative, wt up.  Bilateral pneumothoraces overnight, s/p L chest tube, remaining 2 cm R apical pneumo. On 6L NC. Plan:  Repeat CXR to evaluate R pneumo, consider CT. Continue diuresis. Resume amio gtt and PO. IS/ambulate.     POD 3  HDS, SR, neuro intact, wounds intact, abdomen soft ,no BM yet. Fluid balance negative, wt above admit wt, 3L NC. L chest tube in place, stable small R pneumo. Plan: DC pacing wires. Continue diuresis, IS/ambulate. Transfer to [facility].     POD 4  HDS, SR- was a fib last night, 1.8 sec pauses, neuro intact, wounds intact, abdomen soft, no BM. Pt complaining of ongoing nausea. Fluid balance negative, wt above admit wt, 2.5 L NC. L chest tube w air leak.  Plan: Continue diuresis. IS/ambulate. PT to evaluate. Add reglan. 2 view CXR and ambulatory O2 test today.     Disposition:  OT rec post acute, PT eval pending  Pt lives with spouse in [place].

## 2023-08-13 NOTE — THERAPY
Physical Therapy Contact Note    Patient Name: Venkat Mackenzie  Age:  63 y.o., Sex:  male  Medical Record #: [id number]  Today's Date: 8/13/2023    Attempted PT evaluation x2. RN requesting hold today d/t results of CXR and medical status. Pt did ambulate with nursing this morning. Will re-attempt as able/appropriate.

## 2023-08-13 NOTE — CARE PLAN
Problem: Post Op Day 3 CABG/Heart Valve replacement  Goal: Optimal care of the post op CABG/Heart Valve replacement post op day 3  Outcome: Progressing  Intervention: Daily weights in the morning  Note: Daily weight documented   Intervention: Shower daily and clean incisions twice daily with soap and water  Note: Showered during AM shift. Cleansed incisions with soap and water.  Intervention: Up in chair for all meals  Note: Up in chair for all meals.   Intervention: Ambulate 4 times daily, increasing the distance each time  Note: All walks documented.  Intervention: IS q 1 hour while awake and record best IS volume  Note: Best IS documented  Intervention: Consider removal of bolivar, chest tube and pacer wires if not already done  Note: Bolivar, CT, and pacer wires removed. Pt still has L chest tube.   Intervention: Assess need for case management and  for discharge planning  Note: N/A  Intervention: Discharge planning (See discharge barriers/planning problem on care plan)  Note: N/A   The patient is Watcher - Medium risk of patient condition declining or worsening    Shift Goals  Clinical Goals: Monitor HR and rhythm, ambulation  Patient Goals: Feel better  Family Goals: ANA    Progress made toward(s) clinical / shift goals:  Pt back in SR with 1st degree. No pain complaints at this time. CXR done this AM. VSS    Patient is not progressing towards the following goals:

## 2023-08-13 NOTE — PROGRESS NOTES
Assumed care of pt. Bedside report received from RN [place]. Pt was updated on plan of care. Call light, phone and personal belongings in reach. Bed alarm on and working properly, bed in lowest position, and locked.

## 2023-08-13 NOTE — PROGRESS NOTES
Bedside report received. Pt A&Ox4. Complains of no pain at this time. Afib 75 on the monitors. POC discussed with pt. Pt verbalizes understanding. Call light and belongings within reach. Bed locked and in lowest position. Alarm and fall precautions in place.

## 2023-08-13 NOTE — PROGRESS NOTES
Monitor room notified this RN pt had 1.8 sec pause x2. Pt still stating he feels nauseous. Will medicate per MAR. Becky QUIÑONES notified. Orders to stop amino gtt and hold beta blockers this AM.

## 2023-08-13 NOTE — PROGRESS NOTES
Pt showered. Placed back on tele. Pt converted to afib. Rate in 80s-90s. Becky Zepeda notified. Restarting amio drip.

## 2023-08-14 ENCOUNTER — APPOINTMENT (OUTPATIENT)
Dept: RADIOLOGY | Facility: MEDICAL CENTER | Age: 63
DRG: 219 | End: 2023-08-14
Attending: NURSE PRACTITIONER
Payer: COMMERCIAL

## 2023-08-14 ENCOUNTER — APPOINTMENT (OUTPATIENT)
Dept: RADIOLOGY | Facility: MEDICAL CENTER | Age: 63
DRG: 219 | End: 2023-08-14
Attending: INTERNAL MEDICINE
Payer: COMMERCIAL

## 2023-08-14 ENCOUNTER — APPOINTMENT (OUTPATIENT)
Dept: RADIOLOGY | Facility: MEDICAL CENTER | Age: 63
DRG: 219 | End: 2023-08-14
Attending: THORACIC SURGERY (CARDIOTHORACIC VASCULAR SURGERY)
Payer: COMMERCIAL

## 2023-08-14 LAB
ANION GAP SERPL CALC-SCNC: 9 MMOL/L (ref 7–16)
BUN SERPL-MCNC: 33 MG/DL (ref 8–22)
CALCIUM SERPL-MCNC: 8.3 MG/DL (ref 8.5–10.5)
CHLORIDE SERPL-SCNC: 94 MMOL/L (ref 96–112)
CO2 SERPL-SCNC: 29 MMOL/L (ref 20–33)
CREAT SERPL-MCNC: 0.63 MG/DL (ref 0.5–1.4)
EKG IMPRESSION: NORMAL
ERYTHROCYTE [DISTWIDTH] IN BLOOD BY AUTOMATED COUNT: 42.8 FL (ref 35.9–50)
GFR SERPLBLD CREATININE-BSD FMLA CKD-EPI: 106 ML/MIN/1.73 M 2
GLUCOSE BLD STRIP.AUTO-MCNC: 104 MG/DL (ref 65–99)
GLUCOSE BLD STRIP.AUTO-MCNC: 105 MG/DL (ref 65–99)
GLUCOSE BLD STRIP.AUTO-MCNC: 107 MG/DL (ref 65–99)
GLUCOSE BLD STRIP.AUTO-MCNC: 110 MG/DL (ref 65–99)
GLUCOSE BLD STRIP.AUTO-MCNC: 113 MG/DL (ref 65–99)
GLUCOSE BLD STRIP.AUTO-MCNC: 85 MG/DL (ref 65–99)
GLUCOSE SERPL-MCNC: 95 MG/DL (ref 65–99)
HCT VFR BLD AUTO: 31.8 % (ref 42–52)
HGB BLD-MCNC: 10.7 G/DL (ref 14–18)
MCH RBC QN AUTO: 30.8 PG (ref 27–33)
MCHC RBC AUTO-ENTMCNC: 33.6 G/DL (ref 32.3–36.5)
MCV RBC AUTO: 91.6 FL (ref 81.4–97.8)
PLATELET # BLD AUTO: 239 K/UL (ref 164–446)
PMV BLD AUTO: 10.4 FL (ref 9–12.9)
POTASSIUM SERPL-SCNC: 4.1 MMOL/L (ref 3.6–5.5)
RBC # BLD AUTO: 3.47 M/UL (ref 4.7–6.1)
SODIUM SERPL-SCNC: 132 MMOL/L (ref 135–145)
WBC # BLD AUTO: 14.2 K/UL (ref 4.8–10.8)

## 2023-08-14 PROCEDURE — 85027 COMPLETE CBC AUTOMATED: CPT

## 2023-08-14 PROCEDURE — 93010 ELECTROCARDIOGRAM REPORT: CPT | Performed by: INTERNAL MEDICINE

## 2023-08-14 PROCEDURE — 97535 SELF CARE MNGMENT TRAINING: CPT

## 2023-08-14 PROCEDURE — 93005 ELECTROCARDIOGRAM TRACING: CPT | Performed by: NURSE PRACTITIONER

## 2023-08-14 PROCEDURE — A9270 NON-COVERED ITEM OR SERVICE: HCPCS | Mod: JZ | Performed by: NURSE PRACTITIONER

## 2023-08-14 PROCEDURE — 71045 X-RAY EXAM CHEST 1 VIEW: CPT

## 2023-08-14 PROCEDURE — 700111 HCHG RX REV CODE 636 W/ 250 OVERRIDE (IP): Mod: JZ | Performed by: NURSE PRACTITIONER

## 2023-08-14 PROCEDURE — 700102 HCHG RX REV CODE 250 W/ 637 OVERRIDE(OP)

## 2023-08-14 PROCEDURE — A9270 NON-COVERED ITEM OR SERVICE: HCPCS

## 2023-08-14 PROCEDURE — 82962 GLUCOSE BLOOD TEST: CPT | Mod: 91

## 2023-08-14 PROCEDURE — 71046 X-RAY EXAM CHEST 2 VIEWS: CPT

## 2023-08-14 PROCEDURE — 97163 PT EVAL HIGH COMPLEX 45 MIN: CPT

## 2023-08-14 PROCEDURE — 700102 HCHG RX REV CODE 250 W/ 637 OVERRIDE(OP): Mod: JZ | Performed by: NURSE PRACTITIONER

## 2023-08-14 PROCEDURE — 770022 HCHG ROOM/CARE - ICU (200)

## 2023-08-14 PROCEDURE — 94669 MECHANICAL CHEST WALL OSCILL: CPT

## 2023-08-14 PROCEDURE — 700111 HCHG RX REV CODE 636 W/ 250 OVERRIDE (IP): Mod: JZ

## 2023-08-14 PROCEDURE — 99024 POSTOP FOLLOW-UP VISIT: CPT | Performed by: NURSE PRACTITIONER

## 2023-08-14 PROCEDURE — 80048 BASIC METABOLIC PNL TOTAL CA: CPT

## 2023-08-14 RX ADMIN — METOCLOPRAMIDE 10 MG: 5 INJECTION, SOLUTION INTRAMUSCULAR; INTRAVENOUS at 06:24

## 2023-08-14 RX ADMIN — ACETAMINOPHEN 1000 MG: 500 TABLET, FILM COATED ORAL at 06:24

## 2023-08-14 RX ADMIN — FUROSEMIDE 20 MG: 10 INJECTION, SOLUTION INTRAVENOUS at 17:34

## 2023-08-14 RX ADMIN — MAGNESIUM HYDROXIDE 30 ML: 1200 LIQUID ORAL at 06:24

## 2023-08-14 RX ADMIN — POTASSIUM CHLORIDE 20 MEQ: 1500 TABLET, EXTENDED RELEASE ORAL at 17:33

## 2023-08-14 RX ADMIN — Medication 1 APPLICATOR: at 20:58

## 2023-08-14 RX ADMIN — METOCLOPRAMIDE 10 MG: 5 INJECTION, SOLUTION INTRAMUSCULAR; INTRAVENOUS at 00:35

## 2023-08-14 RX ADMIN — ACETAMINOPHEN 1000 MG: 500 TABLET, FILM COATED ORAL at 00:35

## 2023-08-14 RX ADMIN — ACETAMINOPHEN 1000 MG: 500 TABLET, FILM COATED ORAL at 13:11

## 2023-08-14 RX ADMIN — METOCLOPRAMIDE 10 MG: 5 INJECTION, SOLUTION INTRAMUSCULAR; INTRAVENOUS at 13:11

## 2023-08-14 RX ADMIN — OMEPRAZOLE 20 MG: 20 CAPSULE, DELAYED RELEASE ORAL at 06:24

## 2023-08-14 RX ADMIN — CARVEDILOL 3.12 MG: 3.12 TABLET, FILM COATED ORAL at 17:33

## 2023-08-14 RX ADMIN — METOCLOPRAMIDE 10 MG: 5 INJECTION, SOLUTION INTRAMUSCULAR; INTRAVENOUS at 17:34

## 2023-08-14 RX ADMIN — ASPIRIN 81 MG: 81 TABLET, COATED ORAL at 06:24

## 2023-08-14 RX ADMIN — CARVEDILOL 3.12 MG: 3.12 TABLET, FILM COATED ORAL at 09:37

## 2023-08-14 RX ADMIN — GUAIFENESIN 600 MG: 600 TABLET, EXTENDED RELEASE ORAL at 06:24

## 2023-08-14 RX ADMIN — GUAIFENESIN 600 MG: 600 TABLET, EXTENDED RELEASE ORAL at 17:33

## 2023-08-14 RX ADMIN — FUROSEMIDE 20 MG: 10 INJECTION, SOLUTION INTRAVENOUS at 06:24

## 2023-08-14 RX ADMIN — Medication 1 APPLICATOR: at 09:37

## 2023-08-14 RX ADMIN — SENNOSIDES AND DOCUSATE SODIUM 2 TABLET: 50; 8.6 TABLET ORAL at 06:24

## 2023-08-14 RX ADMIN — POTASSIUM CHLORIDE 20 MEQ: 1500 TABLET, EXTENDED RELEASE ORAL at 06:24

## 2023-08-14 RX ADMIN — ENOXAPARIN SODIUM 40 MG: 100 INJECTION SUBCUTANEOUS at 17:39

## 2023-08-14 RX ADMIN — SENNOSIDES AND DOCUSATE SODIUM 2 TABLET: 50; 8.6 TABLET ORAL at 17:34

## 2023-08-14 RX ADMIN — AMIODARONE HYDROCHLORIDE 400 MG: 200 TABLET ORAL at 06:24

## 2023-08-14 RX ADMIN — AMIODARONE HYDROCHLORIDE 400 MG: 200 TABLET ORAL at 17:33

## 2023-08-14 ASSESSMENT — COGNITIVE AND FUNCTIONAL STATUS - GENERAL
MOVING TO AND FROM BED TO CHAIR: A LITTLE
MOBILITY SCORE: 18
STANDING UP FROM CHAIR USING ARMS: A LITTLE
WALKING IN HOSPITAL ROOM: A LITTLE
CLIMB 3 TO 5 STEPS WITH RAILING: A LITTLE
SUGGESTED CMS G CODE MODIFIER MOBILITY: CK
STANDING UP FROM CHAIR USING ARMS: A LITTLE
TURNING FROM BACK TO SIDE WHILE IN FLAT BAD: A LITTLE
WALKING IN HOSPITAL ROOM: A LITTLE
SUGGESTED CMS G CODE MODIFIER MOBILITY: CK
MOVING FROM LYING ON BACK TO SITTING ON SIDE OF FLAT BED: A LITTLE
DRESSING REGULAR LOWER BODY CLOTHING: A LITTLE
MOVING TO AND FROM BED TO CHAIR: A LITTLE
CLIMB 3 TO 5 STEPS WITH RAILING: A LITTLE
DAILY ACTIVITIY SCORE: 23
MOVING FROM LYING ON BACK TO SITTING ON SIDE OF FLAT BED: A LITTLE
MOBILITY SCORE: 19
SUGGESTED CMS G CODE MODIFIER DAILY ACTIVITY: CI

## 2023-08-14 ASSESSMENT — LIFESTYLE VARIABLES
ON A TYPICAL DAY WHEN YOU DRINK ALCOHOL HOW MANY DRINKS DO YOU HAVE: 1
ALCOHOL_USE: YES
EVER FELT BAD OR GUILTY ABOUT YOUR DRINKING: NO
DOES PATIENT WANT TO STOP DRINKING: NO
AVERAGE NUMBER OF DAYS PER WEEK YOU HAVE A DRINK CONTAINING ALCOHOL: 1
HOW MANY TIMES IN THE PAST YEAR HAVE YOU HAD 5 OR MORE DRINKS IN A DAY: 0
TOTAL SCORE: 0
TOTAL SCORE: 0
HAVE PEOPLE ANNOYED YOU BY CRITICIZING YOUR DRINKING: NO
EVER HAD A DRINK FIRST THING IN THE MORNING TO STEADY YOUR NERVES TO GET RID OF A HANGOVER: NO
TOTAL SCORE: 0
HAVE YOU EVER FELT YOU SHOULD CUT DOWN ON YOUR DRINKING: NO
CONSUMPTION TOTAL: NEGATIVE

## 2023-08-14 ASSESSMENT — PAIN DESCRIPTION - PAIN TYPE
TYPE: ACUTE PAIN
TYPE: ACUTE PAIN

## 2023-08-14 ASSESSMENT — GAIT ASSESSMENTS
ASSISTIVE DEVICE: FRONT WHEEL WALKER
DEVIATION: BRADYKINETIC
GAIT LEVEL OF ASSIST: STANDBY ASSIST
DISTANCE (FEET): 350

## 2023-08-14 ASSESSMENT — FIBROSIS 4 INDEX
FIB4 SCORE: 0.79
FIB4 SCORE: 0.79

## 2023-08-14 NOTE — CARE PLAN
The patient is Watcher - Medium risk of patient condition declining or worsening    Shift Goals: keep chest tube to suction  Clinical Goals:Patient Goals: rest  Family Goals: ANA    Progress made toward(s) clinical / shift goals:    Problem: Post Op Day 4 CABG/Heart Valve Replacement  Goal: Optimal care of the Post Op CABG/Heart Valve replacement Post Op Day 4  Outcome: Progressing  Intervention: Daily weights in the morning  Note: Daily weight 95.4kg   Intervention: Shower daily and clean incisions twice daily with soap and water  Note: Shower completed along with incision care  Intervention: Up in chair for all meals  Note: Pt up to chair all day  Intervention: Ambulate 4 times daily, increasing the distance each time  Note: Pt unable to ambulate earlier in the day d/t chest tube issues.   Intervention: IS q 1 hour while awake and record best IS volume  Note: IS: 1000  Intervention: Consider removal of bolivar, chest tube and pacer wires if not already done  Note: Bolivar and pacer wires removed. L chest tube in place: -20 suction  Intervention: Discharge Education  Note: Educated on importance of chest tube care   Intervention: Add special instructions for cardiac surgery discharge instructions to after visit summary and review with patient  Note: Patient is not ready for discharge       Patient is not progressing towards the following goals:

## 2023-08-14 NOTE — CARE PLAN
Problem: Post Op Day 4 CABG/Heart Valve Replacement  Goal: Optimal care of the Post Op CABG/Heart Valve replacement Post Op Day 4  Outcome: Progressing  Intervention: Daily weights in the morning  Note: Daily weight documented  Intervention: Shower daily and clean incisions twice daily with soap and water  Note: Shower done during AM shift. Incisions cleansed with soap and water   Intervention: Up in chair for all meals  Note: Up in chair for all meals  Intervention: Ambulate 4 times daily, increasing the distance each time  Note: Ambulated x4, distance documented.  Intervention: IS q 1 hour while awake and record best IS volume  Note: Best IS documented  Intervention: Consider removal of bolivar, chest tube and pacer wires if not already done  Note: Bolivar, CT, and pacer wires removed.  Left CT in place, CT to -20 suction. CXR done this AM  Intervention: Discharge Education  Note: Discharge education given at discharge  Intervention: Add special instructions for cardiac surgery discharge instructions to after visit summary and review with patient  Note: N/A   The patient is Watcher - Medium risk of patient condition declining or worsening    Shift Goals  Clinical Goals: Monitor VS, ambulation, CT  Patient Goals: Rest  Family Goals: ANA    Progress made toward(s) clinical / shift goals:  VSS. Best IS documented. Daily weight documented. Ambulated x4. Up in chair. BM today 8/14. CT to -20 suction.    Patient is not progressing towards the following goals:

## 2023-08-14 NOTE — PROGRESS NOTES
Patient transported to [facility] with transport with a full tank of oxygen.

## 2023-08-14 NOTE — THERAPY
Physical Therapy   Initial Evaluation     Patient Name: Venkat Mackenzie  Age:  63 y.o., Sex:  male  Medical Record #: [id number]  Today's Date: 8/14/2023     Precautions  Precautions: Fall Risk;Cardiac Precautions (See Comments);Sternal Precautions (See Comments)    Assessment  Patient is 63 y.o. male with a diagnosis of elective aortic root replacement occurring on 8/9.     Pt tolerated session fairly considering acute medical status. Pt presents with impaired activty tolerance, impaired balance, and impaireed knowledge of post procedure precautions. Pt ambulated 350' with a FWW and SBA. He will benefit from acute PT services to improve his functional independence, improve his knowledge of surgical precautions, and decrease the risk for hospital related functional decline.     At this time anticipate discharge home with home health  services. He will require an FWW prior to discharge.     Pt was provided with open heart handout which was verbally reviewed including: RPE scale, return to walking program, benefits of outpatient cardiac rehab, sternal precautions, cardiac warning signs. Pt was receptive to all education.     Plan    Physical Therapy Initial Treatment Plan   Treatment Plan : (P) Bed Mobility, Debridement, Equipment, Group Therapy, Manual Therapy, Gait Training, Orthotics Training , Neuro Re-Education / Balance, Self Care / Home Evaluation, Stair Training, Therapeutic Activities, Therapeutic Exercise  Treatment Frequency: (P) 3 Times per Week    DC Equipment Recommendations: (P) Front-Wheel Walker  Discharge Recommendations: (P) Recommend home health for continued physical therapy services       Subjective    Pt is pleasant and cooperative throughout session.        Objective       08/14/23 5712   Initial Contact Note    Initial Contact Note Order Received and Verified, Physical Therapy Evaluation in Progress with Full Report to Follow.   Precautions   Precautions Fall Risk;Cardiac Precautions (See  Comments);Sternal Precautions (See Comments)   Vitals   Pulse 74   Patient BP Position Sitting   Pulse Oximetry 94 %   O2 (LPM) 2   O2 Delivery Device Nasal Cannula   Vitals Comments Pt was transferred to , and remained at 95%.   Pain 0 - 10 Group   Location Sternum;Rib Cage   Location Orientation Left   Pain Rating Scale (NPRS) 3   Prior Living Situation   Housing / Facility 1 Story House   Steps Into Home 1   Steps In Home 0   Equipment Owned None   Lives with - Patient's Self Care Capacity Significant Other   Prior Level of Functional Mobility   Bed Mobility Independent   Transfer Status Independent   Ambulation Independent   Ambulation Distance community   Assistive Devices Used None   Stairs Independent   Passive ROM Lower Body   Passive ROM Lower Body WDL   Active ROM Lower Body    Active ROM Lower Body  WDL   Balance Assessment   Sitting Balance (Static) Fair +   Sitting Balance (Dynamic) Fair   Standing Balance (Static) Fair -   Standing Balance (Dynamic) Poor +   Weight Shift Sitting Fair   Weight Shift Standing Fair   Gait Analysis   Gait Level Of Assist Standby Assist   Assistive Device Front Wheel Walker   Distance (Feet) 350  Post ambulation O2 at 84% on RA, He was transferred to  and required 1 min seated rest to return to >90%    # of Times Distance was Traveled 1   Deviation Bradykinetic   # of Stairs Climbed 1   Level of Assist with Stairs Standby Assist   Functional Mobility   Sit to Stand Standby Assist   How much difficulty does the patient currently have...   Turning over in bed (including adjusting bedclothes, sheets and blankets)? 3   Sitting down on and standing up from a chair with arms (e.g., wheelchair, bedside commode, etc.) 3   Moving from lying on back to sitting on the side of the bed? 3   How much help from another person does the patient currently need...   Moving to and from a bed to a chair (including a wheelchair)? 3   Need to walk in a hospital room? 3   Climbing 3-5 steps  with a railing? 3   6 clicks Mobility Score 18   Short Term Goals    Short Term Goal # 1 Pt will ambulate 400' with FWW and supervision.   Short Term Goal # 2 Pt will perform 1 stair with handrails and supervision.   Short Term Goal # 3 Pt will perform sit<>stand and stand pivot transfers with supervsion.n   Education Group   Education Provided Role of Physical Therapist;Sternal Precautions   Sternal Precautions Patient Response Patient;Acceptance;Handout;Demonstration;Explanation;Verbal Demonstration   Role of Physical Therapist Patient Response Patient;Acceptance;Handout;Demonstration;Explanation   Physical Therapy Initial Treatment Plan    Treatment Plan  Bed Mobility;Debridement;Equipment;Group Therapy;Manual Therapy;Gait Training;Orthotics Training ;Neuro Re-Education / Balance;Self Care / Home Evaluation;Stair Training;Therapeutic Activities;Therapeutic Exercise   Treatment Frequency 3 Times per Week   Problem List    Problems Decreased Activity Tolerance;Limited Knowledge of Post-Op Precautions   Anticipated Discharge Equipment and Recommendations   DC Equipment Recommendations Front-Wheel Walker   Discharge Recommendations Recommend home health for continued physical therapy services   Interdisciplinary Plan of Care Collaboration   IDT Collaboration with  Nursing   Patient Position at End of Therapy Seated;Phone within Reach;Tray Table within Reach;Call Light within Reach   Session Information   Date / Session Number  8/14-1 (1/3, 8/20)

## 2023-08-14 NOTE — PROGRESS NOTES
Cardiovascular Surgery Progress Note    Name: Venkat Mackenzie  MRN: [id number]  : 1960  Admit Date: 2023  5:05 AM  5 Days Post-Op     Procedure:  Procedure(s) and Anesthesia Type:     * AORTIC ROOT REPLACEMENT (27 mm Konect Duong bovine pericardial valved conduit, coronary artery reimplantation) and intraoperative transesophageal echocardiography     * ECHOCARDIOGRAM, TRANSESOPHAGEAL, INTRAOPERATIVE - General    Vitals:  Vitals:    23 0425 23 0713 2314 23   BP: 126/84   124/75   Pulse: 76 71  76   Resp: 16 17  17   Temp: 36.5 °C (97.7 °F)   36.5 °C (97.7 °F)   TempSrc: Temporal   Temporal   SpO2: 95%   94%   Weight:   95.1 kg (209 lb 10.5 oz)    Height:          Temp (24hrs), Av.5 °C (97.7 °F), Min:36.4 °C (97.5 °F), Max:36.6 °C (97.9 °F)      Respiratory:    Respiration: 17, Pulse Oximetry: 94 %       Fluids:    Intake/Output Summary (Last 24 hours) at 2023 1120  Last data filed at 2023 1044  Gross per 24 hour   Intake 240 ml   Output 1275 ml   Net -1035 ml       Admit weight: Weight: 94.9 kg (209 lb 3.5 oz)  Current weight: Weight: 95.1 kg (209 lb 10.5 oz) (2314)    Labs:  Recent Labs     23  0242 23  0125 23  0044   WBC 22.0* 17.7* 14.2*   RBC 3.93* 3.59* 3.47*   HEMOGLOBIN 12.0* 10.8* 10.7*   HEMATOCRIT 36.1* 32.3* 31.8*   MCV 91.9 90.0 91.6   MCH 30.5 30.1 30.8   MCHC 33.2 33.4 33.6   RDW 44.5 42.5 42.8   PLATELETCT 161* 174 239   MPV 11.6 10.9 10.4       Recent Labs     23  0242 23  1930 23  0125 23  0142   SODIUM 133*  --  132* 132*   POTASSIUM 4.8 4.8 4.4 4.1   CHLORIDE 96  --  93* 94*   CO2 28  --  29 29   GLUCOSE 123*  --  120* 95   BUN 30*  --  34* 33*   CREATININE 0.80  --  0.69 0.63   CALCIUM 8.6  --  8.5 8.3*                   Medications:  Scheduled Medications   Medication Dose Frequency    metoclopramide  10 mg Q6HRS    guaiFENesin ER  600 mg Q12HRS    amiodarone  400 mg BID    insulin  regular  2-9 Units 4X/DAY ACHS    furosemide  20 mg BID DIURETIC    potassium chloride SA  20 mEq BID    Nozin nasal  swab  1 Applicator BID    aspirin  81 mg DAILY    acetaminophen  1,000 mg Q6HRS    senna-docusate  2 Tablet BID    And    polyethylene glycol/lytes  1 Packet DAILY    And    magnesium hydroxide  30 mL DAILY    omeprazole  20 mg DAILY    enoxaparin (LOVENOX) injection  40 mg DAILY AT 1800    carvedilol  3.125 mg BID WITH MEALS        Exam:   Physical Exam  Vitals and nursing note reviewed.   Constitutional:       General: He is not in acute distress.     Appearance: Normal appearance.   HENT:      Head: Normocephalic.      Right Ear: External ear normal.      Left Ear: External ear normal.      Nose: Nose normal. No congestion.      Mouth/Throat:      Mouth: Mucous membranes are moist.      Pharynx: Oropharynx is clear.   Eyes:      Extraocular Movements: Extraocular movements intact.      Pupils: Pupils are equal, round, and reactive to light.   Cardiovascular:      Rate and Rhythm: Normal rate and regular rhythm.      Pulses: Normal pulses.      Heart sounds: Normal heart sounds.   Pulmonary:      Effort: Pulmonary effort is normal.      Breath sounds: Decreased breath sounds present.   Abdominal:      General: Bowel sounds are decreased. There is no distension.      Palpations: Abdomen is soft.   Musculoskeletal:      Cervical back: Normal range of motion and neck supple. No tenderness.      Right lower leg: Edema present.      Left lower leg: Edema present.   Skin:     General: Skin is warm and dry.      Comments: Midline surgical incision   Neurological:      General: No focal deficit present.      Mental Status: He is alert and oriented to person, place, and time. Mental status is at baseline.   Psychiatric:         Mood and Affect: Mood normal.         Behavior: Behavior normal.         Thought Content: Thought content normal.         Cardiac Medications:    ASA - Yes    Plavix - No;  contraindicated because of Other No CAD    Post-operative Beta Blockers - Yes    Ace/ARB- No; contraindicated because of Normal EF    Statin - No; contraindicated because of No CAD    Aldactone- No; contraindicated because of Normal EF    SGLT2i-  No contraindicated because of cost prohibitive    Ejection Fraction:  55%    Telemetry:   8/10 SR  8/11 SR/a fib  8/12 SR  8/13 a fib to SR  8/14 SR    Assessment/Plan:  POD 1  HDS, SR, neuro intact, wounds intact, abdomen soft, fluid balance positive, wt up,  2 L NC. 160 mL out of chest tubes overnight.  Plan:  Gentle diuresis, d/c chest tubes and bolivar. IS/ambulate.     POD 2  HDS, SR/a fib, neuro intact, wounds intact, abdomen soft, fluid balance negative, wt up.  Bilateral pneumothoraces overnight, s/p L chest tube, remaining 2 cm R apical pneumo. On 6L NC. Plan:  Repeat CXR to evaluate R pneumo, consider CT. Continue diuresis. Resume amio gtt and PO. IS/ambulate.     POD 3  HDS, SR, neuro intact, wounds intact, abdomen soft ,no BM yet. Fluid balance negative, wt above admit wt, 3L NC. L chest tube in place, stable small R pneumo. Plan: DC pacing wires. Continue diuresis, IS/ambulate. Transfer to [facility].     POD 4  HDS, SR- was a fib last night, 1.8 sec pauses, neuro intact, wounds intact, abdomen soft, no BM. Pt complaining of ongoing nausea. Fluid balance negative, wt above admit wt, 2.5 L NC. L chest tube w air leak.  Plan: Continue diuresis. IS/ambulate. PT to evaluate. Add reglan. 2 view CXR and ambulatory O2 test today.    POD 5 HDS, SR, diuresing well- cont, CT no airlak- put to waterseal and check CXR in two hours- keep today, amb, enc IS, awaiting PT re-eval       Disposition:  OT rec post acute, PT eval pending  Pt lives with spouse in [place].

## 2023-08-14 NOTE — DISCHARGE PLANNING
Discharge Appointments/outpatient referrals/ and  set up:    Cardiac surgery follow up appointment made for 4-5 weeks out    Hospital discharge team/schedulers called for cardiology f/u appointment for MD or APRN within 3-4 weeks if possible.    Aortic surveillance program/vascular medicine referral needed, orders placed.    Anticoagulation referral/ coumadin clinic referral not needed and orders not placed.    INR draws are not indicated for root replacement procedure and 81 mg ASA.    1/2 therapies recommended placement. Awaiting re-eval from PT/OT, requested bedside RN to follow up.    2 view CXR ordered per RN cardiac surgery protocol as it was not completed yesterday and patient remains on 2.5 L NC. Bedside RN notified.    Will request the home O2 test of nursing later today or tomorrow based on 2 view CXR results and PT/OT re-eval.

## 2023-08-14 NOTE — PROGRESS NOTES
Monitor Summary:    Rhythm: SR w/ 1st degree  Rate: 69-82  Ectopy: (O) PAC  Measurement : .26/.06/.40

## 2023-08-14 NOTE — PROGRESS NOTES
Bedside report received. Pt A&Ox4. Complains of no pain at this time. SR 72 with a 1st degree on the monitors. CT to -20 suction. POC discussed with pt. Pt verbalizes understanding. Call light and belongings within reach. Bed locked and in lowest position. Alarm and fall precautions in place.

## 2023-08-14 NOTE — PROGRESS NOTES
Bedside report received from Frankee NOC RN. Pt A&Ox4. Sinus rhythm on the monitor. On 2 liters silicone nasal cannula. No current complaints of pain. Sternum incision site assessed. Jareth hose on. Sitting up in chair. POC discussed with pt. Pt verbalizes understanding. Call light and belongings within reach. Bed locked and in lowest position. Alarm and fall precautions in place.

## 2023-08-15 ENCOUNTER — APPOINTMENT (OUTPATIENT)
Dept: RADIOLOGY | Facility: MEDICAL CENTER | Age: 63
DRG: 219 | End: 2023-08-15
Attending: NURSE PRACTITIONER
Payer: COMMERCIAL

## 2023-08-15 ENCOUNTER — APPOINTMENT (OUTPATIENT)
Dept: RADIOLOGY | Facility: MEDICAL CENTER | Age: 63
DRG: 219 | End: 2023-08-15
Attending: INTERNAL MEDICINE
Payer: COMMERCIAL

## 2023-08-15 LAB
ANION GAP SERPL CALC-SCNC: 9 MMOL/L (ref 7–16)
BUN SERPL-MCNC: 26 MG/DL (ref 8–22)
CALCIUM SERPL-MCNC: 8.4 MG/DL (ref 8.5–10.5)
CHLORIDE SERPL-SCNC: 93 MMOL/L (ref 96–112)
CO2 SERPL-SCNC: 30 MMOL/L (ref 20–33)
CREAT SERPL-MCNC: 0.66 MG/DL (ref 0.5–1.4)
ERYTHROCYTE [DISTWIDTH] IN BLOOD BY AUTOMATED COUNT: 42.5 FL (ref 35.9–50)
GFR SERPLBLD CREATININE-BSD FMLA CKD-EPI: 104 ML/MIN/1.73 M 2
GLUCOSE BLD STRIP.AUTO-MCNC: 100 MG/DL (ref 65–99)
GLUCOSE BLD STRIP.AUTO-MCNC: 106 MG/DL (ref 65–99)
GLUCOSE BLD STRIP.AUTO-MCNC: 96 MG/DL (ref 65–99)
GLUCOSE BLD STRIP.AUTO-MCNC: 98 MG/DL (ref 65–99)
GLUCOSE SERPL-MCNC: 101 MG/DL (ref 65–99)
HCT VFR BLD AUTO: 30.8 % (ref 42–52)
HGB BLD-MCNC: 10.1 G/DL (ref 14–18)
MCH RBC QN AUTO: 30 PG (ref 27–33)
MCHC RBC AUTO-ENTMCNC: 32.8 G/DL (ref 32.3–36.5)
MCV RBC AUTO: 91.4 FL (ref 81.4–97.8)
PLATELET # BLD AUTO: 264 K/UL (ref 164–446)
PMV BLD AUTO: 9.9 FL (ref 9–12.9)
POTASSIUM SERPL-SCNC: 4 MMOL/L (ref 3.6–5.5)
RBC # BLD AUTO: 3.37 M/UL (ref 4.7–6.1)
SODIUM SERPL-SCNC: 132 MMOL/L (ref 135–145)
WBC # BLD AUTO: 10.2 K/UL (ref 4.8–10.8)

## 2023-08-15 PROCEDURE — A9270 NON-COVERED ITEM OR SERVICE: HCPCS | Mod: JZ | Performed by: NURSE PRACTITIONER

## 2023-08-15 PROCEDURE — 71045 X-RAY EXAM CHEST 1 VIEW: CPT

## 2023-08-15 PROCEDURE — 82962 GLUCOSE BLOOD TEST: CPT

## 2023-08-15 PROCEDURE — 97535 SELF CARE MNGMENT TRAINING: CPT

## 2023-08-15 PROCEDURE — 700111 HCHG RX REV CODE 636 W/ 250 OVERRIDE (IP): Mod: JZ

## 2023-08-15 PROCEDURE — 700111 HCHG RX REV CODE 636 W/ 250 OVERRIDE (IP): Performed by: NURSE PRACTITIONER

## 2023-08-15 PROCEDURE — 770022 HCHG ROOM/CARE - ICU (200)

## 2023-08-15 PROCEDURE — 99024 POSTOP FOLLOW-UP VISIT: CPT | Performed by: NURSE PRACTITIONER

## 2023-08-15 PROCEDURE — 80048 BASIC METABOLIC PNL TOTAL CA: CPT | Mod: 91

## 2023-08-15 PROCEDURE — A9270 NON-COVERED ITEM OR SERVICE: HCPCS

## 2023-08-15 PROCEDURE — 700102 HCHG RX REV CODE 250 W/ 637 OVERRIDE(OP): Mod: JZ | Performed by: NURSE PRACTITIONER

## 2023-08-15 PROCEDURE — 700102 HCHG RX REV CODE 250 W/ 637 OVERRIDE(OP)

## 2023-08-15 PROCEDURE — 85027 COMPLETE CBC AUTOMATED: CPT

## 2023-08-15 RX ORDER — LORAZEPAM 2 MG/ML
0.5 INJECTION INTRAMUSCULAR EVERY 4 HOURS PRN
Status: DISCONTINUED | OUTPATIENT
Start: 2023-08-15 | End: 2023-08-18 | Stop reason: HOSPADM

## 2023-08-15 RX ORDER — FUROSEMIDE 10 MG/ML
20 INJECTION INTRAMUSCULAR; INTRAVENOUS
Status: DISCONTINUED | OUTPATIENT
Start: 2023-08-16 | End: 2023-08-16

## 2023-08-15 RX ORDER — POTASSIUM CHLORIDE 20 MEQ/1
20 TABLET, EXTENDED RELEASE ORAL DAILY
Status: DISCONTINUED | OUTPATIENT
Start: 2023-08-16 | End: 2023-08-16

## 2023-08-15 RX ADMIN — TRAMADOL HYDROCHLORIDE 50 MG: 50 TABLET ORAL at 07:29

## 2023-08-15 RX ADMIN — MAGNESIUM HYDROXIDE 30 ML: 1200 LIQUID ORAL at 05:34

## 2023-08-15 RX ADMIN — SENNOSIDES AND DOCUSATE SODIUM 2 TABLET: 50; 8.6 TABLET ORAL at 17:19

## 2023-08-15 RX ADMIN — OXYCODONE HYDROCHLORIDE 5 MG: 5 TABLET ORAL at 13:58

## 2023-08-15 RX ADMIN — AMIODARONE HYDROCHLORIDE 400 MG: 200 TABLET ORAL at 17:20

## 2023-08-15 RX ADMIN — FUROSEMIDE 20 MG: 10 INJECTION, SOLUTION INTRAVENOUS at 05:35

## 2023-08-15 RX ADMIN — GUAIFENESIN 600 MG: 600 TABLET, EXTENDED RELEASE ORAL at 05:35

## 2023-08-15 RX ADMIN — CARVEDILOL 3.12 MG: 3.12 TABLET, FILM COATED ORAL at 06:13

## 2023-08-15 RX ADMIN — ENOXAPARIN SODIUM 40 MG: 100 INJECTION SUBCUTANEOUS at 17:21

## 2023-08-15 RX ADMIN — SENNOSIDES AND DOCUSATE SODIUM 2 TABLET: 50; 8.6 TABLET ORAL at 05:34

## 2023-08-15 RX ADMIN — ONDANSETRON 8 MG: 2 INJECTION INTRAMUSCULAR; INTRAVENOUS at 07:30

## 2023-08-15 RX ADMIN — AMIODARONE HYDROCHLORIDE 400 MG: 200 TABLET ORAL at 05:35

## 2023-08-15 RX ADMIN — ASPIRIN 81 MG: 81 TABLET, COATED ORAL at 05:35

## 2023-08-15 RX ADMIN — POTASSIUM CHLORIDE 20 MEQ: 1500 TABLET, EXTENDED RELEASE ORAL at 05:35

## 2023-08-15 RX ADMIN — CARVEDILOL 3.12 MG: 3.12 TABLET, FILM COATED ORAL at 17:22

## 2023-08-15 RX ADMIN — METOCLOPRAMIDE 10 MG: 5 INJECTION, SOLUTION INTRAMUSCULAR; INTRAVENOUS at 12:53

## 2023-08-15 RX ADMIN — POLYETHYLENE GLYCOL 3350 1 PACKET: 17 POWDER, FOR SOLUTION ORAL at 05:33

## 2023-08-15 RX ADMIN — OMEPRAZOLE 20 MG: 20 CAPSULE, DELAYED RELEASE ORAL at 05:35

## 2023-08-15 RX ADMIN — Medication 1 APPLICATOR: at 20:58

## 2023-08-15 RX ADMIN — METOCLOPRAMIDE 10 MG: 5 INJECTION, SOLUTION INTRAMUSCULAR; INTRAVENOUS at 05:35

## 2023-08-15 RX ADMIN — METOCLOPRAMIDE 10 MG: 5 INJECTION, SOLUTION INTRAMUSCULAR; INTRAVENOUS at 17:21

## 2023-08-15 RX ADMIN — LORAZEPAM 0.5 MG: 2 INJECTION INTRAMUSCULAR; INTRAVENOUS at 10:57

## 2023-08-15 RX ADMIN — METOCLOPRAMIDE 10 MG: 5 INJECTION, SOLUTION INTRAMUSCULAR; INTRAVENOUS at 23:12

## 2023-08-15 RX ADMIN — METOCLOPRAMIDE 10 MG: 5 INJECTION, SOLUTION INTRAMUSCULAR; INTRAVENOUS at 00:20

## 2023-08-15 RX ADMIN — GUAIFENESIN 600 MG: 600 TABLET, EXTENDED RELEASE ORAL at 17:20

## 2023-08-15 RX ADMIN — Medication 1 APPLICATOR: at 08:57

## 2023-08-15 RX ADMIN — OXYCODONE HYDROCHLORIDE 5 MG: 5 TABLET ORAL at 01:48

## 2023-08-15 ASSESSMENT — PAIN DESCRIPTION - PAIN TYPE
TYPE: ACUTE PAIN;SURGICAL PAIN
TYPE: ACUTE PAIN
TYPE: ACUTE PAIN;SURGICAL PAIN
TYPE: ACUTE PAIN
TYPE: ACUTE PAIN;SURGICAL PAIN
TYPE: ACUTE PAIN;SURGICAL PAIN
TYPE: ACUTE PAIN
TYPE: ACUTE PAIN
TYPE: ACUTE PAIN;SURGICAL PAIN
TYPE: ACUTE PAIN
TYPE: ACUTE PAIN;SURGICAL PAIN

## 2023-08-15 ASSESSMENT — FIBROSIS 4 INDEX: FIB4 SCORE: 0.72

## 2023-08-15 ASSESSMENT — COGNITIVE AND FUNCTIONAL STATUS - GENERAL
HELP NEEDED FOR BATHING: A LITTLE
SUGGESTED CMS G CODE MODIFIER DAILY ACTIVITY: CK
DAILY ACTIVITIY SCORE: 19
DRESSING REGULAR LOWER BODY CLOTHING: A LITTLE
TOILETING: A LITTLE
DRESSING REGULAR UPPER BODY CLOTHING: A LITTLE
PERSONAL GROOMING: A LITTLE

## 2023-08-15 NOTE — PROGRESS NOTES
Critical care    Patient moved back to [facility] today with worsening left pneumothorax on CXR after placement of a emergent left pleural chest tube on 8/11 by Dr. Reynolds for symptomatic pneumothorax.  CT post chest tube placement showed improvement/resolution of left pneumothorax and appropriate placement of chest tube.  Patient had moved out of the ICU to the telemetry floor.  Apparently there has been some air leak noted.  Reviewed with CV surgery team and I was concerned that if the patient is continuing to have air leak and recurrent pneumothorax there may be a surgical issue that needs to be addressed.  However, on arrival to ICU, it was noted that chest tube was disconnected from the Pleur-evac and held together by tape only.  I suspect there is been some intermittent entrainment of air into the pleural space and this spontaneously breathing patient with disconnected chest tube.  He is clinically stable with normal vital signs and not in distress.  The Pleur-evac and tubing was exchanged and correctly secured and he was placed back on -20 cm water suction.  We will repeat CXR in several hours.  Anticipate pneumothorax should be improving but if not may require new chest tube placement or further surgical evaluation.  Case reviewed in detail with surgical team by phone.

## 2023-08-15 NOTE — CARE PLAN
"  Problem: Post Op Day 5 CABG/Heart Valve Replacement  Goal: Optimal care of the Post Op CABG/Heart Valve replacement Post Op Day 5  Outcome: Progressing  Intervention: Daily weights in the morning  Note: 95.1 kg stand up scale   Intervention: Shower daily and clean incisions twice daily with soap and water  Note: Patient showered and washed incision site   Intervention: Up in chair for all meals  Note: Patient sitting up in chair for meals   Intervention: Ambulate 4 times daily, increasing the distance each time  Note: Patient ambulated the [facility] x 3  Intervention: IS q 1 hour while awake and record best IS volume  Note: IS up to 1,000 mL  Intervention: Complete \"Home O2 Assessment' in vitals flowsheets if unable to wean off O2  Note: Patient on 2 liters silicone nasal cannula      The patient is Watcher - Medium risk of patient condition declining or worsening    Shift Goals: Monitor chest tube  Clinical Goals: Monitor neurological assessment and Chest Tube  Patient Goals: Discharge planning  Family Goals: ANA    Progress made toward(s) clinical / shift goals:  Neurological assessment intact     Patient is not progressing towards the following goals: Chest tube       "

## 2023-08-15 NOTE — PROGRESS NOTES
4 Eyes Skin Assessment Completed by Alphonse RN and Sharon RN.    Head WDL  Ears WDL  Nose WDL  Mouth WDL  Neck WDL  Breast/Chest Scar Midline incision with dermabound, L sided chest tube  Shoulder Blades WDL  Spine WDL scar from previous surgery  (R) Arm/Elbow/Hand WDL  (L) Arm/Elbow/Hand WDL  Abdomen Redness, Scar, and Bruising  Groin WDL  Scrotum/Coccyx/Buttocks WDL  (R) Leg WDL  (L) Leg WDL  (R) Heel/Foot/Toe Boggy  (L) Heel/Foot/Toe Boggy          Devices In Places Blood Pressure Cuff, Pulse Ox, SCD's, and Central Line      Interventions In Place Pillows and Low Air Loss Mattress    Possible Skin Injury No    Pictures Uploaded Into Epic N/A  Wound Consult Placed N/A  RN Wound Prevention Protocol Ordered No

## 2023-08-15 NOTE — CARE PLAN
The patient is Stable - Low risk of patient condition declining or worsening    Shift Goals  Clinical Goals: cxr, monitor chest tube site and pleura vac, monitor vitals  Patient Goals: rest  Family Goals: ANA    Progress made toward(s) clinical / shift goals:    Problem: Knowledge Deficit - Standard  Goal: Patient and family/care givers will demonstrate understanding of plan of care, disease process/condition, diagnostic tests and medications  Description: Target End Date:  1-3 days or as soon as patient condition allows    Document in Patient Education    1.  Patient and family/caregiver oriented to unit, equipment, visitation policy and means for communicating concern  2.  Complete/review Learning Assessment  3.  Assess knowledge level of disease process/condition, treatment plan, diagnostic tests and medications  4.  Explain disease process/condition, treatment plan, diagnostic tests and medications  Outcome: Progressing     Problem: Pain - Standard  Goal: Alleviation of pain or a reduction in pain to the patient’s comfort goal  Description: Target End Date:  Prior to discharge or change in level of care    Document on Vitals flowsheet    1.  Document pain using the appropriate pain scale per order or unit policy  2.  Educate and implement non-pharmacologic comfort measures (i.e. relaxation, distraction, massage, cold/heat therapy, etc.)  3.  Pain management medications as ordered  4.  Reassess pain after pain med administration per policy  5.  If opiods administered assess patient's response to pain medication is appropriate per POSS sedation scale  6.  Follow pain management plan developed in collaboration with patient and interdisciplinary team (including palliative care or pain specialists if applicable)  Outcome: Progressing  Flowsheets  Taken 8/15/2023 1200 by Mikki Trivedi RSHEELA  Pain Rating Scale (NPRS): 0  Taken 8/15/2023 0600 by Alphonse Last R.N.  Critical-Care Pain Observation Score:  0     Problem: Post Op Day 5 CABG/Heart Valve Replacement  Goal: Optimal care of the Post Op CABG/Heart Valve replacement Post Op Day 5  Description: Target End Date:  end of post op day 5  Outcome: Progressing  Intervention: Daily weights in the morning  Note: Daily weight completed in am, stand up scale  Intervention: Up in chair for all meals  Note: Pt up and ambulated for breakfast, lunch and will get up for dinner this evening   Intervention: IS q 1 hour while awake and record best IS volume  Note: Pt motivated but weak effort on   BL 3000  Intervention: Shower daily and clean incisions twice daily with soap and water  Note: Will shower patient this afternoon, incision care done once this AM  Intervention: Consider removal of bolivar, chest tube and pacer wires if not already done  Note: Bolivar removed        Patient is not progressing towards the following goals:

## 2023-08-15 NOTE — CARE PLAN
The patient is Watcher - Medium risk of patient condition declining or worsening    Shift Goals  Clinical Goals: Monitor chest tube, Maintain hemodynamic stability  Patient Goals: Update his significant other  Family Goals: ANA    Progress made toward(s) clinical / shift goals:       Problem: Post Op Day 5 CABG/Heart Valve Replacement  Goal: Optimal care of the Post Op CABG/Heart Valve replacement Post Op Day 5  Intervention: Daily weights in the morning  Note: Stand up weight 95.1kg  Intervention: Up in chair for all meals  Note: Per report, patient up in chair for all meals.   Intervention: IS q 1 hour while awake and record best IS volume  Note: Best IS volume: 1200ml  Intervention: Ambulate 4 times daily, increasing the distance each time  Note: Patient ambulated 4 times with furthest distance being 225 ft.  Intervention: Consider removal of bolivar, chest tube and pacer wires if not already done  Note: Bolivar and pacer wires already removed.  Intervention: Shower daily and clean incisions twice daily with soap and water  Note: Patient showered.      Problem: Pain - Standard  Goal: Alleviation of pain or a reduction in pain to the patient’s comfort goal  Outcome: Progressing   Patient having 5/10 constant and dull left flank pain. Heat applied, patient repositioned, and oxy 5mg administered.

## 2023-08-15 NOTE — PROGRESS NOTES
Chest tube site checked, clean dry and intact. Chest tube site connected to pleura-vac, hooked up to -20 suction, no sign of air leak. Per APRN White order for cxr at 12pm.

## 2023-08-15 NOTE — DISCHARGE PLANNING
Met with pt who lives with wife. He was working full time at [employer]. Lives in a one level home. He is normally independent with ADLs and IADLs.     PCP is Becky Lynn MD.   Uses [employer] [facility] in [place].     Care Transition Team Assessment    Information Source  Orientation Level: Oriented X4  Information Given By: Patient  Who is responsible for making decisions for patient? : Patient    Readmission Evaluation  Is this a readmission?: No    Elopement Risk  Legal Hold: No  Ambulatory or Self Mobile in Wheelchair: No-Not an Elopement Risk  Elopement Risk: Not at Risk for Elopement    Interdisciplinary Discharge Planning  Does Admitting Nurse Feel This Could be a Complex Discharge?: No  Primary Care Physician: Dr. Becky Lynn  Lives with - Patient's Self Care Capacity: Spouse  Patient or legal guardian wants to designate a caregiver: No  Support Systems: Children, Spouse / Significant Other  Housing / Facility: [address]  Do You Take your Prescribed Medications Regularly: Yes  Able to Return to Previous ADL's: Future Time w/Therapy  Mobility Issues: No  Prior Services: Home-Independent  Patient Prefers to be Discharged to:: Home with HH  Assistance Needed: No  Durable Medical Equipment: Walker (will need hospice)    Discharge Preparedness  What is your plan after discharge?: Home with help  What are your discharge supports?: Spouse, Child  Prior Functional Level: Ambulatory, Drives Self, Independent with Activities of Daily Living, Independent with Medication Management  Difficulity with ADLs: Walking  Difficulity with IADLs: Driving, Cooking, Laundry, Shopping    Functional Assesment  Prior Functional Level: Ambulatory, Drives Self, Independent with Activities of Daily Living, Independent with Medication Management    Finances  Financial Barriers to Discharge: No  Prescription Coverage: Yes    Vision / Hearing Impairment  Vision Impairment : Yes  Right Eye Vision: Impaired, Wears Glasses  Left Eye  Vision: Impaired, Wears Glasses  Hearing Impairment : No    Values / Beliefs / Concerns  Values / Beliefs Concerns : No    Advance Directive  Advance Directive?: None    Domestic Abuse  Have you ever been the victim of abuse or violence?: No  Physical Abuse or Sexual Abuse: No  Verbal Abuse or Emotional Abuse: No      Discharge Risks or Barriers  Discharge risks or barriers?: Post-acute placement / services  Patient risk factors: Vulnerable adult    Anticipated Discharge Information  Discharge Disposition: Discharged to home/self care (01)

## 2023-08-15 NOTE — THERAPY
"Occupational Therapy  Daily Treatment     Patient Name: Venkat Mackenzie  Age:  63 y.o., Sex:  male  Medical Record #: [id number]  Today's Date: 8/15/2023     Precautions  Precautions: Fall Risk, Cardiac Precautions (See Comments), Sternal Precautions (See Comments), Chest Tube    Assessment  Pt was seen for OT tx, pt has returned to ICU for CT management. Pt remains w/fair mobility, but ADL's such as dressing, toileting and standing grooming are limited. Pt reports his SO works FT and is not available to assist. Pt does have the potential to continued progress in this setting but at present recommendations remain for post acute placement.     Plan    Treatment Plan Status: Modify Current Treatment Plan  Type of Treatment: Self Care / Activities of Daily Living, Adaptive Equipment, Neuro Re-Education / Balance, Therapeutic Exercises, Therapeutic Activity  Treatment Frequency: 4 Times per Week  Treatment Duration: Until Therapy Goals Met    DC Equipment Recommendations: Unable to determine at this time  Discharge Recommendations: Recommend post-acute placement for additional occupational therapy services prior to discharge home- could progress     Subjective    \"I want what is best for me to recover\"      Objective       08/15/23 1556   Treatment Charges   Charges Yes   OT Self Care / ADL (Units) 3   Total Time Spent   OT Time Spent Yes   OT Self Care / ADL (Minutes) 39   OT Total Time Spent (Calculated) 39   Precautions   Precautions Fall Risk;Cardiac Precautions (See Comments);Sternal Precautions (See Comments);Chest Tube   Pain 0 - 10 Group   Location Sternum   Location Orientation Anterior   Therapist Pain Assessment During Activity;Nurse Notified   Cognition    Cognition / Consciousness X   Level of Consciousness Alert   New Learning Impaired   Attention Impaired   Comments pt is AOx4 and cooperative and motivated but remains w/fluccutating arousal and attention   Other Treatments   Other Treatments Provided " "Focused on LB ADL's and dc recommendations pt reports SO is not able to assist pt d/t working FT, reviewed goals of indepenence prior to dc including mobility and ADL's   Balance   Sitting Balance (Static) Fair +   Sitting Balance (Dynamic) Fair   Standing Balance (Static) Fair   Standing Balance (Dynamic) Fair -   Weight Shift Sitting Fair   Weight Shift Standing Fair   Skilled Intervention Verbal Cuing;Tactile Cuing;Facilitation   Comments w/fww   Bed Mobility    Comments up in chair pre/post   Activities of Daily Living   Grooming Contact Guard Assist;Standing   Upper Body Dressing Minimal Assist   Lower Body Dressing Moderate Assist   Toileting Contact Guard Assist   Skilled Intervention Verbal Cuing;Tactile Cuing;Facilitation;Compensatory Strategies   Comments limited LB ROM to complete dressing tasks, able to compelte toilet jennifer care but anticipate needs assist for clothing managment   How much help from another person does the patient currently need...   6 Clicks Daily Activity Score 19   Functional Mobility   Sit to Stand Standby Assist   Bed, Chair, Wheelchair Transfer Contact Guard Assist   Toilet Transfers Contact Guard Assist   Mobility walking in room w/fww   Skilled Intervention Verbal Cuing;Tactile Cuing;Facilitation   Activity Tolerance   Comments c/o fatigue   Patient / Family Goals   Patient / Family Goal #1 \"To mobilize\"   Goal #1 Outcome Progressing as expected   Short Term Goals   Short Term Goal # 1 Pt will complete ADL transfers with supv   Goal Outcome # 1 Progressing as expected   Short Term Goal # 2 Pt will complete LB dressing with supv using AE PRN   Goal Outcome # 2 Progressing slower than expected   Short Term Goal # 3 Pt will complete toileting with supv   Goal Outcome # 3 Progressing as expected   Education Group   Role of Occupational Therapist Patient Response Patient;Acceptance;Explanation;Verbal Demonstration   Cardiac Precautions Patient Response " Patient;Acceptance;Explanation;Reinforcement Needed;Handout   Sternal Precautions Patient Response Patient;Acceptance;Explanation;Demonstration;Handout;Reinforcement Needed;Verbal Demonstration   Occupational Therapy Treatment Plan    O.T. Treatment Plan Modify Current Treatment Plan   Treatment Frequency 4 Times per Week   Anticipated Discharge Equipment and Recommendations   DC Equipment Recommendations Unable to determine at this time   Interdisciplinary Plan of Care Collaboration   IDT Collaboration with  Nursing   Patient Position at End of Therapy Seated;Call Light within Reach;Tray Table within Reach;Phone within Reach   Collaboration Comments RN aware of OT tx and pts efforts   Session Information   Date / Session Number  8/15 #2 (2/4, 8/17)

## 2023-08-16 ENCOUNTER — APPOINTMENT (OUTPATIENT)
Dept: RADIOLOGY | Facility: MEDICAL CENTER | Age: 63
DRG: 219 | End: 2023-08-16
Attending: INTERNAL MEDICINE
Payer: COMMERCIAL

## 2023-08-16 ENCOUNTER — APPOINTMENT (OUTPATIENT)
Dept: RADIOLOGY | Facility: MEDICAL CENTER | Age: 63
DRG: 219 | End: 2023-08-16
Attending: NURSE PRACTITIONER
Payer: COMMERCIAL

## 2023-08-16 LAB
ANION GAP SERPL CALC-SCNC: 10 MMOL/L (ref 7–16)
BUN SERPL-MCNC: 25 MG/DL (ref 8–22)
CALCIUM SERPL-MCNC: 8.9 MG/DL (ref 8.5–10.5)
CHLORIDE SERPL-SCNC: 94 MMOL/L (ref 96–112)
CO2 SERPL-SCNC: 30 MMOL/L (ref 20–33)
CREAT SERPL-MCNC: 0.66 MG/DL (ref 0.5–1.4)
EKG IMPRESSION: NORMAL
ERYTHROCYTE [DISTWIDTH] IN BLOOD BY AUTOMATED COUNT: 42.6 FL (ref 35.9–50)
GFR SERPLBLD CREATININE-BSD FMLA CKD-EPI: 104 ML/MIN/1.73 M 2
GLUCOSE BLD STRIP.AUTO-MCNC: 111 MG/DL (ref 65–99)
GLUCOSE BLD STRIP.AUTO-MCNC: 118 MG/DL (ref 65–99)
GLUCOSE BLD STRIP.AUTO-MCNC: 119 MG/DL (ref 65–99)
GLUCOSE BLD STRIP.AUTO-MCNC: 123 MG/DL (ref 65–99)
GLUCOSE SERPL-MCNC: 101 MG/DL (ref 65–99)
HCT VFR BLD AUTO: 30.1 % (ref 42–52)
HGB BLD-MCNC: 10.2 G/DL (ref 14–18)
MAGNESIUM SERPL-MCNC: 1.8 MG/DL (ref 1.5–2.5)
MCH RBC QN AUTO: 30.7 PG (ref 27–33)
MCHC RBC AUTO-ENTMCNC: 33.9 G/DL (ref 32.3–36.5)
MCV RBC AUTO: 90.7 FL (ref 81.4–97.8)
PLATELET # BLD AUTO: 292 K/UL (ref 164–446)
PMV BLD AUTO: 9.7 FL (ref 9–12.9)
POTASSIUM SERPL-SCNC: 3.9 MMOL/L (ref 3.6–5.5)
POTASSIUM SERPL-SCNC: 4 MMOL/L (ref 3.6–5.5)
RBC # BLD AUTO: 3.32 M/UL (ref 4.7–6.1)
SODIUM SERPL-SCNC: 134 MMOL/L (ref 135–145)
WBC # BLD AUTO: 10 K/UL (ref 4.8–10.8)

## 2023-08-16 PROCEDURE — 700102 HCHG RX REV CODE 250 W/ 637 OVERRIDE(OP)

## 2023-08-16 PROCEDURE — A9270 NON-COVERED ITEM OR SERVICE: HCPCS | Mod: JZ | Performed by: NURSE PRACTITIONER

## 2023-08-16 PROCEDURE — 82962 GLUCOSE BLOOD TEST: CPT | Mod: 91

## 2023-08-16 PROCEDURE — 93005 ELECTROCARDIOGRAM TRACING: CPT | Performed by: THORACIC SURGERY (CARDIOTHORACIC VASCULAR SURGERY)

## 2023-08-16 PROCEDURE — 83735 ASSAY OF MAGNESIUM: CPT

## 2023-08-16 PROCEDURE — 700102 HCHG RX REV CODE 250 W/ 637 OVERRIDE(OP): Mod: JZ | Performed by: NURSE PRACTITIONER

## 2023-08-16 PROCEDURE — 770022 HCHG ROOM/CARE - ICU (200)

## 2023-08-16 PROCEDURE — A9270 NON-COVERED ITEM OR SERVICE: HCPCS | Performed by: NURSE PRACTITIONER

## 2023-08-16 PROCEDURE — A9270 NON-COVERED ITEM OR SERVICE: HCPCS

## 2023-08-16 PROCEDURE — 700102 HCHG RX REV CODE 250 W/ 637 OVERRIDE(OP): Performed by: NURSE PRACTITIONER

## 2023-08-16 PROCEDURE — 700111 HCHG RX REV CODE 636 W/ 250 OVERRIDE (IP): Mod: JZ | Performed by: NURSE PRACTITIONER

## 2023-08-16 PROCEDURE — 71045 X-RAY EXAM CHEST 1 VIEW: CPT

## 2023-08-16 PROCEDURE — 700111 HCHG RX REV CODE 636 W/ 250 OVERRIDE (IP): Mod: JZ

## 2023-08-16 PROCEDURE — 93010 ELECTROCARDIOGRAM REPORT: CPT | Performed by: INTERNAL MEDICINE

## 2023-08-16 PROCEDURE — 99024 POSTOP FOLLOW-UP VISIT: CPT | Performed by: NURSE PRACTITIONER

## 2023-08-16 PROCEDURE — 84132 ASSAY OF SERUM POTASSIUM: CPT

## 2023-08-16 RX ORDER — AMIODARONE HYDROCHLORIDE 200 MG/1
400 TABLET ORAL 2 TIMES DAILY
Status: DISCONTINUED | OUTPATIENT
Start: 2023-08-16 | End: 2023-08-16

## 2023-08-16 RX ORDER — POTASSIUM CHLORIDE 20 MEQ/1
10 TABLET, EXTENDED RELEASE ORAL DAILY
Status: DISCONTINUED | OUTPATIENT
Start: 2023-08-16 | End: 2023-08-17

## 2023-08-16 RX ORDER — FUROSEMIDE 20 MG/1
20 TABLET ORAL
Status: DISCONTINUED | OUTPATIENT
Start: 2023-08-16 | End: 2023-08-17

## 2023-08-16 RX ADMIN — GUAIFENESIN 600 MG: 600 TABLET, EXTENDED RELEASE ORAL at 05:24

## 2023-08-16 RX ADMIN — GUAIFENESIN 600 MG: 600 TABLET, EXTENDED RELEASE ORAL at 17:40

## 2023-08-16 RX ADMIN — OMEPRAZOLE 20 MG: 20 CAPSULE, DELAYED RELEASE ORAL at 05:25

## 2023-08-16 RX ADMIN — OXYCODONE HYDROCHLORIDE 5 MG: 5 TABLET ORAL at 08:56

## 2023-08-16 RX ADMIN — AMIODARONE HYDROCHLORIDE 0.5 MG/MIN: 1.8 INJECTION, SOLUTION INTRAVENOUS at 13:30

## 2023-08-16 RX ADMIN — CARVEDILOL 3.12 MG: 3.12 TABLET, FILM COATED ORAL at 06:16

## 2023-08-16 RX ADMIN — AMIODARONE HYDROCHLORIDE 400 MG: 200 TABLET ORAL at 17:40

## 2023-08-16 RX ADMIN — SENNOSIDES AND DOCUSATE SODIUM 2 TABLET: 50; 8.6 TABLET ORAL at 17:40

## 2023-08-16 RX ADMIN — SENNOSIDES AND DOCUSATE SODIUM 2 TABLET: 50; 8.6 TABLET ORAL at 05:24

## 2023-08-16 RX ADMIN — AMIODARONE HYDROCHLORIDE 400 MG: 200 TABLET ORAL at 05:26

## 2023-08-16 RX ADMIN — Medication 1 APPLICATOR: at 20:40

## 2023-08-16 RX ADMIN — ENOXAPARIN SODIUM 40 MG: 100 INJECTION SUBCUTANEOUS at 17:40

## 2023-08-16 RX ADMIN — OXYCODONE HYDROCHLORIDE 5 MG: 5 TABLET ORAL at 16:45

## 2023-08-16 RX ADMIN — LORAZEPAM 0.5 MG: 2 INJECTION INTRAMUSCULAR; INTRAVENOUS at 07:19

## 2023-08-16 RX ADMIN — MAGNESIUM HYDROXIDE 30 ML: 1200 LIQUID ORAL at 05:27

## 2023-08-16 RX ADMIN — POLYETHYLENE GLYCOL 3350 1 PACKET: 17 POWDER, FOR SOLUTION ORAL at 05:23

## 2023-08-16 RX ADMIN — FUROSEMIDE 20 MG: 20 TABLET ORAL at 12:48

## 2023-08-16 RX ADMIN — Medication 1 APPLICATOR: at 08:23

## 2023-08-16 RX ADMIN — POTASSIUM CHLORIDE 20 MEQ: 1500 TABLET, EXTENDED RELEASE ORAL at 05:24

## 2023-08-16 RX ADMIN — AMIODARONE HYDROCHLORIDE 1 MG/MIN: 1.8 INJECTION, SOLUTION INTRAVENOUS at 07:50

## 2023-08-16 RX ADMIN — METOCLOPRAMIDE 10 MG: 5 INJECTION, SOLUTION INTRAMUSCULAR; INTRAVENOUS at 05:25

## 2023-08-16 RX ADMIN — ASPIRIN 81 MG: 81 TABLET, COATED ORAL at 05:26

## 2023-08-16 RX ADMIN — FUROSEMIDE 20 MG: 10 INJECTION, SOLUTION INTRAVENOUS at 05:25

## 2023-08-16 RX ADMIN — POTASSIUM CHLORIDE 10 MEQ: 1500 TABLET, EXTENDED RELEASE ORAL at 12:48

## 2023-08-16 ASSESSMENT — PAIN DESCRIPTION - PAIN TYPE
TYPE: ACUTE PAIN;SURGICAL PAIN
TYPE: ACUTE PAIN
TYPE: ACUTE PAIN;SURGICAL PAIN
TYPE: ACUTE PAIN
TYPE: ACUTE PAIN
TYPE: ACUTE PAIN;SURGICAL PAIN
TYPE: ACUTE PAIN
TYPE: SURGICAL PAIN;ACUTE PAIN
TYPE: ACUTE PAIN;SURGICAL PAIN
TYPE: ACUTE PAIN;SURGICAL PAIN
TYPE: SURGICAL PAIN;ACUTE PAIN
TYPE: ACUTE PAIN;SURGICAL PAIN
TYPE: ACUTE PAIN;SURGICAL PAIN
TYPE: ACUTE PAIN

## 2023-08-16 ASSESSMENT — FIBROSIS 4 INDEX: FIB4 SCORE: 0.65

## 2023-08-16 NOTE — PROGRESS NOTES
Pt converted back to SR-ST.   EKG to confirm, ekg states junctional. This RN examined ekg and pt rhythm sees p waves, is regular     Shannan QUIÑONES notified. Ok to stop amio gtt since pt is on po amio.

## 2023-08-16 NOTE — PROGRESS NOTES
MONITOR SUMMARY:     Rhythm: SR with a 1st degree HB 70s - 80s. Patient converted to afib at 0634. Confirmed with EKG.  Rate:70s - 80s  Ectopy: (o)PVC, BBB  Measurements: .20/.11/.48

## 2023-08-16 NOTE — CARE PLAN
The patient is Stable - Low risk of patient condition declining or worsening    Shift Goals  Clinical Goals: monitor chest tube, amio gtt and afib control  Patient Goals: feel better  Family Goals: ANA    Progress made toward(s) clinical / shift goals:    Problem: Pain - Standard  Goal: Alleviation of pain or a reduction in pain to the patient’s comfort goal  Description: Target End Date:  Prior to discharge or change in level of care    Document on Vitals flowsheet    1.  Document pain using the appropriate pain scale per order or unit policy  2.  Educate and implement non-pharmacologic comfort measures (i.e. relaxation, distraction, massage, cold/heat therapy, etc.)  3.  Pain management medications as ordered  4.  Reassess pain after pain med administration per policy  5.  If opiods administered assess patient's response to pain medication is appropriate per POSS sedation scale  6.  Follow pain management plan developed in collaboration with patient and interdisciplinary team (including palliative care or pain specialists if applicable)  Outcome: Progressing  Flowsheets  Taken 8/16/2023 0800 by Mikki Trivedi [email or web address]  Pain Rating Scale (NPRS): 0  Taken 8/16/2023 0600 by Alphonse Last RSHEELA  Critical-Care Pain Observation Score: 0     Problem: Post Op Day 5 CABG/Heart Valve Replacement  Goal: Optimal care of the Post Op CABG/Heart Valve replacement Post Op Day 5  Description: Target End Date:  end of post op day 5  Outcome: Progressing       Patient is not progressing towards the following goals:

## 2023-08-16 NOTE — CARE PLAN
The patient is Stable - Low risk of patient condition declining or worsening    Shift Goals  Clinical Goals: Monitor chest tube site and pleura vac, Monitor vitals  Patient Goals: sleep  Family Goals: ANA    Progress made toward(s) clinical / shift goals:    Problem: Post Op Day 5 CABG/Heart Valve Replacement  Goal: Optimal care of the Post Op CABG/Heart Valve replacement Post Op Day 5  Intervention: Daily weights in the morning  Note: Standup weight 92.9kg  Intervention: Up in chair for all meals  Note: Patient up in chair for all meals  Intervention: Ambulate 4 times daily, increasing the distance each time  Note: Patient ambulated 4 times. Furthest distance 550ft.  Intervention: IS q 1 hour while awake and record best IS volume  Note: Best IS volume 500ml. Needs coaching.  Intervention: Shower daily and clean incisions twice daily with soap and water  Note: Shower completed.     Problem: Knowledge Deficit - Standard  Goal: Patient and family/care givers will demonstrate understanding of plan of care, disease process/condition, diagnostic tests and medications  Outcome: Progressing     Problem: Pain - Standard  Goal: Alleviation of pain or a reduction in pain to the patient’s comfort goal  Outcome: Progressing       Patient is not progressing towards the following goals:

## 2023-08-16 NOTE — PROGRESS NOTES
During morning report, pt converted to Afib rate controlled 's, pt is asymptomatic. Ekg to confirm. ISHMAEL Zepeda notified, Amio post cardiac surgery protocol ordered.

## 2023-08-16 NOTE — PROGRESS NOTES
Cardiovascular Surgery Progress Note    Name: Venkat Mackenzie  MRN: [id number]  : 1960  Admit Date: 2023  5:05 AM  7 Days Post-Op     Procedure:  Procedure(s) and Anesthesia Type:     * AORTIC ROOT REPLACEMENT (27 mm Konect Duong bovine pericardial valved conduit, coronary artery reimplantation) and intraoperative transesophageal echocardiography     * ECHOCARDIOGRAM, TRANSESOPHAGEAL, INTRAOPERATIVE - General    Vitals:  Vitals:    23 0700 23 0800 23 0900 23 1000   BP:  104/70 99/71 100/75   Pulse: 95 67 66 (!) 104   Resp: 20 20 20 (!) 21   Temp: 36.4 °C (97.5 °F) 36.5 °C (97.7 °F)  36.4 °C (97.6 °F)   TempSrc: Temporal Temporal  Temporal   SpO2: 95% 96% 93% 94%   Weight:       Height:          Temp (24hrs), Av.3 °C (97.4 °F), Min:36 °C (96.8 °F), Max:36.8 °C (98.2 °F)      Respiratory:    Respiration: (!) 21, Pulse Oximetry: 94 %       Fluids:    Intake/Output Summary (Last 24 hours) at 2023 1102  Last data filed at 2023 1000  Gross per 24 hour   Intake 1520 ml   Output 1885 ml   Net -365 ml       Admit weight: Weight: 94.9 kg (209 lb 3.5 oz)  Current weight: Weight: 92.9 kg (204 lb 12.9 oz) (23 0400)    Labs:  Recent Labs     23  0044 08/15/23  0025 08/15/23  2317   WBC 14.2* 10.2 10.0   RBC 3.47* 3.37* 3.32*   HEMOGLOBIN 10.7* 10.1* 10.2*   HEMATOCRIT 31.8* 30.8* 30.1*   MCV 91.6 91.4 90.7   MCH 30.8 30.0 30.7   MCHC 33.6 32.8 33.9   RDW 42.8 42.5 42.6   PLATELETCT 239 264 292   MPV 10.4 9.9 9.7       Recent Labs     23  0142 08/15/23  0025 08/15/23  2317 23  0743   SODIUM 132* 132* 134*  --    POTASSIUM 4.1 4.0 3.9 4.0   CHLORIDE 94* 93* 94*  --    CO2 29 30 30  --    GLUCOSE 95 101* 101*  --    BUN 33* 26* 25*  --    CREATININE 0.63 0.66 0.66  --    CALCIUM 8.3* 8.4* 8.9  --                    Medications:  Scheduled Medications   Medication Dose Frequency    amiodarone infusion  1 mg/min Once    furosemide  20 mg Q DAY    potassium  chloride SA  20 mEq DAILY    metoclopramide  10 mg Q6HRS    guaiFENesin ER  600 mg Q12HRS    amiodarone  400 mg BID    insulin regular  2-9 Units 4X/DAY ACHS    Nozin nasal  swab  1 Applicator BID    aspirin  81 mg DAILY    senna-docusate  2 Tablet BID    And    polyethylene glycol/lytes  1 Packet DAILY    And    magnesium hydroxide  30 mL DAILY    omeprazole  20 mg DAILY    enoxaparin (LOVENOX) injection  40 mg DAILY AT 1800    carvedilol  3.125 mg BID WITH MEALS        Exam:   Physical Exam  Constitutional:       General: He is not in acute distress.     Appearance: He is well-developed. He is not diaphoretic.   Cardiovascular:      Rate and Rhythm: Normal rate and regular rhythm.      Heart sounds: Normal heart sounds. No murmur heard.     No friction rub. No gallop.   Pulmonary:      Effort: Pulmonary effort is normal. No respiratory distress.      Breath sounds: Examination of the right-lower field reveals decreased breath sounds. Examination of the left-lower field reveals decreased breath sounds. Decreased breath sounds present. No wheezing or rales.      Comments: CT no air leak  Abdominal:      General: There is no distension.      Palpations: Abdomen is soft.      Tenderness: There is no abdominal tenderness.   Musculoskeletal:      Cervical back: Neck supple.   Skin:     General: Skin is warm and dry.   Neurological:      Mental Status: He is alert and oriented to person, place, and time.         Cardiac Medications:    ASA - Yes    Plavix - No; contraindicated because of Other No CAD    Post-operative Beta Blockers - Yes    Ace/ARB- No; contraindicated because of Normal EF    Statin - No; contraindicated because of No CAD    Aldactone- No; contraindicated because of Normal EF    SGLT2i-  No contraindicated because of cost prohibitive    Ejection Fraction:  55%    Telemetry:   8/10 SR  8/11 SR/a fib  8/12 SR  8/13 a fib to SR  8/14 SR  8/15 SR  8/16 Afib - 100's    Assessment/Plan:  POD 1  HDS,  SR, neuro intact, wounds intact, abdomen soft, fluid balance positive, wt up,  2 L NC. 160 mL out of chest tubes overnight.  Plan:  Gentle diuresis, d/c chest tubes and bolivar. IS/ambulate.     POD 2  HDS, SR/a fib, neuro intact, wounds intact, abdomen soft, fluid balance negative, wt up.  Bilateral pneumothoraces overnight, s/p L chest tube, remaining 2 cm R apical pneumo. On 6L NC. Plan:  Repeat CXR to evaluate R pneumo, consider CT. Continue diuresis. Resume amio gtt and PO. IS/ambulate.     POD 3  HDS, SR, neuro intact, wounds intact, abdomen soft ,no BM yet. Fluid balance negative, wt above admit wt, 3L NC. L chest tube in place, stable small R pneumo. Plan: DC pacing wires. Continue diuresis, IS/ambulate. Transfer to [facility].     POD 4  HDS, SR- was a fib last night, 1.8 sec pauses, neuro intact, wounds intact, abdomen soft, no BM. Pt complaining of ongoing nausea. Fluid balance negative, wt above admit wt, 2.5 L NC. L chest tube w air leak.  Plan: Continue diuresis. IS/ambulate. PT to evaluate. Add reglan. 2 view CXR and ambulatory O2 test today.    POD 5 HDS, SR, diuresing well- cont, CT no airlak- put to waterseal and check CXR in two hours- keep today, amb, enc IS, awaiting PT re-eval    POD 6  HDS, SR, neuro intact, wounds intact, abdomen soft +BM, fluid balance negative, wt at admission weight, 2 L NC. Transferred to ICU yesterday for pneumo- pleural tube connected to suction and issue resolved. C/o ongoing nausea/diaphoresis.  Plan: lasix q day. Trial ativan for nausea. Keep in ICU until chest tube resolved.     POD 7 HDS, afib - amio gtt restarted- on PO amio, euvolemic- change lasix to PO, CT- no air leak- waterseal trial today, amb, enc IS, wean O2 as byron    Disposition:  OT rec post acute, PT eval pending  Pt lives with spouse in [place].

## 2023-08-17 ENCOUNTER — APPOINTMENT (OUTPATIENT)
Dept: RADIOLOGY | Facility: MEDICAL CENTER | Age: 63
DRG: 219 | End: 2023-08-17
Attending: INTERNAL MEDICINE
Payer: COMMERCIAL

## 2023-08-17 ENCOUNTER — APPOINTMENT (OUTPATIENT)
Dept: RADIOLOGY | Facility: MEDICAL CENTER | Age: 63
DRG: 219 | End: 2023-08-17
Attending: NURSE PRACTITIONER
Payer: COMMERCIAL

## 2023-08-17 LAB
ANION GAP SERPL CALC-SCNC: 11 MMOL/L (ref 7–16)
BUN SERPL-MCNC: 21 MG/DL (ref 8–22)
CALCIUM SERPL-MCNC: 8.3 MG/DL (ref 8.5–10.5)
CHLORIDE SERPL-SCNC: 94 MMOL/L (ref 96–112)
CO2 SERPL-SCNC: 28 MMOL/L (ref 20–33)
CREAT SERPL-MCNC: 0.77 MG/DL (ref 0.5–1.4)
EKG IMPRESSION: NORMAL
ERYTHROCYTE [DISTWIDTH] IN BLOOD BY AUTOMATED COUNT: 43.5 FL (ref 35.9–50)
GFR SERPLBLD CREATININE-BSD FMLA CKD-EPI: 99 ML/MIN/1.73 M 2
GLUCOSE BLD STRIP.AUTO-MCNC: 115 MG/DL (ref 65–99)
GLUCOSE BLD STRIP.AUTO-MCNC: 117 MG/DL (ref 65–99)
GLUCOSE BLD STRIP.AUTO-MCNC: 120 MG/DL (ref 65–99)
GLUCOSE BLD STRIP.AUTO-MCNC: 128 MG/DL (ref 65–99)
GLUCOSE SERPL-MCNC: 102 MG/DL (ref 65–99)
HCT VFR BLD AUTO: 32.8 % (ref 42–52)
HGB BLD-MCNC: 10.7 G/DL (ref 14–18)
MCH RBC QN AUTO: 29.7 PG (ref 27–33)
MCHC RBC AUTO-ENTMCNC: 32.6 G/DL (ref 32.3–36.5)
MCV RBC AUTO: 91.1 FL (ref 81.4–97.8)
PLATELET # BLD AUTO: 323 K/UL (ref 164–446)
PMV BLD AUTO: 9.3 FL (ref 9–12.9)
POTASSIUM SERPL-SCNC: 3.8 MMOL/L (ref 3.6–5.5)
RBC # BLD AUTO: 3.6 M/UL (ref 4.7–6.1)
SODIUM SERPL-SCNC: 133 MMOL/L (ref 135–145)
WBC # BLD AUTO: 10 K/UL (ref 4.8–10.8)

## 2023-08-17 PROCEDURE — 97535 SELF CARE MNGMENT TRAINING: CPT

## 2023-08-17 PROCEDURE — A9270 NON-COVERED ITEM OR SERVICE: HCPCS

## 2023-08-17 PROCEDURE — 99024 POSTOP FOLLOW-UP VISIT: CPT | Performed by: NURSE PRACTITIONER

## 2023-08-17 PROCEDURE — 82962 GLUCOSE BLOOD TEST: CPT

## 2023-08-17 PROCEDURE — 71045 X-RAY EXAM CHEST 1 VIEW: CPT

## 2023-08-17 PROCEDURE — A9270 NON-COVERED ITEM OR SERVICE: HCPCS | Mod: JZ | Performed by: NURSE PRACTITIONER

## 2023-08-17 PROCEDURE — A9270 NON-COVERED ITEM OR SERVICE: HCPCS | Performed by: NURSE PRACTITIONER

## 2023-08-17 PROCEDURE — 770022 HCHG ROOM/CARE - ICU (200)

## 2023-08-17 PROCEDURE — 85027 COMPLETE CBC AUTOMATED: CPT

## 2023-08-17 PROCEDURE — 700102 HCHG RX REV CODE 250 W/ 637 OVERRIDE(OP): Performed by: NURSE PRACTITIONER

## 2023-08-17 PROCEDURE — 700102 HCHG RX REV CODE 250 W/ 637 OVERRIDE(OP)

## 2023-08-17 PROCEDURE — 93010 ELECTROCARDIOGRAM REPORT: CPT | Performed by: INTERNAL MEDICINE

## 2023-08-17 PROCEDURE — 80048 BASIC METABOLIC PNL TOTAL CA: CPT

## 2023-08-17 PROCEDURE — 97530 THERAPEUTIC ACTIVITIES: CPT

## 2023-08-17 PROCEDURE — 700111 HCHG RX REV CODE 636 W/ 250 OVERRIDE (IP): Mod: JZ

## 2023-08-17 PROCEDURE — 700102 HCHG RX REV CODE 250 W/ 637 OVERRIDE(OP): Mod: JZ | Performed by: NURSE PRACTITIONER

## 2023-08-17 RX ADMIN — SENNOSIDES AND DOCUSATE SODIUM 2 TABLET: 50; 8.6 TABLET ORAL at 17:14

## 2023-08-17 RX ADMIN — ENOXAPARIN SODIUM 40 MG: 100 INJECTION SUBCUTANEOUS at 17:14

## 2023-08-17 RX ADMIN — FUROSEMIDE 20 MG: 20 TABLET ORAL at 05:14

## 2023-08-17 RX ADMIN — GUAIFENESIN 600 MG: 600 TABLET, EXTENDED RELEASE ORAL at 17:15

## 2023-08-17 RX ADMIN — Medication 1 APPLICATOR: at 09:32

## 2023-08-17 RX ADMIN — GUAIFENESIN 600 MG: 600 TABLET, EXTENDED RELEASE ORAL at 05:14

## 2023-08-17 RX ADMIN — POTASSIUM CHLORIDE 10 MEQ: 1500 TABLET, EXTENDED RELEASE ORAL at 05:15

## 2023-08-17 RX ADMIN — AMIODARONE HYDROCHLORIDE 400 MG: 200 TABLET ORAL at 05:14

## 2023-08-17 RX ADMIN — AMIODARONE HYDROCHLORIDE 400 MG: 200 TABLET ORAL at 17:14

## 2023-08-17 RX ADMIN — CARVEDILOL 3.12 MG: 3.12 TABLET, FILM COATED ORAL at 17:15

## 2023-08-17 RX ADMIN — ASPIRIN 81 MG: 81 TABLET, COATED ORAL at 05:14

## 2023-08-17 RX ADMIN — SENNOSIDES AND DOCUSATE SODIUM 2 TABLET: 50; 8.6 TABLET ORAL at 05:14

## 2023-08-17 RX ADMIN — OMEPRAZOLE 20 MG: 20 CAPSULE, DELAYED RELEASE ORAL at 05:14

## 2023-08-17 RX ADMIN — Medication 1 APPLICATOR: at 21:12

## 2023-08-17 ASSESSMENT — COGNITIVE AND FUNCTIONAL STATUS - GENERAL
TURNING FROM BACK TO SIDE WHILE IN FLAT BAD: A LITTLE
MOBILITY SCORE: 18
WALKING IN HOSPITAL ROOM: A LITTLE
MOVING FROM LYING ON BACK TO SITTING ON SIDE OF FLAT BED: A LITTLE
MOVING TO AND FROM BED TO CHAIR: A LITTLE
STANDING UP FROM CHAIR USING ARMS: A LITTLE
SUGGESTED CMS G CODE MODIFIER MOBILITY: CK
CLIMB 3 TO 5 STEPS WITH RAILING: A LITTLE

## 2023-08-17 ASSESSMENT — GAIT ASSESSMENTS
ASSISTIVE DEVICE: FRONT WHEEL WALKER
DISTANCE (FEET): 400
GAIT LEVEL OF ASSIST: SUPERVISED

## 2023-08-17 ASSESSMENT — PAIN DESCRIPTION - PAIN TYPE
TYPE: ACUTE PAIN

## 2023-08-17 ASSESSMENT — FIBROSIS 4 INDEX: FIB4 SCORE: 0.58

## 2023-08-17 NOTE — CARE PLAN
The patient is Watcher - Medium risk of patient condition declining or worsening    Shift Goals  Clinical Goals: Mobility, monitor WOB  Patient Goals: Pain management  Family Goals: updates, education    Progress made toward(s) clinical / shift goals:  :    Problem: Knowledge Deficit - Standard  Goal: Patient and family/care givers will demonstrate understanding of plan of care, disease process/condition, diagnostic tests and medications  Outcome: Progressing     Problem: Pain - Standard  Goal: Alleviation of pain or a reduction in pain to the patient’s comfort goal  Outcome: Progressing     Problem: Hemodynamics  Goal: Patient's hemodynamics, fluid balance and neurologic status will be stable or improve  Outcome: Progressing

## 2023-08-17 NOTE — PROGRESS NOTES
Cardiovascular Surgery Progress Note    Name: Venkat Mackenzie  MRN: [id number]  : 1960  Admit Date: 2023  5:05 AM  8 Days Post-Op     Procedure:  Procedure(s) and Anesthesia Type:     * AORTIC ROOT REPLACEMENT (27 mm Konect Duong bovine pericardial valved conduit, coronary artery reimplantation) and intraoperative transesophageal echocardiography     * ECHOCARDIOGRAM, TRANSESOPHAGEAL, INTRAOPERATIVE - General    Vitals:  Vitals:    23 0400 23 0500 23 0516 23 0600   BP: 105/61 112/68  103/58   Pulse: 83 (!) 44  90   Resp:       Temp:       TempSrc:       SpO2: 92% 94%  91%   Weight:   88.1 kg (194 lb 3.6 oz)    Height:          Temp (24hrs), Av.5 °C (97.7 °F), Min:36.1 °C (97 °F), Max:36.8 °C (98.2 °F)      Respiratory:    Respiration: 20, Pulse Oximetry: 91 %       Fluids:    Intake/Output Summary (Last 24 hours) at 2023 0846  Last data filed at 2023 0500  Gross per 24 hour   Intake 1260 ml   Output 1200 ml   Net 60 ml       Admit weight: Weight: 94.9 kg (209 lb 3.5 oz)  Current weight: Weight: 88.1 kg (194 lb 3.6 oz) (23 0516)    Labs:  Recent Labs     08/15/23  0025 08/15/23  2317 23  0101   WBC 10.2 10.0 10.0   RBC 3.37* 3.32* 3.60*   HEMOGLOBIN 10.1* 10.2* 10.7*   HEMATOCRIT 30.8* 30.1* 32.8*   MCV 91.4 90.7 91.1   MCH 30.0 30.7 29.7   MCHC 32.8 33.9 32.6   RDW 42.5 42.6 43.5   PLATELETCT 264 292 323   MPV 9.9 9.7 9.3       Recent Labs     08/15/23  0025 08/15/23  2317 23  0743 23  0101   SODIUM 132* 134*  --  133*   POTASSIUM 4.0 3.9 4.0 3.8   CHLORIDE 93* 94*  --  94*   CO2 30 30  --  28   GLUCOSE 101* 101*  --  102*   BUN 26* 25*  --  21   CREATININE 0.66 0.66  --  0.77   CALCIUM 8.4* 8.9  --  8.3*                   Medications:  Scheduled Medications   Medication Dose Frequency    guaiFENesin ER  600 mg Q12HRS    amiodarone  400 mg BID    insulin regular  2-9 Units 4X/DAY ACHS    Nozin nasal  swab  1 Applicator BID    aspirin   81 mg DAILY    senna-docusate  2 Tablet BID    And    polyethylene glycol/lytes  1 Packet DAILY    And    magnesium hydroxide  30 mL DAILY    omeprazole  20 mg DAILY    enoxaparin (LOVENOX) injection  40 mg DAILY AT 1800    carvedilol  3.125 mg BID WITH MEALS        Exam:   Physical Exam  Constitutional:       General: He is not in acute distress.     Appearance: He is well-developed. He is not diaphoretic.   Cardiovascular:      Rate and Rhythm: Normal rate and regular rhythm.      Heart sounds: Normal heart sounds. No murmur heard.     No friction rub. No gallop.   Pulmonary:      Effort: Pulmonary effort is normal. No respiratory distress.      Breath sounds: Examination of the right-lower field reveals decreased breath sounds. Examination of the left-lower field reveals decreased breath sounds. Decreased breath sounds present. No wheezing or rales.      Comments: CT no air leak  Abdominal:      General: There is no distension.      Palpations: Abdomen is soft.      Tenderness: There is no abdominal tenderness.   Musculoskeletal:      Cervical back: Neck supple.   Skin:     General: Skin is warm and dry.   Neurological:      Mental Status: He is alert and oriented to person, place, and time.         Cardiac Medications:    ASA - Yes    Plavix - No; contraindicated because of Other No CAD    Post-operative Beta Blockers - Yes    Ace/ARB- No; contraindicated because of Normal EF    Statin - No; contraindicated because of No CAD    Aldactone- No; contraindicated because of Normal EF    SGLT2i-  No contraindicated because of cost prohibitive    Ejection Fraction:  55%    Telemetry:   8/10 SR  8/11 SR/a fib  8/12 SR  8/13 a fib to SR  8/14 SR  8/15 SR  8/16 Afib - 100's  8/17 SR     Assessment/Plan:  POD 1  HDS, SR, neuro intact, wounds intact, abdomen soft, fluid balance positive, wt up,  2 L NC. 160 mL out of chest tubes overnight.  Plan:  Gentle diuresis, d/c chest tubes and bolivar. IS/ambulate.     POD 2  HDS,  SR/a fib, neuro intact, wounds intact, abdomen soft, fluid balance negative, wt up.  Bilateral pneumothoraces overnight, s/p L chest tube, remaining 2 cm R apical pneumo. On 6L NC. Plan:  Repeat CXR to evaluate R pneumo, consider CT. Continue diuresis. Resume amio gtt and PO. IS/ambulate.     POD 3  HDS, SR, neuro intact, wounds intact, abdomen soft ,no BM yet. Fluid balance negative, wt above admit wt, 3L NC. L chest tube in place, stable small R pneumo. Plan: DC pacing wires. Continue diuresis, IS/ambulate. Transfer to tele.     POD 4  HDS, SR- was a fib last night, 1.8 sec pauses, neuro intact, wounds intact, abdomen soft, no BM. Pt complaining of ongoing nausea. Fluid balance negative, wt above admit wt, 2.5 L NC. L chest tube w air leak.  Plan: Continue diuresis. IS/ambulate. PT to evaluate. Add reglan. 2 view CXR and ambulatory O2 test today.    POD 5 HDS, SR, diuresing well- cont, CT no airlak- put to waterseal and check CXR in two hours- keep today, amb, enc IS, awaiting PT re-eval    POD 6  HDS, SR, neuro intact, wounds intact, abdomen soft +BM, fluid balance negative, wt at admission weight, 2 L NC. Transferred to ICU yesterday for pneumo- pleural tube connected to suction and issue resolved. C/o ongoing nausea/diaphoresis.  Plan: lasix q day. Trial ativan for nausea. Keep in ICU until chest tube resolved.     POD 7 HDS, afib - amio gtt restarted- on PO amio, euvolemic- change lasix to PO, CT- no air leak- waterseal trial today, amb, enc IS, wean O2 as byron    POD 8 HDS, SR on PO amio, wt below admission- d/c lasix, CT to waterseal x 24 hours- CXR this am no pneumothorax, d/c CT- ck CXR in 3 hours and in am. If no pneumo plan home in am.    Disposition:  OT rec post acute, PT eval pending  Pt lives with spouse in [place].

## 2023-08-17 NOTE — THERAPY
Physical Therapy   Daily Treatment     Patient Name: Venkat Mackenzie  Age:  63 y.o., Sex:  male  Medical Record #: [id number]  Today's Date: 8/17/2023     Precautions  Precautions: Fall Risk;Cardiac Precautions (See Comments);Sternal Precautions (See Comments)    Assessment    Pt motivated for PT tx session.  Provided extensive education regarding talk test, RPE scale, home walking program, symptom monitoring, and phases of cardiac rehab.  Pt progressed well with functional mobility, ambulated 400 ft with FWW and SPV.  Pt demo'd adequate strength & balance to negotiate 1 step to enter home, demo'd with SBA on eval.  Answered pt's questions as able/appropriate.  No further acute PT needs.    Plan    Treatment Plan Status: DC Secondary to Goals Met    DC Equipment Recommendations: Front-Wheel Walker  Discharge Recommendations: (Outpatient cardiac rehab)     Objective     08/17/23 1442   Precautions   Precautions Fall Risk;Cardiac Precautions (See Comments);Sternal Precautions (See Comments)   Pain 0 - 10 Group   Therapist Pain Assessment Post Activity Pain Same as Prior to Activity;Nurse Notified   Cognition    Cognition / Consciousness WDL   Level of Consciousness Alert   Comments Pleasant & cooperative, slightly forgetful   Balance   Sitting Balance (Static) Fair +   Sitting Balance (Dynamic) Fair   Standing Balance (Static) Fair   Standing Balance (Dynamic) Fair   Weight Shift Sitting Fair   Weight Shift Standing Fair   Skilled Intervention Verbal Cuing;Tactile Cuing   Bed Mobility    Comments NT, up in chair pre & post session   Gait Analysis   Gait Level Of Assist Supervised   Assistive Device Front Wheel Walker   Distance (Feet) 400   # of Times Distance was Traveled 1   Weight Bearing Status No restrictions   Functional Mobility   Sit to Stand Supervised   Bed, Chair, Wheelchair Transfer Supervised   Transfer Method Stand Step   Skilled Intervention Verbal Cuing   Activity Tolerance   Sitting in Chair Pre &  post session   Standing 10 min   Short Term Goals    Short Term Goal # 1 Pt will ambulate 400' with FWW and supervision.   Goal Outcome # 1 Goal met   Short Term Goal # 2 Pt will perform 1 stair with handrails and supervision.   Goal Outcome # 2 (Not performed but pt demo'd adequate strength & balance to negotiate 1 step at home.  Performed with SBA on eval.)   Short Term Goal # 3 Pt will perform sit<>stand and stand pivot transfers with supervsion.n   Goal Outcome # 3 Goal met   Physical Therapy Treatment Plan   Physical Therapy Treatment Plan Modify Current Treatment Plan   Reason For Discharge Discharge Secondary to Goals Met

## 2023-08-17 NOTE — PROGRESS NOTES
Monitor summary    SR-ST w/ frequent pac's pt was in afib and converted to SR around 1300  Rate   .24/.11/.48

## 2023-08-17 NOTE — CARE PLAN
The patient is Stable - Low risk of patient condition declining or worsening    Shift Goals  Clinical Goals: Monitor chest tube. Maintain hemodynamic stability  Patient Goals: sleep  Family Goals: ANA    Progress made toward(s) clinical / shift goals:    Problem: Knowledge Deficit - Standard  Goal: Patient and family/care givers will demonstrate understanding of plan of care, disease process/condition, diagnostic tests and medications  Outcome: Progressing     Problem: Pain - Standard  Goal: Alleviation of pain or a reduction in pain to the patient’s comfort goal  Outcome: Progressing     Problem: Pre Op  Goal: Optimal preparation for CABG/Heart Valve surgery  Outcome: Progressing     Problem: Day of surgery post CABG/Heart valve replacement  Goal: Stabilization in immediate post op period  Outcome: Progressing     Problem: Post Op Day 1 CABG/Heart Valve Replacement  Goal: Optimal care of the post op CABG/heart valve replacement Post Op Day 1  Outcome: Progressing     Problem: Post op day 2 CABG/Heart Valve Replacement  Goal: Optimal care of the post op CABG/heart valve replacement post op day 2  Outcome: Progressing     Problem: Post Op Day 3 CABG/Heart Valve replacement  Goal: Optimal care of the post op CABG/Heart Valve replacement post op day 3  Outcome: Progressing     Problem: Post Op Day 4 CABG/Heart Valve Replacement  Goal: Optimal care of the Post Op CABG/Heart Valve replacement Post Op Day 4  Outcome: Progressing     Problem: Post Op Day 5 CABG/Heart Valve Replacement  Goal: Optimal care of the Post Op CABG/Heart Valve replacement Post Op Day 5  Outcome: Progressing     Problem: Hemodynamics  Goal: Patient's hemodynamics, fluid balance and neurologic status will be stable or improve  Outcome: Progressing     Problem: Respiratory  Goal: Patient will achieve/maintain optimum respiratory ventilation and gas exchange  Outcome: Progressing     Problem: Risk for Aspiration  Goal: Patient's risk for aspiration  will be absent or decrease  Outcome: Progressing     Problem: Nutrition - Advanced  Goal: Patient will display progressive weight gain toward goal have adequate food and fluid intake  Outcome: Progressing     Problem: Self Care  Goal: Patient will have the ability to perform ADLs independently or with assistance (bathe, groom, dress, toilet and feed)  Outcome: Progressing     Problem: Bowel Elimination - Post Surgical  Goal: Patient will resume regular bowel sounds and function with no discomfort or distention  Outcome: Progressing     Problem: Fall Risk  Goal: Patient will remain free from falls  Outcome: Progressing       Patient is not progressing towards the following goals:

## 2023-08-18 ENCOUNTER — APPOINTMENT (OUTPATIENT)
Dept: RADIOLOGY | Facility: MEDICAL CENTER | Age: 63
DRG: 219 | End: 2023-08-18
Attending: INTERNAL MEDICINE
Payer: COMMERCIAL

## 2023-08-18 ENCOUNTER — APPOINTMENT (OUTPATIENT)
Dept: RADIOLOGY | Facility: MEDICAL CENTER | Age: 63
DRG: 219 | End: 2023-08-18
Attending: NURSE PRACTITIONER
Payer: COMMERCIAL

## 2023-08-18 VITALS
DIASTOLIC BLOOD PRESSURE: 70 MMHG | SYSTOLIC BLOOD PRESSURE: 104 MMHG | BODY MASS INDEX: 30.84 KG/M2 | OXYGEN SATURATION: 93 % | WEIGHT: 203.48 LBS | HEIGHT: 68 IN | HEART RATE: 90 BPM | RESPIRATION RATE: 16 BRPM | TEMPERATURE: 96.8 F

## 2023-08-18 LAB
ANION GAP SERPL CALC-SCNC: 10 MMOL/L (ref 7–16)
BUN SERPL-MCNC: 22 MG/DL (ref 8–22)
CALCIUM SERPL-MCNC: 8.2 MG/DL (ref 8.5–10.5)
CHLORIDE SERPL-SCNC: 93 MMOL/L (ref 96–112)
CO2 SERPL-SCNC: 27 MMOL/L (ref 20–33)
CREAT SERPL-MCNC: 0.66 MG/DL (ref 0.5–1.4)
ERYTHROCYTE [DISTWIDTH] IN BLOOD BY AUTOMATED COUNT: 43.4 FL (ref 35.9–50)
GFR SERPLBLD CREATININE-BSD FMLA CKD-EPI: 104 ML/MIN/1.73 M 2
GLUCOSE BLD STRIP.AUTO-MCNC: 123 MG/DL (ref 65–99)
GLUCOSE SERPL-MCNC: 105 MG/DL (ref 65–99)
HCT VFR BLD AUTO: 31.9 % (ref 42–52)
HGB BLD-MCNC: 10.7 G/DL (ref 14–18)
MCH RBC QN AUTO: 30.6 PG (ref 27–33)
MCHC RBC AUTO-ENTMCNC: 33.5 G/DL (ref 32.3–36.5)
MCV RBC AUTO: 91.1 FL (ref 81.4–97.8)
PLATELET # BLD AUTO: 375 K/UL (ref 164–446)
PMV BLD AUTO: 9.6 FL (ref 9–12.9)
POTASSIUM SERPL-SCNC: 3.6 MMOL/L (ref 3.6–5.5)
RBC # BLD AUTO: 3.5 M/UL (ref 4.7–6.1)
SODIUM SERPL-SCNC: 130 MMOL/L (ref 135–145)
WBC # BLD AUTO: 10.6 K/UL (ref 4.8–10.8)

## 2023-08-18 PROCEDURE — 71046 X-RAY EXAM CHEST 2 VIEWS: CPT

## 2023-08-18 PROCEDURE — 700102 HCHG RX REV CODE 250 W/ 637 OVERRIDE(OP)

## 2023-08-18 PROCEDURE — 99024 POSTOP FOLLOW-UP VISIT: CPT | Performed by: NURSE PRACTITIONER

## 2023-08-18 PROCEDURE — 97535 SELF CARE MNGMENT TRAINING: CPT

## 2023-08-18 PROCEDURE — 71045 X-RAY EXAM CHEST 1 VIEW: CPT

## 2023-08-18 PROCEDURE — A9270 NON-COVERED ITEM OR SERVICE: HCPCS

## 2023-08-18 PROCEDURE — 97530 THERAPEUTIC ACTIVITIES: CPT

## 2023-08-18 PROCEDURE — A9270 NON-COVERED ITEM OR SERVICE: HCPCS | Performed by: NURSE PRACTITIONER

## 2023-08-18 PROCEDURE — 82962 GLUCOSE BLOOD TEST: CPT

## 2023-08-18 PROCEDURE — 700102 HCHG RX REV CODE 250 W/ 637 OVERRIDE(OP): Performed by: NURSE PRACTITIONER

## 2023-08-18 RX ORDER — CARVEDILOL 3.12 MG/1
3.12 TABLET ORAL 2 TIMES DAILY WITH MEALS
Qty: 60 TABLET | Refills: 2 | Status: SHIPPED | OUTPATIENT
Start: 2023-08-18

## 2023-08-18 RX ORDER — GUAIFENESIN 600 MG/1
600 TABLET, EXTENDED RELEASE ORAL EVERY 12 HOURS
Qty: 28 TABLET | Refills: 1
Start: 2023-08-18 | End: 2023-09-11

## 2023-08-18 RX ORDER — ACETAMINOPHEN 500 MG
1000 TABLET ORAL EVERY 6 HOURS PRN
Qty: 30 TABLET | Refills: 0
Start: 2023-08-18

## 2023-08-18 RX ORDER — AMIODARONE HYDROCHLORIDE 200 MG/1
400 TABLET ORAL 2 TIMES DAILY
Qty: 56 TABLET | Refills: 1 | Status: SHIPPED | OUTPATIENT
Start: 2023-08-18

## 2023-08-18 RX ORDER — ASPIRIN 81 MG/1
81 TABLET ORAL DAILY
Qty: 100 TABLET | Refills: 0
Start: 2023-08-19

## 2023-08-18 RX ADMIN — ASPIRIN 81 MG: 81 TABLET, COATED ORAL at 05:23

## 2023-08-18 RX ADMIN — SENNOSIDES AND DOCUSATE SODIUM 2 TABLET: 50; 8.6 TABLET ORAL at 05:23

## 2023-08-18 RX ADMIN — OMEPRAZOLE 20 MG: 20 CAPSULE, DELAYED RELEASE ORAL at 05:23

## 2023-08-18 RX ADMIN — POLYETHYLENE GLYCOL 3350 1 PACKET: 17 POWDER, FOR SOLUTION ORAL at 05:22

## 2023-08-18 RX ADMIN — GUAIFENESIN 600 MG: 600 TABLET, EXTENDED RELEASE ORAL at 05:23

## 2023-08-18 RX ADMIN — MAGNESIUM HYDROXIDE 30 ML: 1200 LIQUID ORAL at 05:23

## 2023-08-18 ASSESSMENT — PAIN DESCRIPTION - PAIN TYPE
TYPE: ACUTE PAIN

## 2023-08-18 ASSESSMENT — COGNITIVE AND FUNCTIONAL STATUS - GENERAL
DAILY ACTIVITIY SCORE: 23
HELP NEEDED FOR BATHING: A LITTLE
SUGGESTED CMS G CODE MODIFIER DAILY ACTIVITY: CI

## 2023-08-18 ASSESSMENT — FIBROSIS 4 INDEX: FIB4 SCORE: 0.5

## 2023-08-18 ASSESSMENT — GAIT ASSESSMENTS: DISTANCE (FEET): 25

## 2023-08-18 NOTE — DISCHARGE PLANNING
Case Management Discharge Planning    Admission Date: 8/9/2023  GMLOS: 3.9  ALOS: 9    6-Clicks ADL Score: 19  6-Clicks Mobility Score: 18      Anticipated Discharge Dispo: Discharge Disposition: Discharged to home/self care (01)    DME Needed: Yes    DME Ordered: Yes    Action(s) Taken: Choice obtained, Referral(s) sent, and DME Face to Face     Escalations Completed: None    Medically Clear: Yes    Next Steps: RNCM met with patient at bedside and obtained choice for [facility] for O2. Patient gave the OK for RNCM to sign on his behalf. RNCM faxed choice form to Jen GARRISON.     Barriers to Discharge: Oxygen Delivery    Is the patient up for discharge tomorrow: patient will DC today.

## 2023-08-18 NOTE — FACE TO FACE
"Face to Face Note  -  Durable Medical Equipment    MATTHEW Aceves - NPI: [id number]  I certify that this patient is under my care and that they had a durable medical equipment(DME)face to face encounter by myself that meets the physician DME face-to-face encounter requirements with this patient on:    Date of encounter:   Patient:                    MRN:                       YOB: 2025  Venkat Mackenzie  [id number]  1960     The encounter with the patient was in whole, or in part, for the following medical condition, which is the primary reason for durable medical equipment:  Cardiac Disease and Post-Op Surgery    I certify that, based on my findings, the following durable medical equipment is medically necessary:    Oxygen   HOME O2 Saturation Measurements:(Values must be present for Home Oxygen orders)  Room air sat at rest: 89  Room air sat with amb: 87  With liters of O2: 1, O2 sat at rest with O2: 92  With Liters of O2: 1, O2 sat with amb with O2 : 90  Is the patient mobile?: Yes  If patient feels more short of breath, they can go up to 6 liters per minute and contact healthcare provider.    Supporting Symptoms: The patient requires supplemental oxygen, as the following interventions have been tried with limited or no improvement: \"Positive expiratory pressure therapies, \"Ambulation with oximetry, and \"Incentive spirometry.    My Clinical findings support the need for the above equipment due to:  Hypoxia  "

## 2023-08-18 NOTE — DISCHARGE SUMMARY
DISCHARGE SUMMARY    ADMISSION DATE: 8/9/2023    DISCHARGE DATE: 8/18/2023    ADMITTING DIAGNOSES: Aortic root aneurysm (5.5 to 6 cm), mild aortic regurgitation, supraventricular tachycardia, hypertension, obstructive sleep apnea, anxiety, GERD    DISCHARGE DIAGNOSES: Aortic root aneurysm (5.5 to 6 cm), mild aortic regurgitation, supraventricular tachycardia, hypertension, obstructive sleep apnea, anxiety, GERD, left pneumothorax    PROCEDURES PERFORMED:   Dr. Zamudio 8/9/2023 AORTIC ROOT REPLACEMENT (27 mm Konect Duong bovine pericardial valved conduit, coronary artery reimplantation) and intraoperative transesophageal echocardiography    HISTORY OF PRESENT ILLNESS:  Mr. Mackenzie is a 63-year-old male with an ascending and root aneurysm.  Echocardiography showed mild aortic regurgitation and cardiac catheterization did not show any coronary artery disease.He was set-up for elective surgery.     HOSPITAL COURSE:   POD 1  HDS, SR, neuro intact, wounds intact, abdomen soft, fluid balance positive, wt up,  2 L NC. 160 mL out of chest tubes overnight.  Plan:  Gentle diuresis, d/c chest tubes and bolivar. IS/ambulate.      POD 2  HDS, SR/a fib, neuro intact, wounds intact, abdomen soft, fluid balance negative, wt up.  Bilateral pneumothoraces overnight, s/p L chest tube, remaining 2 cm R apical pneumo. On 6L NC. Plan:  Repeat CXR to evaluate R pneumo, consider CT. Continue diuresis. Resume amio gtt and PO. IS/ambulate.      POD 3  HDS, SR, neuro intact, wounds intact, abdomen soft ,no BM yet. Fluid balance negative, wt above admit wt, 3L NC. L chest tube in place, stable small R pneumo. Plan: DC pacing wires. Continue diuresis, IS/ambulate. Transfer to [facility].      POD 4  HDS, SR- was a fib last night, 1.8 sec pauses, neuro intact, wounds intact, abdomen soft, no BM. Pt complaining of ongoing nausea. Fluid balance negative, wt above admit wt, 2.5 L NC. L chest tube w air leak.  Plan: Continue diuresis. IS/ambulate. PT to  evaluate. Add reglan. 2 view CXR and ambulatory O2 test today.     POD 5 HDS, SR, diuresing well- cont, CT no airlak- put to waterseal and check CXR in two hours- keep today, amb, enc IS, awaiting PT re-eval     POD 6  HDS, SR, neuro intact, wounds intact, abdomen soft +BM, fluid balance negative, wt at admission weight, 2 L NC. Transferred to ICU yesterday for pneumo- pleural tube connected to suction and issue resolved. C/o ongoing nausea/diaphoresis.  Plan: lasix q day. Trial ativan for nausea. Keep in ICU until chest tube resolved.      POD 7 HDS, afib - amio gtt restarted- on PO amio, euvolemic- change lasix to PO, CT- no air leak- waterseal trial today, amb, enc IS, wean O2 as byron     POD 8 HDS, SR on PO amio, wt below admission- d/c lasix, CT to waterseal x 24 hours- CXR this am no pneumothorax, d/c CT- ck CXR in 3 hours and in am. If no pneumo plan home in am.    POD 9 HDS, SR on po amio, below baseline weight, ambulating without assistance, CXR- no pneumothorax, plan home today with home O2    TELEMETRY:  8/10 SR  8/11 SR/a fib  8/12 SR  8/13 a fib to SR  8/14 SR  8/15 SR  8/16 Afib - 100's  8/17 SR   8/18 SR    RECENT LABS:     Lab Results   Component Value Date/Time    SODIUM 130 (L) 08/17/2023 11:45 PM    POTASSIUM 3.6 08/17/2023 11:45 PM    CHLORIDE 93 (L) 08/17/2023 11:45 PM    CO2 27 08/17/2023 11:45 PM    GLUCOSE 105 (H) 08/17/2023 11:45 PM    BUN 22 08/17/2023 11:45 PM    CREATININE 0.66 08/17/2023 11:45 PM      Lab Results   Component Value Date/Time    WBC 10.6 08/17/2023 11:45 PM    RBC 3.50 (L) 08/17/2023 11:45 PM    HEMOGLOBIN 10.7 (L) 08/17/2023 11:45 PM    HEMATOCRIT 31.9 (L) 08/17/2023 11:45 PM    MCV 91.1 08/17/2023 11:45 PM    MCH 30.6 08/17/2023 11:45 PM    MCHC 33.5 08/17/2023 11:45 PM    MPV 9.6 08/17/2023 11:45 PM    NEUTSPOLYS 63.80 08/07/2023 01:18 PM    LYMPHOCYTES 22.90 08/07/2023 01:18 PM    MONOCYTES 7.60 08/07/2023 01:18 PM    EOSINOPHILS 3.50 08/07/2023 01:18 PM    BASOPHILS  0.50 08/07/2023 01:18 PM      Lab Results   Component Value Date/Time    PROTHROMBTM 16.7 (H) 08/09/2023 12:20 PM    INR 1.38 (H) 08/09/2023 12:20 PM        Fluids:    Intake/Output Summary (Last 24 hours) at 8/18/2023 1103  Last data filed at 8/18/2023 0500  Gross per 24 hour   Intake 1080 ml   Output 940 ml   Net 140 ml     Admit weight: Weight: 94.9 kg (209 lb 3.5 oz)  Current weight: Weight: 92.3 kg (203 lb 7.8 oz) (08/18/23 0400)    ALLERGIES:     Iodine    EJECTION FRACTION:  55%    CARDIAC MEDICATIONS:    ASA - Yes    Plavix - No; contraindicated because of Other aortic valve    Post-operative Beta Blockers - Yes    Ace/ARB- No; contraindicated because of Hypotension    Statin - No; contraindicated because of No CAD    Aldactone- No; contraindicated because of Normal EF    SGLT2i-  No contraindicated because of cost prohibitive    DISCHARGE MEDICATIONS:      Medication List        START taking these medications        Instructions   acetaminophen 500 MG Tabs  Commonly known as: Tylenol   Take 2 Tablets by mouth every 6 hours as needed for Mild Pain.  Dose: 1,000 mg     amiodarone 200 MG Tabs  Commonly known as: Cordarone  Notes to patient: FOR THE ATRIAL FIBRILLATION. DO NOT TAKE IF HEART RATE IS LESS THAN 60   Take 2 Tablets by mouth 2 times a day. x 7 days, Then Take Two Tablets daily for 7 days, Then Take 1 Tablet Daily.  Dose: 400 mg     aspirin 81 MG EC tablet  Start taking on: August 19, 2023   Take 1 Tablet by mouth every day.  Dose: 81 mg     guaiFENesin  MG Tb12  Commonly known as: Mucinex   Take 1 Tablet by mouth every 12 hours.  Dose: 600 mg            CHANGE how you take these medications        Instructions   carvedilol 3.125 MG Tabs  What changed:   medication strength  how much to take  Commonly known as: Coreg  Notes to patient: LOWERS WORKLOAD ON YOUR HEART.  DO NO TAKE IF HEART RATE IS LESS THAN 60 OR IF SYSTOLIC BLOOD PRESSURE (TOP NUMBER) IS LESS THAN 100.   Take 1 Tablet by mouth 2  times a day with meals.  Dose: 3.125 mg     hydroCHLOROthiazide 12.5 MG tablet  What changed: when to take this  Commonly known as: Hydrodiuril   Take 1 Tablet by mouth every day.  Dose: 12.5 mg     metFORMIN  MG Tb24  What changed: when to take this  Commonly known as: Glucophage XR   Take 1 Tablet by mouth every day.  Dose: 500 mg            CONTINUE taking these medications        Instructions   allopurinol 300 MG Tabs  Commonly known as: Zyloprim   Take 1 Tablet by mouth every day.  Dose: 300 mg     Magnesium 250 MG Tabs   Take 1 Tablet by mouth every morning.  Dose: 1 Tablet     Potassium 99 MG Tabs   Take 1 Tablet by mouth every morning.  Dose: 1 Tablet     Vitamin B-12 5000 MCG Subl   Place 1 Tablet under the tongue every morning.  Dose: 1 Tablet            STOP taking these medications      losartan 100 MG Tabs  Commonly known as: Cozaar     metoprolol SR 25 MG Tb24  Commonly known as: Toprol XL     predniSONE 50 MG Tabs  Commonly known as: Deltasone              DIET:   Cardiac diet    DISCHARGE INSTRUCTIONS DISCUSSED WITH THE PATIENT:      1. NO driving for 4 weeks after surgery. You may ride as a passenger.  2. NO lifting of any item over 10 lbs (e.g. gallon of milk) for 6 weeks after surgery.  3. DO walk as much as possible! Walk a minimum of once a day. Depending on your fatigue and comfort level, you may walk as much as you wish. There is no maximum.  4. Other physical activities (sex, housework, gardening, etc.) are OK after 4 weeks   5. Continue using incentive spirometer for 2 weeks, especially if going home on oxygen.    Incision Care:  1. SHOWER ONLY - no baths. Clean incision daily with plain Ivory ® soap or any other dye or perfume free soap. Then pat incision dry with clean towel. Avoid creams or lotions on the incision(s).  a. If there is any increase in redness or swelling, or separation of the incision line, or thick drainage* from any of the incisions, call right away  * Clear, thin  drainage is not abnormal especially from the leg incision and/or                         chest tube sites.  2. Continue to wear your ENMANUEL Stockings for 4 weeks. You may take off the stockings when in bed or when the legs are elevated.    Patient instructed to call [facility] cardiac surgery at [phone]  if any increased shortness of breath, uncontrolled pain, weight gain greater than 3 pounds in 1 day or 5 pounds in 1 week, SBP >140, HR <60 or redness swelling or drainage of incisions.      FOLLOW-UP:   Future Appointments   Date Time Provider Department Center   9/5/2023 12:45 PM Shannan Mauricio P.A.-C. [facility] None   9/7/2023  2:40 PM RASHAWN Ryan None   9/11/2023 12:30 PM CT RESOURCE PROVIDER [facility] None

## 2023-08-18 NOTE — DISCHARGE PLANNING
"Discharge Discussion and home care recap prior to discharge:      Discharge review was completed with patient wife.    Patient was reminded that outpatient cardiac rehab beginning 6 weeks post op. They were told it is highly recommended and available to them if desired, but not required. They were told to look at the provided flyer for the contact number and additional information.    Patient was reminded to utilize the vitals log book provided to take his/her weight daily and record.    Patient was told to call me with weight gain > 3 lbs in 1 day or 5 lbs in 1 week  Patient was also told to record heart rate and blood pressure morning and night; and to follow the hold parameters provided in the discharge summary on beta blockers and ACE/ARB (if prescribed on discharge).    Patient was reminded to review the \"recovery after heart surgery\" packet with information on normal expectations such as clicking in the chest, loud/pounding heart/ hot and cold flashes, weight loss, constipation, insomnia, tingling on left side of chest (CABG with LIMA).  I also reviewed the decision tree of whom to call and when with concerns after going home. RN navigator Monday through Friday during business hours for any questions or urgent concerns, and the office for urgent concerns at night or on the weekends.    I briefly recapped the discharge instructions that their primary RN will review in depth prior to discharge   1) appointments   2) medication reconciliation (start, continue, change, stop)   3) care instructions    All additional questions were answered or deferred to the bedside RN.    Event time 25 minutes    "

## 2023-08-18 NOTE — DISCHARGE PLANNING
Received choice form at: 0801  Agency/Facility name: [facility]  Referral sent per choice form at:  0828

## 2023-08-18 NOTE — CARE PLAN
The patient is Stable - Low risk of patient condition declining or worsening    Shift Goals  Clinical Goals: shower, wean O2 to room air.  Patient Goals: Sleep  Family Goals: updates, education    Progress made toward(s) clinical / shift goals:    Problem: Post Op Day 8 CABG/Heart Valve Replacement  Goal: Optimal care of the Post Op CABG/Heart Valve replacement Post Op Day 5  Intervention: Daily weights in the morning  Note: Weight 92.3kg Standup.  Intervention: Up in chair for all meals  Note: Patient up in chair for all meals.  Intervention: Ambulate 4 times daily, increasing the distance each time  Note: Patient ambulated 4 times.  Intervention: IS q 1 hour while awake and record best IS volume  Note: Best IS volume 1250ml  Intervention: Shower daily and clean incisions twice daily with soap and water  Note: Patient showered      Problem: Knowledge Deficit - Standard  Goal: Patient and family/care givers will demonstrate understanding of plan of care, disease process/condition, diagnostic tests and medications  Outcome: Progressing     Problem: Pain - Standard  Goal: Alleviation of pain or a reduction in pain to the patient’s comfort goal  Outcome: Progressing       Patient is not progressing towards the following goals:

## 2023-08-18 NOTE — CARE PLAN
The patient is Stable - Low risk of patient condition declining or worsening    Shift Goals  Clinical Goals: Discharge  Patient Goals: Go home  Family Goals: ANA    Progress made toward(s) clinical / shift goals:    Problem: Knowledge Deficit - Standard  Goal: Patient and family/care givers will demonstrate understanding of plan of care, disease process/condition, diagnostic tests and medications  Outcome: Met     Problem: Pain - Standard  Goal: Alleviation of pain or a reduction in pain to the patient’s comfort goal  Outcome: Met     Problem: Pre Op  Goal: Optimal preparation for CABG/Heart Valve surgery  Outcome: Met     Problem: Day of surgery post CABG/Heart valve replacement  Goal: Stabilization in immediate post op period  Outcome: Met     Problem: Post Op Day 1 CABG/Heart Valve Replacement  Goal: Optimal care of the post op CABG/heart valve replacement Post Op Day 1  Outcome: Met     Problem: Post op day 2 CABG/Heart Valve Replacement  Goal: Optimal care of the post op CABG/heart valve replacement post op day 2  Outcome: Met     Problem: Post Op Day 3 CABG/Heart Valve replacement  Goal: Optimal care of the post op CABG/Heart Valve replacement post op day 3  Outcome: Met     Problem: Post Op Day 4 CABG/Heart Valve Replacement  Goal: Optimal care of the Post Op CABG/Heart Valve replacement Post Op Day 4  Outcome: Met     Problem: Post Op Day 5 CABG/Heart Valve Replacement  Goal: Optimal care of the Post Op CABG/Heart Valve replacement Post Op Day 5  Outcome: Met     Problem: Hemodynamics  Goal: Patient's hemodynamics, fluid balance and neurologic status will be stable or improve  Outcome: Met     Problem: Respiratory  Goal: Patient will achieve/maintain optimum respiratory ventilation and gas exchange  Outcome: Met     Problem: Risk for Aspiration  Goal: Patient's risk for aspiration will be absent or decrease  Outcome: Met     Problem: Nutrition - Advanced  Goal: Patient will display progressive weight gain  toward goal have adequate food and fluid intake  Outcome: Met     Problem: Self Care  Goal: Patient will have the ability to perform ADLs independently or with assistance (bathe, groom, dress, toilet and feed)  Outcome: Met     Problem: Bowel Elimination - Post Surgical  Goal: Patient will resume regular bowel sounds and function with no discomfort or distention  Outcome: Met     Problem: Fall Risk  Goal: Patient will remain free from falls  Outcome: Met       Patient is not progressing towards the following goals:

## 2023-08-18 NOTE — PROGRESS NOTES
Pt being discharged.    All LDAs removed, diet trays discontinued, pt dressed in personal clothing, all other personal belongings placed in appropriate bag and given to pt's wife. DME delivered bedside. Reviewed discharge instructions with pt and his wife and answered all current questions. Further questions after discharge directed to ISHMAEL Jimenez or RN Navigator Eva.    1250: Pt placed on home O2, transferred to wheelchair. Pt escorted by wife and CNA to [facility] for discharge.

## 2023-08-18 NOTE — FACE TO FACE
"Face to Face Note  -  Durable Medical Equipment    MATTHEW Aceves - NPI: [id number]  I certify that this patient is under my care and that they had a durable medical equipment(DME)face to face encounter by myself that meets the physician DME face-to-face encounter requirements with this patient on:    Date of encounter:   Patient:                    MRN:                       YOB: 2025  Venkat Mackenzie  [id number]  1960     The encounter with the patient was in whole, or in part, for the following medical condition, which is the primary reason for durable medical equipment:  Post-Op Surgery    I certify that, based on my findings, the following durable medical equipment is medically necessary:    Oxygen   HOME O2 Saturation Measurements:(Values must be present for Home Oxygen orders)         ,     ,       If patient feels more short of breath, they can go up to 6 liters per minute and contact healthcare provider.    Supporting Symptoms: The patient requires supplemental oxygen, as the following interventions have been tried with limited or no improvement: \"Ambulation with oximetry.    My Clinical findings support the need for the above equipment due to:  Hypoxia  "

## 2023-08-18 NOTE — DISCHARGE INSTRUCTIONS
DIVISION OF CARDIAC SURGERY   DISCHARGE INSTRUCTIONS    Activity:    NO driving for 4 weeks after surgery. You may ride as a passenger.  NO lifting, pushing, or pulling more than 10 pounds for 6 weeks.  For the next 6 weeks, keep your elbows close to your body and move within a pain-free motion when lifting, pushing or pulling.  Do not stretch both arms backwards at the same time.    Walk at least 4 times per day, there is no maximum. The goal is to increase your distance over time.  Continue using incentive spirometer for 2 weeks or until your baseline volume is reached.  If you are going home on oxygen and you were not on oxygen prior to surgery, keep using until you are oxygen free.  Weigh yourself daily.  Call your Cardiologist for a weight gain of 3 or more pounds in 1 day or more than 5 pounds in 7 days.  Take all of your medications as prescribed. Do not use a pill box for the first month at home. If you have questions, please call your nurse navigator at [phone].  Continue to wear the ENMANUEL (compression) stockings for 2-4 weeks or until all swelling is gone. You may take them off when you are in bed or when your legs are elevated.    Incision Care:    Make sure to clean your incision(s) TWICE DAILY.  Once by showering AND once using the no rinse Foam cleanser provided in the hospital.  During the shower, cleanse the incision(s) with a perfume and dye free soap (Dial, Dove, [place])  Use gentle pressure and rub up and down over incision with your hands or a washcloth. Rinse off and pat incision(s) dry with clean towel.  Keep the incision open to air. No creams or lotions on your incision(s). No baths.  If there is any increased redness or swelling, separation of the incision line, or thick drainage from any of your incisions, call the Cardiac Surgeons ([phone]).      General Instructions:    You have been referred to Cardiac Rehab.  You can start Cardiac Rehab 30 days after surgery.  If you do  not have an appointment at the time of discharge call [phone] to schedule an appointment.  Your Primary Care Doctor typically handles home oxygen. Oxygen may be stopped when your oxygen level is consistently greater than 90.  Check with your Primary Care Doctor if you are unsure.  Take all of your medications (including pain medications) as prescribed.  Taking medications other than prescribed can result in serious injury.    For Patients Discharged with Narcotic Pain Medication:     If a refill is needed, understand that only 1 refill will be provided and you must come to the Cardiac Surgeons’ office for an appointment (72 hours’ notice is required to schedule and there are no weekend appointments).  If the pain medications you are discharged on are not working, you will need to bring your remaining prescription into the office in order to receive a new prescription.  If you were taking narcotics prior to your heart surgery, the Cardiac Surgeons will provide you with one prescription and additional medications will need to be provided by your pain management doctor.  Do not drink alcohol while taking narcotics.  Lost or stolen medications will not be refilled.  If medications are stolen, report to law enforcement.    Contact Cardiac Surgery at [phone] if you have any questions.

## 2023-08-18 NOTE — THERAPY
"Occupational Therapy  Daily Treatment     Patient Name: Venkat Mackenzie  Age:  63 y.o., Sex:  male  Medical Record #: [id number]  Today's Date: 8/18/2023     Precautions: Fall Risk, Cardiac Precautions (See Comments), Sternal Precautions (See Comments)    Assessment    Pt seen for OT tx to include: transfers, LB dressing in personal clothing, toileting, standing grooming. SO present and very supportive. She is taking time off work to assist pt on initial DC. Extensive education provided as outlined below in Education Group. Pt has met all OT goals and scheduled for DC home today. No further acute OT needs at this time.     Plan    Treatment Plan Status: Modify Current Treatment Plan  Type of Treatment: Self Care / Activities of Daily Living, Adaptive Equipment, Neuro Re-Education / Balance, Therapeutic Exercises, Therapeutic Activity  Treatment Frequency: 4 Times per Week  Treatment Duration: Until Therapy Goals Met    DC Equipment Recommendations: Tub / Shower Seat, Sock Aide, Hand Held Shower, Reacher, Long Handled Shoehorn  Discharge Recommendations: Anticipate that the patient will have no further occupational therapy needs after discharge from the hospital    Subjective    \"I think I'm going home today.\"      Objective       08/18/23 1019   Vitals   Pulse 90   Patient BP Position Sitting   Blood Pressure  104/70   Pulse Oximetry 93 %   O2 (LPM) 1   O2 Delivery Device Silicone Nasal Cannula   Cognition    Cognition / Consciousness WDL   Level of Consciousness Alert   Comments very receptive to education with excellent questions   Other Treatments   Other Treatments Provided Extensive education as outlined in Education Group   Balance   Sitting Balance (Static) Good   Sitting Balance (Dynamic) Fair +   Standing Balance (Static) Fair +   Standing Balance (Dynamic) Fair   Weight Shift Sitting Fair   Weight Shift Standing Fair   Comments without AD   Bed Mobility    Scooting Supervised  (seated)   Comments up to " chair pre and post; plans to sleep in power recliner at home   Activities of Daily Living   Grooming Supervision;Standing  (oral care at sink)   Lower Body Dressing Minimal Assist  (sock-aid for B socks, donned shorts without AE; slight min A for slip-on sneakers due to edema (educated on use of shoe horn))   Toileting Supervision  (BM and urination on toilet)   Functional Mobility   Sit to Stand Supervised   Bed, Chair, Wheelchair Transfer Supervised   Toilet Transfers Supervised   Transfer Method Stand Step  (with and without FWW)   Mobility short gait and transfers   Activity Tolerance   Sitting in Chair up pre and post   Standing 10 min   Comments no overt signs of fatigue, dizziness, SOB   Short Term Goals   Short Term Goal # 1 Pt will complete ADL transfers with supv   Goal Outcome # 1 Goal met   Short Term Goal # 2 Pt will complete LB dressing with supv using AE PRN   Goal Outcome # 2 Goal met   Short Term Goal # 3 Pt will complete toileting with supv   Goal Outcome # 3 Goal met   Education Group   Cardiac Precautions Patient Response Patient;Significant Other;Acceptance;Explanation;Handout;Demonstration;Verbal Demonstration;Action Demonstration  (rest breaks as needed)   Sternal Precautions Patient Response Patient;Significant Other;Acceptance;Explanation;Demonstration;Handout;Verbal Demonstration;Action Demonstration   Energy Conservation Patient Response Patient;Significant Other;Acceptance;Explanation;Verbal Demonstration  (seated ADL, pacing strategies)   Home Safety Patient Response Patient;Significant Other;Acceptance;Explanation;Handout;Verbal Demonstration  (use of shower chair with supv for transfer)   Adaptive Equipment Patient Response Patient;Significant Other;Acceptance;Explanation;Demonstration;Handout;Verbal Demonstration;Action Demonstration  (sock-aid, reacher, long shoe horn)

## 2023-08-22 ENCOUNTER — TELEPHONE (OUTPATIENT)
Dept: CARDIOTHORACIC SURGERY | Facility: MEDICAL CENTER | Age: 63
End: 2023-08-22
Payer: COMMERCIAL

## 2023-08-22 NOTE — TELEPHONE ENCOUNTER
"Hospital follow up call    Reports some nausea but is eating well.    he states that all incisions are healing well and approximated with no s/s of infection (redness, puss, fever, purulent drainage, or tenderness).    he is using the IS; volume is not improved from the hospital. Current volume is 1500 ml.    he is walking everyday, distance is increased from the hospital.    he is obtaining daily weights. Current weight is 207 lbs which is more than the first home weight of 204 lbs.    he is taking all prescribed meds as directed. He has no medication questions.    he is taking vitals (HR and BP).    BP's: 126/77,  100/60  103/67  119/79    HR's: 70's to 80's    he has not had a bowel movement since discharge. He states he has been on stool softeners and laxatives.  He is passing gas, he was told to try a suppository today and call me tomorrow    he is showering everyday or every other day and is not wearing the compression/ENMANUEL hose, he does report LE edema, a little \"poofy\".    he states that the post op pain is well controlled.  Narcotics are not being utilized. Tylenol is being utilized.    he has no additional questions at this time. Follow up education was needed on having a bowel movement. Follow up appointments were reviewed and I ensured they have my number if issues or concerns arise.      Follow up call time was 17 minutes.        "

## 2023-08-25 ENCOUNTER — APPOINTMENT (OUTPATIENT)
Dept: RADIOLOGY | Facility: MEDICAL CENTER | Age: 63
End: 2023-08-25
Attending: EMERGENCY MEDICINE
Payer: COMMERCIAL

## 2023-08-25 ENCOUNTER — TELEPHONE (OUTPATIENT)
Dept: CARDIOTHORACIC SURGERY | Facility: MEDICAL CENTER | Age: 63
End: 2023-08-25
Payer: COMMERCIAL

## 2023-08-25 ENCOUNTER — TELEPHONE (OUTPATIENT)
Dept: NEUROLOGY | Facility: MEDICAL CENTER | Age: 63
End: 2023-08-25
Payer: COMMERCIAL

## 2023-08-25 ENCOUNTER — HOSPITAL ENCOUNTER (EMERGENCY)
Facility: MEDICAL CENTER | Age: 63
End: 2023-08-25
Attending: EMERGENCY MEDICINE
Payer: COMMERCIAL

## 2023-08-25 VITALS
DIASTOLIC BLOOD PRESSURE: 78 MMHG | OXYGEN SATURATION: 92 % | RESPIRATION RATE: 20 BRPM | HEART RATE: 75 BPM | BODY MASS INDEX: 31.64 KG/M2 | SYSTOLIC BLOOD PRESSURE: 128 MMHG | WEIGHT: 208.78 LBS | HEIGHT: 68 IN | TEMPERATURE: 97.8 F

## 2023-08-25 DIAGNOSIS — K59.00 CONSTIPATION, UNSPECIFIED CONSTIPATION TYPE: ICD-10-CM

## 2023-08-25 PROCEDURE — 74019 RADEX ABDOMEN 2 VIEWS: CPT

## 2023-08-25 PROCEDURE — 99283 EMERGENCY DEPT VISIT LOW MDM: CPT

## 2023-08-25 RX ORDER — AMOXICILLIN 250 MG
1 CAPSULE ORAL DAILY
Qty: 30 TABLET | Refills: 0 | Status: SHIPPED | OUTPATIENT
Start: 2023-08-25

## 2023-08-25 ASSESSMENT — FIBROSIS 4 INDEX: FIB4 SCORE: 0.5

## 2023-08-25 NOTE — ED TRIAGE NOTES
"Chief Complaint   Patient presents with    Constipation     No BM in over 2 weeks      Pt ambulatory to triage for above complaint. Pt had aortic root replacement surgery on 8/9 and has not had a BM since. Pt has been taking Colace and enemas without relief. Pt denies abdominal pain.     Pt is alert and oriented, speaking in full sentences, follows commands and responds appropriately to questions.      Pt placed in lobby. Pt educated on triage process and apologized for wait time. Pt encouraged to alert staff for any changes.     Patient and staff wearing appropriate PPE      /76   Pulse 68   Temp 36.6 °C (97.8 °F) (Temporal)   Resp 16   Ht 1.727 m (5' 8\")   Wt 94.7 kg (208 lb 12.4 oz)   SpO2 93%       "

## 2023-08-25 NOTE — TELEPHONE ENCOUNTER
"8/23:Patient had called stating he still hadn't had a BM despite the suppository he was told to do 8/22.  He was told to try an enema and call back in an hour if it didn't work. He called back and stated it had worked.    8/25: called today stating in his VM that he actually only went \"a little bit\" after the enema. He is passing gas. He is eating.  He states he does have some nausea.    He was told to go to the ER for evaluation of stool impaction and possible ileus vs obstruction.    ISHMAEL Jimenez notified.      "

## 2023-08-25 NOTE — ED PROVIDER NOTES
ED Provider Note    CHIEF COMPLAINT  Chief Complaint   Patient presents with    Constipation     No BM in over 2 weeks        EXTERNAL RECORDS REVIEWED  Inpatient Notes the patient was admitted to the hospital 8/9/2023 and discharged 8/18/2023.  He was admitted with an aortic root aneurysm 5.5 to 6 cm with mild aortic regurgitation SVT he had an aortic root replacement with Dr. Zamudio 8/9/2023    HPI/ROS  LIMITATION TO HISTORY   Select: : None  OUTSIDE HISTORIAN(S):  Significant other informs he is only had a few small hard bowel movements    Venkat Mackenzie is a 63 y.o. male who presents stating that he has been constipated since his surgery on 8/9/2023 where he had his aortic root replaced.  States he has had small stools but they have been firm.  He has been using Colace without much improvement.  He is also given himself a few enemas.  He denies any abdominal pain.  He did not notice any black tarry stool or blood in his stool.  He has no fevers or chills nausea or vomiting.  He states that he is eating.    PAST MEDICAL HISTORY   has a past medical history of Arrhythmia (Uknown, Date), Bowel habit changes, Diabetes (HCC), Gastroparesis, Heart burn (Just after eating spicey food), Heart valve disease (This is why Doctor is ordering the surgery), Hypertension, and Metabolic syndrome.    SURGICAL HISTORY   has a past surgical history that includes vagotomy (at age 19); appendectomy; cervical fusion posterior; other (Neck); aortic valve replacement (8/9/2023); and echocardiogram, transesophageal, intraoperative (8/9/2023).    FAMILY HISTORY  Family History   Problem Relation Age of Onset    Stroke Mother     Heart Disease Mother     Diabetes Mother     Lung Disease Mother         COPD    Diabetes Father     Heart Disease Father     Stroke Father     No Known Problems Son     No Known Problems Son        SOCIAL HISTORY  Social History     Tobacco Use    Smoking status: Never    Smokeless tobacco: Former      "Types: Chew     Quit date: 5/1/1980    Tobacco comments:     None   Vaping Use    Vaping Use: Never used   Substance and Sexual Activity    Alcohol use: Yes     Alcohol/week: 1.2 oz     Types: 2 Cans of beer per week     Comment: one beer a week    Drug use: No    Sexual activity: Yes     Partners: Female       CURRENT MEDICATIONS  Home Medications       Reviewed by Ayde Peck R.N. (Registered Nurse) on 08/25/23 at 1450  Med List Status: Not Addressed     Medication Last Dose Status   acetaminophen (TYLENOL) 500 MG Tab  Active   allopurinol (ZYLOPRIM) 300 MG Tab  Active   amiodarone (CORDARONE) 200 MG Tab  Active   aspirin 81 MG EC tablet  Active   carvedilol (COREG) 3.125 MG Tab  Active   Cyanocobalamin (VITAMIN B-12) 5000 MCG SL Tab  Active   guaiFENesin ER (MUCINEX) 600 MG TABLET SR 12 HR  Active   hydroCHLOROthiazide (HYDRODIURIL) 12.5 MG tablet  Active   Magnesium 250 MG Tab  Active   metFORMIN ER (GLUCOPHAGE XR) 500 MG TABLET SR 24 HR  Active   Potassium 99 MG Tab  Active                    ALLERGIES  Allergies   Allergen Reactions    Iodine Unspecified and Hives     dizziness  dizziness       PHYSICAL EXAM  VITAL SIGNS: /76   Pulse 68   Temp 36.6 °C (97.8 °F) (Temporal)   Resp 16   Ht 1.727 m (5' 8\")   Wt 94.7 kg (208 lb 12.4 oz)   SpO2 93%   BMI 31.74 kg/m²      Constitutional: Well developed, Well nourished, No acute distress, Non-toxic appearance.   HEENT: Normocephalic, Atraumatic,  external ears normal, pharynx pink,  Mucous  Membranes moist, No rhinorrhea or mucosal edema  Eyes: PERRL, EOMI, Conjunctiva normal, No discharge.   Neck: Normal range of motion, No tenderness, Supple, No stridor.   Lymphatic: No lymphadenopathy    Cardiovascular: Regular Rate and Rhythm, No murmurs,  rubs, or gallops.   Thorax & Lungs: Lungs clear to auscultation bilaterally, No respiratory distress, No wheezes, rhales or rhonchi, No chest wall tenderness.  Patient's chest incisions are clean dry and intact " without erythema  Abdomen: Bowel sounds normal, Soft, non tender, non distended,  No pulsatile masses., no rebound guarding or peritoneal signs.   Skin: Warm, Dry, No erythema, No rash,   Extremities: Equal, intact distal pulses, No cyanosis, clubbing or edema,  No tenderness.   Musculoskeletal: Good range of motion in all major joints. No tenderness to palpation or major deformities noted.   Neurologic: Alert & oriented No focal deficits noted.   Psychiatric: Affect normal, Judgment normal, Mood normal.       DIAGNOSTIC STUDIES / PROCEDURES  EKG  I have independently interpreted this EKG  none    LABS  None    RADIOLOGY  I have independently interpreted the diagnostic imaging associated with this visit and am waiting the final reading from the radiologist.   My preliminary interpretation is as follows: X-ray abdomen constipation no evidence of obstruction operative chest changes  Radiologist interpretation:   [id number] [id number]   Final Result      1.  Median sternotomy and aortic valve replacement.   2.  Small hiatus hernia.   3.  No acute bowel obstruction is evident. There is greater than average fecal burden.   4.  Probable right lower lobe pneumonia and right pleural effusion.   5.  Suspect minimal left pleural effusion.            COURSE & MEDICAL DECISION MAKING    ED Observation Status? no    INITIAL ASSESSMENT, COURSE AND PLAN  Care Narrative: Venkat Mackenzie is a 63 y.o. male who presents stating that he has been constipated since his surgery on 8/9/2023 where he had his aortic root replaced.  States he has had small stools but they have been firm.  He has been using Colace without much improvement.  He is also given himself a few enemas.  He denies any abdominal pain.  He did not notice any black tarry stool or blood in his stool.  He has no fevers or chills nausea or vomiting.  He states that he is eating.  On physical exam his abdomen is soft and nontender.  His incisions on his chest are clean dry  and intact he is in no distress.     Differential diagnosis: Constipation versus ileus  ADDITIONAL PROBLEM LIST  Gastroparesis  Hypertension  Colon polyp  Metabolic syndrome  GERD  Hiatal hernia  Plantar fasciitis  PTSD  DISPOSITION AND DISCUSSIONS    Patient's x-ray shows some postop changes in the chest and constipation.  I advised him to keep drinking plenty of fluids and eating as normal and to take MiraLAX powder twice daily and Senokot once daily.  If he has worsening abdominal pain fevers vomiting or worsening symptoms he should return for further evaluation.  I have discussed management of the patient with the following physicians and MARK's:  none    Discussion of management with other QHP or appropriate source(s): None     Escalation of care considered, and ultimately not performed:blood analysis was considered but the patient is not febrile hypoxic or in any distress his abdominal exam is benign    Barriers to care at this time, including but not limited to: none.     Decision tools and prescription drugs considered including, but not limited to:  stool softeners .  The patient will return for new or worsening symptoms and is stable at the time of discharge.    The patient is referred to a primary physician for blood pressure management, diabetic screening, and for all other preventative health concerns.      DISPOSITION:  Patient will be discharged home in stable condition.    FOLLOW UP:  JUDE Major.P.RFaustoN.  [address] [address] [phone]    Call in 3 days  for recheck    [facility], Emergency Dept  [address] [facility] [address] [phone]    As needed, If symptoms worsen      OUTPATIENT MEDICATIONS:  New Prescriptions    SENNA-DOCUSATE (PERICOLACE OR SENOKOT S) 8.6-50 MG TAB    Take 1 Tablet by mouth every day.       FINAL DIAGNOSIS  1. Constipation, unspecified constipation type           Electronically signed by: Paz Rose M.D.,  8/25/2023 3:38 PM

## 2023-08-25 NOTE — TELEPHONE ENCOUNTER
Body Location Override (Optional - Billing Will Still Be Based On Selected Body Map Location If Applicable): right lateral shoulder NEUROLOGY PATIENT PRE-VISIT PLANNING     Patient was NOT contacted to complete PVP.  Note: Patient will not be contacted if there is no indication to call.     Patient Appointment is scheduled as: New Patient     Is visit type and length scheduled correctly? Yes    [employer] Patient is checked in Patient Demographics? Yes    3.   Is referral attached to visit? Yes    4. Were records received from referring provider? Yes    4. Patient was contacted to have someone accompany them to visit.     5. Is this appointment scheduled as a Hospital Follow-Up?  No    6. Does the patient require any pre procedure or post procedure follow up? No    7. If any orders were placed at last visit or intended to be done for this visit do we have Results/Consult Notes? No  Labs - Labs were not ordered at last office visit.  Imaging - Imaging was not ordered at last office visit.  Referrals - No referrals were ordered at last office visit.  Note: If patient appointment is for lab or imaging review and patient did not complete the studies, check with provider if OK to reschedule patient until completed.    8. If patient appointment is for Botox - is order pended for provider? N/A    Detail Level: Detailed

## 2023-08-26 NOTE — DISCHARGE INSTRUCTIONS
Use MiraLAX powder 1 capful twice daily and take Senokot daily for your constipation.  Return to the emergency department for blood in the stool fevers vomiting or worsening symptoms.

## 2023-08-26 NOTE — ED NOTES
AVS discussed with patient. Pt ambulatory to lobby with steady gait with home o2. F/u instructions discussed

## 2023-08-30 ENCOUNTER — OFFICE VISIT (OUTPATIENT)
Dept: CARDIOTHORACIC SURGERY | Facility: MEDICAL CENTER | Age: 63
End: 2023-08-30
Payer: COMMERCIAL

## 2023-08-30 VITALS
DIASTOLIC BLOOD PRESSURE: 68 MMHG | BODY MASS INDEX: 31.77 KG/M2 | SYSTOLIC BLOOD PRESSURE: 118 MMHG | HEART RATE: 74 BPM | TEMPERATURE: 97.3 F | OXYGEN SATURATION: 91 % | WEIGHT: 209.6 LBS | HEIGHT: 68 IN

## 2023-08-30 DIAGNOSIS — Z09 POSTOP CHECK: ICD-10-CM

## 2023-08-30 PROCEDURE — 3078F DIAST BP <80 MM HG: CPT | Performed by: NURSE PRACTITIONER

## 2023-08-30 PROCEDURE — 99024 POSTOP FOLLOW-UP VISIT: CPT | Performed by: NURSE PRACTITIONER

## 2023-08-30 PROCEDURE — 3074F SYST BP LT 130 MM HG: CPT | Performed by: NURSE PRACTITIONER

## 2023-08-30 ASSESSMENT — FIBROSIS 4 INDEX: FIB4 SCORE: 0.5

## 2023-08-30 NOTE — PROGRESS NOTES
"CHIEF COMPLAINT: Post-op visit     PROCEDURE: Dr. Zamudio 8/9/2023 AORTIC ROOT REPLACEMENT (27 mm Konect Duong bovine pericardial valved conduit, coronary artery reimplantation) and intraoperative transesophageal echocardiography    HPI: Patient presents to clinic for 1 month follow-up.  He states he walks every day down the street, and sometimes around the block.  He denies chest pain and shortness of breath.  He reports some \"loss of equilibrium\" that goes away if he stops to rest.  Patient has had issues with constipation, dry skin and itchiness reports it is getting better.  He also reports some left lateral chest pain that he describes as popping.  He reports that this is improving with time.    /70 (BP Location: Left arm, Patient Position: Sitting, BP Cuff Size: Adult)   Pulse 69   Temp 36.3 °C (97.3 °F) (Temporal)   Ht 1.727 m (5' 8\")   Wt 91.2 kg (201 lb 1.6 oz)   SpO2 93%     PHYSICAL EXAM:  Cardiac: S1S2  Neuro:  AAO x 3  Resp:  CTA  Wounds:  CDI  Sternum:  Stable    PLAN:     Discharge patient from cardiac surgery clinic.  Patient to call if popping in the left lateral chest does not improve in the next 3 weeks. We reviewed post operative sternal precautions, weight limits and driving precautions moving forward.  We reviewed SBE prophylaxis.  Patient to follow-up with cardiology tomorrow for further medication management.  Patient may return to work with no restrictions at 8 weeks postsurgery.    Overall, we are very pleased with the patient’s progress and we have instructed the patient to follow-up with us in the future should they have any concerns related to their surgery. Otherwise, we will see the patient on a PRN basis. The patient will continue to follow-up with their Cardiologist and PCP.  The patient has been informed that any further prescription refills should be done through their primary care physician and/or cardiologist.  They acknowledged understanding.  Thank you for " allowing us to participate in the care of this very pleasant patient and please let us know if there is any way we may be of further assistance.

## 2023-08-30 NOTE — PROGRESS NOTES
CHIEF COMPLAINT: Wound Check    HPI: Concerned about chest tube incision. Walking around the block.     PHYSICAL EXAM:   Sternum- c/d/I  Chest tube sites- c/d/I- no erythema, swelling drainage.   Heart sounds: no murmurs  Lungs- CTA- decreased bilaterally    PLAN:   Constipation resolved.    Encourage IS 10 x/day    Incisions c/d/I- call if any erythema, swelling, drainage

## 2023-09-07 ENCOUNTER — OFFICE VISIT (OUTPATIENT)
Dept: NEUROLOGY | Facility: MEDICAL CENTER | Age: 63
End: 2023-09-07
Attending: PSYCHIATRY & NEUROLOGY
Payer: COMMERCIAL

## 2023-09-07 VITALS
HEART RATE: 75 BPM | TEMPERATURE: 98 F | WEIGHT: 205.03 LBS | BODY MASS INDEX: 31.07 KG/M2 | HEIGHT: 68 IN | OXYGEN SATURATION: 93 % | SYSTOLIC BLOOD PRESSURE: 120 MMHG | DIASTOLIC BLOOD PRESSURE: 66 MMHG

## 2023-09-07 DIAGNOSIS — F03.A0 MILD DEMENTIA WITHOUT BEHAVIORAL DISTURBANCE, PSYCHOTIC DISTURBANCE, MOOD DISTURBANCE, OR ANXIETY, UNSPECIFIED DEMENTIA TYPE (HCC): Primary | ICD-10-CM

## 2023-09-07 DIAGNOSIS — R73.03 PREDIABETES: ICD-10-CM

## 2023-09-07 DIAGNOSIS — R06.83 PRIMARY SNORING: ICD-10-CM

## 2023-09-07 DIAGNOSIS — I10 ESSENTIAL HYPERTENSION: ICD-10-CM

## 2023-09-07 DIAGNOSIS — E66.9 OBESITY (BMI 30.0-34.9): ICD-10-CM

## 2023-09-07 PROBLEM — K31.84 GASTROPARESIS: Status: ACTIVE | Noted: 2023-07-24

## 2023-09-07 PROCEDURE — 3074F SYST BP LT 130 MM HG: CPT | Performed by: PSYCHIATRY & NEUROLOGY

## 2023-09-07 PROCEDURE — 99205 OFFICE O/P NEW HI 60 MIN: CPT | Performed by: PSYCHIATRY & NEUROLOGY

## 2023-09-07 PROCEDURE — 3078F DIAST BP <80 MM HG: CPT | Performed by: PSYCHIATRY & NEUROLOGY

## 2023-09-07 PROCEDURE — 99212 OFFICE O/P EST SF 10 MIN: CPT | Performed by: PSYCHIATRY & NEUROLOGY

## 2023-09-07 ASSESSMENT — FIBROSIS 4 INDEX: FIB4 SCORE: 0.5

## 2023-09-07 ASSESSMENT — PATIENT HEALTH QUESTIONNAIRE - PHQ9: CLINICAL INTERPRETATION OF PHQ2 SCORE: 0

## 2023-09-07 NOTE — PROGRESS NOTES
Reason for Neurology Consult:  Concern for Dementia    History of present illness:    Vnekat Mackenzie 63 y.o.right handed gentleman who is from [place] and moved to [place] at a young where he did his High School. He was life long  in [facility].  He retired over 20 years ago> when he had an on the job injury where a garage collapsed on  him. (Neck and musculoskeletal injuries)    Here with wife.     Problem list reviewed.  Aortic Room Aneurysm Surgery at [facility] ([place])    When asked about his memory or thinking ability he is aware that he will say something to her and then get distracted and then will ask the same question over and over again. Wife concurs with this issue for the last 3-4 years and occurs every day which is becoming more frequent in the last 12 months.    Wife has noticed that he will continue ask the same questions over and over again for well over 1 to 2 years. He will often ask his wife or turn to his wife for confirmation of whether he is doing something right or not.    Intellectual ability seems to have changed to the wife in the last 1 to 1.5 years including reasoning and problem solving (tasks like signing on the computer and using a phone (sometimes he gets confused or lost about using the phone). He is able to text and email people without wife's assistance.    Sleep- snorting, grunting self awake which has been occurring over the last 2 plus years (wife says this occurs about 2 times per week)    No evolving or ongoing paranoia,delusions, hallucinations in the last 3 months.    No falls or near falls in the last 6 months or so.  No accidents or incidents reported when driving or known to wife to have occurred in the last 12 months. Wife stated and iterated today that she feels  secure and confident in Akil's ability to operate a motorized vehicle in last 3-6 months or so.    No evolving parkinsonian features in the last 6-12 months.    No history of known coronary  events, stroke events or seizure events in adult life.      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    No significant alcohol use in adult life.    Family Hx: No discrete dementia issues or progressive encephalopathic conditions in adult life.  Father: Open Heart Surgery (in his early 70s)> with post operative cognitive.    Patient Active Problem List    Diagnosis Date Noted    Pneumothorax 08/11/2023    Lethargy 08/11/2023    Aortic root aneurysm (HCC) 08/09/2023    Type 2 diabetes mellitus without complication, without long-term current use of insulin (HCC) 08/09/2023    Plantar fasciitis of left foot 01/10/2023    Acute right-sided low back pain with bilateral sciatica 05/02/2022    Bilateral leg cramps 10/11/2021    DION (generalized anxiety disorder) 05/19/2021    Current mild episode of major depressive disorder without prior episode ([facility]) 05/19/2021    PTSD (post-traumatic stress disorder) 05/19/2021    Irregular heartbeat 05/12/2021    Obesity (BMI 30-39.9) 05/12/2021    Multiple atypical skin moles 05/01/2020    Nocturnal leg cramps 09/11/2018    Chronic pain of multiple joints 09/11/2018    Hiatal hernia 06/16/2018    Other sleep apnea 06/13/2018    Plantar fasciitis of right foot 01/18/2018    Memory difficulty 07/13/2017    Metabolic syndrome 05/09/2017    Gastroesophageal reflux disease without esophagitis 05/09/2017    History of adenomatous polyp of colon 04/18/2017    Vitamin D deficiency 04/04/2017    Chronic gout 03/22/2017    Gastroparesis     Essential hypertension        Past medical history:   Past Medical History:   Diagnosis Date    Arrhythmia Uknown, Date    Bowel habit changes     diarrhea with anxiety    Diabetes (HCC)     borderline    Gastroparesis     2016    Heart burn Just after eating spicey food    Heart valve disease This is why Doctor is ordering the surgery    Hypertension     7 yrs    Metabolic syndrome        Past surgical  history:   Past Surgical History:   Procedure Laterality Date    AORTIC VALVE REPLACEMENT  8/9/2023    Procedure: AORTIC ROOT AND VALVE REPLACEMENT, TRANSESOPHAGEAL ECHOCARDIOGRAM;  Surgeon: Emanuel Zamudio M.D.;  Location: SURGERY [facility];  Service: Cardiothoracic    ECHOCARDIOGRAM, TRANSESOPHAGEAL, INTRAOPERATIVE  8/9/2023    Procedure: ECHOCARDIOGRAM, TRANSESOPHAGEAL, INTRAOPERATIVE;  Surgeon: Emanuel Zamudio M.D.;  Location: SURGERY [facility];  Service: Cardiothoracic    APPENDECTOMY      CERVICAL FUSION POSTERIOR      7-8 yrs back    OTHER  Neck    VAGOTOMY  at age 19    due to hiatal hernia         Social history:   Social History     Socioeconomic History    Marital status:      Spouse name: Not on file    Number of children: Not on file    Years of education: Not on file    Highest education level: Not on file   Occupational History    Occupation: surveillance     Employer: [employer]   Tobacco Use    Smoking status: Never    Smokeless tobacco: Former     Types: Chew     Quit date: 5/1/1980    Tobacco comments:     None   Vaping Use    Vaping Use: Never used   Substance and Sexual Activity    Alcohol use: Yes     Alcohol/week: 1.2 oz     Types: 2 Cans of beer per week     Comment: one beer a week    Drug use: No    Sexual activity: Yes     Partners: Female   Other Topics Concern    Not on file   Social History Narrative    Not on file     Social Determinants of Health     Financial Resource Strain: Not on file   Food Insecurity: Not on file   Transportation Needs: Not on file   Physical Activity: Not on file   Stress: Not on file   Social Connections: Not on file   Intimate Partner Violence: Not on file   Housing Stability: Not on file       Family history:   Family History   Problem Relation Age of Onset    Stroke Mother     Heart Disease Mother     Diabetes Mother     Lung Disease Mother         COPD    Diabetes Father     Heart Disease Father     Stroke Father     No Known Problems Son     No  "Known Problems Son          Current medications:   Current Outpatient Medications   Medication    senna-docusate (PERICOLACE OR SENOKOT S) 8.6-50 MG Tab    carvedilol (COREG) 3.125 MG Tab    acetaminophen (TYLENOL) 500 MG Tab    amiodarone (CORDARONE) 200 MG Tab    aspirin 81 MG EC tablet    guaiFENesin ER (MUCINEX) 600 MG TABLET SR 12 HR    Magnesium 250 MG Tab    Cyanocobalamin (VITAMIN B-12) 5000 MCG SL Tab    hydroCHLOROthiazide (HYDRODIURIL) 12.5 MG tablet    metFORMIN ER (GLUCOPHAGE XR) 500 MG TABLET SR 24 HR    allopurinol (ZYLOPRIM) 300 MG Tab    Potassium 99 MG Tab     No current facility-administered medications for this visit.       Medication Allergy:  Allergies   Allergen Reactions    Iodine Unspecified and Hives     dizziness  dizziness           Physical examination:   Vitals:    09/07/23 1424   BP: 120/66   BP Location: Right arm   Patient Position: Sitting   BP Cuff Size: Adult   Pulse: 75   Temp: 36.7 °C (98 °F)   TempSrc: Temporal   SpO2: 93%   Weight: 93 kg (205 lb 0.4 oz)   Height: 1.727 m (5' 8\")       Normal cephalic atraumatic.  There is full range of movement around the neck in all directions without restrictions or discrete pain evoked triggers.  No lower extremity edema.      Neurological  Exam:      [place] Cognitive Assessment (MOCA) Version 7.1    Years of Education: High School    TOTAL SCORE: 16/30  (to be scanned into the MEDIA section in the E.M.R.)          Mental status: Awake, alert and fully oriented to person, place, and situation. Normal attention and concentration.  Did not appear/act combative,irritable,anxious,paranoid/delusional or aggressive to or with me.    Speech and language: Speech is fluent without errors, clear, intact to repetition, and intact to naming.     Follows 3 step motor commands in sequence without significant delay and correctly.    Cranial nerve exam:  II: Pupils are equally round and reactive to light. Visual fields are intact by confrontation.  III, " IV, VI: EOMI, no diplopia, no ptosis.  V: Sensation to light touch is normal over V1-3 distributions bilaterally.  .  VII: Facial movements are symmetrical. There is no facial droop. .  VIII: Hearing intact to soft speech and finger rub bilaterally  IX: Palate elevates symmetrically, uvula is midline. Dysarthria is not present.  XI: Shoulder shrug are symmetrical and strong.   XII: Tongue protrudes midline.      Motor exam:  Muscle tone is normal in all 4 limbs. and No abnormal movements appreciated.    Muscle strength:    Neck Flexors/Extensors: 5/5       Right  Left  Deltoid   5/5  5/5      Biceps   5/5  5/5  Triceps  5/5  5/5   Wrist extensors 5/5  5/5  Wrist flexors  5/5  5/5     5/5  5/5  Interossei  5/5  5/5  Thenar (APB)  5/5  5/5   Hip flexors  5/5  5/5  Quadriceps  5/5  5/5    Hamstrings  5/5  5/5  Dorsiflexors  5/5  5/5  Plantarflexors  5/5  5/5  Toe extension  5/5  5/5          Reflexes:       Right  Left  Biceps   2/4  2/4  Triceps              2/4  2/4  Brachioradialis     2/4  2/4  Knee jerk  2/4  2/4  Ankle jerk  2/4  2/4     Frontal release signs are absent    bilaterally toes are downgoing to plantar stimulation..    Coordination (finger-to-nose, heel/knee/shin, rapid alternating movements) was normal.     There was no ataxia, no tremors, and no dysmetria.     Station and gait were normal. Easily stands up from exam chair without retropulsion,veering,leaning,swaying (to either side).       STOP BANG SCORE OF 5.    Labs and Tests:     Component Ref Range & Units 4 mo ago  (4/17/23) 8 mo ago  (1/10/23) 2 yr ago  (5/10/21) 3 yr ago  (5/6/20) 3 yr ago  (12/2/19) 4 yr ago  (4/17/19) 5 yr ago  (2/20/18)   Glycohemoglobin 4.0 - 5.6 % 5.8 High   5.8 High  CM  5.9 High  CM  5        Reviewed from last 3 months.    ago  (1/10/23) 3 yr ago  (5/6/20) 3 yr ago  (12/2/19) 4 yr ago  (4/17/19) 5 yr ago  (6/13/18) 6 yr ago  (8/17/17)     TSH 0.380 - 5.330 uIU/mL 3.110  2.830 CM  4.190 CM  3.090 CM  1.990 CM   "2.260 CM        NEUROIMAGING: No recent Brain Imaging done          Impression/Plans/Recommendations:    Mild Degree or Stage of Dementia with onset of forgetfulness over 3-4 years ago and slowly getting worse with frequent repeating and asking the same questions over again (daily) for over 1 year now.    There are also some vascular risk factors including HTN and Obesity.    MOCA score today was 16/30 today.      Global Deterioration Score per wife today in the 4 to 5 range.    Functional Activity Questionnaire score of 13 per wife today with multiple 2s given  (assembling tax records, business affairs/ playing a game of skill, keeping track of current events, remembering appointments/medications).    There is no parkinsonism at this time.    Today, I reviewed the clinical criteria and reviewed several  scenarios of the differences being using the medical terms of normal brain aging (age associated memory impairment),  Mild Cognitive Impairment (MCI) and Dementia.    Dementia  is a syndrome but statistically and for the majority of patients  occurs due  to a more rapid aging of the brain tissue or potentially from injury to certain parts of one's brain ( often from such contributing factors as  the cumulative effects of alcohol, from one or more ischemic or hemmorhagic stroke(s), from neurodegeneration or long standing with/without suboptimally controlled Hypertension). It is for some of these potential factors as to why I recommend a brain imaging test (Head CT or Brain MRI) be done for the 1st time or in certain circumstances repeated for comparison purposes  as such imaging can suggest one or more factors as to the reason(s) for the person's cognitive/memory changes. In fact, a normal or \"age related\" finding on a brain imaging test in and of itself is useful clinical and objective information to have in the evaluation of a person who has either an acute, evolving or even chronic (months to years) long " cognitive/memory complaint.    Additional factors or contributors to Brain Health issues can be summarized in several books/references which I discussed as well today.     Goals going forwards include:    A. Paying attention to one's risk factors and reducing their prevalence or potential impact on one's changing memory/thinking> an excellent example would be to stop smoking, reduce or eliminate alcohol use (depending on the amount and frequency of usage), maintain good blood pressure control by buying a digital arm blood pressure cuff such as an OMRON Series 3 or 5 and checking one's blood pressure atleast 3 days per week (in the am and early afternoon) that the numbers are under 140/90 and working as needed with the primary care doctor  to optimize blood pressure control).    B. Encouraging proper sleep hygiene which for most adults is 7 to 8 hours of uninterrupted sleep per night.      C. Encouraging some form of exercise preferable aerobic forms to be done (4 to 5 days per week- 30 minutes minimum per day)> 150 minutes per week as a goal. Example activities could include fast walking (up a slight incline), jogging, cycling (road or stationary), swimming,tennis. Essentially, even basic walking on a flat surface or a treadmill would be better than doing nothing.    D. Maintaining or forming new social contacts with family and friends  and a positive attitude despite the concerns and/or ongoing issues with thinking and/or memory.    E. Eating well which means a diet low in salt  (under 2 grams per day), sugar and saturated fat.    F. Maintaining one's BMI (Body Mass Index) under 30.    G. Consideration of the use of cognitive enhancers (acetylcholinerase inhibitors such as Aricept vs an NMDA Receptor agent like Namenda). Pros and cons of such compounds in terms of predicted efficacy and side effects profiles reviewed. I gave Akil some information of Namenda today and they will read about the information.    H.  Brain MRI reviewed today and we also discussed consideration of Brain PET at the this (but at this time will hold off on this time).    I.  Lab work to be done (Vitamin levels to be done)    J. Anti amyloid drugs discussed and hand outs given to wife ; they are both in agreement to NOT pursue these medications.    K. OT guided Driving Simulator to be done for risk stratification.    L. Vascular Risk Factors reduction strategy reviewed today.    M. Book on Dementia Care Management given to wife today.       I have performed  a history and physical exam and a directed /focused  ROS today.    Total time spent today or this patient's care was 76 minutes  and included reviewing  the diagnostic workup to date (such as labs and imaging as well as interpreting such tests relevant to this patient's neurological condition),  reviewing/obtaining separately obtained history (from patient and/or wife )  for today's neurological problem(s) ,counseling and educating the patient and wife  on issues related to cognition/memory and cognitive health factors and documenting  the clinical information in the EMR.    Follow up in 6 months or so.    Aniceto Hernandez MD  [facility].   [facility]

## 2023-09-11 ENCOUNTER — OFFICE VISIT (OUTPATIENT)
Dept: CARDIOTHORACIC SURGERY | Facility: MEDICAL CENTER | Age: 63
End: 2023-09-11
Payer: COMMERCIAL

## 2023-09-11 VITALS
BODY MASS INDEX: 30.48 KG/M2 | SYSTOLIC BLOOD PRESSURE: 122 MMHG | DIASTOLIC BLOOD PRESSURE: 70 MMHG | TEMPERATURE: 97.3 F | HEART RATE: 69 BPM | WEIGHT: 201.1 LBS | HEIGHT: 68 IN | OXYGEN SATURATION: 93 %

## 2023-09-11 DIAGNOSIS — Z09 POSTOP CHECK: ICD-10-CM

## 2023-09-11 PROCEDURE — 3074F SYST BP LT 130 MM HG: CPT | Performed by: NURSE PRACTITIONER

## 2023-09-11 PROCEDURE — 3078F DIAST BP <80 MM HG: CPT | Performed by: NURSE PRACTITIONER

## 2023-09-11 PROCEDURE — 99024 POSTOP FOLLOW-UP VISIT: CPT | Performed by: NURSE PRACTITIONER

## 2023-09-11 ASSESSMENT — FIBROSIS 4 INDEX: FIB4 SCORE: 0.5

## 2023-09-12 ENCOUNTER — OFFICE VISIT (OUTPATIENT)
Dept: CARDIOLOGY | Facility: MEDICAL CENTER | Age: 63
End: 2023-09-12
Attending: NURSE PRACTITIONER
Payer: COMMERCIAL

## 2023-09-12 VITALS
HEIGHT: 68 IN | SYSTOLIC BLOOD PRESSURE: 110 MMHG | BODY MASS INDEX: 30.77 KG/M2 | WEIGHT: 203 LBS | OXYGEN SATURATION: 94 % | HEART RATE: 62 BPM | DIASTOLIC BLOOD PRESSURE: 68 MMHG

## 2023-09-12 DIAGNOSIS — I27.20 PULMONARY HTN (HCC): ICD-10-CM

## 2023-09-12 DIAGNOSIS — I10 ESSENTIAL HYPERTENSION: ICD-10-CM

## 2023-09-12 DIAGNOSIS — I47.10 SVT (SUPRAVENTRICULAR TACHYCARDIA) (HCC): ICD-10-CM

## 2023-09-12 DIAGNOSIS — I49.3 PVC'S (PREMATURE VENTRICULAR CONTRACTIONS): ICD-10-CM

## 2023-09-12 DIAGNOSIS — R91.8 PULMONARY NODULES: ICD-10-CM

## 2023-09-12 DIAGNOSIS — I71.21 AORTIC ROOT ANEURYSM (HCC): ICD-10-CM

## 2023-09-12 PROCEDURE — 3074F SYST BP LT 130 MM HG: CPT | Performed by: NURSE PRACTITIONER

## 2023-09-12 PROCEDURE — 99213 OFFICE O/P EST LOW 20 MIN: CPT | Performed by: NURSE PRACTITIONER

## 2023-09-12 PROCEDURE — 99214 OFFICE O/P EST MOD 30 MIN: CPT | Performed by: NURSE PRACTITIONER

## 2023-09-12 PROCEDURE — 3078F DIAST BP <80 MM HG: CPT | Performed by: NURSE PRACTITIONER

## 2023-09-12 RX ORDER — FUROSEMIDE 20 MG/1
20 TABLET ORAL 2 TIMES DAILY
Qty: 30 TABLET | Refills: 1 | Status: SHIPPED
Start: 2023-09-12 | End: 2023-09-12

## 2023-09-12 RX ORDER — FUROSEMIDE 20 MG/1
20 TABLET ORAL DAILY
Qty: 30 TABLET | Refills: 1 | Status: SHIPPED | OUTPATIENT
Start: 2023-09-12

## 2023-09-12 ASSESSMENT — ENCOUNTER SYMPTOMS
NAUSEA: 1
FEVER: 0
COUGH: 1
ABDOMINAL PAIN: 0
DIZZINESS: 0
ORTHOPNEA: 0
CLAUDICATION: 0
SHORTNESS OF BREATH: 0
PALPITATIONS: 0
PND: 0
MYALGIAS: 0

## 2023-09-12 ASSESSMENT — FIBROSIS 4 INDEX: FIB4 SCORE: 0.5

## 2023-09-12 NOTE — PROGRESS NOTES
Chief Complaint   Patient presents with    Hypertension     F/V DX: Essential hypertension    Other     F/V DX: Irregular heartbeat       Subjective     Venkat Mackenzie is a 63 y.o. male who presents today to follow-up after his aortic root replacement surgery with his significant other, Cornelia.    Patient of Shannan Mauricio PA-C.  Patient was last seen in clinic on 7/28/2023 with Shannan.  During that visit, patient was sent to CT surgery for evaluation of his aortic root aneurysm.    Patient was recommended for surgery.  Patient had cardiac catheterization which did not show any coronary artery disease.  He had surgery on 8/9/2023. He had developed bilateral pneumothoraces, had chest tube placements, post-op afib and started on amiodarone.  He was discharged on 8/18/2023.    Patient reports he is doing fairly well, he does mention having some chest soreness.  He also states it is hard for him to take a deep breath as it causes him to cough.  He does mention having some lower extremity edema and some nausea in the mornings.    He denies palpitations, orthopnea, PND, shortness of breath or dizziness/lightheadedness.    He is using his heart pillow and following his precautions.  Patient is using his incentive spirometer, currently is at 1000 mL, his baseline was around 1300 mL.    Patient reports he is eating well, walking about a block and at the grocery stores.  Patient reports he is having bowel movements.    He reports his home weights have gone from around 208 pounds down to 199 pounds.    Additionally, patient has the following medical problems:     -Hypertension    -SVT    -Obstructive sleep apnea    -Anxiety    -Gout      Past Medical History:   Diagnosis Date    Arrhythmia Uknown, Date    Bowel habit changes     diarrhea with anxiety    Diabetes (HCC)     borderline    Gastroparesis     2016    Heart burn Just after eating spicey food    Heart valve disease This is why Doctor is ordering the surgery     Hypertension     7 yrs    Metabolic syndrome      Past Surgical History:   Procedure Laterality Date    AORTIC VALVE REPLACEMENT  8/9/2023    Procedure: AORTIC ROOT AND VALVE REPLACEMENT, TRANSESOPHAGEAL ECHOCARDIOGRAM;  Surgeon: Emanuel Zamudio M.D.;  Location: SURGERY [facility];  Service: Cardiothoracic    ECHOCARDIOGRAM, TRANSESOPHAGEAL, INTRAOPERATIVE  8/9/2023    Procedure: ECHOCARDIOGRAM, TRANSESOPHAGEAL, INTRAOPERATIVE;  Surgeon: Emanuel Zamudio M.D.;  Location: SURGERY [facility];  Service: Cardiothoracic    APPENDECTOMY      CERVICAL FUSION POSTERIOR      7-8 yrs back    OTHER  Neck    VAGOTOMY  at age 19    due to hiatal hernia     Family History   Problem Relation Age of Onset    Stroke Mother     Heart Disease Mother     Diabetes Mother     Lung Disease Mother         COPD    Diabetes Father     Heart Disease Father     Stroke Father     No Known Problems Son     No Known Problems Son      Social History     Socioeconomic History    Marital status:      Spouse name: Not on file    Number of children: Not on file    Years of education: Not on file    Highest education level: Not on file   Occupational History    Occupation: surveillance     Employer: [employer]   Tobacco Use    Smoking status: Never    Smokeless tobacco: Former     Types: Chew     Quit date: 5/1/1980    Tobacco comments:     None   Vaping Use    Vaping Use: Never used   Substance and Sexual Activity    Alcohol use: Yes     Alcohol/week: 1.2 oz     Types: 2 Cans of beer per week     Comment: one beer a week    Drug use: No    Sexual activity: Yes     Partners: Female   Other Topics Concern    Not on file   Social History Narrative    Not on file     Social Determinants of Health     Financial Resource Strain: Not on file   Food Insecurity: Not on file   Transportation Needs: Not on file   Physical Activity: Not on file   Stress: Not on file   Social Connections: Not on file   Intimate Partner Violence: Not on file   Housing  Stability: Not on file     Allergies   Allergen Reactions    Iodine Unspecified and Hives     dizziness  dizziness     Outpatient Encounter Medications as of 9/12/2023   Medication Sig Dispense Refill    furosemide (LASIX) 20 MG Tab Take 1 Tablet by mouth every day. 30 Tablet 1    senna-docusate (PERICOLACE OR SENOKOT S) 8.6-50 MG Tab Take 1 Tablet by mouth every day. 30 Tablet 0    carvedilol (COREG) 3.125 MG Tab Take 1 Tablet by mouth 2 times a day with meals. 60 Tablet 2    acetaminophen (TYLENOL) 500 MG Tab Take 2 Tablets by mouth every 6 hours as needed for Mild Pain. 30 Tablet 0    amiodarone (CORDARONE) 200 MG Tab Take 2 Tablets by mouth 2 times a day. x 7 days, Then Take Two Tablets daily for 7 days, Then Take 1 Tablet Daily. 56 Tablet 1    aspirin 81 MG EC tablet Take 1 Tablet by mouth every day. 100 Tablet 0    Magnesium 250 MG Tab Take 1 Tablet by mouth every morning.      Cyanocobalamin (VITAMIN B-12) 5000 MCG SL Tab Place 1 Tablet under the tongue every morning.      hydroCHLOROthiazide (HYDRODIURIL) 12.5 MG tablet Take 1 Tablet by mouth every day. (Patient taking differently: Take 12.5 mg by mouth every morning.) 90 Tablet 3    metFORMIN ER (GLUCOPHAGE XR) 500 MG TABLET SR 24 HR Take 1 Tablet by mouth every day. (Patient taking differently: Take 500 mg by mouth every morning.) 90 Tablet 3    allopurinol (ZYLOPRIM) 300 MG Tab Take 1 Tablet by mouth every day. 90 Tablet 3    Potassium 99 MG Tab Take 1 Tablet by mouth every morning.      [DISCONTINUED] furosemide (LASIX) 20 MG Tab Take 1 Tablet by mouth 2 times a day. 30 Tablet 1     No facility-administered encounter medications on file as of 9/12/2023.     Review of Systems   Constitutional:  Negative for fever and malaise/fatigue.   Respiratory:  Positive for cough. Negative for shortness of breath.    Cardiovascular:  Positive for chest pain (chest soreness) and leg swelling. Negative for palpitations, orthopnea, claudication and PND.  "  Gastrointestinal:  Positive for nausea. Negative for abdominal pain.   Musculoskeletal:  Negative for myalgias.   Neurological:  Negative for dizziness.   All other systems reviewed and are negative.             Objective     /68 (BP Location: Left arm, Patient Position: Sitting, BP Cuff Size: Adult)   Pulse 62   Ht 1.727 m (5' 8\")   Wt 92.1 kg (203 lb)   SpO2 94%   BMI 30.87 kg/m²     Physical Exam  Vitals reviewed.   Constitutional:       Appearance: He is well-developed.   HENT:      Head: Normocephalic and atraumatic.   Eyes:      Pupils: Pupils are equal, round, and reactive to light.   Neck:      Vascular: No JVD.   Cardiovascular:      Rate and Rhythm: Normal rate and regular rhythm.      Heart sounds: Normal heart sounds.   Pulmonary:      Effort: Pulmonary effort is normal. No respiratory distress.      Breath sounds: Normal breath sounds. No wheezing or rales.   Abdominal:      General: Bowel sounds are normal.      Palpations: Abdomen is soft.   Musculoskeletal:         General: Normal range of motion.      Cervical back: Normal range of motion and neck supple.      Right lower leg: No edema.      Left lower leg: No edema.   Skin:     General: Skin is warm and dry.   Neurological:      General: No focal deficit present.      Mental Status: He is alert and oriented to person, place, and time.   Psychiatric:         Behavior: Behavior normal.       Lab Results   Component Value Date/Time    CHOLSTRLTOT 135 01/10/2023 08:22 AM    LDL 65 01/10/2023 08:22 AM    HDL 61 01/10/2023 08:22 AM    TRIGLYCERIDE 46 01/10/2023 08:22 AM       Lab Results   Component Value Date/Time    SODIUM 130 (L) 08/17/2023 11:45 PM    POTASSIUM 3.6 08/17/2023 11:45 PM    CHLORIDE 93 (L) 08/17/2023 11:45 PM    CO2 27 08/17/2023 11:45 PM    GLUCOSE 105 (H) 08/17/2023 11:45 PM    BUN 22 08/17/2023 11:45 PM    CREATININE 0.66 08/17/2023 11:45 PM     Lab Results   Component Value Date/Time    ALKPHOSPHAT 80 08/07/2023 01:18 " PM    ASTSGOT 13 08/07/2023 01:18 PM    ALTSGPT 20 08/07/2023 01:18 PM    TBILIRUBIN 0.6 08/07/2023 01:18 PM        Cardiac cath 8/1/2023  HEMODYNAMICS:  Aortic pressure: 140 /83 mmHg  LVEDP: 22 mmHg  No significant aortic gradient on pullback     CORONARY ANGIOGRAPHY:  The left main coronary artery : Large caliber normal-appearing vessel that trifurcates to LAD, ramus intermedius and left circumflex  Ramus intermedius coronary artery: Normal-appearing large caliber vessel  The left anterior descending coronary artery :  transapical vessel with mild luminal irregularities.  Large D1 branch  The left circumflex coronary artery :  large caliber vessel with bifurcating OM, mild luminal irregularities  The right coronary artery  : Large-caliber dominant vessel with slow coronary flow phenomena.  High anterior takeoff /huff's crook takeoff     Supravalvular aortography:  Aortic root: 5.1-5.2cm  Ascending aorta: 3.6cm  No significant AI     IMPRESSION:  1.  No significant epicardial coronary artery disease. Dominant RCA with high anterior takeoff.  2.  Elevated resting LVEDP 22 mmHg with no significant transaortic gradient on pullback.   3.  Slow coronary flow  4.  Aneurysmal aortic root >5cm     RECOMMENDATIONS:  -Proceed with scheduled aortic root surgery as planned next week  -TR band protocol  - Primary ASCVD prevention     NOTIFICATION:  The patient's wife was called and notified of the results.     Referring CTS notified.     Aortic root replacement 8/9/2023  Operation date: 8/9/2023   Referring physician: Noe Dash MD     PreOp Diagnosis: Aortic root aneurysm (5.5 to 6 cm), mild aortic regurgitation, supraventricular tachycardia, hypertension, obstructive sleep apnea, anxiety, GERD     PostOp Diagnosis: Aortic root aneurysm (5.5 to 6 cm), mild aortic regurgitation, supraventricular tachycardia, hypertension, obstructive sleep apnea, anxiety, GERD     Procedure(s):  AORTIC ROOT REPLACEMENT (27 mm Konect  Duong bovine pericardial valved conduit, coronary artery reimplantation) and intraoperative transesophageal echocardiography     Surgeon(s):  Emanuel Zamudio M.D.     Assistant:  LISE Brown            Assessment & Plan     1. Aortic root aneurysm (HCC)  Basic Metabolic Panel    furosemide (LASIX) 20 MG Tab    DISCONTINUED: furosemide (LASIX) 20 MG Tab      2. PVC's (premature ventricular contractions)        3. Essential hypertension        4. SVT (supraventricular tachycardia) (HCC)        5. Pulmonary nodules        6. Pulmonary HTN (HCC)            Medical Decision Making: Today's Assessment/Status/Plan:        Dilated ascending aortic root, s/p aortic root replacement on 8/9/2023:  -Patient was seen by CT surgery  -Recommend cardiac rehab, patient unsure if he will be able to participate  -Patient is having trouble taking deep breaths, does cough with deep breaths, sounds like diminished lower lobes, and lower extremity edema, recommend starting furosemide 20 mg daily  -Repeat a BMP in 1 week  -Continue monitoring weights at home  -Continue using I-S at minimum 10 times a day    Postoperative A-fib:  -Continue amiodarone 200 mg daily (verified that patient is taking this dose)  -Continue aspirin 81 mg daily, not on OAC  -[id number] score is 3 (Age, HTN, DM) he recently turned 63, can discuss OAC at future visit if afib returns     Irregular heartbeat  SVT  PVCs  -Stable   - Event monitoring showed frequent runs of SVT and a high PVC burden of 10.2  -Continue carvedilol 3.125 mg twice a day     Pulmonary HTN  Mild AI  Mild MR  - Asymptomatic  - Continue to monitor clinically     Hypertension  -Stable  - Blood pressure is now well controlled  -Continue HCTZ 12.5mg daily  -Continue carvedilol 3.125 mg twice a day  -Continue home BP monitoring.      Pulmonary nodules  -Incidental finding of 5 mm nodule right middle lobe and 4.3 mm nodule left lower lobe.  -Recommend patient follow-up with PCP    FU in  clinic in 4 weeks with lab testing. Sooner if needed.    Patient verbalizes understanding and agrees with the plan of care.     PLEASE NOTE: This Note was created using voice recognition Software. I have made every reasonable attempt to correct obvious errors, but I expect that there are errors of grammar and possibly content that I did not discover before finalizing the note

## 2023-09-14 ENCOUNTER — TELEPHONE (OUTPATIENT)
Dept: HEALTH INFORMATION MANAGEMENT | Facility: OTHER | Age: 63
End: 2023-09-14
Payer: COMMERCIAL

## 2023-09-18 ENCOUNTER — HOSPITAL ENCOUNTER (OUTPATIENT)
Dept: LAB | Facility: MEDICAL CENTER | Age: 63
End: 2023-09-18
Attending: NURSE PRACTITIONER
Payer: COMMERCIAL

## 2023-09-18 ENCOUNTER — OFFICE VISIT (OUTPATIENT)
Dept: MEDICAL GROUP | Facility: PHYSICIAN GROUP | Age: 63
End: 2023-09-18
Payer: COMMERCIAL

## 2023-09-18 VITALS
HEIGHT: 68 IN | HEART RATE: 65 BPM | WEIGHT: 194 LBS | TEMPERATURE: 97.1 F | SYSTOLIC BLOOD PRESSURE: 110 MMHG | OXYGEN SATURATION: 93 % | DIASTOLIC BLOOD PRESSURE: 70 MMHG | BODY MASS INDEX: 29.4 KG/M2

## 2023-09-18 DIAGNOSIS — Z23 NEED FOR VACCINATION: ICD-10-CM

## 2023-09-18 DIAGNOSIS — Z09 HOSPITAL DISCHARGE FOLLOW-UP: ICD-10-CM

## 2023-09-18 DIAGNOSIS — R91.8 PULMONARY NODULES: ICD-10-CM

## 2023-09-18 LAB
ANION GAP SERPL CALC-SCNC: 17 MMOL/L (ref 7–16)
BASOPHILS # BLD AUTO: 1.4 % (ref 0–1.8)
BASOPHILS # BLD: 0.08 K/UL (ref 0–0.12)
BUN SERPL-MCNC: 20 MG/DL (ref 8–22)
CALCIUM SERPL-MCNC: 10 MG/DL (ref 8.5–10.5)
CHLORIDE SERPL-SCNC: 96 MMOL/L (ref 96–112)
CO2 SERPL-SCNC: 25 MMOL/L (ref 20–33)
CREAT SERPL-MCNC: 0.94 MG/DL (ref 0.5–1.4)
EOSINOPHIL # BLD AUTO: 0.23 K/UL (ref 0–0.51)
EOSINOPHIL NFR BLD: 4.2 % (ref 0–6.9)
ERYTHROCYTE [DISTWIDTH] IN BLOOD BY AUTOMATED COUNT: 49.2 FL (ref 35.9–50)
FASTING STATUS PATIENT QL REPORTED: NORMAL
GFR SERPLBLD CREATININE-BSD FMLA CKD-EPI: 90 ML/MIN/1.73 M 2
GLUCOSE SERPL-MCNC: 99 MG/DL (ref 65–99)
HCT VFR BLD AUTO: 45.7 % (ref 42–52)
HGB BLD-MCNC: 14.5 G/DL (ref 14–18)
IMM GRANULOCYTES # BLD AUTO: 0.05 K/UL (ref 0–0.11)
IMM GRANULOCYTES NFR BLD AUTO: 0.9 % (ref 0–0.9)
LYMPHOCYTES # BLD AUTO: 1.69 K/UL (ref 1–4.8)
LYMPHOCYTES NFR BLD: 30.5 % (ref 22–41)
MCH RBC QN AUTO: 29.1 PG (ref 27–33)
MCHC RBC AUTO-ENTMCNC: 31.7 G/DL (ref 32.3–36.5)
MCV RBC AUTO: 91.8 FL (ref 81.4–97.8)
MONOCYTES # BLD AUTO: 0.49 K/UL (ref 0–0.85)
MONOCYTES NFR BLD AUTO: 8.8 % (ref 0–13.4)
NEUTROPHILS # BLD AUTO: 3 K/UL (ref 1.82–7.42)
NEUTROPHILS NFR BLD: 54.2 % (ref 44–72)
NRBC # BLD AUTO: 0 K/UL
NRBC BLD-RTO: 0 /100 WBC (ref 0–0.2)
PLATELET # BLD AUTO: 339 K/UL (ref 164–446)
PMV BLD AUTO: 9.8 FL (ref 9–12.9)
POTASSIUM SERPL-SCNC: 3.1 MMOL/L (ref 3.6–5.5)
RBC # BLD AUTO: 4.98 M/UL (ref 4.7–6.1)
SODIUM SERPL-SCNC: 138 MMOL/L (ref 135–145)
WBC # BLD AUTO: 5.5 K/UL (ref 4.8–10.8)

## 2023-09-18 PROCEDURE — 90677 PCV20 VACCINE IM: CPT | Performed by: NURSE PRACTITIONER

## 2023-09-18 PROCEDURE — 99215 OFFICE O/P EST HI 40 MIN: CPT | Mod: 25 | Performed by: NURSE PRACTITIONER

## 2023-09-18 PROCEDURE — 3074F SYST BP LT 130 MM HG: CPT | Performed by: NURSE PRACTITIONER

## 2023-09-18 PROCEDURE — 90472 IMMUNIZATION ADMIN EACH ADD: CPT | Performed by: NURSE PRACTITIONER

## 2023-09-18 PROCEDURE — 36415 COLL VENOUS BLD VENIPUNCTURE: CPT

## 2023-09-18 PROCEDURE — 90471 IMMUNIZATION ADMIN: CPT | Performed by: NURSE PRACTITIONER

## 2023-09-18 PROCEDURE — 80048 BASIC METABOLIC PNL TOTAL CA: CPT

## 2023-09-18 PROCEDURE — 85025 COMPLETE CBC W/AUTO DIFF WBC: CPT

## 2023-09-18 PROCEDURE — 3078F DIAST BP <80 MM HG: CPT | Performed by: NURSE PRACTITIONER

## 2023-09-18 PROCEDURE — 90662 IIV NO PRSV INCREASED AG IM: CPT | Performed by: NURSE PRACTITIONER

## 2023-09-18 ASSESSMENT — FIBROSIS 4 INDEX: FIB4 SCORE: 0.5

## 2023-09-18 NOTE — ASSESSMENT & PLAN NOTE
On 8/9/2023 he had his aortic root replaced.  He was seen on 8/25/2023 in the ER for constipation.  Imaging of his abdomen did show constipation without obstruction.  Prior to ER he has tried Colace and a few enemas.  He was advised to drink plenty of fluids, use MiraLAX powder twice daily and Senokot once daily. His last BM was yesterday and hard. BM are every other day, sometimes twice a day. He is drinking plenty of water. He is eating smaller portions with low salt diet, eating salads. He was admitted on 8/1/2023 for left heart catheterization with aortic root replacement and intraoperative transesophageal echocardiography.  On 8/9/2023 he did have aortic root replacement and intraoperative transesophageal echocardiography.  He was in the hospital for 9 days.  He did have chest tube present that was removed 8/17/2023 1 day before discharge.  Day of discharge his repeat chest x-ray was negative for pneumothorax and he was to go home with oxygen.  He was started on amiodarone and aspirin.  Continues carvedilol, hydrochlorothiazide and metformin.  His losartan, metoprolol and prednisone were stopped.  He is to not drive for weeks after surgery, or lifting any items over 10 pounds for 6 weeks after surgery.  He did have postop follow-up on 9/11/2023 with cardiothoracic surgery.  He has followed up with cardiology 9/12/2023.  During the office visit he was started on furosemide 20 mg daily due to diminished lower lobes and lower extremity edema.  There is also incidental finding of a 5 mm nodule right middle lobe and a 4.3 mm nodule left lower lobe. His home blood pressures are similar to today's blood pressure. His weight is down 26 pounds since his office visit in April 2023. He is feeling better. Waiting on call to schedule for cardiac rehab. IS up to 2000, 5-6 times a day.

## 2023-09-18 NOTE — ASSESSMENT & PLAN NOTE
Found incidentally on CT 6/20/2023: There is a 5 mm pleural-based nodule right middle lobe and a 4.3 mm nodule left lower lobe. + tobacco chew history. He was a  in his previous job.

## 2023-09-18 NOTE — PROGRESS NOTES
Subjective:     CC: Hospital discharge follow-up    HPI:   Venkat presents today with Cornelia for the following:    Pulmonary nodules  Found incidentally on CT 6/20/2023: There is a 5 mm pleural-based nodule right middle lobe and a 4.3 mm nodule left lower lobe. + tobacco chew history. He was a  in his previous job.    Hospital discharge follow-up  On 8/9/2023 he had his aortic root replaced.  He was seen on 8/25/2023 in the ER for constipation.  Imaging of his abdomen did show constipation without obstruction.  Prior to ER he has tried Colace and a few enemas.  He was advised to drink plenty of fluids, use MiraLAX powder twice daily and Senokot once daily. His last BM was yesterday and hard. BM are every other day, sometimes twice a day. He is drinking plenty of water. He is eating smaller portions with low salt diet, eating salads. He was admitted on 8/1/2023 for left heart catheterization with aortic root replacement and intraoperative transesophageal echocardiography.  On 8/9/2023 he did have aortic root replacement and intraoperative transesophageal echocardiography.  He was in the hospital for 9 days.  He did have chest tube present that was removed 8/17/2023 1 day before discharge.  Day of discharge his repeat chest x-ray was negative for pneumothorax and he was to go home with oxygen.  He was started on amiodarone and aspirin.  Continues carvedilol, hydrochlorothiazide and metformin.  His losartan, metoprolol and prednisone were stopped.  He is to not drive for weeks after surgery, or lifting any items over 10 pounds for 6 weeks after surgery.  He did have postop follow-up on 9/11/2023 with cardiothoracic surgery.  He has followed up with cardiology 9/12/2023.  During the office visit he was started on furosemide 20 mg daily due to diminished lower lobes and lower extremity edema.  There is also incidental finding of a 5 mm nodule right middle lobe and a 4.3 mm nodule left lower lobe. His home blood  pressures are similar to today's blood pressure. His weight is down 26 pounds since his office visit in April 2023. He is feeling better. Waiting on call to schedule for cardiac rehab. IS up to 2000, 5-6 times a day.         Past Medical History:   Diagnosis Date    Arrhythmia Uknown, Date    Bowel habit changes     diarrhea with anxiety    Diabetes (HCC)     borderline    Gastroparesis     2016    Heart burn Just after eating spicey food    Heart valve disease This is why Doctor is ordering the surgery    Hypertension     7 yrs    Metabolic syndrome        Social History     Tobacco Use    Smoking status: Never    Smokeless tobacco: Former     Types: Chew     Quit date: 5/1/1980    Tobacco comments:     None   Vaping Use    Vaping Use: Never used   Substance Use Topics    Alcohol use: Yes     Alcohol/week: 1.2 oz     Types: 2 Cans of beer per week     Comment: one beer a week    Drug use: No       Current Outpatient Medications Ordered in Epic   Medication Sig Dispense Refill    furosemide (LASIX) 20 MG Tab Take 1 Tablet by mouth every day. 30 Tablet 1    senna-docusate (PERICOLACE OR SENOKOT S) 8.6-50 MG Tab Take 1 Tablet by mouth every day. 30 Tablet 0    carvedilol (COREG) 3.125 MG Tab Take 1 Tablet by mouth 2 times a day with meals. 60 Tablet 2    acetaminophen (TYLENOL) 500 MG Tab Take 2 Tablets by mouth every 6 hours as needed for Mild Pain. 30 Tablet 0    amiodarone (CORDARONE) 200 MG Tab Take 2 Tablets by mouth 2 times a day. x 7 days, Then Take Two Tablets daily for 7 days, Then Take 1 Tablet Daily. 56 Tablet 1    aspirin 81 MG EC tablet Take 1 Tablet by mouth every day. 100 Tablet 0    Magnesium 250 MG Tab Take 1 Tablet by mouth every morning.      Cyanocobalamin (VITAMIN B-12) 5000 MCG SL Tab Place 1 Tablet under the tongue every morning.      hydroCHLOROthiazide (HYDRODIURIL) 12.5 MG tablet Take 1 Tablet by mouth every day. (Patient taking differently: Take 12.5 mg by mouth every morning.) 90 Tablet 3  "   metFORMIN ER (GLUCOPHAGE XR) 500 MG TABLET SR 24 HR Take 1 Tablet by mouth every day. (Patient taking differently: Take 500 mg by mouth every morning.) 90 Tablet 3    allopurinol (ZYLOPRIM) 300 MG Tab Take 1 Tablet by mouth every day. 90 Tablet 3    Potassium 99 MG Tab Take 1 Tablet by mouth every morning.       No current [facility]-ordered facility-administered medications on file.       Allergies:  Iodine    Health Maintenance: Reviewed      Objective:     Vital signs reviewed  Exam:  /70 (BP Location: Right arm, Patient Position: Sitting, BP Cuff Size: Adult)   Pulse 65   Temp 36.2 °C (97.1 °F) (Temporal)   Ht 1.727 m (5' 8\")   Wt 88 kg (194 lb)   SpO2 93%   BMI 29.50 kg/m²  Body mass index is 29.5 kg/m².    Gen: Alert and oriented, No apparent distress.  Eyes:   Lids normal. Glasses in place.   Lungs: Normal effort, CTA bilaterally, no wheezes, rhonchi, or rales.  Left lateral healing chest tube sites clean and dry without any redness or discharge.  CV: Regular rate and rhythm. No murmurs, rubs, or gallops.  Midline healing incision, no redness or discharge.  Ext: No clubbing, cyanosis. RLE 1+, no pitting, LLE edema 1+, no pitting, redness present no warmth. Bilateral pedal pulse 1+.       Assessment & Plan:     63 y.o. male with the following -     1. Hospital discharge follow-up  Acute complicated problem.  Hospital discharge follow-up completed today.  I reviewed his recent ER visit, labs, imaging, and recent hospitalizations on both 8/1/2023 and 8/9/2023.  Questions were answered today.  He also has questions regarding when he can return to work and if he is okay to drive.  I will send a message to cardiology to clarify send a message back in his MyChart.  He will continue with his amiodarone, furosemide 20 mg daily and daily potassium.  Check an updated labs.  Blood pressure was controlled today 110/70.  Continue plenty of fluids and high-fiber diet.  May continue over-the-counter MiraLAX, Senokot " and Colace.  - CBC WITH DIFFERENTIAL; Future  - Basic Metabolic Panel; Future    2. Pulmonary nodules  Chronic stable problem.  Incidental finding on CT scan in June.  He did not smoke tobacco but did chew tobacco.  He was a .  We will plan for repeat CT scan around June 2024.    3. Need for vaccination  Acute complicated problem.  He would like his influenza and pneumonia vaccine today. I have placed the below orders and discussed them with an approved delegating provider. The MA is performing the below orders under the direction of Dr. Davis/patient list  None  Member do his CT scan next year 58.  - INFLUENZA VACCINE, HIGH DOSE (65+ ONLY)  - Pneumococcal Conjugate Vaccine 20-Valent (19 yrs+)    My total time spent caring for the patient on the day of the encounter was 46 minutes.   This does not include time spent on separately billable procedures/tests.      Return in about 3 months (around 12/18/2023) for Diabetes.    Please note that this dictation was created using voice recognition software. I have made every reasonable attempt to correct obvious errors, but I expect that there are errors of grammar and possibly content that I did not discover before finalizing the note.

## 2023-09-19 DIAGNOSIS — E87.6 HYPOKALEMIA: ICD-10-CM

## 2023-09-19 RX ORDER — POTASSIUM CHLORIDE 20 MEQ/1
20 TABLET, EXTENDED RELEASE ORAL 2 TIMES DAILY
Qty: 8 TABLET | Refills: 0 | Status: SHIPPED | OUTPATIENT
Start: 2023-09-19 | End: 2023-09-23

## 2023-09-19 NOTE — PROGRESS NOTES
Recent potassium 3.1 mmol/L.  Start potassium chloride 20 mEq twice daily for 4 days, stop potassium 99 while taking prescription.  After 4 days prescription will resume his home potassium 99 dosing.

## 2023-09-28 ENCOUNTER — TELEPHONE (OUTPATIENT)
Dept: CARDIOLOGY | Facility: MEDICAL CENTER | Age: 63
End: 2023-09-28
Payer: COMMERCIAL

## 2023-09-28 NOTE — TELEPHONE ENCOUNTER
JANEEN              Caller: Angélica with [facility]    Topic/issue: They were calling about the patient's metoprolol SR (TOPROL XL) 25 MG TABLET SR 24 HR and carvedilol (COREG) 3.125 MG Tab medications and they were informed the metoprolol medication was discontinued but they said that the patient refilled it this month on the 22nd and they were asking for a call back    Callback Number: see below      Thank you    -Jimi WOO

## 2023-09-28 NOTE — TELEPHONE ENCOUNTER
Called pt, he had been taking Carvedilol and Metoprolol. Metoprolol is not an active rx but pt managed to pick It up somehow. Informed pt that he should only be taking Carvedilol. Pt verbalizes understanding and will remove Metoprolol from his medications. Pt reports no s/s, BP today 141/82 HR 79.    Returned Angélica's call and informed her.

## 2023-10-09 ENCOUNTER — OFFICE VISIT (OUTPATIENT)
Dept: CARDIOLOGY | Facility: MEDICAL CENTER | Age: 63
End: 2023-10-09
Attending: INTERNAL MEDICINE
Payer: COMMERCIAL

## 2023-10-09 VITALS
HEIGHT: 68 IN | SYSTOLIC BLOOD PRESSURE: 112 MMHG | HEART RATE: 81 BPM | WEIGHT: 194 LBS | RESPIRATION RATE: 16 BRPM | OXYGEN SATURATION: 95 % | BODY MASS INDEX: 29.4 KG/M2 | DIASTOLIC BLOOD PRESSURE: 70 MMHG

## 2023-10-09 DIAGNOSIS — I10 HTN (HYPERTENSION), MALIGNANT: ICD-10-CM

## 2023-10-09 DIAGNOSIS — I71.21 AORTIC ROOT ANEURYSM (HCC): ICD-10-CM

## 2023-10-09 DIAGNOSIS — Z88.8 ALLERGY TO IODINE: ICD-10-CM

## 2023-10-09 DIAGNOSIS — I49.3 PVC'S (PREMATURE VENTRICULAR CONTRACTIONS): ICD-10-CM

## 2023-10-09 DIAGNOSIS — I77.810 ASCENDING AORTA DILATATION (HCC): ICD-10-CM

## 2023-10-09 DIAGNOSIS — I47.10 SVT (SUPRAVENTRICULAR TACHYCARDIA) (HCC): ICD-10-CM

## 2023-10-09 PROCEDURE — 3078F DIAST BP <80 MM HG: CPT | Performed by: INTERNAL MEDICINE

## 2023-10-09 PROCEDURE — 99214 OFFICE O/P EST MOD 30 MIN: CPT | Mod: 25 | Performed by: INTERNAL MEDICINE

## 2023-10-09 PROCEDURE — 3074F SYST BP LT 130 MM HG: CPT | Performed by: INTERNAL MEDICINE

## 2023-10-09 PROCEDURE — 99213 OFFICE O/P EST LOW 20 MIN: CPT | Performed by: INTERNAL MEDICINE

## 2023-10-09 PROCEDURE — 93005 ELECTROCARDIOGRAM TRACING: CPT | Performed by: INTERNAL MEDICINE

## 2023-10-09 ASSESSMENT — FIBROSIS 4 INDEX: FIB4 SCORE: 0.56

## 2023-10-09 ASSESSMENT — ENCOUNTER SYMPTOMS
PALPITATIONS: 0
SENSORY CHANGE: 0
SPEECH CHANGE: 0
DIZZINESS: 0
BLURRED VISION: 0
PND: 0
ORTHOPNEA: 0
HALLUCINATIONS: 0
DEPRESSION: 0
LOSS OF CONSCIOUSNESS: 0
NAUSEA: 0
EYE PAIN: 0
CLAUDICATION: 0
WEIGHT LOSS: 0
HEADACHES: 0
VOMITING: 0
COUGH: 0
BRUISES/BLEEDS EASILY: 0
DOUBLE VISION: 0
MYALGIAS: 0
FALLS: 0
SHORTNESS OF BREATH: 0
BLOOD IN STOOL: 0
EYE DISCHARGE: 0
ABDOMINAL PAIN: 0
CHILLS: 0
FEVER: 0

## 2023-10-09 NOTE — PROGRESS NOTES
Chief Complaint   Patient presents with    Follow-Up     F/v Dx:Aortic root aneurysm    Hypertension       Subjective     Venkat Mackenzie is a 63 y.o. male who presents today okay for cardiac care and management to recent aortic root replacement on August 9, 2023.  Patient also had atrial fibrillation postoperatively.  Also with prior history of PVCs, SVT, hypertension.    I have personally interpreted EKG today with patient, there is no evidence of acute coronary syndrome, no evidence of prior infarct, normal CA and QT interval, no significant conduction disease. Sinus rhythm.    In the interim, patient has been doing well without having any symptoms. Patient denies having chest pain, dyspnea, palpitation, presyncope, syncope episodes.       Past Medical History:   Diagnosis Date    Arrhythmia Uknown, Date    Bowel habit changes     diarrhea with anxiety    Diabetes (HCC)     borderline    Gastroparesis     2016    Heart burn Just after eating spicey food    Heart valve disease This is why Doctor is ordering the surgery    Hypertension     7 yrs    Metabolic syndrome      Past Surgical History:   Procedure Laterality Date    AORTIC VALVE REPLACEMENT  8/9/2023    Procedure: AORTIC ROOT AND VALVE REPLACEMENT, TRANSESOPHAGEAL ECHOCARDIOGRAM;  Surgeon: Emanuel Zamudio M.D.;  Location: SURGERY [facility];  Service: Cardiothoracic    ECHOCARDIOGRAM, TRANSESOPHAGEAL, INTRAOPERATIVE  8/9/2023    Procedure: ECHOCARDIOGRAM, TRANSESOPHAGEAL, INTRAOPERATIVE;  Surgeon: Emanuel Zamudio M.D.;  Location: SURGERY [facility];  Service: Cardiothoracic    APPENDECTOMY      CERVICAL FUSION POSTERIOR      7-8 yrs back    OTHER  Neck    VAGOTOMY  at age 19    due to hiatal hernia     Family History   Problem Relation Age of Onset    Stroke Mother     Heart Disease Mother     Diabetes Mother     Lung Disease Mother         COPD    Diabetes Father     Heart Disease Father     Stroke Father     No Known Problems Son     No Known  Problems Son      Social History     Socioeconomic History    Marital status:      Spouse name: Not on file    Number of children: Not on file    Years of education: Not on file    Highest education level: Not on file   Occupational History    Occupation: surveillance     Employer: [employer]   Tobacco Use    Smoking status: Never    Smokeless tobacco: Former     Types: Chew     Quit date: 5/1/1980    Tobacco comments:     None   Vaping Use    Vaping Use: Never used   Substance and Sexual Activity    Alcohol use: Yes     Alcohol/week: 1.2 oz     Types: 2 Cans of beer per week     Comment: one beer a week    Drug use: No    Sexual activity: Yes     Partners: Female   Other Topics Concern    Not on file   Social History Narrative    Not on file     Social Determinants of Health     Financial Resource Strain: Not on file   Food Insecurity: Not on file   Transportation Needs: Not on file   Physical Activity: Not on file   Stress: Not on file   Social Connections: Not on file   Intimate Partner Violence: Not on file   Housing Stability: Not on file     Allergies   Allergen Reactions    Iodine Unspecified and Hives     dizziness  dizziness     Outpatient Encounter Medications as of 10/9/2023   Medication Sig Dispense Refill    furosemide (LASIX) 20 MG Tab Take 1 Tablet by mouth every day. 30 Tablet 1    senna-docusate (PERICOLACE OR SENOKOT S) 8.6-50 MG Tab Take 1 Tablet by mouth every day. 30 Tablet 0    carvedilol (COREG) 3.125 MG Tab Take 1 Tablet by mouth 2 times a day with meals. 60 Tablet 2    acetaminophen (TYLENOL) 500 MG Tab Take 2 Tablets by mouth every 6 hours as needed for Mild Pain. 30 Tablet 0    amiodarone (CORDARONE) 200 MG Tab Take 2 Tablets by mouth 2 times a day. x 7 days, Then Take Two Tablets daily for 7 days, Then Take 1 Tablet Daily. 56 Tablet 1    aspirin 81 MG EC tablet Take 1 Tablet by mouth every day. 100 Tablet 0    Magnesium 250 MG Tab Take 1 Tablet by mouth every morning.       "Cyanocobalamin (VITAMIN B-12) 5000 MCG SL Tab Place 1 Tablet under the tongue every morning.      hydroCHLOROthiazide (HYDRODIURIL) 12.5 MG tablet Take 1 Tablet by mouth every day. (Patient taking differently: Take 12.5 mg by mouth every morning.) 90 Tablet 3    metFORMIN ER (GLUCOPHAGE XR) 500 MG TABLET SR 24 HR Take 1 Tablet by mouth every day. (Patient taking differently: Take 500 mg by mouth every morning.) 90 Tablet 3    allopurinol (ZYLOPRIM) 300 MG Tab Take 1 Tablet by mouth every day. 90 Tablet 3    Potassium 99 MG Tab Take 1 Tablet by mouth every morning.       No facility-administered encounter medications on file as of 10/9/2023.     Review of Systems   Constitutional:  Negative for chills, fever, malaise/fatigue and weight loss.   HENT:  Negative for ear discharge, ear pain, hearing loss and nosebleeds.    Eyes:  Negative for blurred vision, double vision, pain and discharge.   Respiratory:  Negative for cough and shortness of breath.    Cardiovascular:  Negative for chest pain, palpitations, orthopnea, claudication, leg swelling and PND.   Gastrointestinal:  Negative for abdominal pain, blood in stool, melena, nausea and vomiting.   Genitourinary:  Negative for dysuria and hematuria.   Musculoskeletal:  Negative for falls, joint pain and myalgias.   Skin:  Negative for itching and rash.   Neurological:  Negative for dizziness, sensory change, speech change, loss of consciousness and headaches.   Endo/Heme/Allergies:  Negative for environmental allergies. Does not bruise/bleed easily.   Psychiatric/Behavioral:  Negative for depression, hallucinations and suicidal ideas.               Objective     /70 (BP Location: Left arm, Patient Position: Sitting, BP Cuff Size: Adult)   Pulse 81   Resp 16   Ht 1.727 m (5' 8\")   Wt 88 kg (194 lb)   SpO2 95%   BMI 29.50 kg/m²     Physical Exam  Vitals and nursing note reviewed.   Constitutional:       General: He is not in acute distress.     Appearance: He " is not diaphoretic.   HENT:      Head: Normocephalic and atraumatic.      Right Ear: External ear normal.      Left Ear: External ear normal.      Nose: No congestion or rhinorrhea.   Eyes:      General:         Right eye: No discharge.         Left eye: No discharge.   Neck:      Thyroid: No thyromegaly.      Vascular: No JVD.   Cardiovascular:      Rate and Rhythm: Normal rate and regular rhythm.      Pulses: Normal pulses.   Pulmonary:      Effort: No respiratory distress.   Abdominal:      General: There is no distension.      Tenderness: There is no abdominal tenderness.   Musculoskeletal:         General: No swelling or tenderness.      Right lower leg: No edema.      Left lower leg: No edema.   Skin:     General: Skin is warm and dry.   Neurological:      Mental Status: He is alert and oriented to person, place, and time.      Cranial Nerves: No cranial nerve deficit.   Psychiatric:         Behavior: Behavior normal.                Assessment & Plan     1. SVT (supraventricular tachycardia)  EKG      2. PVC's (premature ventricular contractions)        3. Ascending aorta dilatation (HCC)        4. Aortic root aneurysm (HCC)        5. HTN (hypertension), malignant        6. Allergy to iodine            Medical Decision Making: Today's Assessment/Status/Plan:   At this time patient is clinically stable in terms of his cardiac standpoint.  Blood pressure is well controlled.  Continue current medications at current dose as above. Refills were prescribed today and patient was instructed with plan of care.

## 2023-10-10 LAB — EKG IMPRESSION: NORMAL

## 2023-10-10 PROCEDURE — 93010 ELECTROCARDIOGRAM REPORT: CPT | Performed by: INTERNAL MEDICINE

## 2023-10-16 ENCOUNTER — OFFICE VISIT (OUTPATIENT)
Dept: MEDICAL GROUP | Facility: PHYSICIAN GROUP | Age: 63
End: 2023-10-16
Payer: COMMERCIAL

## 2023-10-16 VITALS
TEMPERATURE: 97.3 F | HEART RATE: 70 BPM | WEIGHT: 205 LBS | DIASTOLIC BLOOD PRESSURE: 80 MMHG | HEIGHT: 67 IN | OXYGEN SATURATION: 97 % | SYSTOLIC BLOOD PRESSURE: 150 MMHG | BODY MASS INDEX: 32.18 KG/M2

## 2023-10-16 DIAGNOSIS — Z02.89 ENCOUNTER FOR COMPLETION OF FORM WITH PATIENT: ICD-10-CM

## 2023-10-16 DIAGNOSIS — I10 ESSENTIAL HYPERTENSION: ICD-10-CM

## 2023-10-16 PROBLEM — E11.9 TYPE 2 DIABETES MELLITUS WITHOUT COMPLICATION, WITHOUT LONG-TERM CURRENT USE OF INSULIN (HCC): Status: RESOLVED | Noted: 2023-08-09 | Resolved: 2023-10-16

## 2023-10-16 PROCEDURE — 99214 OFFICE O/P EST MOD 30 MIN: CPT | Performed by: NURSE PRACTITIONER

## 2023-10-16 PROCEDURE — 3077F SYST BP >= 140 MM HG: CPT | Performed by: NURSE PRACTITIONER

## 2023-10-16 PROCEDURE — 3079F DIAST BP 80-89 MM HG: CPT | Performed by: NURSE PRACTITIONER

## 2023-10-16 RX ORDER — METOPROLOL SUCCINATE 25 MG/1
TABLET, EXTENDED RELEASE ORAL
COMMUNITY
Start: 2023-09-22 | End: 2023-10-16

## 2023-10-16 ASSESSMENT — FIBROSIS 4 INDEX: FIB4 SCORE: 0.56

## 2023-10-16 NOTE — LETTER
October 16, 2023    To Whom It May Concern:         This is confirmation that Venkat Mackenzie attended his scheduled appointment with VIKKI Major on 10/16/23. Please extend his leave of absence an additional 3 weeks with a return to work date of 11/13/2023 with full restrictions.          If you have any questions please do not hesitate to call me at the phone number listed below.    Sincerely,          ECHO Major.  [phone]

## 2023-10-16 NOTE — ASSESSMENT & PLAN NOTE
Blood pressure today 166/80 and on repeat 150/80. Continues hydrochlorothiazide 12.5 mg daily, carvedilol 3.125 mg twice daily, and amiodarone 400 mg twice daily. He has not taken his blood pressure medications today.

## 2023-10-16 NOTE — PROGRESS NOTES
Subjective:     CC: paperwork     HPI:   Venkat presents today with the following:    Paperwork   He is moving to [place] 10/23/2023 requesting 3 weeks off from work. Per patient [facility] wants him to extend his leave of absence as there is no light duty available. He is transferring to [facility] in [place]. He is a . He states [employer] is requesting a letter for the 3 week leave of absence starting 10/23/2023 with return date of 11/13/2023. He is currently on a leave of absence. He brings in return to work paperwork. He had aortic root replaced 8/9/2023. He has followed up with cardiology and released back to work.     Essential hypertension  Blood pressure today 166/80 and on repeat 150/80. Continues hydrochlorothiazide 12.5 mg daily, carvedilol 3.125 mg twice daily, and amiodarone 400 mg twice daily. He has not taken his blood pressure medications today.       Past Medical History:   Diagnosis Date    Arrhythmia Uknown, Date    Bowel habit changes     diarrhea with anxiety    Diabetes (HCC)     borderline    Gastroparesis     2016    Heart burn Just after eating spicey food    Heart valve disease This is why Doctor is ordering the surgery    Hypertension     7 yrs    Metabolic syndrome        Social History     Tobacco Use    Smoking status: Never    Smokeless tobacco: Former     Types: Chew     Quit date: 5/1/1980    Tobacco comments:     None   Vaping Use    Vaping Use: Never used   Substance Use Topics    Alcohol use: Yes     Alcohol/week: 1.2 oz     Types: 2 Cans of beer per week     Comment: one beer a week    Drug use: No       Current Outpatient Medications Ordered in Epic   Medication Sig Dispense Refill    furosemide (LASIX) 20 MG Tab Take 1 Tablet by mouth every day. 30 Tablet 1    senna-docusate (PERICOLACE OR SENOKOT S) 8.6-50 MG Tab Take 1 Tablet by mouth every day. 30 Tablet 0    carvedilol (COREG) 3.125 MG Tab Take 1 Tablet by mouth 2 times a day with meals. 60 Tablet 2    acetaminophen  "(TYLENOL) 500 MG Tab Take 2 Tablets by mouth every 6 hours as needed for Mild Pain. 30 Tablet 0    amiodarone (CORDARONE) 200 MG Tab Take 2 Tablets by mouth 2 times a day. x 7 days, Then Take Two Tablets daily for 7 days, Then Take 1 Tablet Daily. 56 Tablet 1    aspirin 81 MG EC tablet Take 1 Tablet by mouth every day. 100 Tablet 0    Magnesium 250 MG Tab Take 1 Tablet by mouth every morning.      Cyanocobalamin (VITAMIN B-12) 5000 MCG SL Tab Place 1 Tablet under the tongue every morning.      hydroCHLOROthiazide (HYDRODIURIL) 12.5 MG tablet Take 1 Tablet by mouth every day. (Patient taking differently: Take 12.5 mg by mouth every morning.) 90 Tablet 3    metFORMIN ER (GLUCOPHAGE XR) 500 MG TABLET SR 24 HR Take 1 Tablet by mouth every day. (Patient taking differently: Take 500 mg by mouth every morning.) 90 Tablet 3    allopurinol (ZYLOPRIM) 300 MG Tab Take 1 Tablet by mouth every day. 90 Tablet 3    Potassium 99 MG Tab Take 1 Tablet by mouth every morning.       No current [facility]-ordered facility-administered medications on file.       Allergies:  Iodine    Health Maintenance: Reviewed      Objective:     Vital signs reviewed  Exam:  BP (!) 150/80 (BP Location: Right arm, Patient Position: Sitting, BP Cuff Size: Adult)   Pulse 70   Temp 36.3 °C (97.3 °F) (Temporal)   Ht 1.702 m (5' 7\")   Wt 93 kg (205 lb)   SpO2 97%   BMI 32.11 kg/m²  Body mass index is 32.11 kg/m².    Gen: Alert and oriented, No apparent distress.  Eyes:   Lids normal. Glasses in place.   Lungs: Normal effort, CTA bilaterally, no wheezes, rhonchi, or rales  CV: Regular rate and rhythm. No murmurs, rubs, or gallops.  Ext: No clubbing, cyanosis, edema.      Assessment & Plan:     63 y.o. male with the following -     1. Essential hypertension  Chronic exacerbated problem.  Blood pressure is elevated today 150/80 however he is not taking his medications.  We will have him return for an MA BP check in next 2-3 days and reminded him to take his " medications we can see his blood pressure.  Blood pressure cardiology was 112/70.  Continue hydrochlorothiazide 12.5 mg daily, carvedilol 3.125 mg twice daily and amiodarone 400 mg twice daily.    2. Encounter for completion of form with patient  Acute uncomplicated problem.  Return to work form completed today with return to work date of 11/13/2023.  I did provide him with a note as well extending his leave for additional 3 weeks returning to work 11/13/2023.  He asked if there are any recommendations for primary care in [place] and discussed that he should check with his insurance to get a list of providers in his network.  He verbalized understanding.  Return as needed.      Return for MA visit for BP check in 2-3 days.    Please note that this dictation was created using voice recognition software. I have made every reasonable attempt to correct obvious errors, but I expect that there are errors of grammar and possibly content that I did not discover before finalizing the note.

## 2023-11-04 DIAGNOSIS — Z98.890 S/P ASCENDING AORTIC ANEURYSM REPAIR: ICD-10-CM

## 2023-11-04 DIAGNOSIS — Z86.79 S/P ASCENDING AORTIC ANEURYSM REPAIR: ICD-10-CM

## 2023-11-04 RX ORDER — AMIODARONE HYDROCHLORIDE 200 MG/1
TABLET ORAL
Qty: 56 TABLET | Refills: 0 | OUTPATIENT
Start: 2023-11-04

## 2023-12-19 ENCOUNTER — DOCUMENTATION (OUTPATIENT)
Dept: VASCULAR LAB | Facility: MEDICAL CENTER | Age: 63
End: 2023-12-19

## 2023-12-19 NOTE — PROGRESS NOTES
Patient missed appointment today to establish with vascular care  Will ask MA to call and reschedule.  Will be unable to participate in care until or unless patient makes appt to come infor a face to face visit to establish.  Await further patient contact.    Michael J. Bloch, MD  Vascular Care

## 2023-12-22 ENCOUNTER — TELEPHONE (OUTPATIENT)
Dept: VASCULAR LAB | Facility: MEDICAL CENTER | Age: 63
End: 2023-12-22

## 2023-12-22 NOTE — TELEPHONE ENCOUNTER
Called and left message to r/s initial appt with Dr Michael Bloch. Please contact  Selin Ortiz, Med Ass't for sooner openings.      Selin Ortiz, Med Ass't  [facility]  Ph. [phone]  Fx. [phone]

## 2024-01-05 ENCOUNTER — TELEPHONE (OUTPATIENT)
Dept: VASCULAR LAB | Facility: MEDICAL CENTER | Age: 64
End: 2024-01-05

## 2024-01-05 NOTE — TELEPHONE ENCOUNTER
Called and left a message to reschedule initial appt with Dr Michael Bloch.    Selin Ortiz, Med Ass't  [facility]  Ph. [phone]  Fx. [phone]

## 2024-05-30 DIAGNOSIS — I10 HTN (HYPERTENSION), MALIGNANT: ICD-10-CM

## 2024-05-30 RX ORDER — HYDROCHLOROTHIAZIDE 12.5 MG/1
CAPSULE, GELATIN COATED ORAL DAILY
Qty: 100 CAPSULE | Refills: 4 | Status: SHIPPED | OUTPATIENT
Start: 2024-05-30

## 2025-02-19 DIAGNOSIS — I10 HTN (HYPERTENSION), MALIGNANT: ICD-10-CM

## 2025-02-19 RX ORDER — HYDROCHLOROTHIAZIDE 12.5 MG/1
CAPSULE ORAL DAILY
Qty: 100 CAPSULE | Refills: 4 | OUTPATIENT
Start: 2025-02-19

## (undated) DEVICE — MICRODRIP PRIMARY VENTED 60 (48EA/CA) THIS WAS PART #2C8428 WHICH WAS DISCONTINUED

## (undated) DEVICE — SUTURE 4-0 PROLENE V-7 D/A (36PK/BX)

## (undated) DEVICE — SUTURE 2-0 ETHIBOND SH D/A 36 (36PK/BX)"

## (undated) DEVICE — SET BIFURCATED BLOOD - (48EA/CS)

## (undated) DEVICE — KIT RADIAL ARTERY 20GA W/MAX BARRIER AND BIOPATCH  (5EA/CA) #10740 IS FOR THE SET RADIAL ARTERIAL

## (undated) DEVICE — PAD LAP STERILE 18 X 18 - (5/PK 40PK/CA)

## (undated) DEVICE — GOWN SURGEONS X-LARGE - DISP. (30/CA)

## (undated) DEVICE — SODIUM CHL. INJ. 0.9% 500ML (24EA/CA 50CA/PF)

## (undated) DEVICE — PTFE PLED STER - (250/CA)

## (undated) DEVICE — SUTURE 4-0 30CM STRATAFIX SPIRAL PS-2 (12EA/BX)

## (undated) DEVICE — BAG RESUSCITATION DISPOSABLE - WITH MASK (10 EA/CA)

## (undated) DEVICE — D-5-W INJ. 500 ML - (24EA/CA)

## (undated) DEVICE — SET LEADWIRE 5 LEAD BEDSIDE DISPOSABLE ECG (1SET OF 5/EA)

## (undated) DEVICE — SYS DLV COST CLS RM TEMP - INJECTATE (CO-SET II) (10EA/CA)

## (undated) DEVICE — ELECTRODE DUAL RETURN W/ CORD - (50/PK)

## (undated) DEVICE — SUTURE 6-0 PROLENE RB-2 D/A 30 (36PK/BX)"

## (undated) DEVICE — TUBING CLEARLINK DUO-VENT - C-FLO (48EA/CA)

## (undated) DEVICE — CHLORAPREP 26 ML APPLICATOR - ORANGE TINT(25/CA)

## (undated) DEVICE — BLADE SURGICAL #15 - (50/BX 3BX/CA)

## (undated) DEVICE — CANISTER SUCTION 3000ML MECHANICAL FILTER AUTO SHUTOFF MEDI-VAC NONSTERILE LF DISP  (40EA/CA)

## (undated) DEVICE — KIT CATHETERIZATION TWO-LUMEN CENTRAL VENOUS PI CVC (5EA/CA)

## (undated) DEVICE — ELECTRODE RADIOLUCNT SOLID GEL DEFIB PADS (12EA/CA)

## (undated) DEVICE — ADHESIVE DERMABOND HVD MINI (12EA/BX)

## (undated) DEVICE — TRANSDUCER BIFURCATED MONITORING KIT (10EA/CA)

## (undated) DEVICE — SUTURE OHS

## (undated) DEVICE — TRAY SURESTEP FOLEY TEMP SENSING 16FR (10EA/CA) ORDER  #18764 FOR TEMP FOLEY ONLY

## (undated) DEVICE — SUTURE 4-0 PROLENE RB-1 D/A 36 (36PK/BX)"

## (undated) DEVICE — SENSOR OXIMETER ADULT SPO2 RD SET (20EA/BX)

## (undated) DEVICE — BLADE STERNUM SAW SURGICAL 32.0 X 6.4 MM STERILE (1/EA)

## (undated) DEVICE — FIBRILLAR SURGICEL 4X4 - 10/CA

## (undated) DEVICE — GLOVE BIOGEL SZ 7.5 SURGICAL PF LTX - (50PR/BX 4BX/CA)

## (undated) DEVICE — INSERT STEALTH #5 - (10/BX)

## (undated) DEVICE — LACTATED RINGERS INJ 1000 ML - (14EA/CA 60CA/PF)

## (undated) DEVICE — SOD. CHL. INJ. 0.9% 1000 ML - (14EA/CA 60CA/PF)

## (undated) DEVICE — TUBE CHEST 32FR. RIGHT ANGLED (10EA/CA)

## (undated) DEVICE — SUTURE 2-0 ETHIBOND V-5 - (6/BX)

## (undated) DEVICE — SENSOR CEREBRAL AND SOMATIC MONITORING (20/CA)

## (undated) DEVICE — BAG DRAINAGE ANTIREFLUX TOWER SLIDE TAP 4000 ML (20EA/CA)

## (undated) DEVICE — SUCTION INSTRUMENT YANKAUER BULBOUS TIP W/O VENT (50EA/CA)

## (undated) DEVICE — NEEDLE, MAYO CATGUT 1826-6D TROCAR

## (undated) DEVICE — WIRE STEEL 5-0 B&S 20 OHS - 5/PK 12PK/BX ITEM. D5329 OR D6625 CAN BE USED AS A SUB

## (undated) DEVICE — SODIUM CHL IRRIGATION 0.9% 1000ML (12EA/CA)

## (undated) DEVICE — DERMABOND ADVANCED - (12EA/BX)

## (undated) DEVICE — BAG SPONGE COUNT 10.25 X 32 - BLUE (250/CA)

## (undated) DEVICE — TOWEL STOP TIMEOUT SAFETY FLAG (40EA/CA)

## (undated) DEVICE — ORGANIZER SUTURE GABBAY-FRAT - ER STERILE (3/SET 4ST/BX)

## (undated) DEVICE — SLEEVE, VASO, THIGH, MED

## (undated) DEVICE — SUTURE 3-0 SILK SH C/R 18 IN - (12/BX)

## (undated) DEVICE — COVER LIGHT HANDLE ALC PLUS DISP (18EA/BX)

## (undated) DEVICE — Device

## (undated) DEVICE — LEAD PACING TEMP MYO - (12/BX)

## (undated) DEVICE — QUICKLOADS COR-KNOT TITANIUM - (6EA/PK 12PK/BX)

## (undated) DEVICE — SUTURE 0 VICRYL PLUS CTX - 36 INCH (36/BX)

## (undated) DEVICE — ARMBOARD  SMALL IV 9 INLONG - (25EA/CA)

## (undated) DEVICE — PACK CV DRAPING/BASIN 2PART - (1/CA)

## (undated) DEVICE — STOPCOCK MALE 4-WAY - (50/CA)

## (undated) DEVICE — CAUTERY OPTHALMIC HOT TEMP (10EA/SP)

## (undated) DEVICE — GOWN WARMING STANDARD FLEX - (30/CA)

## (undated) DEVICE — GLOVE BIOGEL INDICATOR SZ 8 SURGICAL PF LTX - (50/BX 4BX/CA)

## (undated) DEVICE — SET EXTENSION WITH 2 PORTS (48EA/CA) ***PART #2C8610 IS A SUBSTITUTE*****

## (undated) DEVICE — SUTURE 5-0 PROLENE RB-1 D/A 36 (36PK/BX)"

## (undated) DEVICE — TUBE CHEST 32FR. STRAIGHT - (10EA/CA)

## (undated) DEVICE — SET FLUID WARMING STANDARD FLOW - (10/CA)

## (undated) DEVICE — DEVICE KIT COR-KNOT COMBO2 DEVICES & 12 KNOTS PER KIT (6KT/CA)

## (undated) DEVICE — SUTURE 0 ETHIBOND MO6 C/R - (12/BX) 8-18 INCH ETHICON

## (undated) DEVICE — SUTURE 1 SILK BLK BRD TIES 10-30 (12/BX)"

## (undated) DEVICE — SYSTEM CHEST DRAIN ADULT/PEDS W/AUTO TRANSFUSION CAPABILITY SAHARA (6EA/CA)